# Patient Record
Sex: MALE | Race: WHITE | NOT HISPANIC OR LATINO | Employment: OTHER | URBAN - METROPOLITAN AREA
[De-identification: names, ages, dates, MRNs, and addresses within clinical notes are randomized per-mention and may not be internally consistent; named-entity substitution may affect disease eponyms.]

---

## 2017-02-06 ENCOUNTER — HISTORICAL (OUTPATIENT)
Dept: LAB | Facility: HOSPITAL | Age: 72
End: 2017-02-06

## 2017-04-24 ENCOUNTER — HISTORICAL (OUTPATIENT)
Dept: ADMINISTRATIVE | Facility: HOSPITAL | Age: 72
End: 2017-04-24

## 2017-04-28 ENCOUNTER — HISTORICAL (OUTPATIENT)
Dept: RADIOLOGY | Facility: HOSPITAL | Age: 72
End: 2017-04-28

## 2017-08-07 ENCOUNTER — HISTORICAL (OUTPATIENT)
Dept: LAB | Facility: HOSPITAL | Age: 72
End: 2017-08-07

## 2017-08-07 LAB
ALBUMIN SERPL-MCNC: 3.9 GM/DL (ref 3.4–5)
ALBUMIN/GLOB SERPL: 1.1 RATIO (ref 1.1–2)
ALP SERPL-CCNC: 56 UNIT/L (ref 46–116)
ALT SERPL-CCNC: 21 UNIT/L (ref 12–78)
AST SERPL-CCNC: 18 UNIT/L (ref 15–37)
BILIRUB SERPL-MCNC: 0.5 MG/DL (ref 0.2–1)
BILIRUBIN DIRECT+TOT PNL SERPL-MCNC: 0.12 MG/DL (ref 0–0.2)
BILIRUBIN DIRECT+TOT PNL SERPL-MCNC: 0.38 MG/DL (ref 0–0.8)
BUN SERPL-MCNC: 22 MG/DL (ref 7–18)
CALCIUM SERPL-MCNC: 10 MG/DL (ref 8.5–10.1)
CHLORIDE SERPL-SCNC: 106 MMOL/L (ref 98–107)
CO2 SERPL-SCNC: 21.7 MMOL/L (ref 21–32)
CREAT SERPL-MCNC: 1.05 MG/DL (ref 0.6–1.3)
CREAT UR-MCNC: 48.6 MG/DL (ref 30–125)
EST. AVERAGE GLUCOSE BLD GHB EST-MCNC: 128 MG/DL
GLOBULIN SER-MCNC: 3.7 GM/DL (ref 2.4–3.5)
GLUCOSE SERPL-MCNC: 112 MG/DL (ref 74–106)
HBA1C MFR BLD: 6.1 % (ref 4.5–6.2)
MICROALBUMIN UR-MCNC: 1.2 MG/DL (ref 0–3)
MICROALBUMIN/CREAT RATIO PNL UR: 24.7 MG/GM CR (ref 0–30)
POTASSIUM SERPL-SCNC: 4.5 MMOL/L (ref 3.5–5.1)
PROT SERPL-MCNC: 7.6 GM/DL (ref 6.4–8.2)
SODIUM SERPL-SCNC: 144 MMOL/L (ref 136–145)

## 2018-02-16 ENCOUNTER — HISTORICAL (OUTPATIENT)
Dept: LAB | Facility: HOSPITAL | Age: 73
End: 2018-02-16

## 2018-08-20 ENCOUNTER — HISTORICAL (OUTPATIENT)
Dept: LAB | Facility: HOSPITAL | Age: 73
End: 2018-08-20

## 2019-02-18 ENCOUNTER — HISTORICAL (OUTPATIENT)
Dept: LAB | Facility: HOSPITAL | Age: 74
End: 2019-02-18

## 2019-02-18 LAB
ABS NEUT (OLG): 3.27 X10(3)/MCL (ref 2.1–9.2)
ALBUMIN SERPL-MCNC: 3.9 GM/DL (ref 3.4–5)
ALBUMIN/GLOB SERPL: 1.1 RATIO (ref 1.1–2)
ALP SERPL-CCNC: 51 UNIT/L (ref 46–116)
ALT SERPL-CCNC: 19 UNIT/L (ref 12–78)
AST SERPL-CCNC: 17 UNIT/L (ref 15–37)
BASOPHILS # BLD AUTO: 0.1 X10(3)/MCL (ref 0–0.2)
BASOPHILS NFR BLD AUTO: 1 %
BILIRUB SERPL-MCNC: 0.5 MG/DL (ref 0.2–1)
BILIRUBIN DIRECT+TOT PNL SERPL-MCNC: 0.16 MG/DL (ref 0–0.2)
BILIRUBIN DIRECT+TOT PNL SERPL-MCNC: 0.34 MG/DL (ref 0–0.8)
BUN SERPL-MCNC: 23.4 MG/DL (ref 7–18)
CALCIUM SERPL-MCNC: 9.6 MG/DL (ref 8.5–10.1)
CHLORIDE SERPL-SCNC: 103 MMOL/L (ref 98–107)
CHOLEST SERPL-MCNC: 134 MG/DL (ref 0–200)
CHOLEST/HDLC SERPL: 1.9 {RATIO} (ref 0–5)
CO2 SERPL-SCNC: 29.3 MMOL/L (ref 21–32)
CREAT SERPL-MCNC: 1.25 MG/DL (ref 0.6–1.3)
CREAT UR-MCNC: 52.4 MG/DL (ref 30–125)
EOSINOPHIL # BLD AUTO: 1.2 X10(3)/MCL (ref 0–0.9)
EOSINOPHIL NFR BLD AUTO: 17 %
ERYTHROCYTE [DISTWIDTH] IN BLOOD BY AUTOMATED COUNT: 14 % (ref 11.5–17)
EST. AVERAGE GLUCOSE BLD GHB EST-MCNC: 117 MG/DL
GLOBULIN SER-MCNC: 3.4 GM/DL (ref 2.4–3.5)
GLUCOSE SERPL-MCNC: 110 MG/DL (ref 74–106)
HBA1C MFR BLD: 5.7 % (ref 4.5–6.2)
HCT VFR BLD AUTO: 45.6 % (ref 42–52)
HDLC SERPL-MCNC: 72 MG/DL (ref 40–60)
HGB BLD-MCNC: 14.6 GM/DL (ref 14–18)
LDLC SERPL CALC-MCNC: 53 MG/DL (ref 0–129)
LYMPHOCYTES # BLD AUTO: 1.8 X10(3)/MCL (ref 0.6–4.6)
LYMPHOCYTES NFR BLD AUTO: 26 %
MCH RBC QN AUTO: 28.2 PG (ref 27–31)
MCHC RBC AUTO-ENTMCNC: 32 GM/DL (ref 33–36)
MCV RBC AUTO: 88.2 FL (ref 80–94)
MICROALBUMIN UR-MCNC: <0.1 MG/DL (ref 0–3)
MICROALBUMIN/CREAT RATIO PNL UR: <1.9 MG/GM CR (ref 0–30)
MONOCYTES # BLD AUTO: 0.7 X10(3)/MCL (ref 0.1–1.3)
MONOCYTES NFR BLD AUTO: 10 %
NEUTROPHILS # BLD AUTO: 3.27 X10(3)/MCL (ref 1.4–7.9)
NEUTROPHILS NFR BLD AUTO: 47 %
PLATELET # BLD AUTO: 263 X10(3)/MCL (ref 130–400)
PMV BLD AUTO: 10 FL (ref 9.4–12.4)
POTASSIUM SERPL-SCNC: 4.3 MMOL/L (ref 3.5–5.1)
PROT SERPL-MCNC: 7.3 GM/DL (ref 6.4–8.2)
PSA SERPL-MCNC: 0.88 NG/ML (ref 0–4)
RBC # BLD AUTO: 5.17 X10(6)/MCL (ref 4.7–6.1)
SODIUM SERPL-SCNC: 140 MMOL/L (ref 136–145)
TRIGL SERPL-MCNC: 46 MG/DL
VLDLC SERPL CALC-MCNC: 9 MG/DL
WBC # SPEC AUTO: 7 X10(3)/MCL (ref 4.5–11.5)

## 2019-08-20 ENCOUNTER — HISTORICAL (OUTPATIENT)
Dept: LAB | Facility: HOSPITAL | Age: 74
End: 2019-08-20

## 2020-02-20 ENCOUNTER — HISTORICAL (OUTPATIENT)
Dept: LAB | Facility: HOSPITAL | Age: 75
End: 2020-02-20

## 2020-09-08 ENCOUNTER — HISTORICAL (OUTPATIENT)
Dept: LAB | Facility: HOSPITAL | Age: 75
End: 2020-09-08

## 2021-03-08 ENCOUNTER — HISTORICAL (OUTPATIENT)
Dept: LAB | Facility: HOSPITAL | Age: 76
End: 2021-03-08

## 2021-03-08 LAB
ABS NEUT (OLG): 3.43 X10(3)/MCL (ref 2.1–9.2)
ALBUMIN SERPL-MCNC: 3.7 GM/DL (ref 3.4–4.8)
ALBUMIN/GLOB SERPL: 1.3 RATIO (ref 1.1–2)
ALP SERPL-CCNC: 37 UNIT/L (ref 40–150)
ALT SERPL-CCNC: 11 UNIT/L (ref 0–55)
AST SERPL-CCNC: 13 UNIT/L (ref 5–34)
BASOPHILS # BLD AUTO: 0.1 X10(3)/MCL (ref 0–0.2)
BASOPHILS NFR BLD AUTO: 1 %
BILIRUB SERPL-MCNC: 0.7 MG/DL
BILIRUBIN DIRECT+TOT PNL SERPL-MCNC: 0.3 MG/DL (ref 0–0.5)
BILIRUBIN DIRECT+TOT PNL SERPL-MCNC: 0.4 MG/DL (ref 0–0.8)
BUN SERPL-MCNC: 20.1 MG/DL (ref 8.4–25.7)
CALCIUM SERPL-MCNC: 9.3 MG/DL (ref 8.8–10)
CHLORIDE SERPL-SCNC: 106 MMOL/L (ref 98–107)
CO2 SERPL-SCNC: 27 MMOL/L (ref 23–31)
CREAT SERPL-MCNC: 0.91 MG/DL (ref 0.73–1.18)
EOSINOPHIL # BLD AUTO: 1.2 X10(3)/MCL (ref 0–0.9)
EOSINOPHIL NFR BLD AUTO: 17 %
ERYTHROCYTE [DISTWIDTH] IN BLOOD BY AUTOMATED COUNT: 15 % (ref 11.5–17)
EST. AVERAGE GLUCOSE BLD GHB EST-MCNC: 114 MG/DL
GLOBULIN SER-MCNC: 2.8 GM/DL (ref 2.4–3.5)
GLUCOSE SERPL-MCNC: 80 MG/DL (ref 82–115)
HBA1C MFR BLD: 5.6 %
HCT VFR BLD AUTO: 47.3 % (ref 42–52)
HGB BLD-MCNC: 15 GM/DL (ref 14–18)
IMM GRANULOCYTES # BLD AUTO: 0.01 % (ref 0–0.02)
IMM GRANULOCYTES NFR BLD AUTO: 0.1 % (ref 0–0.43)
LYMPHOCYTES # BLD AUTO: 1.6 X10(3)/MCL (ref 0.6–4.6)
LYMPHOCYTES NFR BLD AUTO: 23 %
MCH RBC QN AUTO: 28.4 PG (ref 27–31)
MCHC RBC AUTO-ENTMCNC: 31.7 GM/DL (ref 33–36)
MCV RBC AUTO: 89.6 FL (ref 80–94)
MONOCYTES # BLD AUTO: 0.6 X10(3)/MCL (ref 0.1–1.3)
MONOCYTES NFR BLD AUTO: 9 %
NEUTROPHILS # BLD AUTO: 3.43 X10(3)/MCL (ref 1.4–7.9)
NEUTROPHILS NFR BLD AUTO: 50 %
PLATELET # BLD AUTO: 223 X10(3)/MCL (ref 130–400)
PMV BLD AUTO: 11.2 FL (ref 9.4–12.4)
POTASSIUM SERPL-SCNC: 4.4 MMOL/L (ref 3.5–5.1)
PROT SERPL-MCNC: 6.5 GM/DL (ref 5.8–7.6)
PSA SERPL-MCNC: 1.15 NG/ML
RBC # BLD AUTO: 5.28 X10(6)/MCL (ref 4.7–6.1)
SODIUM SERPL-SCNC: 145 MMOL/L (ref 136–145)
TSH SERPL-ACNC: 1.79 UIU/ML (ref 0.35–4.94)
WBC # SPEC AUTO: 6.9 X10(3)/MCL (ref 4.5–11.5)

## 2021-09-13 ENCOUNTER — HISTORICAL (OUTPATIENT)
Dept: LAB | Facility: HOSPITAL | Age: 76
End: 2021-09-13

## 2021-09-13 LAB
ABS NEUT (OLG): 4.38 X10(3)/MCL (ref 2.1–9.2)
ALBUMIN SERPL-MCNC: 3.5 GM/DL (ref 3.4–4.8)
ALBUMIN/GLOB SERPL: 1.2 RATIO (ref 1.1–2)
ALP SERPL-CCNC: 37 UNIT/L (ref 40–150)
ALT SERPL-CCNC: 8 UNIT/L (ref 0–55)
AST SERPL-CCNC: 13 UNIT/L (ref 5–34)
BASOPHILS # BLD AUTO: 0.1 X10(3)/MCL (ref 0–0.2)
BASOPHILS NFR BLD AUTO: 1 %
BILIRUB SERPL-MCNC: 0.8 MG/DL
BILIRUBIN DIRECT+TOT PNL SERPL-MCNC: 0.4 MG/DL (ref 0–0.5)
BILIRUBIN DIRECT+TOT PNL SERPL-MCNC: 0.4 MG/DL (ref 0–0.8)
BUN SERPL-MCNC: 22.3 MG/DL (ref 8.4–25.7)
CALCIUM SERPL-MCNC: 9.2 MG/DL (ref 8.8–10)
CHLORIDE SERPL-SCNC: 107 MMOL/L (ref 98–107)
CHOLEST SERPL-MCNC: 119 MG/DL
CHOLEST/HDLC SERPL: 2 {RATIO} (ref 0–5)
CO2 SERPL-SCNC: 24 MMOL/L (ref 23–31)
CREAT SERPL-MCNC: 0.97 MG/DL (ref 0.73–1.18)
CREAT UR-MCNC: 46.5 MG/DL (ref 58–161)
EOSINOPHIL # BLD AUTO: 1.2 X10(3)/MCL (ref 0–0.9)
EOSINOPHIL NFR BLD AUTO: 15 %
ERYTHROCYTE [DISTWIDTH] IN BLOOD BY AUTOMATED COUNT: 14.9 % (ref 11.5–17)
EST. AVERAGE GLUCOSE BLD GHB EST-MCNC: 111.2 MG/DL
GLOBULIN SER-MCNC: 3 GM/DL (ref 2.4–3.5)
GLUCOSE SERPL-MCNC: 95 MG/DL (ref 82–115)
HBA1C MFR BLD: 5.5 %
HCT VFR BLD AUTO: 45.5 % (ref 42–52)
HDLC SERPL-MCNC: 59 MG/DL (ref 35–60)
HGB BLD-MCNC: 15 GM/DL (ref 14–18)
IMM GRANULOCYTES # BLD AUTO: 0.01 % (ref 0–0.02)
IMM GRANULOCYTES NFR BLD AUTO: 0.1 % (ref 0–0.43)
LDLC SERPL CALC-MCNC: 51 MG/DL (ref 50–140)
LYMPHOCYTES # BLD AUTO: 1.6 X10(3)/MCL (ref 0.6–4.6)
LYMPHOCYTES NFR BLD AUTO: 20 %
MCH RBC QN AUTO: 28.8 PG (ref 27–31)
MCHC RBC AUTO-ENTMCNC: 33 GM/DL (ref 33–36)
MCV RBC AUTO: 87.3 FL (ref 80–94)
MICROALBUMIN UR-MCNC: 25.1 UG/ML
MICROALBUMIN/CREAT RATIO PNL UR: 54 MG/GM CR (ref 0–30)
MONOCYTES # BLD AUTO: 0.7 X10(3)/MCL (ref 0.1–1.3)
MONOCYTES NFR BLD AUTO: 9 %
NEUTROPHILS # BLD AUTO: 4.38 X10(3)/MCL (ref 1.4–7.9)
NEUTROPHILS NFR BLD AUTO: 55 %
PLATELET # BLD AUTO: 220 X10(3)/MCL (ref 130–400)
PMV BLD AUTO: 10.7 FL (ref 9.4–12.4)
POTASSIUM SERPL-SCNC: 4 MMOL/L (ref 3.5–5.1)
PROT SERPL-MCNC: 6.5 GM/DL (ref 5.8–7.6)
RBC # BLD AUTO: 5.21 X10(6)/MCL (ref 4.7–6.1)
SODIUM SERPL-SCNC: 140 MMOL/L (ref 136–145)
TRIGL SERPL-MCNC: 47 MG/DL (ref 34–140)
TSH SERPL-ACNC: 1.88 UIU/ML (ref 0.35–4.94)
VLDLC SERPL CALC-MCNC: 9 MG/DL
WBC # SPEC AUTO: 7.9 X10(3)/MCL (ref 4.5–11.5)

## 2022-02-25 ENCOUNTER — HISTORICAL (OUTPATIENT)
Dept: LAB | Facility: HOSPITAL | Age: 77
End: 2022-02-25

## 2022-02-25 LAB
ALBUMIN SERPL-MCNC: 3.5 G/DL (ref 3.4–4.8)
ALBUMIN/GLOB SERPL: 1 {RATIO} (ref 1.1–2)
ALP SERPL-CCNC: 42 U/L (ref 40–150)
ALT SERPL-CCNC: 11 U/L (ref 0–55)
AST SERPL-CCNC: 13 U/L (ref 5–34)
BILIRUB SERPL-MCNC: 0.8 MG/DL
BILIRUBIN DIRECT+TOT PNL SERPL-MCNC: 0.4 (ref 0–0.5)
BILIRUBIN DIRECT+TOT PNL SERPL-MCNC: 0.4 (ref 0–0.8)
BUN SERPL-MCNC: 16.2 MG/DL (ref 8.4–25.7)
CALCIUM SERPL-MCNC: 9.5 MG/DL (ref 8.7–10.5)
CHLORIDE SERPL-SCNC: 110 MMOL/L (ref 98–107)
CHOLEST SERPL-MCNC: 121 MG/DL
CHOLEST/HDLC SERPL: 2 {RATIO} (ref 0–5)
CO2 SERPL-SCNC: 26 MMOL/L (ref 23–31)
CREAT SERPL-MCNC: 0.94 MG/DL (ref 0.73–1.18)
EST. AVERAGE GLUCOSE BLD GHB EST-MCNC: 116.9 MG/DL
GLOBULIN SER-MCNC: 3.4 G/DL (ref 2.4–3.5)
GLUCOSE SERPL-MCNC: 99 MG/DL (ref 82–115)
HBA1C MFR BLD: 5.7 %
HDLC SERPL-MCNC: 51 MG/DL (ref 35–60)
HEMOLYSIS INTERF INDEX SERPL-ACNC: 3
ICTERIC INTERF INDEX SERPL-ACNC: 1
LDLC SERPL CALC-MCNC: 62 MG/DL (ref 50–140)
LIPEMIC INTERF INDEX SERPL-ACNC: <0
POTASSIUM SERPL-SCNC: 4.1 MMOL/L (ref 3.5–5.1)
PROT SERPL-MCNC: 6.9 G/DL (ref 5.8–7.6)
SODIUM SERPL-SCNC: 145 MMOL/L (ref 136–145)
TRIGL SERPL-MCNC: 42 MG/DL (ref 34–140)
TSH SERPL-ACNC: 2.43 M[IU]/L (ref 0.35–4.94)
VLDLC SERPL CALC-MCNC: 8 MG/DL

## 2022-04-09 ENCOUNTER — HISTORICAL (OUTPATIENT)
Dept: ADMINISTRATIVE | Facility: HOSPITAL | Age: 77
End: 2022-04-09
Payer: MEDICARE

## 2022-04-25 VITALS
DIASTOLIC BLOOD PRESSURE: 84 MMHG | OXYGEN SATURATION: 98 % | HEIGHT: 71 IN | SYSTOLIC BLOOD PRESSURE: 152 MMHG | BODY MASS INDEX: 35.28 KG/M2 | WEIGHT: 252 LBS

## 2022-09-01 DIAGNOSIS — E78.5 HYPERLIPIDEMIA, UNSPECIFIED HYPERLIPIDEMIA TYPE: Primary | ICD-10-CM

## 2022-09-01 DIAGNOSIS — I10 HYPERTENSION, UNSPECIFIED TYPE: ICD-10-CM

## 2022-09-01 DIAGNOSIS — E13.9 DIABETES MELLITUS OF OTHER TYPE WITHOUT COMPLICATION, UNSPECIFIED WHETHER LONG TERM INSULIN USE: ICD-10-CM

## 2022-09-01 DIAGNOSIS — E66.9 OBESITY, UNSPECIFIED CLASSIFICATION, UNSPECIFIED OBESITY TYPE, UNSPECIFIED WHETHER SERIOUS COMORBIDITY PRESENT: ICD-10-CM

## 2022-09-02 ENCOUNTER — TELEPHONE (OUTPATIENT)
Dept: INTERNAL MEDICINE | Facility: CLINIC | Age: 77
End: 2022-09-02
Payer: MEDICARE

## 2022-09-02 NOTE — TELEPHONE ENCOUNTER
----- Message from Aimee Paredes Patient Care Assistant sent at 9/1/2022  3:22 PM CDT -----  Regarding: RE: mae curry 9/8 @ 3  Pt. Confirmed appointment and fasting labs.  ----- Message -----  From: Alvaro Bell LPN  Sent: 9/1/2022   8:47 AM CDT  To: Aimee Paredes Patient Care Assistant  Subject: mae curry 9/8 @ 3                              Are there any outstanding tasks in patient chart? Needs fasting labs    Is there documentation of outstanding tasks in patient chart? no    Has patient been to the ER, urgent care, or another physician since last visit?    Has patient done any blood work or x-rays since last visit?

## 2022-09-06 ENCOUNTER — LAB VISIT (OUTPATIENT)
Dept: LAB | Facility: HOSPITAL | Age: 77
End: 2022-09-06
Attending: INTERNAL MEDICINE
Payer: MEDICARE

## 2022-09-06 DIAGNOSIS — E13.9 DIABETES MELLITUS OF OTHER TYPE WITHOUT COMPLICATION, UNSPECIFIED WHETHER LONG TERM INSULIN USE: ICD-10-CM

## 2022-09-06 DIAGNOSIS — E78.5 HYPERLIPIDEMIA, UNSPECIFIED HYPERLIPIDEMIA TYPE: ICD-10-CM

## 2022-09-06 DIAGNOSIS — I10 HYPERTENSION, UNSPECIFIED TYPE: ICD-10-CM

## 2022-09-06 DIAGNOSIS — E66.9 OBESITY, UNSPECIFIED CLASSIFICATION, UNSPECIFIED OBESITY TYPE, UNSPECIFIED WHETHER SERIOUS COMORBIDITY PRESENT: ICD-10-CM

## 2022-09-06 LAB
ALBUMIN SERPL-MCNC: 3.4 GM/DL (ref 3.4–4.8)
ALBUMIN/GLOB SERPL: 1 RATIO (ref 1.1–2)
ALP SERPL-CCNC: 38 UNIT/L (ref 40–150)
ALT SERPL-CCNC: 12 UNIT/L (ref 0–55)
AST SERPL-CCNC: 16 UNIT/L (ref 5–34)
BILIRUBIN DIRECT+TOT PNL SERPL-MCNC: 0.7 MG/DL
BUN SERPL-MCNC: 23.3 MG/DL (ref 8.4–25.7)
CALCIUM SERPL-MCNC: 9.4 MG/DL (ref 8.8–10)
CHLORIDE SERPL-SCNC: 106 MMOL/L (ref 98–107)
CHOLEST SERPL-MCNC: 134 MG/DL
CHOLEST/HDLC SERPL: 2 {RATIO} (ref 0–5)
CO2 SERPL-SCNC: 28 MMOL/L (ref 23–31)
CREAT SERPL-MCNC: 1.07 MG/DL (ref 0.73–1.18)
EST. AVERAGE GLUCOSE BLD GHB EST-MCNC: 114 MG/DL
GFR SERPLBLD CREATININE-BSD FMLA CKD-EPI: >60 MLS/MIN/1.73/M2
GLOBULIN SER-MCNC: 3.5 GM/DL (ref 2.4–3.5)
GLUCOSE SERPL-MCNC: 91 MG/DL (ref 82–115)
HBA1C MFR BLD: 5.6 %
HDLC SERPL-MCNC: 57 MG/DL (ref 35–60)
LDLC SERPL CALC-MCNC: 66 MG/DL (ref 50–140)
POTASSIUM SERPL-SCNC: 3.8 MMOL/L (ref 3.5–5.1)
PROT SERPL-MCNC: 6.9 GM/DL (ref 5.8–7.6)
SODIUM SERPL-SCNC: 143 MMOL/L (ref 136–145)
TRIGL SERPL-MCNC: 55 MG/DL (ref 34–140)
TSH SERPL-ACNC: 3.57 UIU/ML (ref 0.35–4.94)
VLDLC SERPL CALC-MCNC: 11 MG/DL

## 2022-09-06 PROCEDURE — 80053 COMPREHEN METABOLIC PANEL: CPT

## 2022-09-06 PROCEDURE — 80061 LIPID PANEL: CPT

## 2022-09-06 PROCEDURE — 83036 HEMOGLOBIN GLYCOSYLATED A1C: CPT

## 2022-09-06 PROCEDURE — 84443 ASSAY THYROID STIM HORMONE: CPT

## 2022-09-06 PROCEDURE — 36415 COLL VENOUS BLD VENIPUNCTURE: CPT

## 2022-09-08 ENCOUNTER — OFFICE VISIT (OUTPATIENT)
Dept: INTERNAL MEDICINE | Facility: CLINIC | Age: 77
End: 2022-09-08
Payer: MEDICARE

## 2022-09-08 VITALS
BODY MASS INDEX: 36.94 KG/M2 | WEIGHT: 258 LBS | OXYGEN SATURATION: 98 % | HEIGHT: 70 IN | HEART RATE: 46 BPM | SYSTOLIC BLOOD PRESSURE: 132 MMHG | DIASTOLIC BLOOD PRESSURE: 82 MMHG

## 2022-09-08 DIAGNOSIS — E11.9 TYPE 2 DIABETES MELLITUS WITHOUT COMPLICATION, WITHOUT LONG-TERM CURRENT USE OF INSULIN: ICD-10-CM

## 2022-09-08 DIAGNOSIS — R60.0 LEG EDEMA: ICD-10-CM

## 2022-09-08 DIAGNOSIS — I10 HYPERTENSION, UNSPECIFIED TYPE: Primary | ICD-10-CM

## 2022-09-08 DIAGNOSIS — I73.9 PAD (PERIPHERAL ARTERY DISEASE): ICD-10-CM

## 2022-09-08 DIAGNOSIS — E78.5 HYPERLIPIDEMIA, UNSPECIFIED HYPERLIPIDEMIA TYPE: ICD-10-CM

## 2022-09-08 DIAGNOSIS — I25.10 CORONARY ARTERY DISEASE, UNSPECIFIED VESSEL OR LESION TYPE, UNSPECIFIED WHETHER ANGINA PRESENT, UNSPECIFIED WHETHER NATIVE OR TRANSPLANTED HEART: ICD-10-CM

## 2022-09-08 PROCEDURE — 1126F AMNT PAIN NOTED NONE PRSNT: CPT | Mod: CPTII,,, | Performed by: INTERNAL MEDICINE

## 2022-09-08 PROCEDURE — 3288F PR FALLS RISK ASSESSMENT DOCUMENTED: ICD-10-PCS | Mod: CPTII,,, | Performed by: INTERNAL MEDICINE

## 2022-09-08 PROCEDURE — 99214 PR OFFICE/OUTPT VISIT, EST, LEVL IV, 30-39 MIN: ICD-10-PCS | Mod: ,,, | Performed by: INTERNAL MEDICINE

## 2022-09-08 PROCEDURE — 1159F PR MEDICATION LIST DOCUMENTED IN MEDICAL RECORD: ICD-10-PCS | Mod: CPTII,,, | Performed by: INTERNAL MEDICINE

## 2022-09-08 PROCEDURE — 3079F DIAST BP 80-89 MM HG: CPT | Mod: CPTII,,, | Performed by: INTERNAL MEDICINE

## 2022-09-08 PROCEDURE — 1159F MED LIST DOCD IN RCRD: CPT | Mod: CPTII,,, | Performed by: INTERNAL MEDICINE

## 2022-09-08 PROCEDURE — 99214 OFFICE O/P EST MOD 30 MIN: CPT | Mod: ,,, | Performed by: INTERNAL MEDICINE

## 2022-09-08 PROCEDURE — 1160F RVW MEDS BY RX/DR IN RCRD: CPT | Mod: CPTII,,, | Performed by: INTERNAL MEDICINE

## 2022-09-08 PROCEDURE — 3075F SYST BP GE 130 - 139MM HG: CPT | Mod: CPTII,,, | Performed by: INTERNAL MEDICINE

## 2022-09-08 PROCEDURE — 1101F PR PT FALLS ASSESS DOC 0-1 FALLS W/OUT INJ PAST YR: ICD-10-PCS | Mod: CPTII,,, | Performed by: INTERNAL MEDICINE

## 2022-09-08 PROCEDURE — 3075F PR MOST RECENT SYSTOLIC BLOOD PRESS GE 130-139MM HG: ICD-10-PCS | Mod: CPTII,,, | Performed by: INTERNAL MEDICINE

## 2022-09-08 PROCEDURE — 1160F PR REVIEW ALL MEDS BY PRESCRIBER/CLIN PHARMACIST DOCUMENTED: ICD-10-PCS | Mod: CPTII,,, | Performed by: INTERNAL MEDICINE

## 2022-09-08 PROCEDURE — 3288F FALL RISK ASSESSMENT DOCD: CPT | Mod: CPTII,,, | Performed by: INTERNAL MEDICINE

## 2022-09-08 PROCEDURE — 1126F PR PAIN SEVERITY QUANTIFIED, NO PAIN PRESENT: ICD-10-PCS | Mod: CPTII,,, | Performed by: INTERNAL MEDICINE

## 2022-09-08 PROCEDURE — 3079F PR MOST RECENT DIASTOLIC BLOOD PRESSURE 80-89 MM HG: ICD-10-PCS | Mod: CPTII,,, | Performed by: INTERNAL MEDICINE

## 2022-09-08 PROCEDURE — 1101F PT FALLS ASSESS-DOCD LE1/YR: CPT | Mod: CPTII,,, | Performed by: INTERNAL MEDICINE

## 2022-09-08 RX ORDER — FINASTERIDE 5 MG/1
TABLET, FILM COATED ORAL
COMMUNITY
Start: 2021-09-23 | End: 2022-10-05

## 2022-09-08 RX ORDER — AMLODIPINE BESYLATE 5 MG/1
TABLET ORAL
COMMUNITY
Start: 2022-03-02 | End: 2023-02-06

## 2022-09-08 RX ORDER — BENAZEPRIL HYDROCHLORIDE 40 MG/1
TABLET ORAL
COMMUNITY
Start: 2021-09-23 | End: 2022-10-05

## 2022-09-08 RX ORDER — CILOSTAZOL 100 MG/1
100 TABLET ORAL 2 TIMES DAILY
COMMUNITY
Start: 2022-09-02 | End: 2022-10-05

## 2022-09-08 RX ORDER — PRAVASTATIN SODIUM 40 MG/1
TABLET ORAL
COMMUNITY
Start: 2021-09-23 | End: 2022-10-05

## 2022-09-08 RX ORDER — FUROSEMIDE 20 MG/1
20 TABLET ORAL DAILY
Qty: 30 TABLET | Refills: 11 | Status: SHIPPED | OUTPATIENT
Start: 2022-09-08 | End: 2023-08-07

## 2022-09-08 NOTE — PROGRESS NOTES
Patient ID: Alcides Rosario is a 77 y.o. male.    Chief Complaint: 6 Month Follow Up (6 MO F/U)      HPI:   Patient presents here today for above reason.     Alcides is a 75-year-old gentleman here for follow up History of BPH diabetes mellitus type 2 non-insulin-dependent history hypertension hyperlipidemia and also a history of a stroke ×2 in the past underwent interventional radiology embolectomy. Also with a history of claudication been on Pletal. Overall doing okay had blood work done is here for review. His age-appropriate screening is up-to-date. c/o left knee pain, mostly in back of knee. better with advil.  labs wnl.   flu vax refuses. pneumonia refuses. MRI shoulder with bursitis and tendonitis, PT was ordered but pt improved  lost 26#, diet exercise, has a garden now.   labs wnl.  screening uptodate  c-scope done wnl, couple of polyps  today : no change since last visit.  covid vaccinated  Off metformin, a1c controlled  C/o LE swelling, on norvasc 5mg, new findings. On plavix and pletal        The patient's Health Maintenance was reviewed and the following appears to be due at this time:   Health Maintenance Due   Topic Date Due    Hepatitis C Screening  Never done    TETANUS VACCINE  Never done    Shingles Vaccine (1 of 2) Never done    Pneumococcal Vaccines (Age 65+) (1 - PCV) Never done    COVID-19 Vaccine (4 - Booster for Moderna series) 02/28/2022    Influenza Vaccine (1) Never done        Past Medical History:  History reviewed. No pertinent past medical history.  History reviewed. No pertinent surgical history.  Review of patient's allergies indicates:  No Known Allergies  Current Outpatient Medications on File Prior to Visit   Medication Sig Dispense Refill    amLODIPine (NORVASC) 5 MG tablet   See Instructions, TAKE 1 TABLET BY MOUTH DAILY (FOR BLOOD PRESSURE), # 30 tab(s), 11 Refill(s), Pharmacy: [facility], cm, Height/Length Dosing, 03/02/22 11:22:00 CST, 113.4, kg, Weight  Dosing, 03/02/22 11:22:00 CST      benazepriL (LOTENSIN) 40 MG tablet   See Instructions, TAKE 1 TABLET BY MOUTH DAILY, # 30 tab(s), 11 Refill(s), Pharmacy: [facility], 180, cm, Height/Length Dosing, 09/15/21 13:52:00 CDT, 114.3, kg, Weight Dosing, 09/15/21 13:52:00 CDT      finasteride (PROSCAR) 5 mg tablet   See Instructions, TAKE 1 TABLET BY MOUTH DAILY, # 30 tab(s), 11 Refill(s), Pharmacy: [facility], 180, cm, Height/Length Dosing, 09/15/21 13:52:00 CDT, 114.3, kg, Weight Dosing, 09/15/21 13:52:00 CDT      pravastatin (PRAVACHOL) 40 MG tablet   See Instructions, TAKE 1 TABLET BY MOUTH DAILY, # 30 tab(s), 11 Refill(s), Pharmacy: [facility], 180, cm, Height/Length Dosing, 09/15/21 13:52:00 CDT, 114.3, kg, Weight Dosing, 09/15/21 13:52:00 CDT      cilostazoL (PLETAL) 100 MG Tab Take 100 mg by mouth 2 (two) times daily.      clopidogreL (PLAVIX) 75 mg tablet TAKE 1 TABLET BY MOUTH DAILY FOR ANTI CLOTTING 30 tablet 11    fenofibrate (TRICOR) 145 MG tablet TAKE 1 TABLET BY MOUTH DAILY 30 tablet 5     No current facility-administered medications on file prior to visit.     Social History     Socioeconomic History    Marital status:    Tobacco Use    Smoking status: Former     Types: Cigarettes     Passive exposure: Never    Smokeless tobacco: Never   Substance and Sexual Activity    Alcohol use: Never    Drug use: Never    Sexual activity: Never     History reviewed. No pertinent family history.    ROS:   Review of Systems  Constitutional: No weight gain, No fever, No chills, No fatigue.   Eyes: No blurring, No visual disturbances.   Ear/Nose/Mouth/Throat: No decreased hearing, No ear pain, No nasal congestion, No sore throat.   Respiratory: No shortness of breath, No cough, No wheezing.   Cardiovascular: No chest pain, No palpitations, No peripheral edema.   Gastrointestinal: No nausea, No vomiting, No diarrhea, No constipation, No abdominal pain.   Genitourinary: No  "dysuria, No hematuria.   Hematology/Lymphatics: No bruising tendency, No bleeding tendency, No swollen lymph glands.   Endocrine: No excessive thirst, No polyuria, No excessive hunger.   Musculoskeletal: No joint pain, No muscle pain, No decreased range of motion.   Integumentary: No rash, No pruritus.   Neurologic: No abnormal balance, No confusion, No headache.   Psychiatric: No anxiety, No depression, Not suicidal, No hallucinations.      Vitals/PE:   /82 (BP Location: Right arm, Patient Position: Sitting, BP Method: Medium (Manual))   Pulse (!) 46   Ht 5' 10" (1.778 m)   Wt 117 kg (258 lb)   SpO2 98%   BMI 37.02 kg/m²   Physical Exam    General: Alert and oriented, No acute distress.   Eye: Normal conjunctiva without exudate.  HENMT: Normocephalic/AT, Normal hearing, Oral mucosa is moist and pink   Neck: No goiter visualized.   Respiratory: Lungs CTAB, Respirations are non-labored, Breath sounds are equal, Symmetrical chest wall expansion.  Cardiovascular: Normal rate, Regular rhythm, No murmur, No edema.   Gastrointestinal: Non-distended.   Genitourinary: Deferred.  Musculoskeletal: Normal ROM, Normal gait, No deformities or amputations.  Integumentary: Warm, Dry, Intact. No diaphoresis, or flushing.  Neurologic: No focal deficits, Cranial Nerves II-XII are grossly intact.   Psychiatric: Cooperative, Appropriate mood & affect, Normal judgment, Non-suicidal.    Assessment/Plan:       1. Hypertension, unspecified type    2. Hyperlipidemia, unspecified hyperlipidemia type    3. Coronary artery disease, unspecified vessel or lesion type, unspecified whether angina present, unspecified whether native or transplanted heart    4. PAD (peripheral artery disease)    5. Leg edema    6. Type 2 diabetes mellitus without complication, without long-term current use of insulin      Plan:lasix 20mg daily  Will send to Dr Bravo  in the near future if nto improved  Labs wnl  Cpmm  Off of metformin and A1c " controlled.  Cpmm  Rtc 6mo    Education and counseling done face to face regarding medical conditions and plan. Contact office if new symptoms develop. Should any symptoms ever significantly worsen seek emergency medical attention/go to ER. Follow up at least yearly for wellness or sooner PRN. Nurse to call patient with any results. The patient is receptive, expresses understanding and is agreeable to plan. All questions have been answered.    No follow-ups on file.      2

## 2022-11-07 ENCOUNTER — TELEPHONE (OUTPATIENT)
Dept: INTERNAL MEDICINE | Facility: CLINIC | Age: 77
End: 2022-11-07
Payer: MEDICARE

## 2022-11-07 DIAGNOSIS — R60.0 LEG EDEMA: Primary | ICD-10-CM

## 2022-11-11 ENCOUNTER — HOSPITAL ENCOUNTER (OUTPATIENT)
Dept: RADIOLOGY | Facility: HOSPITAL | Age: 77
Discharge: HOME | End: 2022-11-11
Attending: INTERNAL MEDICINE
Payer: MEDICARE

## 2022-11-11 DIAGNOSIS — R60.0 LEG EDEMA: ICD-10-CM

## 2022-11-11 PROCEDURE — 93970 EXTREMITY STUDY: CPT | Mod: TC

## 2022-11-15 ENCOUNTER — TELEPHONE (OUTPATIENT)
Dept: INTERNAL MEDICINE | Facility: CLINIC | Age: 77
End: 2022-11-15
Payer: MEDICARE

## 2022-11-15 DIAGNOSIS — I25.10 CORONARY ARTERY DISEASE, UNSPECIFIED VESSEL OR LESION TYPE, UNSPECIFIED WHETHER ANGINA PRESENT, UNSPECIFIED WHETHER NATIVE OR TRANSPLANTED HEART: Primary | ICD-10-CM

## 2022-11-15 NOTE — TELEPHONE ENCOUNTER
----- Message from Maycol Mcleod II, MD sent at 11/14/2022  5:14 PM CST -----  No evidence of DVT on ultrasound

## 2023-02-06 DIAGNOSIS — I10 HYPERTENSION, UNSPECIFIED TYPE: Primary | ICD-10-CM

## 2023-02-06 RX ORDER — AMLODIPINE BESYLATE 5 MG/1
TABLET ORAL
Qty: 30 TABLET | Refills: 11 | Status: SHIPPED | OUTPATIENT
Start: 2023-02-06 | End: 2023-02-06

## 2023-02-28 ENCOUNTER — TELEPHONE (OUTPATIENT)
Dept: INTERNAL MEDICINE | Facility: CLINIC | Age: 78
End: 2023-02-28
Payer: MEDICARE

## 2023-02-28 NOTE — TELEPHONE ENCOUNTER
----- Message from Alvaro Bell LPN sent at 2/28/2023  7:53 AM CST -----  Regarding: mae curry 3/8 @10:20  Are there any outstanding tasks in patient chart? Needs fasting labs    Is there documentation of outstanding tasks in patient chart? no    Has patient been to the ER, urgent care, or another physician since last visit?    Has patient done any blood work or x-rays since last visit?

## 2023-03-06 ENCOUNTER — LAB VISIT (OUTPATIENT)
Dept: LAB | Facility: HOSPITAL | Age: 78
End: 2023-03-06
Attending: INTERNAL MEDICINE
Payer: MEDICARE

## 2023-03-06 DIAGNOSIS — R60.0 LEG EDEMA: ICD-10-CM

## 2023-03-06 DIAGNOSIS — I25.10 CORONARY ARTERY DISEASE, UNSPECIFIED VESSEL OR LESION TYPE, UNSPECIFIED WHETHER ANGINA PRESENT, UNSPECIFIED WHETHER NATIVE OR TRANSPLANTED HEART: ICD-10-CM

## 2023-03-06 DIAGNOSIS — I73.9 PAD (PERIPHERAL ARTERY DISEASE): ICD-10-CM

## 2023-03-06 DIAGNOSIS — E11.9 TYPE 2 DIABETES MELLITUS WITHOUT COMPLICATION, WITHOUT LONG-TERM CURRENT USE OF INSULIN: ICD-10-CM

## 2023-03-06 DIAGNOSIS — E78.5 HYPERLIPIDEMIA, UNSPECIFIED HYPERLIPIDEMIA TYPE: ICD-10-CM

## 2023-03-06 DIAGNOSIS — I10 HYPERTENSION, UNSPECIFIED TYPE: ICD-10-CM

## 2023-03-06 LAB
ALBUMIN SERPL-MCNC: 3.4 G/DL (ref 3.4–4.8)
ALBUMIN/GLOB SERPL: 0.9 RATIO (ref 1.1–2)
ALP SERPL-CCNC: 44 UNIT/L (ref 40–150)
ALT SERPL-CCNC: 13 UNIT/L (ref 0–55)
AST SERPL-CCNC: 14 UNIT/L (ref 5–34)
BASOPHILS # BLD AUTO: 0.04 X10(3)/MCL (ref 0–0.2)
BASOPHILS NFR BLD AUTO: 0.5 %
BILIRUBIN DIRECT+TOT PNL SERPL-MCNC: 0.6 MG/DL
BUN SERPL-MCNC: 23.6 MG/DL (ref 8.4–25.7)
CALCIUM SERPL-MCNC: 9.2 MG/DL (ref 8.8–10)
CHLORIDE SERPL-SCNC: 104 MMOL/L (ref 98–107)
CHOLEST SERPL-MCNC: 122 MG/DL
CHOLEST/HDLC SERPL: 3 {RATIO} (ref 0–5)
CO2 SERPL-SCNC: 27 MMOL/L (ref 23–31)
CREAT SERPL-MCNC: 1.02 MG/DL (ref 0.73–1.18)
CREAT UR-MCNC: 90.3 MG/DL (ref 63–166)
EOSINOPHIL # BLD AUTO: 0.94 X10(3)/MCL (ref 0–0.9)
EOSINOPHIL NFR BLD AUTO: 12.7 %
ERYTHROCYTE [DISTWIDTH] IN BLOOD BY AUTOMATED COUNT: 14.9 % (ref 11.5–17)
EST. AVERAGE GLUCOSE BLD GHB EST-MCNC: 114 MG/DL
GFR SERPLBLD CREATININE-BSD FMLA CKD-EPI: >60 MLS/MIN/1.73/M2
GLOBULIN SER-MCNC: 3.8 GM/DL (ref 2.4–3.5)
GLUCOSE SERPL-MCNC: 91 MG/DL (ref 82–115)
HBA1C MFR BLD: 5.6 %
HCT VFR BLD AUTO: 51.5 % (ref 42–52)
HDLC SERPL-MCNC: 44 MG/DL (ref 35–60)
HGB BLD-MCNC: 16.2 G/DL (ref 14–18)
IMM GRANULOCYTES # BLD AUTO: 0.01 X10(3)/MCL (ref 0–0.04)
IMM GRANULOCYTES NFR BLD AUTO: 0.1 %
LDLC SERPL CALC-MCNC: 64 MG/DL (ref 50–140)
LYMPHOCYTES # BLD AUTO: 1.74 X10(3)/MCL (ref 0.6–4.6)
LYMPHOCYTES NFR BLD AUTO: 23.5 %
MCH RBC QN AUTO: 27.3 PG
MCHC RBC AUTO-ENTMCNC: 31.5 G/DL (ref 33–36)
MCV RBC AUTO: 86.7 FL (ref 80–94)
MICROALBUMIN UR-MCNC: 111.8 UG/ML
MICROALBUMIN/CREAT RATIO PNL UR: 123.8 MG/GM CR (ref 0–30)
MONOCYTES # BLD AUTO: 0.58 X10(3)/MCL (ref 0.1–1.3)
MONOCYTES NFR BLD AUTO: 7.8 %
NEUTROPHILS # BLD AUTO: 4.11 X10(3)/MCL (ref 2.1–9.2)
NEUTROPHILS NFR BLD AUTO: 55.4 %
PLATELET # BLD AUTO: 283 X10(3)/MCL (ref 130–400)
PMV BLD AUTO: 10.5 FL (ref 7.4–10.4)
POTASSIUM SERPL-SCNC: 4 MMOL/L (ref 3.5–5.1)
PROT SERPL-MCNC: 7.2 GM/DL (ref 5.8–7.6)
RBC # BLD AUTO: 5.94 X10(6)/MCL (ref 4.7–6.1)
SODIUM SERPL-SCNC: 140 MMOL/L (ref 136–145)
TRIGL SERPL-MCNC: 68 MG/DL (ref 34–140)
TSH SERPL-ACNC: 2.3 UIU/ML (ref 0.35–4.94)
VLDLC SERPL CALC-MCNC: 14 MG/DL
WBC # SPEC AUTO: 7.4 X10(3)/MCL (ref 4.5–11.5)

## 2023-03-06 PROCEDURE — 80061 LIPID PANEL: CPT

## 2023-03-06 PROCEDURE — 84443 ASSAY THYROID STIM HORMONE: CPT

## 2023-03-06 PROCEDURE — 83036 HEMOGLOBIN GLYCOSYLATED A1C: CPT

## 2023-03-06 PROCEDURE — 80053 COMPREHEN METABOLIC PANEL: CPT

## 2023-03-06 PROCEDURE — 82043 UR ALBUMIN QUANTITATIVE: CPT

## 2023-03-06 PROCEDURE — 85025 COMPLETE CBC W/AUTO DIFF WBC: CPT

## 2023-03-06 PROCEDURE — 36415 COLL VENOUS BLD VENIPUNCTURE: CPT

## 2023-03-08 ENCOUNTER — OFFICE VISIT (OUTPATIENT)
Dept: INTERNAL MEDICINE | Facility: CLINIC | Age: 78
End: 2023-03-08
Payer: MEDICARE

## 2023-03-08 VITALS
DIASTOLIC BLOOD PRESSURE: 72 MMHG | OXYGEN SATURATION: 96 % | HEIGHT: 70 IN | SYSTOLIC BLOOD PRESSURE: 138 MMHG | RESPIRATION RATE: 18 BRPM | BODY MASS INDEX: 36.79 KG/M2 | HEART RATE: 44 BPM | WEIGHT: 257 LBS

## 2023-03-08 DIAGNOSIS — I10 HYPERTENSION, UNSPECIFIED TYPE: Primary | ICD-10-CM

## 2023-03-08 DIAGNOSIS — I25.10 CORONARY ARTERY DISEASE, UNSPECIFIED VESSEL OR LESION TYPE, UNSPECIFIED WHETHER ANGINA PRESENT, UNSPECIFIED WHETHER NATIVE OR TRANSPLANTED HEART: ICD-10-CM

## 2023-03-08 DIAGNOSIS — E78.5 HYPERLIPIDEMIA, UNSPECIFIED HYPERLIPIDEMIA TYPE: ICD-10-CM

## 2023-03-08 DIAGNOSIS — I48.20 CHRONIC A-FIB: ICD-10-CM

## 2023-03-08 DIAGNOSIS — I73.9 PAD (PERIPHERAL ARTERY DISEASE): ICD-10-CM

## 2023-03-08 DIAGNOSIS — E66.01 SEVERE OBESITY (BMI 35.0-39.9) WITH COMORBIDITY: ICD-10-CM

## 2023-03-08 DIAGNOSIS — E11.9 TYPE 2 DIABETES MELLITUS WITHOUT COMPLICATION, WITHOUT LONG-TERM CURRENT USE OF INSULIN: ICD-10-CM

## 2023-03-08 DIAGNOSIS — R60.0 LEG EDEMA: ICD-10-CM

## 2023-03-08 PROCEDURE — 99214 OFFICE O/P EST MOD 30 MIN: CPT | Mod: ,,, | Performed by: INTERNAL MEDICINE

## 2023-03-08 PROCEDURE — 1101F PT FALLS ASSESS-DOCD LE1/YR: CPT | Mod: CPTII,,, | Performed by: INTERNAL MEDICINE

## 2023-03-08 PROCEDURE — 1159F MED LIST DOCD IN RCRD: CPT | Mod: CPTII,,, | Performed by: INTERNAL MEDICINE

## 2023-03-08 PROCEDURE — 3075F PR MOST RECENT SYSTOLIC BLOOD PRESS GE 130-139MM HG: ICD-10-PCS | Mod: CPTII,,, | Performed by: INTERNAL MEDICINE

## 2023-03-08 PROCEDURE — 3078F DIAST BP <80 MM HG: CPT | Mod: CPTII,,, | Performed by: INTERNAL MEDICINE

## 2023-03-08 PROCEDURE — 3288F PR FALLS RISK ASSESSMENT DOCUMENTED: ICD-10-PCS | Mod: CPTII,,, | Performed by: INTERNAL MEDICINE

## 2023-03-08 PROCEDURE — 99214 PR OFFICE/OUTPT VISIT, EST, LEVL IV, 30-39 MIN: ICD-10-PCS | Mod: ,,, | Performed by: INTERNAL MEDICINE

## 2023-03-08 PROCEDURE — 3288F FALL RISK ASSESSMENT DOCD: CPT | Mod: CPTII,,, | Performed by: INTERNAL MEDICINE

## 2023-03-08 PROCEDURE — 3078F PR MOST RECENT DIASTOLIC BLOOD PRESSURE < 80 MM HG: ICD-10-PCS | Mod: CPTII,,, | Performed by: INTERNAL MEDICINE

## 2023-03-08 PROCEDURE — 1101F PR PT FALLS ASSESS DOC 0-1 FALLS W/OUT INJ PAST YR: ICD-10-PCS | Mod: CPTII,,, | Performed by: INTERNAL MEDICINE

## 2023-03-08 PROCEDURE — 1159F PR MEDICATION LIST DOCUMENTED IN MEDICAL RECORD: ICD-10-PCS | Mod: CPTII,,, | Performed by: INTERNAL MEDICINE

## 2023-03-08 PROCEDURE — 3075F SYST BP GE 130 - 139MM HG: CPT | Mod: CPTII,,, | Performed by: INTERNAL MEDICINE

## 2023-03-08 RX ORDER — APIXABAN 5 MG/1
5 TABLET, FILM COATED ORAL 2 TIMES DAILY
Status: ON HOLD | COMMUNITY
Start: 2023-03-07 | End: 2023-04-01 | Stop reason: HOSPADM

## 2023-03-08 RX ORDER — SACUBITRIL AND VALSARTAN 24; 26 MG/1; MG/1
1 TABLET, FILM COATED ORAL 2 TIMES DAILY
COMMUNITY
Start: 2023-01-12 | End: 2024-03-06

## 2023-03-08 NOTE — PROGRESS NOTES
Patient ID: Alcides Rosario is a 77 y.o. male.    Chief Complaint: Follow-up (6 month f/u, labs 3/6/Dr. Bravo will proceed with pacemaker 3/31)      HPI:   Patient presents here today for above reason.     Alcides is a 69-year-old gentleman presenting today in follow-up.  Last visit was having some lower extremity edema again on amlodipine.  Was sent over to Cardiology for evaluation they performed a full workup including EKG PET scan is echocardiogram.  Was found to have new onset AFib with elevated chads Vasc score was placed on Eliquis.  Also ultrasound showed decompensated EF placed on Entresto.  Blood pressures are stable he was placed on Coreg as well but could not tolerate it because of bradycardia.  Then now he is scheduled for a pacemaker.  He was taken off his Pletal benazepril and amlodipine he is doing better in that regard.  He had blood work done here for review labs all within normal limits age-appropriate screening up-to-date here for follow-up    The patient's Health Maintenance was reviewed and the following appears to be due at this time:   Health Maintenance Due   Topic Date Due    Hepatitis C Screening  Never done    TETANUS VACCINE  Never done    Shingles Vaccine (1 of 2) Never done    Pneumococcal Vaccines (Age 65+) (1 - PCV) Never done    Influenza Vaccine (1) Never done        Past Medical History:  History reviewed. No pertinent past medical history.  History reviewed. No pertinent surgical history.  Review of patient's allergies indicates:  No Known Allergies  Current Outpatient Medications on File Prior to Visit   Medication Sig Dispense Refill    clopidogreL (PLAVIX) 75 mg tablet TAKE 1 TABLET BY MOUTH DAILY FOR ANTI CLOTTING 30 tablet 11    ELIQUIS 5 mg Tab Take 5 mg by mouth 2 (two) times daily.      ENTRESTO 24-26 mg per tablet Take 1 tablet by mouth 2 (two) times daily.      fenofibrate (TRICOR) 145 MG tablet TAKE 1 TABLET BY MOUTH DAILY 30 tablet 5    finasteride (PROSCAR) 5 mg tablet  "TAKE 1 TABLET BY MOUTH DAILY 30 tablet 11    furosemide (LASIX) 20 MG tablet Take 1 tablet (20 mg total) by mouth once daily. 30 tablet 11    pravastatin (PRAVACHOL) 40 MG tablet TAKE 1 TABLET BY MOUTH DAILY 30 tablet 11    [DISCONTINUED] benazepriL (LOTENSIN) 40 MG tablet TAKE 1 TABLET BY MOUTH DAILY (Patient not taking: Reported on 3/8/2023) 30 tablet 11    [DISCONTINUED] cilostazoL (PLETAL) 100 MG Tab TAKE 1 TABLET BY MOUTH TWICE A DAY (Patient not taking: Reported on 3/8/2023) 60 tablet 11     No current facility-administered medications on file prior to visit.     Social History     Socioeconomic History    Marital status:    Tobacco Use    Smoking status: Former     Types: Cigarettes     Passive exposure: Never    Smokeless tobacco: Never   Substance and Sexual Activity    Alcohol use: Never    Drug use: Never    Sexual activity: Never     History reviewed. No pertinent family history.    ROS:   Comprehensive review of systems was performed and is negative except as noted above    Vitals/PE:   /72 (BP Location: Left arm, Patient Position: Sitting)   Pulse (!) 44   Resp 18   Ht 5' 10" (1.778 m)   Wt 116.6 kg (257 lb)   SpO2 96%   BMI 36.88 kg/m²   Physical Exam    General: Alert and oriented, No acute distress.   Eye: Normal conjunctiva without exudate.  HENMT: Normocephalic/AT, Normal hearing, Oral mucosa is moist and pink   Neck: No goiter visualized.   Respiratory: Lungs CTAB, Respirations are non-labored, Breath sounds are equal, Symmetrical chest wall expansion.  Cardiovascular: Normal rate, Regular rhythm, No murmur, No edema.   Gastrointestinal: Non-distended.   Genitourinary: Deferred.  Musculoskeletal: Normal ROM, Normal gait, No deformities or amputations.  Integumentary: Warm, Dry, Intact. No diaphoresis, or flushing.  Neurologic: No focal deficits, Cranial Nerves II-XII are grossly intact.   Psychiatric: Cooperative, Appropriate mood & affect, Normal judgment, " Non-suicidal.    Assessment/Plan:       1. Hypertension, unspecified type    2. Hyperlipidemia, unspecified hyperlipidemia type    3. Coronary artery disease, unspecified vessel or lesion type, unspecified whether angina present, unspecified whether native or transplanted heart    4. PAD (peripheral artery disease)    5. Leg edema    6. Chronic a-fib    7. Type 2 diabetes mellitus without complication, without long-term current use of insulin    8. Severe obesity (BMI 35.0-39.9) with comorbidity         Plan:  Labs wnl  Now off amlodipine  Neg US  Seen by cardio, workup shows new afib and chf.  Now on eliquis and entresto.  Couldn't tolerate bb.  Scheduled for PPM  Screening uptodate  Edema improved    Education and counseling done face to face regarding medical conditions and plan. Contact office if new symptoms develop. Should any symptoms ever significantly worsen seek emergency medical attention/go to ER. Follow up at least yearly for wellness or sooner PRN. Nurse to call patient with any results. The patient is receptive, expresses understanding and is agreeable to plan. All questions have been answered.    No follow-ups on file.

## 2023-03-31 ENCOUNTER — HOSPITAL ENCOUNTER (OUTPATIENT)
Facility: HOSPITAL | Age: 78
End: 2023-03-31
Attending: INTERNAL MEDICINE | Admitting: INTERNAL MEDICINE
Payer: MEDICARE

## 2023-03-31 ENCOUNTER — HOSPITAL ENCOUNTER (INPATIENT)
Facility: HOSPITAL | Age: 78
LOS: 1 days | Discharge: HOME | DRG: 921 | End: 2023-04-01
Attending: INTERNAL MEDICINE | Admitting: INTERNAL MEDICINE
Payer: MEDICARE

## 2023-03-31 VITALS
RESPIRATION RATE: 21 BRPM | SYSTOLIC BLOOD PRESSURE: 162 MMHG | HEART RATE: 64 BPM | HEIGHT: 72 IN | WEIGHT: 248.88 LBS | BODY MASS INDEX: 33.71 KG/M2 | OXYGEN SATURATION: 97 % | DIASTOLIC BLOOD PRESSURE: 86 MMHG | TEMPERATURE: 98 F

## 2023-03-31 DIAGNOSIS — I48.19 ATRIAL FIBRILLATION, PERSISTENT: ICD-10-CM

## 2023-03-31 DIAGNOSIS — I49.9 ARRHYTHMIA: ICD-10-CM

## 2023-03-31 DIAGNOSIS — T82.9XXA PACEMAKER COMPLICATIONS: ICD-10-CM

## 2023-03-31 DIAGNOSIS — I48.20 CHRONIC A-FIB: Primary | ICD-10-CM

## 2023-03-31 DIAGNOSIS — I49.5 CORONARY SINUS RHYTHM DISORDER: ICD-10-CM

## 2023-03-31 PROCEDURE — 25000003 PHARM REV CODE 250: Performed by: INTERNAL MEDICINE

## 2023-03-31 PROCEDURE — 25000003 PHARM REV CODE 250

## 2023-03-31 PROCEDURE — C1898 LEAD, PMKR, OTHER THAN TRANS: HCPCS | Performed by: INTERNAL MEDICINE

## 2023-03-31 PROCEDURE — 11000001 HC ACUTE MED/SURG PRIVATE ROOM

## 2023-03-31 PROCEDURE — 93005 ELECTROCARDIOGRAM TRACING: CPT

## 2023-03-31 PROCEDURE — 99900031 HC PATIENT EDUCATION (STAT)

## 2023-03-31 PROCEDURE — C1894 INTRO/SHEATH, NON-LASER: HCPCS | Performed by: INTERNAL MEDICINE

## 2023-03-31 PROCEDURE — 93010 EKG 12-LEAD: ICD-10-PCS | Mod: ,,, | Performed by: INTERNAL MEDICINE

## 2023-03-31 PROCEDURE — 99152 MOD SED SAME PHYS/QHP 5/>YRS: CPT | Performed by: INTERNAL MEDICINE

## 2023-03-31 PROCEDURE — C1786 PMKR, SINGLE, RATE-RESP: HCPCS | Performed by: INTERNAL MEDICINE

## 2023-03-31 PROCEDURE — 99153 MOD SED SAME PHYS/QHP EA: CPT | Performed by: INTERNAL MEDICINE

## 2023-03-31 PROCEDURE — 33207 INSERT HEART PM VENTRICULAR: CPT | Mod: KX | Performed by: INTERNAL MEDICINE

## 2023-03-31 PROCEDURE — 93041 RHYTHM ECG TRACING: CPT

## 2023-03-31 PROCEDURE — 27201423 OPTIME MED/SURG SUP & DEVICES STERILE SUPPLY: Performed by: INTERNAL MEDICINE

## 2023-03-31 PROCEDURE — C1819 TISSUE LOCALIZATION-EXCISION: HCPCS | Performed by: INTERNAL MEDICINE

## 2023-03-31 PROCEDURE — 93010 ELECTROCARDIOGRAM REPORT: CPT | Mod: ,,, | Performed by: INTERNAL MEDICINE

## 2023-03-31 PROCEDURE — 63600175 PHARM REV CODE 636 W HCPCS: Performed by: INTERNAL MEDICINE

## 2023-03-31 PROCEDURE — 25500020 PHARM REV CODE 255: Performed by: INTERNAL MEDICINE

## 2023-03-31 PROCEDURE — 21400001 HC TELEMETRY ROOM

## 2023-03-31 DEVICE — PACING LEAD
Type: IMPLANTABLE DEVICE | Site: CHEST | Status: FUNCTIONAL
Brand: TENDRIL™

## 2023-03-31 DEVICE — PULSE GENERATOR SSIR
Type: IMPLANTABLE DEVICE | Site: CHEST | Status: FUNCTIONAL
Brand: ASSURITY MRI™

## 2023-03-31 RX ORDER — MIDAZOLAM HYDROCHLORIDE 1 MG/ML
INJECTION INTRAMUSCULAR; INTRAVENOUS
Status: DISCONTINUED | OUTPATIENT
Start: 2023-03-31 | End: 2023-03-31 | Stop reason: HOSPADM

## 2023-03-31 RX ORDER — FUROSEMIDE 20 MG/1
20 TABLET ORAL DAILY
Status: DISCONTINUED | OUTPATIENT
Start: 2023-04-01 | End: 2023-04-01 | Stop reason: HOSPADM

## 2023-03-31 RX ORDER — HYDROCODONE BITARTRATE AND ACETAMINOPHEN 5; 325 MG/1; MG/1
1 TABLET ORAL EVERY 4 HOURS PRN
Status: DISCONTINUED | OUTPATIENT
Start: 2023-03-31 | End: 2023-03-31 | Stop reason: HOSPADM

## 2023-03-31 RX ORDER — ALPRAZOLAM 0.25 MG/1
0.25 TABLET ORAL 3 TIMES DAILY PRN
Status: DISCONTINUED | OUTPATIENT
Start: 2023-03-31 | End: 2023-04-01 | Stop reason: HOSPADM

## 2023-03-31 RX ORDER — DOXYCYCLINE HYCLATE 100 MG
100 TABLET ORAL 2 TIMES DAILY
Status: DISCONTINUED | OUTPATIENT
Start: 2023-03-31 | End: 2023-04-01 | Stop reason: HOSPADM

## 2023-03-31 RX ORDER — ZOLPIDEM TARTRATE 5 MG/1
5 TABLET ORAL NIGHTLY PRN
Status: DISCONTINUED | OUTPATIENT
Start: 2023-03-31 | End: 2023-04-01 | Stop reason: HOSPADM

## 2023-03-31 RX ORDER — FINASTERIDE 5 MG/1
5 TABLET, FILM COATED ORAL DAILY
Status: DISCONTINUED | OUTPATIENT
Start: 2023-04-01 | End: 2023-04-01 | Stop reason: HOSPADM

## 2023-03-31 RX ORDER — LABETALOL HYDROCHLORIDE 5 MG/ML
10 INJECTION, SOLUTION INTRAVENOUS ONCE
Status: COMPLETED | OUTPATIENT
Start: 2023-03-31 | End: 2023-03-31

## 2023-03-31 RX ORDER — CEFAZOLIN SODIUM 1 G/3ML
INJECTION, POWDER, FOR SOLUTION INTRAMUSCULAR; INTRAVENOUS
Status: DISCONTINUED | OUTPATIENT
Start: 2023-03-31 | End: 2023-03-31 | Stop reason: HOSPADM

## 2023-03-31 RX ORDER — FENOFIBRATE 145 MG/1
145 TABLET, FILM COATED ORAL DAILY
Status: DISCONTINUED | OUTPATIENT
Start: 2023-04-01 | End: 2023-04-01 | Stop reason: HOSPADM

## 2023-03-31 RX ORDER — MORPHINE SULFATE 4 MG/ML
2 INJECTION, SOLUTION INTRAMUSCULAR; INTRAVENOUS
Status: DISCONTINUED | OUTPATIENT
Start: 2023-03-31 | End: 2023-04-01 | Stop reason: HOSPADM

## 2023-03-31 RX ORDER — FENTANYL CITRATE 50 UG/ML
INJECTION, SOLUTION INTRAMUSCULAR; INTRAVENOUS
Status: DISCONTINUED | OUTPATIENT
Start: 2023-03-31 | End: 2023-03-31 | Stop reason: HOSPADM

## 2023-03-31 RX ORDER — MORPHINE SULFATE 4 MG/ML
2 INJECTION, SOLUTION INTRAMUSCULAR; INTRAVENOUS EVERY 4 HOURS PRN
Status: DISCONTINUED | OUTPATIENT
Start: 2023-03-31 | End: 2023-03-31 | Stop reason: HOSPADM

## 2023-03-31 RX ORDER — DOXYCYCLINE 100 MG/1
100 CAPSULE ORAL 2 TIMES DAILY
Qty: 10 CAPSULE | Refills: 0 | Status: ON HOLD
Start: 2023-03-31 | End: 2023-04-01 | Stop reason: HOSPADM

## 2023-03-31 RX ORDER — PRAVASTATIN SODIUM 40 MG/1
40 TABLET ORAL DAILY
Status: DISCONTINUED | OUTPATIENT
Start: 2023-04-01 | End: 2023-04-01 | Stop reason: HOSPADM

## 2023-03-31 RX ORDER — ONDANSETRON 2 MG/ML
INJECTION INTRAMUSCULAR; INTRAVENOUS
Status: DISCONTINUED | OUTPATIENT
Start: 2023-03-31 | End: 2023-03-31 | Stop reason: HOSPADM

## 2023-03-31 RX ORDER — SODIUM CHLORIDE 9 MG/ML
INJECTION, SOLUTION INTRAVENOUS ONCE
Status: DISCONTINUED | OUTPATIENT
Start: 2023-03-31 | End: 2023-03-31 | Stop reason: HOSPADM

## 2023-03-31 RX ORDER — HYDROCODONE BITARTRATE AND ACETAMINOPHEN 7.5; 325 MG/1; MG/1
1 TABLET ORAL EVERY 6 HOURS PRN
Status: DISCONTINUED | OUTPATIENT
Start: 2023-03-31 | End: 2023-04-01 | Stop reason: HOSPADM

## 2023-03-31 RX ORDER — LIDOCAINE HYDROCHLORIDE 10 MG/ML
INJECTION, SOLUTION EPIDURAL; INFILTRATION; INTRACAUDAL; PERINEURAL
Status: DISCONTINUED | OUTPATIENT
Start: 2023-03-31 | End: 2023-03-31 | Stop reason: HOSPADM

## 2023-03-31 RX ORDER — ACETAMINOPHEN 325 MG/1
650 TABLET ORAL EVERY 4 HOURS PRN
Status: DISCONTINUED | OUTPATIENT
Start: 2023-03-31 | End: 2023-03-31 | Stop reason: HOSPADM

## 2023-03-31 RX ADMIN — LABETALOL HYDROCHLORIDE 10 MG: 5 INJECTION, SOLUTION INTRAVENOUS at 12:03

## 2023-03-31 RX ADMIN — DOXYCYCLINE HYCLATE 100 MG: 100 TABLET, COATED ORAL at 10:03

## 2023-03-31 NOTE — Clinical Note
Patient moved to the table hooked up to vitals and defib pads put on chest and back. Left chest prepped with chloraprep and sterile draped.

## 2023-03-31 NOTE — Clinical Note
A dressing was applied to the incision. Steri strips over staples, Pressure dressing applied covered with telfa island.

## 2023-03-31 NOTE — DISCHARGE SUMMARY
[facility] - Cath Lab  Discharge Note  Short Stay    Procedure(s) (LRB):  INSERTION, PACEMAKER (N/A)      OUTCOME: Patient tolerated treatment/procedure well without complication and is now ready for discharge.    DISPOSITION: Home or Self Care    FINAL DIAGNOSIS:  Sick sinus syndrome    FOLLOWUP: In clinic    DISCHARGE INSTRUCTIONS:  No discharge procedures on file.      Clinical Reference Documents Added to Patient Instructions         Document    ANESTHESIA (ENGLISH)            TIME SPENT ON DISCHARGE: 60 minutes

## 2023-03-31 NOTE — Clinical Note
The generator was inserted into pocket in and was inserted into antibiotic pouch in the pocket the left upper chest.

## 2023-03-31 NOTE — DISCHARGE INSTRUCTIONS
Wound Care    You may get the incision wet (3 days for dermabond glue/7 days for steri strips) after your surgery. That is no sooner than ______________. After_______ wash the incision (gently) with soap and water, using a clean washcloth. You should rinse over the area thoroughly and pat dry with a towel. DO NOT SCRUB THE WOUND. The incision should be closed with no drainage or gaping edges. If it opens up you should call the office.  Do not put anything on the incision such as (ointments, creams, powders, alcohol, peroxide, etc.)  Do NOT use a Band-aid or any other dressing.  If steri strips (paper stitches) are used, do not remove them. Leave them in place, trim the ends if they curl up. DO NOT PUT STRIPS BACK ON THE WOUND IF THEY FALL OFF.  If it appears that there are no stitches or steri-strips, then a sterile glue was used, it will flake off like a scab over time. Do not pick at it as it is serving as the wound cover.  Watch the incision/surgery site for: increased redness, increased swelling, bleeding or drainage. Also, note if you have fever or chills.  Treat pain at your incision with Tylenol. If you are not on Coumadin, you may use Ibuprofen(Advil) or Aspirin.     Activity Post Implant    Do not drive for two days  _You received new leads in your procedure today. Keep your elbow below the shoulder where the pacemaker/ICD was implanted and do not lift more than 10 to 15 pounds for at least 4 weeks to prevent dislodgment of the leads.  Activities otherwise tolerated or as specified by the physician are allowed. We want you to get back to normal activities as soon as possible. If you feel pulling or discomfort in the area, either slow down or stop the activity altogether. Your body and common sense are your best guides.  Take antibiotics as prescribed until last dose is taken.  You may resume your regular diet.    When to call the office: [phone]    Excessive drainage, swelling or pain at the incision  site, fever 100 degrees or greater.  Return of symptoms occurring before the pacemaker/ICD was implanted.    New Medications prescribed include: Doxycycline 100mg Twice a Day for 5 days                                                               prescription at [facility] in [place].                                                               Resume Eliquis in 2 days.

## 2023-04-01 VITALS
BODY MASS INDEX: 33.71 KG/M2 | SYSTOLIC BLOOD PRESSURE: 132 MMHG | RESPIRATION RATE: 18 BRPM | WEIGHT: 248.88 LBS | DIASTOLIC BLOOD PRESSURE: 66 MMHG | TEMPERATURE: 98 F | OXYGEN SATURATION: 97 % | HEIGHT: 72 IN | HEART RATE: 85 BPM

## 2023-04-01 PROBLEM — T82.9XXA PACEMAKER COMPLICATIONS: Status: ACTIVE | Noted: 2023-04-01

## 2023-04-01 LAB
ANION GAP SERPL CALC-SCNC: 6 MEQ/L
BUN SERPL-MCNC: 16 MG/DL (ref 8.4–25.7)
CALCIUM SERPL-MCNC: 8.5 MG/DL (ref 8.8–10)
CHLORIDE SERPL-SCNC: 110 MMOL/L (ref 98–107)
CO2 SERPL-SCNC: 24 MMOL/L (ref 23–31)
CREAT SERPL-MCNC: 0.93 MG/DL (ref 0.73–1.18)
CREAT/UREA NIT SERPL: 17
ERYTHROCYTE [DISTWIDTH] IN BLOOD BY AUTOMATED COUNT: 15.5 % (ref 11.5–17)
GFR SERPLBLD CREATININE-BSD FMLA CKD-EPI: >60 MLS/MIN/1.73/M2
GLUCOSE SERPL-MCNC: 114 MG/DL (ref 82–115)
HCT VFR BLD AUTO: 41.4 % (ref 42–52)
HGB BLD-MCNC: 13.3 G/DL (ref 14–18)
MAGNESIUM SERPL-MCNC: 1.7 MG/DL (ref 1.6–2.6)
MCH RBC QN AUTO: 27.6 PG (ref 27–31)
MCHC RBC AUTO-ENTMCNC: 32.1 G/DL (ref 33–36)
MCV RBC AUTO: 85.9 FL (ref 80–94)
NRBC BLD AUTO-RTO: 0 %
PLATELET # BLD AUTO: 237 X10(3)/MCL (ref 130–400)
PMV BLD AUTO: 10.9 FL (ref 7.4–10.4)
POTASSIUM SERPL-SCNC: 3.9 MMOL/L (ref 3.5–5.1)
RBC # BLD AUTO: 4.82 X10(6)/MCL (ref 4.7–6.1)
SODIUM SERPL-SCNC: 140 MMOL/L (ref 136–145)
WBC # SPEC AUTO: 6.7 X10(3)/MCL (ref 4.5–11.5)

## 2023-04-01 PROCEDURE — 80048 BASIC METABOLIC PNL TOTAL CA: CPT | Performed by: NURSE PRACTITIONER

## 2023-04-01 PROCEDURE — 83735 ASSAY OF MAGNESIUM: CPT | Performed by: NURSE PRACTITIONER

## 2023-04-01 PROCEDURE — 25000003 PHARM REV CODE 250: Performed by: INTERNAL MEDICINE

## 2023-04-01 PROCEDURE — 85027 COMPLETE CBC AUTOMATED: CPT | Performed by: NURSE PRACTITIONER

## 2023-04-01 RX ORDER — HYDROCODONE BITARTRATE AND ACETAMINOPHEN 7.5; 325 MG/1; MG/1
1 TABLET ORAL EVERY 12 HOURS PRN
Qty: 10 TABLET | Refills: 0 | Status: SHIPPED | OUTPATIENT
Start: 2023-04-01 | End: 2024-03-06

## 2023-04-01 RX ORDER — DOXYCYCLINE HYCLATE 100 MG
100 TABLET ORAL 2 TIMES DAILY
Qty: 14 TABLET | Refills: 0 | Status: SHIPPED | OUTPATIENT
Start: 2023-04-01 | End: 2023-04-08

## 2023-04-01 RX ADMIN — DOXYCYCLINE HYCLATE 100 MG: 100 TABLET, COATED ORAL at 09:04

## 2023-04-01 NOTE — H&P
[facility] - 6th Floor Medical Telemetry  Cardiology  History and Physical     Patient Name: Alcides Rosario  MRN: [id number]  Admission Date: 3/31/2023  Code Status: Prior   Attending Provider: Joaquin Bravo MD   Primary Care Physician: SERGIO JEFFERS MD  Principal Problem:<principal problem not specified>    Patient information was obtained from patient, past medical records, and ER records.     Subjective:     Chief Complaint: Reason for Admission: Pocket Hematoma s/p Device Implant     HPI: Mr. Rosario is a 76 y/o male who is known to [facility], Dr. Bravo. The patient presented to [facility] via transfer from [facility] after having a small Device Pocket Hematoma post operatively. The hematoma was compressed with a Compression Dressing and he was Transferred to [facility] for overnight observation.     PMH: SSS/PPM Implant, PAF, HTN, HLD, Chronic BLE Edema, CAD, PAD  PSH: Tonsillectomy, Embolectomy, Angiogram, PPM Implant (Single Lead [employer]rales)  Family History: Mother, D, 76, MI; Brother, L, CAD  Social History: Denies Illicit Drug, ETOH and Tobacco Use (Former Smoker - 1 ppd for 50+ Years; Quit in 2000)    Previous Cardiac Diagnostics:   HOLTER 2.17.23:  This is a good quality study.   Predominant rhythm is sinus bradycardia.   The minimum heart rate recorded was 31 beats / minute. The maximum heart rate is 95 beats / minute. The mean heart rate is 48 beats / minute.   HR NOTED TO BE IN 20'S    Carotid US 2.17.23:  The study quality is average.   1-39% stenosis in the proximal left internal carotid artery based on Bluth Criteria.  Appears closer to 40-59% in gray scale.  1-39% stenosis in the proximal right internal carotid artery based on Bluth Criteria.  Appears closer to 40-59% in gray scale.   Antegrade left vertebral artery flow.   Antegrade right vertebral artery flow.     ECHO 2.9.23:  The study quality is average.   Global left ventricular systolic function is borderline normal. The left ventricle diastolic  function is impaired (Grade II) with an elevated left atrial pressure. Severe concentric LVH present. The left ventricular ejection fraction is estimated at 50-55%.  Difficult to assess EF due to irregualr rhythm.  The left atrium is severely enlarged based on the left atrium volume index of 49.6ml/m².  Moderate (2+) mitral regurgitation. Mild (1+) tricuspid regurgitation. Mild (1+) pulmonic regurgitation.   Mild calcification of the aortic valve is noted with adequate cuspal excursion. Moderate calcification of the mitral valve is noted with reduced excursion of the posterior mitral leaflet appreciated.   The estimated pulmonary artery systolic pressure is 48 mmHg assuming a right atrial pressure of 8 mmHg. Evidence of pulmonary hypertension is noted.     PET 12.29.22:  This is an abnormal perfusion study. Study is consistent with ischemia.   This scan is suggestive of moderate risk for future cardiovascular events.   Small partially reversible perfusion abnormality of severe intensity in the inferior lateral region.   The left ventricular cavity is noted to be moderately enlarged on the stress studies. The stress left ventricular ejection fraction was calculated to be 40% and left ventricular global function is mildly reduced. The rest left ventricular cavity is noted to be moderately enlarged. The rest left ventricular ejection fraction was calculated to be 32% and rest left ventricular global function is moderately reduced.   When compared to the resting ejection fraction (32%), the stress ejection fraction (40%) has increased.   The study quality is good.   There was a rise in myocardial blood flow between rest and stress.  Global myocardial blood flow reserve was 1.97.  Myocardial blood flow reserve is globally abnormal, placing the patient at a higher coronary event risk.    Review of patient's allergies indicates:  No Known Allergies    Current Facility-Administered Medications on File Prior to Encounter    Medication    [COMPLETED] labetaloL injection 10 mg    [DISCONTINUED] 0.9%  NaCl infusion    [DISCONTINUED] acetaminophen tablet 650 mg    [DISCONTINUED] ceFAZolin 2 g in dextrose 5 % in water (D5W) 5 % 50 mL IVPB (MB+)    [DISCONTINUED] ceFAZolin injection    [DISCONTINUED] fentaNYL injection    [DISCONTINUED] HYDROcodone-acetaminophen 5-325 mg per tablet 1 tablet    [DISCONTINUED] iopamidoL (ISOVUE-370) injection    [DISCONTINUED] LIDOcaine (PF) 10 mg/ml (1%) injection    [DISCONTINUED] midazolam (VERSED) 1 mg/mL injection    [DISCONTINUED] morphine injection 2 mg    [DISCONTINUED] ondansetron injection     Current Outpatient Medications on File Prior to Encounter   Medication Sig    clopidogreL (PLAVIX) 75 mg tablet TAKE 1 TABLET BY MOUTH DAILY FOR ANTI CLOTTING    doxycycline (VIBRAMYCIN) 100 MG Cap Take 1 capsule (100 mg total) by mouth 2 (two) times a day. for 5 days    ELIQUIS 5 mg Tab Take 5 mg by mouth 2 (two) times daily.    ENTRESTO 24-26 mg per tablet Take 1 tablet by mouth 2 (two) times daily.    fenofibrate (TRICOR) 145 MG tablet TAKE 1 TABLET BY MOUTH DAILY    finasteride (PROSCAR) 5 mg tablet TAKE 1 TABLET BY MOUTH DAILY    furosemide (LASIX) 20 MG tablet Take 1 tablet (20 mg total) by mouth once daily.    pravastatin (PRAVACHOL) 40 MG tablet TAKE 1 TABLET BY MOUTH DAILY     Review of Systems   Constitutional: Negative for fever and malaise/fatigue.   Cardiovascular:  Negative for chest pain, claudication and irregular heartbeat.   Respiratory:  Negative for shortness of breath.    Skin:         Ecchymosis to Pacer Pocket/L CW   Gastrointestinal:  Negative for nausea and vomiting.   Neurological:  Negative for weakness.   All other systems reviewed and are negative.    Objective:     Vital Signs (Most Recent):  Temp: 97.8 °F (36.6 °C) (03/31/23 2252)  Pulse: 60 (04/01/23 0400)  Resp: 18 (03/31/23 2252)  BP: (!) 151/80 (03/31/23 2252)  SpO2: 97 % (03/31/23 2252)   Vital Signs (24h Range):  Temp:   [97.5 °F (36.4 °C)-97.8 °F (36.6 °C)] 97.8 °F (36.6 °C)  Pulse:  [55-71] 60  Resp:  [5-24] 18  SpO2:  [90 %-100 %] 97 %  BP: (115-194)/() 151/80     Weight: 112.9 kg (248 lb 14.4 oz)  Body mass index is 33.76 kg/m².    SpO2: 97 %         Intake/Output Summary (Last 24 hours) at 4/1/2023 0723  Last data filed at 4/1/2023 0600  Gross per 24 hour   Intake 0 ml   Output 600 ml   Net -600 ml       Lines/Drains/Airways       Peripheral Intravenous Line  Duration                  Peripheral IV - Single Lumen 03/31/23 0645 20 G Anterior;Left Upper Arm 1 day                  Physical Exam  Constitutional:       Appearance: Normal appearance.   HENT:      Head: Normocephalic.   Eyes:      Extraocular Movements: Extraocular movements intact.   Cardiovascular:      Rate and Rhythm: Normal rate and regular rhythm.      Heart sounds: Normal heart sounds.   Pulmonary:      Effort: Pulmonary effort is normal.      Breath sounds: Normal breath sounds.   Abdominal:      Palpations: Abdomen is soft.   Musculoskeletal:         General: Normal range of motion.   Skin:     General: Skin is warm and dry.      Capillary Refill: Capillary refill takes less than 2 seconds.      Comments: L CW Pacer Site TESHA/Ecchymosis, Mild Swelling    Neurological:      General: No focal deficit present.      Mental Status: He is alert and oriented to person, place, and time.   Psychiatric:         Mood and Affect: Mood normal.     EKG:       Telemetry: V-Paced    Significant Labs: CMP   Recent Labs   Lab 04/01/23  0630      K 3.9   CO2 24   BUN 16.0   CREATININE 0.93   CALCIUM 8.5*   , CBC   Recent Labs   Lab 04/01/23  0630   WBC 6.7   HGB 13.3*   HCT 41.4*        CXR 3.31.23:  EXAMINATION:  XR CHEST AP PORTABLE     CLINICAL HISTORY:  s/p device placement;     TECHNIQUE:  Single view of the chest     COMPARISON:  No prior imaging available for comparison.     FINDINGS:  Prominent interstitial markings with no focal opacification.     The  cardiomediastinal silhouette is within normal limits.     Left-sided cardiac device is noted.     No acute osseous abnormality.     Impression:     Prominent interstitial markings with no focal opacification.    Assessment and Plan:   Pacemaker Hematoma Pocket  SSS s/p Single Lead Pacemaker Placement ([employer])  PAF    - CHADsVASc - 3 Points - 3.2% Stroke Risk per Year     - Eliquis as OP  HTN  HLD  CAD  PAD  No Hx of GIB     PLAN:  Monitor PPM Site for Worsening of Hematoma  Possible D/C Home after PPM Incision Evaluated  Will F/U for Evaluation of PPM    VTE Risk Mitigation (From admission, onward)      None          Dustin Dle Real, FROYLAN  Cardiology  [facility] - 6th Floor Medical Telemetry  04/01/2023 7:23 AM

## 2023-04-01 NOTE — PROGRESS NOTES
Post pacemaker pocket hematoma  Admit for observation overnight  Resume home meds except for Eliquis and Plavix  P.r.n.    Discussed with Dr. Bravo

## 2023-04-01 NOTE — DISCHARGE SUMMARY
[facility]  Discharge Note  Short Stay    * No surgery found *    OUTCOME: Patient tolerated treatment/procedure well without complication and is now ready for discharge.    DISPOSITION: Home or Self Care    FINAL DIAGNOSIS: Pacemaker Pocket Hematoma    FOLLOWUP: In clinic   Follow-up Information       Joaquin Bravo MD Follow up on 4/6/2023.    Specialty: Cardiology  Why: Pacemaker/Wound Check with Dr. Bravo on Thursday to Evaluate Restart of Plavix and Eliquis  Contact information:  [facility] [place] [address]  [phone]                            DISCHARGE INSTRUCTIONS:   Discharge Procedure Orders   Activity as tolerated   Order Comments: DISCHARGE PLAN:  Discharge Activity:  As Tolerated, No Heavy Lifting > 5 lbs., Notify MD with Symptoms of Bleed/Infection    Discharge Implant Device Instructions:  Sling to be Used when you go to bed or try to take naps for 1 week post implant.   Do NOT raise elbow above shoulder height for 4 weeks post Implant.  Do NOT lift more than 10-15 Pounds for 4 Weeks.  Do NOT Drive for 1 Week Post Device Implant.        Medication List        START taking these medications      doxycycline 100 MG tablet  Commonly known as: VIBRA-TABS  Take 1 tablet (100 mg total) by mouth 2 (two) times a day. for 7 days  Replaces: doxycycline 100 MG Cap     HYDROcodone-acetaminophen 7.5-325 mg per tablet  Commonly known as: NORCO  Take 1 tablet by mouth every 12 (twelve) hours as needed for Pain.            CONTINUE taking these medications      ENTRESTO 24-26 mg per tablet  Generic drug: sacubitriL-valsartan     fenofibrate 145 MG tablet  Commonly known as: TRICOR  TAKE 1 TABLET BY MOUTH DAILY     finasteride 5 mg tablet  Commonly known as: PROSCAR  TAKE 1 TABLET BY MOUTH DAILY     furosemide 20 MG tablet  Commonly known as: LASIX  Take 1 tablet (20 mg total) by mouth once daily.     pravastatin 40 MG tablet  Commonly known as: PRAVACHOL  TAKE 1 TABLET BY MOUTH DAILY             STOP taking these medications      clopidogreL 75 mg tablet  Commonly known as: PLAVIX     doxycycline 100 MG Cap  Commonly known as: VIBRAMYCIN  Replaced by: doxycycline 100 MG tablet     ELIQUIS 5 mg Tab  Generic drug: apixaban               Where to Get Your Medications        These medications were sent to [facility] - [place] - [address]  [address], [facility] [address]      Phone: [phone]   doxycycline 100 MG tablet  HYDROcodone-acetaminophen 7.5-325 mg per tablet        TIME SPENT ON DISCHARGE: 38 minutes

## 2023-04-01 NOTE — NURSING
Nurses Note -- 4 Eyes      4/1/2023   12:54 AM      Skin assessed during: Admit      [x] No Pressure Injuries Present    [x]Prevention Measures Documented      [] Yes- Altered Skin Integrity Present or Discovered   [] LDA Added if Not in Epic (Describe Wound)   [] New Altered Skin Integrity was Present on Admit and Documented in LDA   [] Wound Image Taken    Wound Care Consulted? No    Attending Nurse:  Rama Munoz RN     Second RN/Staff Member:  Malika Alejandro RN

## 2023-04-06 ENCOUNTER — PATIENT OUTREACH (OUTPATIENT)
Dept: ADMINISTRATIVE | Facility: CLINIC | Age: 78
End: 2023-04-06
Payer: MEDICARE

## 2023-04-06 NOTE — PROGRESS NOTES
C3 nurse spoke with Alcides Rosario   for a TCC post hospital discharge follow up call. The patient has a scheduled HOSFU appointment with SERGIO JEFFERS MD   on 04/06/2023 @ 4PM.

## 2023-04-10 ENCOUNTER — TELEPHONE (OUTPATIENT)
Dept: INTERNAL MEDICINE | Facility: CLINIC | Age: 78
End: 2023-04-10
Payer: MEDICARE

## 2023-04-10 NOTE — TELEPHONE ENCOUNTER
----- Message from Alie Galan sent at 4/10/2023  2:32 PM CDT -----  Regarding: meds  .Type:  Needs Medical Advice    Who Called: Pt  Symptoms (please be specific):    How long has patient had these symptoms:    Pharmacy name and phone #: [facility]. - [place], [place] - [id number]   Would the patient rather a call back or a response via [facility]? Call back  Best Call Back Number: [phone]  Additional Information: Pt states that he went to get meds at pharmacy and the pharmacy is telling him it's discontinued. Needs clinic to f/u, states he's been taking these meds for a long time.

## 2023-04-10 NOTE — TELEPHONE ENCOUNTER
Pt stated he went to his pharmacy to  Plavix and he was told MD discontinued this medication. Pt states he needs to be on this medication. Please advise

## 2023-04-11 RX ORDER — CARVEDILOL 6.25 MG/1
6.25 TABLET ORAL 2 TIMES DAILY
COMMUNITY
Start: 2023-03-07 | End: 2023-09-06

## 2023-04-11 RX ORDER — APIXABAN 5 MG/1
5 TABLET, FILM COATED ORAL 2 TIMES DAILY
COMMUNITY
Start: 2023-04-07

## 2023-06-05 DIAGNOSIS — E78.5 HYPERLIPIDEMIA, UNSPECIFIED HYPERLIPIDEMIA TYPE: ICD-10-CM

## 2023-06-05 RX ORDER — FENOFIBRATE 145 MG/1
TABLET, FILM COATED ORAL
Qty: 30 TABLET | Refills: 5 | Status: SHIPPED | OUTPATIENT
Start: 2023-06-05 | End: 2023-11-08

## 2023-08-07 DIAGNOSIS — I10 HYPERTENSION, UNSPECIFIED TYPE: Primary | ICD-10-CM

## 2023-08-07 RX ORDER — FUROSEMIDE 20 MG/1
20 TABLET ORAL
Qty: 30 TABLET | Refills: 11 | Status: SHIPPED | OUTPATIENT
Start: 2023-08-07 | End: 2024-03-06

## 2023-08-30 ENCOUNTER — TELEPHONE (OUTPATIENT)
Dept: INTERNAL MEDICINE | Facility: CLINIC | Age: 78
End: 2023-08-30
Payer: MEDICARE

## 2023-08-30 DIAGNOSIS — E66.01 SEVERE OBESITY (BMI 35.0-39.9) WITH COMORBIDITY: ICD-10-CM

## 2023-08-30 DIAGNOSIS — E11.9 TYPE 2 DIABETES MELLITUS WITHOUT COMPLICATION, WITHOUT LONG-TERM CURRENT USE OF INSULIN: ICD-10-CM

## 2023-08-30 DIAGNOSIS — I10 HYPERTENSION, UNSPECIFIED TYPE: ICD-10-CM

## 2023-08-30 DIAGNOSIS — Z00.00 WELLNESS EXAMINATION: Primary | ICD-10-CM

## 2023-08-30 DIAGNOSIS — Z12.5 SCREENING PSA (PROSTATE SPECIFIC ANTIGEN): ICD-10-CM

## 2023-08-30 DIAGNOSIS — E78.5 HYPERLIPIDEMIA, UNSPECIFIED HYPERLIPIDEMIA TYPE: ICD-10-CM

## 2023-08-30 DIAGNOSIS — I48.20 CHRONIC A-FIB: ICD-10-CM

## 2023-08-30 DIAGNOSIS — I73.9 PAD (PERIPHERAL ARTERY DISEASE): ICD-10-CM

## 2023-08-30 DIAGNOSIS — I25.10 CORONARY ARTERY DISEASE, UNSPECIFIED VESSEL OR LESION TYPE, UNSPECIFIED WHETHER ANGINA PRESENT, UNSPECIFIED WHETHER NATIVE OR TRANSPLANTED HEART: ICD-10-CM

## 2023-08-30 NOTE — TELEPHONE ENCOUNTER
----- Message from Alvaro Bell LPN sent at 8/30/2023  7:26 AM CDT -----  Regarding: mae curry 9/6 @10:40  Are there any outstanding tasks in patient chart?needs fasting labs    Is there documentation of outstanding tasks in patient chart? no    Has patient been to the ER, urgent care, or another physician since last visit?    Has patient done any blood work or x-rays since last visit?

## 2023-09-02 ENCOUNTER — LAB VISIT (OUTPATIENT)
Dept: LAB | Facility: HOSPITAL | Age: 78
End: 2023-09-02
Attending: INTERNAL MEDICINE
Payer: MEDICARE

## 2023-09-02 DIAGNOSIS — Z00.00 WELLNESS EXAMINATION: ICD-10-CM

## 2023-09-02 DIAGNOSIS — I48.20 CHRONIC A-FIB: ICD-10-CM

## 2023-09-02 DIAGNOSIS — E78.5 HYPERLIPIDEMIA, UNSPECIFIED HYPERLIPIDEMIA TYPE: ICD-10-CM

## 2023-09-02 DIAGNOSIS — I73.9 PAD (PERIPHERAL ARTERY DISEASE): ICD-10-CM

## 2023-09-02 DIAGNOSIS — E66.01 SEVERE OBESITY (BMI 35.0-39.9) WITH COMORBIDITY: ICD-10-CM

## 2023-09-02 DIAGNOSIS — I10 HYPERTENSION, UNSPECIFIED TYPE: ICD-10-CM

## 2023-09-02 DIAGNOSIS — E11.9 TYPE 2 DIABETES MELLITUS WITHOUT COMPLICATION, WITHOUT LONG-TERM CURRENT USE OF INSULIN: ICD-10-CM

## 2023-09-02 DIAGNOSIS — I25.10 CORONARY ARTERY DISEASE, UNSPECIFIED VESSEL OR LESION TYPE, UNSPECIFIED WHETHER ANGINA PRESENT, UNSPECIFIED WHETHER NATIVE OR TRANSPLANTED HEART: ICD-10-CM

## 2023-09-02 DIAGNOSIS — Z12.5 SCREENING PSA (PROSTATE SPECIFIC ANTIGEN): ICD-10-CM

## 2023-09-02 LAB
ALBUMIN SERPL-MCNC: 3.4 G/DL (ref 3.4–4.8)
ALBUMIN/GLOB SERPL: 0.9 RATIO (ref 1.1–2)
ALP SERPL-CCNC: 50 UNIT/L (ref 40–150)
ALT SERPL-CCNC: 12 UNIT/L (ref 0–55)
APPEARANCE UR: CLEAR
AST SERPL-CCNC: 14 UNIT/L (ref 5–34)
BACTERIA #/AREA URNS AUTO: NORMAL /HPF
BASOPHILS # BLD AUTO: 0.05 X10(3)/MCL
BASOPHILS NFR BLD AUTO: 0.7 %
BILIRUB SERPL-MCNC: 0.6 MG/DL
BILIRUB UR QL STRIP.AUTO: NEGATIVE
BUN SERPL-MCNC: 21 MG/DL (ref 8.4–25.7)
CALCIUM SERPL-MCNC: 9.2 MG/DL (ref 8.8–10)
CHLORIDE SERPL-SCNC: 104 MMOL/L (ref 98–107)
CHOLEST SERPL-MCNC: 118 MG/DL
CHOLEST/HDLC SERPL: 3 {RATIO} (ref 0–5)
CO2 SERPL-SCNC: 28 MMOL/L (ref 23–31)
COLOR UR: YELLOW
CREAT SERPL-MCNC: 1.13 MG/DL (ref 0.73–1.18)
EOSINOPHIL # BLD AUTO: 0.94 X10(3)/MCL (ref 0–0.9)
EOSINOPHIL NFR BLD AUTO: 13.4 %
ERYTHROCYTE [DISTWIDTH] IN BLOOD BY AUTOMATED COUNT: 17.9 % (ref 11.5–17)
EST. AVERAGE GLUCOSE BLD GHB EST-MCNC: 116.9 MG/DL
GFR SERPLBLD CREATININE-BSD FMLA CKD-EPI: >60 MLS/MIN/1.73/M2
GLOBULIN SER-MCNC: 3.7 GM/DL (ref 2.4–3.5)
GLUCOSE SERPL-MCNC: 102 MG/DL (ref 82–115)
GLUCOSE UR QL STRIP.AUTO: NEGATIVE
HBA1C MFR BLD: 5.7 %
HCT VFR BLD AUTO: 51.9 % (ref 42–52)
HDLC SERPL-MCNC: 43 MG/DL (ref 35–60)
HGB BLD-MCNC: 15.6 G/DL (ref 14–18)
IMM GRANULOCYTES # BLD AUTO: 0.01 X10(3)/MCL (ref 0–0.04)
IMM GRANULOCYTES NFR BLD AUTO: 0.1 %
KETONES UR QL STRIP.AUTO: NEGATIVE
LDLC SERPL CALC-MCNC: 65 MG/DL (ref 50–140)
LEUKOCYTE ESTERASE UR QL STRIP.AUTO: NEGATIVE
LYMPHOCYTES # BLD AUTO: 1.18 X10(3)/MCL (ref 0.6–4.6)
LYMPHOCYTES NFR BLD AUTO: 16.8 %
MCH RBC QN AUTO: 25.7 PG (ref 27–31)
MCHC RBC AUTO-ENTMCNC: 30.1 G/DL (ref 33–36)
MCV RBC AUTO: 85.5 FL (ref 80–94)
MONOCYTES # BLD AUTO: 0.71 X10(3)/MCL (ref 0.1–1.3)
MONOCYTES NFR BLD AUTO: 10.1 %
NEUTROPHILS # BLD AUTO: 4.15 X10(3)/MCL (ref 2.1–9.2)
NEUTROPHILS NFR BLD AUTO: 58.9 %
NITRITE UR QL STRIP.AUTO: NEGATIVE
PH UR STRIP.AUTO: 7 [PH]
PLATELET # BLD AUTO: 202 X10(3)/MCL (ref 130–400)
PMV BLD AUTO: 10.6 FL (ref 7.4–10.4)
POTASSIUM SERPL-SCNC: 4.2 MMOL/L (ref 3.5–5.1)
PROT SERPL-MCNC: 7.1 GM/DL (ref 5.8–7.6)
PROT UR QL STRIP.AUTO: 30
PSA SERPL-MCNC: 0.77 NG/ML
RBC # BLD AUTO: 6.07 X10(6)/MCL (ref 4.7–6.1)
RBC #/AREA URNS AUTO: NORMAL /HPF
RBC UR QL AUTO: ABNORMAL
SODIUM SERPL-SCNC: 139 MMOL/L (ref 136–145)
SP GR UR STRIP.AUTO: 1.02 (ref 1–1.03)
SQUAMOUS #/AREA URNS AUTO: NORMAL /HPF
TRIGL SERPL-MCNC: 52 MG/DL (ref 34–140)
TSH SERPL-ACNC: 2.44 UIU/ML (ref 0.35–4.94)
UROBILINOGEN UR STRIP-ACNC: 2
VLDLC SERPL CALC-MCNC: 10 MG/DL
WBC # SPEC AUTO: 7.04 X10(3)/MCL (ref 4.5–11.5)
WBC #/AREA URNS AUTO: NORMAL /HPF

## 2023-09-02 PROCEDURE — 81001 URINALYSIS AUTO W/SCOPE: CPT

## 2023-09-02 PROCEDURE — 83036 HEMOGLOBIN GLYCOSYLATED A1C: CPT

## 2023-09-02 PROCEDURE — 84153 ASSAY OF PSA TOTAL: CPT

## 2023-09-02 PROCEDURE — 85025 COMPLETE CBC W/AUTO DIFF WBC: CPT

## 2023-09-02 PROCEDURE — 80053 COMPREHEN METABOLIC PANEL: CPT

## 2023-09-02 PROCEDURE — 84443 ASSAY THYROID STIM HORMONE: CPT

## 2023-09-02 PROCEDURE — 80061 LIPID PANEL: CPT

## 2023-09-02 PROCEDURE — 36415 COLL VENOUS BLD VENIPUNCTURE: CPT

## 2023-09-06 ENCOUNTER — OFFICE VISIT (OUTPATIENT)
Dept: INTERNAL MEDICINE | Facility: CLINIC | Age: 78
End: 2023-09-06
Payer: MEDICARE

## 2023-09-06 VITALS
WEIGHT: 264 LBS | DIASTOLIC BLOOD PRESSURE: 84 MMHG | SYSTOLIC BLOOD PRESSURE: 132 MMHG | HEIGHT: 72 IN | RESPIRATION RATE: 18 BRPM | BODY MASS INDEX: 35.76 KG/M2 | HEART RATE: 60 BPM

## 2023-09-06 DIAGNOSIS — I10 HYPERTENSION, UNSPECIFIED TYPE: ICD-10-CM

## 2023-09-06 DIAGNOSIS — E78.5 HYPERLIPIDEMIA, UNSPECIFIED HYPERLIPIDEMIA TYPE: ICD-10-CM

## 2023-09-06 DIAGNOSIS — I48.20 CHRONIC A-FIB: ICD-10-CM

## 2023-09-06 DIAGNOSIS — E11.9 TYPE 2 DIABETES MELLITUS WITHOUT COMPLICATION, WITHOUT LONG-TERM CURRENT USE OF INSULIN: ICD-10-CM

## 2023-09-06 DIAGNOSIS — I10 PRIMARY HYPERTENSION: Primary | ICD-10-CM

## 2023-09-06 DIAGNOSIS — E66.01 SEVERE OBESITY (BMI 35.0-39.9) WITH COMORBIDITY: ICD-10-CM

## 2023-09-06 DIAGNOSIS — E78.2 MIXED HYPERLIPIDEMIA: ICD-10-CM

## 2023-09-06 DIAGNOSIS — I25.10 CORONARY ARTERY DISEASE INVOLVING NATIVE CORONARY ARTERY OF NATIVE HEART WITHOUT ANGINA PECTORIS: ICD-10-CM

## 2023-09-06 PROCEDURE — 99214 OFFICE O/P EST MOD 30 MIN: CPT | Mod: ,,, | Performed by: INTERNAL MEDICINE

## 2023-09-06 PROCEDURE — 1159F PR MEDICATION LIST DOCUMENTED IN MEDICAL RECORD: ICD-10-PCS | Mod: CPTII,,, | Performed by: INTERNAL MEDICINE

## 2023-09-06 PROCEDURE — 1101F PR PT FALLS ASSESS DOC 0-1 FALLS W/OUT INJ PAST YR: ICD-10-PCS | Mod: CPTII,,, | Performed by: INTERNAL MEDICINE

## 2023-09-06 PROCEDURE — 1101F PT FALLS ASSESS-DOCD LE1/YR: CPT | Mod: CPTII,,, | Performed by: INTERNAL MEDICINE

## 2023-09-06 PROCEDURE — 3075F SYST BP GE 130 - 139MM HG: CPT | Mod: CPTII,,, | Performed by: INTERNAL MEDICINE

## 2023-09-06 PROCEDURE — 3079F DIAST BP 80-89 MM HG: CPT | Mod: CPTII,,, | Performed by: INTERNAL MEDICINE

## 2023-09-06 PROCEDURE — 3288F FALL RISK ASSESSMENT DOCD: CPT | Mod: CPTII,,, | Performed by: INTERNAL MEDICINE

## 2023-09-06 PROCEDURE — 3288F PR FALLS RISK ASSESSMENT DOCUMENTED: ICD-10-PCS | Mod: CPTII,,, | Performed by: INTERNAL MEDICINE

## 2023-09-06 PROCEDURE — 3075F PR MOST RECENT SYSTOLIC BLOOD PRESS GE 130-139MM HG: ICD-10-PCS | Mod: CPTII,,, | Performed by: INTERNAL MEDICINE

## 2023-09-06 PROCEDURE — 99214 PR OFFICE/OUTPT VISIT, EST, LEVL IV, 30-39 MIN: ICD-10-PCS | Mod: ,,, | Performed by: INTERNAL MEDICINE

## 2023-09-06 PROCEDURE — 3079F PR MOST RECENT DIASTOLIC BLOOD PRESSURE 80-89 MM HG: ICD-10-PCS | Mod: CPTII,,, | Performed by: INTERNAL MEDICINE

## 2023-09-06 PROCEDURE — 1159F MED LIST DOCD IN RCRD: CPT | Mod: CPTII,,, | Performed by: INTERNAL MEDICINE

## 2023-09-06 RX ORDER — FINASTERIDE 5 MG/1
TABLET, FILM COATED ORAL
Qty: 30 TABLET | Refills: 11 | Status: SHIPPED | OUTPATIENT
Start: 2023-09-06

## 2023-09-06 RX ORDER — KETOCONAZOLE 20 MG/ML
SHAMPOO, SUSPENSION TOPICAL
Qty: 120 ML | Refills: 3 | Status: SHIPPED | OUTPATIENT
Start: 2023-09-07 | End: 2023-12-11

## 2023-09-06 RX ORDER — PRAVASTATIN SODIUM 40 MG/1
TABLET ORAL
Qty: 30 TABLET | Refills: 11 | Status: SHIPPED | OUTPATIENT
Start: 2023-09-06

## 2023-09-06 NOTE — PROGRESS NOTES
Patient ID: Alcides Rosario is a 78 y.o. male.    Chief Complaint: Medicare AWV (Labs 9/2)      HPI:   Patient presents here today for above reason.     Alcides is a 78-year-old gentleman here for follow up History of BPH diabetes mellitus type 2 non-insulin-dependent history hypertension hyperlipidemia and also a history of a stroke ×2 in the past underwent interventional radiology embolectomy. Also with a history of claudication been on Pletal. Overall doing okay had blood work done is here for review. His age-appropriate screening is up-to-date. c/o left knee pain, mostly in back of knee. better with advil.  labs wnl.   flu vax refuses. pneumonia refuses. MRI shoulder with bursitis and tendonitis, PT was ordered but pt improved  lost 26#, diet exercise, has a garden now.   labs wnl.  screening uptodate  c-scope done wnl, couple of polyps  today : no change since last visit.  covid vaccinated  Off metformin, a1c controlled  C/o LE swelling, on norvasc 5mg, new findings. Off plavix and pletal.  Post pacemaker placement doing well energy is better.  He is on anticoagulated.  Also placed on Entresto which is helping tremendously blood pressure is tolerating.  Potassium levels are normal  Dealing with seborrhea        The patient's Health Maintenance was reviewed and the following appears to be due at this time:   Health Maintenance Due   Topic Date Due    Hepatitis C Screening  Never done    Pneumococcal Vaccines (Age 65+) (1 - PCV) Never done    TETANUS VACCINE  Never done    Shingles Vaccine (1 of 2) Never done    Eye Exam  08/22/2020    COVID-19 Vaccine (6 - Moderna series) 12/23/2021    Influenza Vaccine (1) Never done        Past Medical History:  Past Medical History:   Diagnosis Date    Atrial fibrillation     HTN (hypertension)     Peripheral vascular disease, unspecified      Past Surgical History:   Procedure Laterality Date    INSERTION OF PACEMAKER N/A 3/31/2023    Procedure: INSERTION, PACEMAKER;  Surgeon:  Joaquin Bravo MD;  Location: [facility] CATH LAB;  Service: Cardiology;  Laterality: N/A;  single chamber PPM     Review of patient's allergies indicates:  No Known Allergies  Current Outpatient Medications on File Prior to Visit   Medication Sig Dispense Refill    ELIQUIS 5 mg Tab Take 5 mg by mouth 2 (two) times daily.      ENTRESTO 24-26 mg per tablet Take 1 tablet by mouth 2 (two) times daily.      fenofibrate (TRICOR) 145 MG tablet TAKE 1 TABLET BY MOUTH DAILY 30 tablet 5    furosemide (LASIX) 20 MG tablet TAKE ONE TABLET BY MOUTH DAILY 30 tablet 11    HYDROcodone-acetaminophen (NORCO) 7.5-325 mg per tablet Take 1 tablet by mouth every 12 (twelve) hours as needed for Pain. (Patient not taking: Reported on 4/6/2023) 10 tablet 0    [DISCONTINUED] carvediloL (COREG) 6.25 MG tablet Take 6.25 mg by mouth 2 (two) times daily.      [DISCONTINUED] finasteride (PROSCAR) 5 mg tablet TAKE 1 TABLET BY MOUTH DAILY 30 tablet 11    [DISCONTINUED] pravastatin (PRAVACHOL) 40 MG tablet TAKE 1 TABLET BY MOUTH DAILY 30 tablet 11     No current facility-administered medications on file prior to visit.     Social History     Socioeconomic History    Marital status:    Tobacco Use    Smoking status: Former     Types: Cigarettes     Passive exposure: Never    Smokeless tobacco: Never   Substance and Sexual Activity    Alcohol use: Never    Drug use: Never    Sexual activity: Never     History reviewed. No pertinent family history.    ROS:   Comprehensive review of systems was performed and is negative except as noted above    Vitals/PE:   /84 (BP Location: Left arm, Patient Position: Sitting)   Pulse 60   Resp 18   Ht 6' (1.829 m)   Wt 119.7 kg (264 lb)   BMI 35.80 kg/m²   Physical Exam    General: Alert and oriented, No acute distress.   Eye: Normal conjunctiva without exudate.  HENMT: Normocephalic/AT, Normal hearing, Oral mucosa is moist and pink   Neck: No goiter visualized.   Respiratory: Lungs CTAB, Respirations are  non-labored, Breath sounds are equal, Symmetrical chest wall expansion.  Cardiovascular: Normal rate, Regular rhythm, No murmur, No edema.   Gastrointestinal: Non-distended.   Genitourinary: Deferred.  Musculoskeletal: Normal ROM, Normal gait, No deformities or amputations.  Integumentary: Warm, Dry, Intact. No diaphoresis, or flushing.  Neurologic: No focal deficits, Cranial Nerves II-XII are grossly intact.   Psychiatric: Cooperative, Appropriate mood & affect, Normal judgment, Non-suicidal.    Assessment/Plan:       1. Primary hypertension   At goal, cont current rx.   2. Mixed hyperlipidemia   At goal on statin  3. Coronary artery disease involving native coronary artery of native heart without angina pectoris   stable  4. Chronic a-fib   Rate controlled. On oac. S/p ppm  5. Type 2 diabetes mellitus without complication, without long-term current use of insulin   A1c 5.7, off of med   6. Severe obesity (BMI 35.0-39.9) with comorbidity   Diet exercise wt loss     7. seborrhea   Nizoral, if not improved, will send to derm.    Education and counseling done face to face regarding medical conditions and plan. Contact office if new symptoms develop. Should any symptoms ever significantly worsen seek emergency medical attention/go to ER. Follow up at least yearly for wellness or sooner PRN. Nurse to call patient with any results. The patient is receptive, expresses understanding and is agreeable to plan. All questions have been answered.    No follow-ups on file.

## 2023-11-08 DIAGNOSIS — E78.5 HYPERLIPIDEMIA, UNSPECIFIED HYPERLIPIDEMIA TYPE: ICD-10-CM

## 2023-11-08 RX ORDER — FENOFIBRATE 145 MG/1
TABLET, FILM COATED ORAL
Qty: 30 TABLET | Refills: 5 | Status: SHIPPED | OUTPATIENT
Start: 2023-11-08

## 2023-12-11 DIAGNOSIS — L40.9 PSORIASIS: Primary | ICD-10-CM

## 2023-12-11 RX ORDER — KETOCONAZOLE 20 MG/ML
SHAMPOO, SUSPENSION TOPICAL
Qty: 120 ML | Refills: 3 | Status: SHIPPED | OUTPATIENT
Start: 2023-12-11

## 2024-01-22 ENCOUNTER — LAB VISIT (OUTPATIENT)
Dept: LAB | Facility: HOSPITAL | Age: 79
End: 2024-01-22
Attending: INTERNAL MEDICINE
Payer: MEDICARE

## 2024-01-22 DIAGNOSIS — I10 ESSENTIAL HYPERTENSION, MALIGNANT: ICD-10-CM

## 2024-01-22 DIAGNOSIS — E78.5 HYPERLIPIDEMIA, UNSPECIFIED HYPERLIPIDEMIA TYPE: Primary | ICD-10-CM

## 2024-01-22 LAB
ANION GAP SERPL CALC-SCNC: 11 MEQ/L
BUN SERPL-MCNC: 30.2 MG/DL (ref 8.4–25.7)
CALCIUM SERPL-MCNC: 9.6 MG/DL (ref 8.8–10)
CHLORIDE SERPL-SCNC: 102 MMOL/L (ref 98–107)
CO2 SERPL-SCNC: 31 MMOL/L (ref 23–31)
CREAT SERPL-MCNC: 1.08 MG/DL (ref 0.73–1.18)
CREAT/UREA NIT SERPL: 28
GFR SERPLBLD CREATININE-BSD FMLA CKD-EPI: >60 MLS/MIN/1.73/M2
GLUCOSE SERPL-MCNC: 89 MG/DL (ref 82–115)
POTASSIUM SERPL-SCNC: 4.6 MMOL/L (ref 3.5–5.1)
SODIUM SERPL-SCNC: 144 MMOL/L (ref 136–145)

## 2024-01-22 PROCEDURE — 36415 COLL VENOUS BLD VENIPUNCTURE: CPT

## 2024-01-22 PROCEDURE — 80048 BASIC METABOLIC PNL TOTAL CA: CPT

## 2024-02-27 ENCOUNTER — TELEPHONE (OUTPATIENT)
Dept: INTERNAL MEDICINE | Facility: CLINIC | Age: 79
End: 2024-02-27
Payer: MEDICARE

## 2024-02-27 DIAGNOSIS — E11.9 TYPE 2 DIABETES MELLITUS WITHOUT COMPLICATION, WITHOUT LONG-TERM CURRENT USE OF INSULIN: ICD-10-CM

## 2024-02-27 DIAGNOSIS — I25.10 CORONARY ARTERY DISEASE INVOLVING NATIVE CORONARY ARTERY OF NATIVE HEART WITHOUT ANGINA PECTORIS: ICD-10-CM

## 2024-02-27 DIAGNOSIS — E78.2 MIXED HYPERLIPIDEMIA: ICD-10-CM

## 2024-02-27 DIAGNOSIS — I48.20 CHRONIC A-FIB: ICD-10-CM

## 2024-02-27 DIAGNOSIS — I10 PRIMARY HYPERTENSION: Primary | ICD-10-CM

## 2024-02-27 NOTE — TELEPHONE ENCOUNTER
----- Message from Alvaro Bell LPN sent at 2/27/2024  7:32 AM CST -----  Regarding: mae curry 3/6 @10:40  Are there any outstanding tasks in patient chart? Needs fasting labs    Is there documentation of outstanding tasks in patient chart? no    Has patient been to the ER, urgent care, or another physician since last visit?    Has patient done any blood work or x-rays since last visit?    5. PLEASE HAVE PATIENT BRING MEDICATION LIST OR BOTTLES TO EVERY OFFICE VISIT

## 2024-03-04 ENCOUNTER — LAB VISIT (OUTPATIENT)
Dept: LAB | Facility: HOSPITAL | Age: 79
End: 2024-03-04
Attending: INTERNAL MEDICINE
Payer: MEDICARE

## 2024-03-04 DIAGNOSIS — E11.9 TYPE 2 DIABETES MELLITUS WITHOUT COMPLICATION, WITHOUT LONG-TERM CURRENT USE OF INSULIN: ICD-10-CM

## 2024-03-04 DIAGNOSIS — I25.10 CORONARY ARTERY DISEASE INVOLVING NATIVE CORONARY ARTERY OF NATIVE HEART WITHOUT ANGINA PECTORIS: ICD-10-CM

## 2024-03-04 DIAGNOSIS — I10 PRIMARY HYPERTENSION: ICD-10-CM

## 2024-03-04 DIAGNOSIS — E78.2 MIXED HYPERLIPIDEMIA: ICD-10-CM

## 2024-03-04 DIAGNOSIS — I48.20 CHRONIC A-FIB: ICD-10-CM

## 2024-03-04 LAB
ALBUMIN SERPL-MCNC: 3 G/DL (ref 3.4–4.8)
ALBUMIN/GLOB SERPL: 0.8 RATIO (ref 1.1–2)
ALP SERPL-CCNC: 43 UNIT/L (ref 40–150)
ALT SERPL-CCNC: 25 UNIT/L (ref 0–55)
AST SERPL-CCNC: 30 UNIT/L (ref 5–34)
BASOPHILS # BLD AUTO: 0.06 X10(3)/MCL
BASOPHILS NFR BLD AUTO: 1 %
BILIRUB SERPL-MCNC: 0.7 MG/DL
BUN SERPL-MCNC: 31 MG/DL (ref 8.4–25.7)
CALCIUM SERPL-MCNC: 9.1 MG/DL (ref 8.8–10)
CHLORIDE SERPL-SCNC: 103 MMOL/L (ref 98–107)
CHOLEST SERPL-MCNC: 115 MG/DL
CHOLEST/HDLC SERPL: 2 {RATIO} (ref 0–5)
CO2 SERPL-SCNC: 27 MMOL/L (ref 23–31)
CREAT SERPL-MCNC: 1.28 MG/DL (ref 0.73–1.18)
CREAT UR-MCNC: 72.7 MG/DL (ref 63–166)
EOSINOPHIL # BLD AUTO: 1.2 X10(3)/MCL (ref 0–0.9)
EOSINOPHIL NFR BLD AUTO: 19.6 %
ERYTHROCYTE [DISTWIDTH] IN BLOOD BY AUTOMATED COUNT: 18.1 % (ref 11.5–17)
EST. AVERAGE GLUCOSE BLD GHB EST-MCNC: 105.4 MG/DL
GFR SERPLBLD CREATININE-BSD FMLA CKD-EPI: 57 MLS/MIN/1.73/M2
GLOBULIN SER-MCNC: 3.6 GM/DL (ref 2.4–3.5)
GLUCOSE SERPL-MCNC: 86 MG/DL (ref 82–115)
HBA1C MFR BLD: 5.3 %
HCT VFR BLD AUTO: 41.6 % (ref 42–52)
HDLC SERPL-MCNC: 49 MG/DL (ref 35–60)
HGB BLD-MCNC: 13.1 G/DL (ref 14–18)
IMM GRANULOCYTES # BLD AUTO: 0 X10(3)/MCL (ref 0–0.04)
IMM GRANULOCYTES NFR BLD AUTO: 0 %
LDLC SERPL CALC-MCNC: 54 MG/DL (ref 50–140)
LYMPHOCYTES # BLD AUTO: 1.39 X10(3)/MCL (ref 0.6–4.6)
LYMPHOCYTES NFR BLD AUTO: 22.7 %
MCH RBC QN AUTO: 27.7 PG (ref 27–31)
MCHC RBC AUTO-ENTMCNC: 31.5 G/DL (ref 33–36)
MCV RBC AUTO: 87.9 FL (ref 80–94)
MICROALBUMIN UR-MCNC: 8.8 UG/ML
MICROALBUMIN/CREAT RATIO PNL UR: 12.1 MG/GM CR (ref 0–30)
MONOCYTES # BLD AUTO: 0.58 X10(3)/MCL (ref 0.1–1.3)
MONOCYTES NFR BLD AUTO: 9.5 %
NEUTROPHILS # BLD AUTO: 2.89 X10(3)/MCL (ref 2.1–9.2)
NEUTROPHILS NFR BLD AUTO: 47.2 %
PLATELET # BLD AUTO: 255 X10(3)/MCL (ref 130–400)
PMV BLD AUTO: 10.2 FL (ref 7.4–10.4)
POTASSIUM SERPL-SCNC: 4.1 MMOL/L (ref 3.5–5.1)
PROT SERPL-MCNC: 6.6 GM/DL (ref 5.8–7.6)
RBC # BLD AUTO: 4.73 X10(6)/MCL (ref 4.7–6.1)
SODIUM SERPL-SCNC: 142 MMOL/L (ref 136–145)
TRIGL SERPL-MCNC: 59 MG/DL (ref 34–140)
VLDLC SERPL CALC-MCNC: 12 MG/DL
WBC # SPEC AUTO: 6.12 X10(3)/MCL (ref 4.5–11.5)

## 2024-03-04 PROCEDURE — 83036 HEMOGLOBIN GLYCOSYLATED A1C: CPT

## 2024-03-04 PROCEDURE — 82043 UR ALBUMIN QUANTITATIVE: CPT

## 2024-03-04 PROCEDURE — 80053 COMPREHEN METABOLIC PANEL: CPT

## 2024-03-04 PROCEDURE — 36415 COLL VENOUS BLD VENIPUNCTURE: CPT

## 2024-03-04 PROCEDURE — 80061 LIPID PANEL: CPT

## 2024-03-04 PROCEDURE — 85025 COMPLETE CBC W/AUTO DIFF WBC: CPT

## 2024-03-06 ENCOUNTER — OFFICE VISIT (OUTPATIENT)
Dept: INTERNAL MEDICINE | Facility: CLINIC | Age: 79
End: 2024-03-06
Payer: MEDICARE

## 2024-03-06 VITALS
OXYGEN SATURATION: 98 % | HEIGHT: 72 IN | BODY MASS INDEX: 32.37 KG/M2 | HEART RATE: 75 BPM | RESPIRATION RATE: 18 BRPM | SYSTOLIC BLOOD PRESSURE: 104 MMHG | WEIGHT: 239 LBS | DIASTOLIC BLOOD PRESSURE: 68 MMHG

## 2024-03-06 DIAGNOSIS — I10 PRIMARY HYPERTENSION: Primary | ICD-10-CM

## 2024-03-06 DIAGNOSIS — I48.20 CHRONIC A-FIB: ICD-10-CM

## 2024-03-06 DIAGNOSIS — E78.2 MIXED HYPERLIPIDEMIA: ICD-10-CM

## 2024-03-06 DIAGNOSIS — E11.9 TYPE 2 DIABETES MELLITUS WITHOUT COMPLICATION, WITHOUT LONG-TERM CURRENT USE OF INSULIN: ICD-10-CM

## 2024-03-06 DIAGNOSIS — I50.9 CONGESTIVE HEART FAILURE, UNSPECIFIED HF CHRONICITY, UNSPECIFIED HEART FAILURE TYPE: ICD-10-CM

## 2024-03-06 DIAGNOSIS — I25.10 CORONARY ARTERY DISEASE INVOLVING NATIVE CORONARY ARTERY OF NATIVE HEART WITHOUT ANGINA PECTORIS: ICD-10-CM

## 2024-03-06 PROCEDURE — 3074F SYST BP LT 130 MM HG: CPT | Mod: CPTII,,, | Performed by: INTERNAL MEDICINE

## 2024-03-06 PROCEDURE — 1159F MED LIST DOCD IN RCRD: CPT | Mod: CPTII,,, | Performed by: INTERNAL MEDICINE

## 2024-03-06 PROCEDURE — 99214 OFFICE O/P EST MOD 30 MIN: CPT | Mod: ,,, | Performed by: INTERNAL MEDICINE

## 2024-03-06 PROCEDURE — 3078F DIAST BP <80 MM HG: CPT | Mod: CPTII,,, | Performed by: INTERNAL MEDICINE

## 2024-03-06 PROCEDURE — 3288F FALL RISK ASSESSMENT DOCD: CPT | Mod: CPTII,,, | Performed by: INTERNAL MEDICINE

## 2024-03-06 PROCEDURE — 1101F PT FALLS ASSESS-DOCD LE1/YR: CPT | Mod: CPTII,,, | Performed by: INTERNAL MEDICINE

## 2024-03-06 RX ORDER — FUROSEMIDE 40 MG/1
40 TABLET ORAL 2 TIMES DAILY
COMMUNITY
Start: 2024-03-05

## 2024-03-06 RX ORDER — SACUBITRIL AND VALSARTAN 49; 51 MG/1; MG/1
1 TABLET, FILM COATED ORAL 2 TIMES DAILY
COMMUNITY
Start: 2024-03-05

## 2024-03-06 NOTE — PROGRESS NOTES
Patient ID: Alcides Rosario is a 78 y.o. male.    Chief Complaint: Follow-up (6 month f/u, labs 3/4)      HPI:   Patient presents here today for above reason.     Alcides is a 78-year-old gentleman here for follow up History of BPH diabetes mellitus type 2 non-insulin-dependent history hypertension hyperlipidemia and also a history of a stroke ×2 in the past underwent interventional radiology embolectomy. Also with a history of claudication been on Pletal. Overall doing okay had blood work done is here for review. His age-appropriate screening is up-to-date. c/o left knee pain, mostly in back of knee. better with advil.  labs wnl.   flu vax refuses. pneumonia refuses. MRI shoulder with bursitis and tendonitis, PT was ordered but pt improved  lost 26#, diet exercise, has a garden now.   labs wnl.  screening uptodate  c-scope done wnl, couple of polyps  today : no change since last visit.  covid vaccinated  Off metformin, a1c controlled  C/o LE swelling, on norvasc 5mg, new findings. Off plavix and pletal.  Post pacemaker placement doing well energy is better.  He is on anticoagulated.  Also placed on Entresto which is helping tremendously blood pressure is tolerating.  Potassium levels are normal. Lasix is now 40mg bid.    The patient's Health Maintenance was reviewed and the following appears to be due at this time:   Health Maintenance Due   Topic Date Due    Hepatitis C Screening  Never done    Pneumococcal Vaccines (Age 65+) (1 of 2 - PCV) Never done    TETANUS VACCINE  Never done    Shingles Vaccine (1 of 2) Never done    RSV Vaccine (Age 60+ and Pregnant patients) (1 - 1-dose 60+ series) Never done    Eye Exam  11/03/2017    Influenza Vaccine (1) Never done    COVID-19 Vaccine (7 - 2023-24 season) 09/01/2023        Past Medical History:  Past Medical History:   Diagnosis Date    Atrial fibrillation     HTN (hypertension)     Peripheral vascular disease, unspecified      Past Surgical History:   Procedure Laterality  Date    INSERTION OF PACEMAKER N/A 3/31/2023    Procedure: INSERTION, PACEMAKER;  Surgeon: Joaquin Bravo MD;  Location: [facility] CATH LAB;  Service: Cardiology;  Laterality: N/A;  single chamber PPM     Review of patient's allergies indicates:  No Known Allergies  Current Outpatient Medications on File Prior to Visit   Medication Sig Dispense Refill    ELIQUIS 5 mg Tab Take 5 mg by mouth 2 (two) times daily.      ENTRESTO 49-51 mg per tablet Take 1 tablet by mouth 2 (two) times daily.      fenofibrate (TRICOR) 145 MG tablet TAKE 1 TABLET BY MOUTH DAILY 30 tablet 5    finasteride (PROSCAR) 5 mg tablet TAKE 1 TABLET BY MOUTH DAILY 30 tablet 11    furosemide (LASIX) 40 MG tablet Take 40 mg by mouth 2 (two) times daily.      pravastatin (PRAVACHOL) 40 MG tablet TAKE 1 TABLET BY MOUTH DAILY 30 tablet 11    ketoconazole (NIZORAL) 2 % shampoo APPLY TO THE AFFECTED AREA(S) TOPICALLY TWICE A WEEK 120 mL 3    [DISCONTINUED] ENTRESTO 24-26 mg per tablet Take 1 tablet by mouth 2 (two) times daily.      [DISCONTINUED] furosemide (LASIX) 20 MG tablet TAKE ONE TABLET BY MOUTH DAILY 30 tablet 11    [DISCONTINUED] HYDROcodone-acetaminophen (NORCO) 7.5-325 mg per tablet Take 1 tablet by mouth every 12 (twelve) hours as needed for Pain. (Patient not taking: Reported on 4/6/2023) 10 tablet 0     No current facility-administered medications on file prior to visit.     Social History     Socioeconomic History    Marital status:    Tobacco Use    Smoking status: Former     Types: Cigarettes     Passive exposure: Never    Smokeless tobacco: Never   Substance and Sexual Activity    Alcohol use: Never    Drug use: Never    Sexual activity: Never     History reviewed. No pertinent family history.    ROS:   Comprehensive review of systems was performed and is negative except as noted above    Vitals/PE:   /68 (BP Location: Left arm, Patient Position: Sitting)   Pulse 75   Resp 18   Ht 6' (1.829 m)   Wt 108.4 kg (239 lb)   SpO2 98%    BMI 32.41 kg/m²   Physical Exam    General: Alert and oriented, No acute distress.   Eye: Normal conjunctiva without exudate.  HENMT: Normocephalic/AT, Normal hearing, Oral mucosa is moist and pink   Neck: No goiter visualized.   Respiratory: Lungs CTAB, Respirations are non-labored, Breath sounds are equal, Symmetrical chest wall expansion.  Cardiovascular: Normal rate, Regular rhythm, No murmur, No edema.   Gastrointestinal: Non-distended.   Genitourinary: Deferred.  Musculoskeletal: Normal ROM, Normal gait, No deformities or amputations.  Integumentary: Warm, Dry, Intact. No diaphoresis, or flushing.  Neurologic: No focal deficits, Cranial Nerves II-XII are grossly intact.   Psychiatric: Cooperative, Appropriate mood & affect, Normal judgment, Non-suicidal.    Assessment/Plan:       1. Primary hypertension   At goal, dont current rx.   2. Mixed hyperlipidemia   At goal cont current  3. Coronary artery disease involving native coronary artery of native heart without angina pectoris   Sees cardio  4. Chronic a-fib   Rate  controlled, o noac  5. Type 2 diabetes mellitus without complication, without long-term current use of insulin   Well controlled. Diet controlled.  6. Congestive heart failure, unspecified HF chronicity, unspecified heart failure type   Compensated, better with lasix 40mg bid, entresto now 49/51           Education and counseling done face to face regarding medical conditions and plan. Contact office if new symptoms develop. Should any symptoms ever significantly worsen seek emergency medical attention/go to ER. Follow up at least yearly for wellness or sooner PRN. Nurse to call patient with any results. The patient is receptive, expresses understanding and is agreeable to plan. All questions have been answered.    No follow-ups on file.

## 2024-05-08 DIAGNOSIS — E78.5 HYPERLIPIDEMIA, UNSPECIFIED HYPERLIPIDEMIA TYPE: ICD-10-CM

## 2024-05-08 RX ORDER — FENOFIBRATE 145 MG/1
TABLET, FILM COATED ORAL
Qty: 30 TABLET | Refills: 5 | Status: SHIPPED | OUTPATIENT
Start: 2024-05-08

## 2024-09-03 ENCOUNTER — TELEPHONE (OUTPATIENT)
Dept: INTERNAL MEDICINE | Facility: CLINIC | Age: 79
End: 2024-09-03
Payer: MEDICARE

## 2024-09-03 DIAGNOSIS — I73.9 PAD (PERIPHERAL ARTERY DISEASE): ICD-10-CM

## 2024-09-03 DIAGNOSIS — E11.9 TYPE 2 DIABETES MELLITUS WITHOUT COMPLICATION, WITHOUT LONG-TERM CURRENT USE OF INSULIN: ICD-10-CM

## 2024-09-03 DIAGNOSIS — I10 HYPERTENSION, UNSPECIFIED TYPE: ICD-10-CM

## 2024-09-03 DIAGNOSIS — E78.2 MIXED HYPERLIPIDEMIA: ICD-10-CM

## 2024-09-03 DIAGNOSIS — Z00.00 WELLNESS EXAMINATION: Primary | ICD-10-CM

## 2024-09-03 DIAGNOSIS — I50.9 CONGESTIVE HEART FAILURE, UNSPECIFIED HF CHRONICITY, UNSPECIFIED HEART FAILURE TYPE: ICD-10-CM

## 2024-09-03 DIAGNOSIS — Z12.5 SCREENING PSA (PROSTATE SPECIFIC ANTIGEN): ICD-10-CM

## 2024-09-03 DIAGNOSIS — I25.10 CORONARY ARTERY DISEASE INVOLVING NATIVE CORONARY ARTERY OF NATIVE HEART WITHOUT ANGINA PECTORIS: ICD-10-CM

## 2024-09-03 NOTE — TELEPHONE ENCOUNTER
----- Message from Alvaro Bell LPN sent at 9/3/2024  8:20 AM CDT -----  Regarding: mae curry 9/11 @10  Are there any outstanding tasks in patient chart? Needs fasting labs    Is there documentation of outstanding tasks in patient chart? no    Has patient been to the ER, urgent care, or another physician since last visit?    Has patient done any blood work or x-rays since last visit?    5. PLEASE HAVE PATIENT BRING MEDICATION LIST OR BOTTLES TO EVERY OFFICE VISIT   normal (ped)...

## 2024-09-18 ENCOUNTER — TELEPHONE (OUTPATIENT)
Dept: INTERNAL MEDICINE | Facility: CLINIC | Age: 79
End: 2024-09-18
Payer: MEDICARE

## 2024-09-18 NOTE — TELEPHONE ENCOUNTER
----- Message from Magdy Briceño MA sent at 9/18/2024 10:07 AM CDT -----  Regarding: POLI Addison 89/23/24 @ 9:20 AM  Fasting labs to be done.

## 2024-09-20 ENCOUNTER — LAB VISIT (OUTPATIENT)
Dept: LAB | Facility: HOSPITAL | Age: 79
End: 2024-09-20
Attending: INTERNAL MEDICINE
Payer: MEDICARE

## 2024-09-20 DIAGNOSIS — E78.2 MIXED HYPERLIPIDEMIA: ICD-10-CM

## 2024-09-20 DIAGNOSIS — Z12.5 SCREENING PSA (PROSTATE SPECIFIC ANTIGEN): ICD-10-CM

## 2024-09-20 DIAGNOSIS — I10 HYPERTENSION, UNSPECIFIED TYPE: ICD-10-CM

## 2024-09-20 DIAGNOSIS — Z00.00 WELLNESS EXAMINATION: ICD-10-CM

## 2024-09-20 DIAGNOSIS — I25.10 CORONARY ARTERY DISEASE INVOLVING NATIVE CORONARY ARTERY OF NATIVE HEART WITHOUT ANGINA PECTORIS: ICD-10-CM

## 2024-09-20 DIAGNOSIS — E11.9 TYPE 2 DIABETES MELLITUS WITHOUT COMPLICATION, WITHOUT LONG-TERM CURRENT USE OF INSULIN: ICD-10-CM

## 2024-09-20 DIAGNOSIS — I50.9 CONGESTIVE HEART FAILURE, UNSPECIFIED HF CHRONICITY, UNSPECIFIED HEART FAILURE TYPE: ICD-10-CM

## 2024-09-20 DIAGNOSIS — I73.9 PAD (PERIPHERAL ARTERY DISEASE): ICD-10-CM

## 2024-09-20 DIAGNOSIS — E78.5 HYPERLIPIDEMIA, UNSPECIFIED HYPERLIPIDEMIA TYPE: ICD-10-CM

## 2024-09-20 LAB
ALBUMIN SERPL-MCNC: 3.1 G/DL (ref 3.4–4.8)
ALBUMIN/GLOB SERPL: 1 RATIO (ref 1.1–2)
ALP SERPL-CCNC: 38 UNIT/L (ref 40–150)
ALT SERPL-CCNC: 10 UNIT/L (ref 0–55)
ANION GAP SERPL CALC-SCNC: 5 MEQ/L
AST SERPL-CCNC: 15 UNIT/L (ref 5–34)
BASOPHILS # BLD AUTO: 0.04 X10(3)/MCL
BASOPHILS NFR BLD AUTO: 0.7 %
BILIRUB SERPL-MCNC: 0.5 MG/DL
BUN SERPL-MCNC: 37.6 MG/DL (ref 8.4–25.7)
CALCIUM SERPL-MCNC: 9.4 MG/DL (ref 8.8–10)
CHLORIDE SERPL-SCNC: 106 MMOL/L (ref 98–107)
CHOLEST SERPL-MCNC: 107 MG/DL
CHOLEST/HDLC SERPL: 3 {RATIO} (ref 0–5)
CO2 SERPL-SCNC: 31 MMOL/L (ref 23–31)
CREAT SERPL-MCNC: 1.4 MG/DL (ref 0.73–1.18)
CREAT/UREA NIT SERPL: 27
EOSINOPHIL # BLD AUTO: 0.85 X10(3)/MCL (ref 0–0.9)
EOSINOPHIL NFR BLD AUTO: 14 %
ERYTHROCYTE [DISTWIDTH] IN BLOOD BY AUTOMATED COUNT: 15.5 % (ref 11.5–17)
EST. AVERAGE GLUCOSE BLD GHB EST-MCNC: 108.3 MG/DL
GFR SERPLBLD CREATININE-BSD FMLA CKD-EPI: 51 ML/MIN/1.73/M2
GLOBULIN SER-MCNC: 3.2 GM/DL (ref 2.4–3.5)
GLUCOSE SERPL-MCNC: 89 MG/DL (ref 82–115)
HBA1C MFR BLD: 5.4 %
HCT VFR BLD AUTO: 36.3 % (ref 42–52)
HDLC SERPL-MCNC: 42 MG/DL (ref 35–60)
HGB BLD-MCNC: 11.6 G/DL (ref 14–18)
IMM GRANULOCYTES # BLD AUTO: 0.01 X10(3)/MCL (ref 0–0.04)
IMM GRANULOCYTES NFR BLD AUTO: 0.2 %
LDLC SERPL CALC-MCNC: 54 MG/DL (ref 50–140)
LYMPHOCYTES # BLD AUTO: 1.52 X10(3)/MCL (ref 0.6–4.6)
LYMPHOCYTES NFR BLD AUTO: 25.1 %
MCH RBC QN AUTO: 29.2 PG (ref 27–31)
MCHC RBC AUTO-ENTMCNC: 32 G/DL (ref 33–36)
MCV RBC AUTO: 91.4 FL (ref 80–94)
MONOCYTES # BLD AUTO: 0.63 X10(3)/MCL (ref 0.1–1.3)
MONOCYTES NFR BLD AUTO: 10.4 %
NEUTROPHILS # BLD AUTO: 3 X10(3)/MCL (ref 2.1–9.2)
NEUTROPHILS NFR BLD AUTO: 49.6 %
PLATELET # BLD AUTO: 279 X10(3)/MCL (ref 130–400)
PMV BLD AUTO: 10.5 FL (ref 7.4–10.4)
POTASSIUM SERPL-SCNC: 4.6 MMOL/L (ref 3.5–5.1)
PROT SERPL-MCNC: 6.3 GM/DL (ref 5.8–7.6)
PSA SERPL-MCNC: 1.38 NG/ML
RBC # BLD AUTO: 3.97 X10(6)/MCL (ref 4.7–6.1)
SODIUM SERPL-SCNC: 142 MMOL/L (ref 136–145)
TRIGL SERPL-MCNC: 56 MG/DL (ref 34–140)
TSH SERPL-ACNC: 1.9 UIU/ML (ref 0.35–4.94)
VLDLC SERPL CALC-MCNC: 11 MG/DL
WBC # BLD AUTO: 6.05 X10(3)/MCL (ref 4.5–11.5)

## 2024-09-20 PROCEDURE — 84153 ASSAY OF PSA TOTAL: CPT

## 2024-09-20 PROCEDURE — 83036 HEMOGLOBIN GLYCOSYLATED A1C: CPT

## 2024-09-20 PROCEDURE — 36415 COLL VENOUS BLD VENIPUNCTURE: CPT

## 2024-09-20 PROCEDURE — 80061 LIPID PANEL: CPT

## 2024-09-20 PROCEDURE — 84443 ASSAY THYROID STIM HORMONE: CPT

## 2024-09-20 PROCEDURE — 85025 COMPLETE CBC W/AUTO DIFF WBC: CPT

## 2024-09-20 PROCEDURE — 80053 COMPREHEN METABOLIC PANEL: CPT

## 2024-09-20 RX ORDER — PRAVASTATIN SODIUM 40 MG/1
TABLET ORAL
Qty: 30 TABLET | Refills: 11 | Status: SHIPPED | OUTPATIENT
Start: 2024-09-20

## 2024-09-20 RX ORDER — FINASTERIDE 5 MG/1
TABLET, FILM COATED ORAL
Qty: 30 TABLET | Refills: 11 | Status: SHIPPED | OUTPATIENT
Start: 2024-09-20

## 2024-09-23 ENCOUNTER — OFFICE VISIT (OUTPATIENT)
Dept: INTERNAL MEDICINE | Facility: CLINIC | Age: 79
End: 2024-09-23
Payer: MEDICARE

## 2024-09-23 VITALS
BODY MASS INDEX: 31.97 KG/M2 | WEIGHT: 236 LBS | OXYGEN SATURATION: 95 % | DIASTOLIC BLOOD PRESSURE: 60 MMHG | HEIGHT: 72 IN | RESPIRATION RATE: 16 BRPM | SYSTOLIC BLOOD PRESSURE: 108 MMHG | HEART RATE: 60 BPM

## 2024-09-23 DIAGNOSIS — I63.9 CEREBROVASCULAR ACCIDENT (CVA), UNSPECIFIED MECHANISM: ICD-10-CM

## 2024-09-23 DIAGNOSIS — N40.0 BENIGN PROSTATIC HYPERPLASIA WITHOUT LOWER URINARY TRACT SYMPTOMS: ICD-10-CM

## 2024-09-23 DIAGNOSIS — E78.2 MIXED HYPERLIPIDEMIA: ICD-10-CM

## 2024-09-23 DIAGNOSIS — I10 PRIMARY HYPERTENSION: ICD-10-CM

## 2024-09-23 DIAGNOSIS — Z00.00 WELL ADULT EXAM: Primary | ICD-10-CM

## 2024-09-23 DIAGNOSIS — G89.29 CHRONIC BILATERAL LOW BACK PAIN WITHOUT SCIATICA: ICD-10-CM

## 2024-09-23 DIAGNOSIS — D64.9 ANEMIA, UNSPECIFIED TYPE: ICD-10-CM

## 2024-09-23 DIAGNOSIS — I48.20 CHRONIC A-FIB: ICD-10-CM

## 2024-09-23 DIAGNOSIS — N18.31 CHRONIC KIDNEY DISEASE, STAGE 3A: ICD-10-CM

## 2024-09-23 DIAGNOSIS — E66.9 OBESITY (BMI 30-39.9): ICD-10-CM

## 2024-09-23 DIAGNOSIS — E11.9 TYPE 2 DIABETES MELLITUS WITHOUT COMPLICATION, WITHOUT LONG-TERM CURRENT USE OF INSULIN: ICD-10-CM

## 2024-09-23 DIAGNOSIS — I25.10 CORONARY ARTERY DISEASE INVOLVING NATIVE CORONARY ARTERY OF NATIVE HEART WITHOUT ANGINA PECTORIS: ICD-10-CM

## 2024-09-23 DIAGNOSIS — M54.50 CHRONIC BILATERAL LOW BACK PAIN WITHOUT SCIATICA: ICD-10-CM

## 2024-09-23 DIAGNOSIS — I73.9 PAD (PERIPHERAL ARTERY DISEASE): ICD-10-CM

## 2024-09-23 DIAGNOSIS — K62.5 BRBPR (BRIGHT RED BLOOD PER RECTUM): ICD-10-CM

## 2024-09-23 PROCEDURE — 1159F MED LIST DOCD IN RCRD: CPT | Mod: CPTII,,, | Performed by: NURSE PRACTITIONER

## 2024-09-23 PROCEDURE — 3074F SYST BP LT 130 MM HG: CPT | Mod: CPTII,,, | Performed by: NURSE PRACTITIONER

## 2024-09-23 PROCEDURE — 3078F DIAST BP <80 MM HG: CPT | Mod: CPTII,,, | Performed by: NURSE PRACTITIONER

## 2024-09-23 PROCEDURE — 3288F FALL RISK ASSESSMENT DOCD: CPT | Mod: CPTII,,, | Performed by: NURSE PRACTITIONER

## 2024-09-23 PROCEDURE — 1101F PT FALLS ASSESS-DOCD LE1/YR: CPT | Mod: CPTII,,, | Performed by: NURSE PRACTITIONER

## 2024-09-23 PROCEDURE — 1158F ADVNC CARE PLAN TLK DOCD: CPT | Mod: CPTII,,, | Performed by: NURSE PRACTITIONER

## 2024-09-23 PROCEDURE — G0439 PPPS, SUBSEQ VISIT: HCPCS | Mod: ,,, | Performed by: NURSE PRACTITIONER

## 2024-09-23 PROCEDURE — 99213 OFFICE O/P EST LOW 20 MIN: CPT | Mod: 25,,, | Performed by: NURSE PRACTITIONER

## 2024-09-23 PROCEDURE — 1160F RVW MEDS BY RX/DR IN RCRD: CPT | Mod: CPTII,,, | Performed by: NURSE PRACTITIONER

## 2024-09-23 NOTE — PROGRESS NOTES
Internal Medicine      Patient ID: [id number]     Chief Complaint: Medicare Annual Wellness     HPI:     Alcides Rosario is a 79 y.o. male here today for a Medicare Annual Wellness visit and comprehensive Health Risk Assessment. History of BPH diabetes mellitus type 2 non-insulin-dependent history hypertension hyperlipidemia and also a history of a stroke ×2 in the past underwent interventional radiology embolectomy. Also h/o claudication on Pletal. S/p pacer placement. Issues with low back pain and bilateral leg pain. He has seen specialist for this and is not a sx candidate. He is requesting handicap tag for this reason. He is the sole caretaker of handicapped wife.    A separate, significant E/M service performed for complaints of occ BRBPR. He states s/s occur about every 18 months with last episode 3 weeks ago. S/s have resolved. Mild anemia noted. Also, issues with bilateral back pain with associated BLE pain at times.  He does not take OTC analgesics.      Health Maintenance   Topic Date Due    TETANUS VACCINE  Never done    Shingles Vaccine (1 of 2) Never done    Eye Exam  11/03/2017    Hemoglobin A1c  03/20/2025    Lipid Panel  09/20/2025        Past Medical History:   Diagnosis Date    Atrial fibrillation     HTN (hypertension)     Peripheral vascular disease, unspecified         Past Surgical History:   Procedure Laterality Date    INSERTION OF PACEMAKER N/A 3/31/2023    Procedure: INSERTION, PACEMAKER;  Surgeon: Joaquin Bravo MD;  Location: [facility] CATH LAB;  Service: Cardiology;  Laterality: N/A;  single chamber PPM        Social History     Socioeconomic History    Marital status:    Tobacco Use    Smoking status: Former     Types: Cigarettes     Passive exposure: Never    Smokeless tobacco: Never   Substance and Sexual Activity    Alcohol use: Never    Drug use: Never    Sexual activity: Never     Social Determinants of Health     Financial Resource Strain: Low Risk  (9/23/2024)    Overall Financial  Resource Strain (CARDIA)     Difficulty of Paying Living Expenses: Not hard at all   Food Insecurity: No Food Insecurity (9/23/2024)    Hunger Vital Sign     Worried About Running Out of Food in the Last Year: Never true     Ran Out of Food in the Last Year: Never true   Transportation Needs: No Transportation Needs (9/23/2024)    TRANSPORTATION NEEDS     Transportation : No   Physical Activity: Insufficiently Active (9/23/2024)    Exercise Vital Sign     Days of Exercise per Week: 2 days     Minutes of Exercise per Session: 10 min   Stress: No Stress Concern Present (9/23/2024)    [employer] [place] [employer] - Occupational Stress Questionnaire     Feeling of Stress : Not at all   Housing Stability: Low Risk  (9/23/2024)    Housing Stability Vital Sign     Unable to Pay for Housing in the Last Year: No     Homeless in the Last Year: No          Current Outpatient Medications   Medication Instructions    ELIQUIS 5 mg, Oral, 2 times daily    ENTRESTO 49-51 mg per tablet 1 tablet, Oral, 2 times daily    fenofibrate (TRICOR) 145 MG tablet TAKE 1 TABLET BY MOUTH DAILY    finasteride (PROSCAR) 5 mg tablet TAKE 1 TABLET BY MOUTH DAILY    furosemide (LASIX) 40 mg, Oral, 2 times daily    pravastatin (PRAVACHOL) 40 MG tablet TAKE 1 TABLET BY MOUTH DAILY       Review of patient's allergies indicates:  No Known Allergies     Immunization History   Administered Date(s) Administered    COVID-19 Vaccine 01/06/2021, 02/03/2021, 10/28/2021    COVID-19, MRNA, LN-S, PF (MODERNA FULL 0.5 ML DOSE) 01/06/2021, 02/03/2021, 10/28/2021        Patient Care Team:  Maycol Mcleod II, MD as PCP - General (Internal Medicine)  Maycol Mcleod II, MD Chastant, Bradley J II, MD as Consulting Physician (Internal Medicine)  Joaquin Bravo MD as Consulting Physician (Cardiology)    Subjective:     Review of Systems  Constitutional: No fever, No chills, No sweats, No fatigue, No weight loss.  Eyes: No  blurring.  Ear/Nose/Mouth/Throat: No nasal congestion, No vertigo.  Respiratory: No shortness of breath, No cough, No sputum production, No wheezing, No exertional dyspnea.   Cardiovascular: No chest pain, No palpitations, No claudication, No orthopnea, No peripheral edema.  Gastrointestinal: No nausea, No vomiting, No diarrhea, No rectal bleeding, No constipation, No abdominal pain.  Genitourinary: No dysuria, No hematuria, No frequency.  Endocrine: No excessive thirst, No polyuria, No cold intolerance, No heat intolerance.  Musculoskeletal: No joint pain, No muscle pain.  Integumentary: No rash, No ecchymosis.  Neurologic: No altered mental status, No headaches.  Psychiatrics: No anxiety,No depression, No SI/HI.    Objective:     Visit Vitals  /60 (BP Location: Left arm, Patient Position: Sitting, BP Method: Small (Manual))   Pulse 60   Resp 16   Ht 6' (1.829 m)   Wt 107 kg (236 lb)   SpO2 95%   BMI 32.01 kg/m²       Physical Exam  Vitals and nursing note reviewed.   Constitutional:       General: He is not in acute distress.     Appearance: Normal appearance. He is normal weight. He is not ill-appearing, toxic-appearing or diaphoretic.   HENT:      Head: Normocephalic and atraumatic.      Right Ear: Tympanic membrane, ear canal and external ear normal.      Left Ear: Tympanic membrane, ear canal and external ear normal.      Nose: Nose normal. No congestion or rhinorrhea.      Mouth/Throat:      Mouth: Mucous membranes are moist.      Pharynx: Oropharynx is clear. No oropharyngeal exudate or posterior oropharyngeal erythema.   Eyes:      General: No scleral icterus.        Right eye: No discharge.         Left eye: No discharge.      Extraocular Movements: Extraocular movements intact.      Conjunctiva/sclera: Conjunctivae normal.      Pupils: Pupils are equal, round, and reactive to light.   Neck:      Vascular: No carotid bruit.   Cardiovascular:      Rate and Rhythm: Normal rate and regular rhythm.       Pulses: Normal pulses.      Heart sounds: No murmur heard.     No friction rub. No gallop.   Pulmonary:      Effort: Pulmonary effort is normal. No respiratory distress.      Breath sounds: Normal breath sounds. No stridor. No wheezing, rhonchi or rales.   Abdominal:      General: Abdomen is flat. Bowel sounds are normal. There is no distension.      Palpations: Abdomen is soft. There is no mass.      Tenderness: There is no abdominal tenderness. There is no guarding or rebound.      Hernia: No hernia is present.   Musculoskeletal:         General: No swelling, tenderness, deformity or signs of injury. Normal range of motion.      Cervical back: Normal range of motion and neck supple. No rigidity or tenderness.   Lymphadenopathy:      Cervical: No cervical adenopathy.   Skin:     General: Skin is warm and dry.      Coloration: Skin is not jaundiced or pale.      Findings: No bruising, erythema, lesion or rash.   Neurological:      General: No focal deficit present.      Mental Status: He is alert and oriented to person, place, and time. Mental status is at baseline.      Motor: No weakness.      Coordination: Coordination normal.      Gait: Gait normal.   Psychiatric:         Mood and Affect: Mood normal.         Behavior: Behavior normal.         Thought Content: Thought content normal.         Judgment: Judgment normal.         Assessment:       ICD-10-CM ICD-9-CM   1. Well adult exam  Z00.00 V70.0   2. BRBPR (bright red blood per rectum)  K62.5 569.3   3. Anemia, unspecified type  D64.9 285.9   4. Chronic bilateral low back pain without sciatica  M54.50 724.2    G89.29 338.29   5. Chronic kidney disease, stage 3a  N18.31 585.3   6. PAD (peripheral artery disease)  I73.9 443.9   7. Type 2 diabetes mellitus without complication, without long-term current use of insulin  E11.9 250.00   8. Benign prostatic hyperplasia without lower urinary tract symptoms  N40.0 600.00   9. Primary hypertension  I10 401.9   10. Mixed  hyperlipidemia  E78.2 272.2   11. Coronary artery disease involving native coronary artery of native heart without angina pectoris  I25.10 414.01   12. Chronic a-fib  I48.20 427.31   13. Cerebrovascular accident (CVA), unspecified mechanism  I63.9 434.91   14. Obesity (BMI 30-39.9)  E66.9 278.00        Plan:     1. Well adult exam  HEALTH EDUCATION RECOMMENDATIONS:  weight control, diet and cholesterol, exercise 150 minutes per week, stress reduction, and adequate sleep 6-8 hours per night    2. BRBPR (bright red blood per rectum)  S/s reportedly resolved presently  Continue to monitor for evidence of bleeding  OAC Eliquis  Repeat CBC with iron studies in 1 month    -     CBC Auto Differential; Future; Expected date: 10/23/2024  -     Iron and TIBC; Future; Expected date: 10/23/2024  -     Ferritin; Future; Expected date: 10/23/2024  -     Reticulocytes; Future; Expected date: 10/23/2024  -     Folate; Future; Expected date: 10/23/2024  -     Vitamin B12; Future; Expected date: 10/23/2024    3. Anemia, unspecified type  See #2    -     CBC Auto Differential; Future; Expected date: 10/23/2024  -     Iron and TIBC; Future; Expected date: 10/23/2024  -     Ferritin; Future; Expected date: 10/23/2024  -     Reticulocytes; Future; Expected date: 10/23/2024  -     Folate; Future; Expected date: 10/23/2024  -     Vitamin B12; Future; Expected date: 10/23/2024    4. Chronic bilateral low back pain without sciatica  Tylenol Arthritis as needed    5. Chronic kidney disease, stage 3a  Inc water intake   Repeat BMP in 1 month    -     Basic Metabolic Panel; Future; Expected date: 10/23/2024    6. PAD (peripheral artery disease)  Per Dr. Bravo  No LE edema present    7. Type 2 diabetes mellitus without complication, without long-term current use of insulin  Diet controlled    8. Benign prostatic hyperplasia without lower urinary tract symptoms  Stable on current dose of Proscar 5mg daily    9. Primary hypertension  HYPERTENSION  RECOMMENDATIONS:  Continue current treatment regimen.  Dietary sodium restriction.  Regular aerobic exercise.  Weight loss.  Reduce stress.  Goal BP <130/80; Encouraged to monitor blood pressure at home    10. Mixed hyperlipidemia  HYPERLIPIDEMIA RECOMMENDATIONS:  Reviewed diet and exercise., Encouraged regular exercise., Discussed diet modification., and Lab as ordered.    11. Coronary artery disease involving native coronary artery of native heart without angina pectoris  Stable on current dosage of Entresto  Healthy lifestyle recommended  Keep scheduled follow ups with CV, Dr. Bravo    12. Chronic a-fib  Stable R & R  Pacer status    13. Cerebrovascular accident (CVA), unspecified mechanism  No s/s present  Doing well on current RX dosage of Eliquis    14. Obesity (BMI 30-39.9)  See #1       A comprehensive HEALTH RISK ASSESSMENT was completed today. Results are summarized below:    There are NO EMOTIONAL/SOCIAL CONCERNS identified on today's screening for Social Isolation, Depression and Anxiety.    There are NO COGNITIVE FUNCTION CONCERNS identified on today's screening.     The patient reports NO OPIOID PRESCRIPTIONS. This was confirmed through medication reconciliation and the  website.    The patient is NOT A TOBACCO USER.  The patient reports NO SIGNIFICANT ALCOHOL USE.     All Questions regarding food, transportation or housing were not answered today.     Advance Care Planning   Advance Care Planning     Date: 09/23/2024    Living Will  During this visit, I engaged the patient  in the voluntary advance care planning process.  The patient and I reviewed the role for advance directives and their purpose in directing future healthcare if the patient's unable to speak for him/herself.  At this point in time, the patient does have full decision-making capacity.  We discussed different extreme health states that he could experience, and reviewed what kind of medical care he would want in those situations.  I  spent a total of 3 minutes engaging the patient in this advance care planning discussion. Encouraged to bring copy to NOV.           I attest to discussing Advance Care Planning with patient and/or family member.  Education was provided including the importance of the Health Care Power of , Advance Directives, and/or [place] documentation.  The patient expressed understanding to the importance of this information and discussion.       Provided patient with a 5-10 year written screening schedule and personal prevention plan. Recommendations were developed using the USPSTF age appropriate recommendations. Education, counseling, and referrals were provided as needed. After Visit Summary printed and given to patient, which includes a list of additional screenings\tests needed.    Follow up in about 6 months (around 3/23/2025) for with Dr. Mcleod. In addition to their scheduled follow up, the patient has also been instructed to follow up on as needed basis.     Future Appointments   Date Time Provider Department Center   4/2/2025 10:20 AM Maycol Mcleod II, MD [facility]   9/29/2025 10:00 AM Maycol Mcleod II, MD [facility]        Cyn Ratliff NP

## 2024-10-22 ENCOUNTER — LAB VISIT (OUTPATIENT)
Dept: LAB | Facility: HOSPITAL | Age: 79
End: 2024-10-22
Attending: INTERNAL MEDICINE
Payer: MEDICARE

## 2024-10-22 DIAGNOSIS — N18.31 CHRONIC KIDNEY DISEASE, STAGE 3A: ICD-10-CM

## 2024-10-22 DIAGNOSIS — D50.8 IRON DEFICIENCY ANEMIA SECONDARY TO INADEQUATE DIETARY IRON INTAKE: Primary | ICD-10-CM

## 2024-10-22 DIAGNOSIS — E53.8 FOLATE DEFICIENCY: ICD-10-CM

## 2024-10-22 DIAGNOSIS — K62.5 BRBPR (BRIGHT RED BLOOD PER RECTUM): ICD-10-CM

## 2024-10-22 DIAGNOSIS — D64.9 ANEMIA, UNSPECIFIED TYPE: ICD-10-CM

## 2024-10-22 LAB
ANION GAP SERPL CALC-SCNC: 9 MEQ/L
BASOPHILS # BLD AUTO: 0.04 X10(3)/MCL
BASOPHILS NFR BLD AUTO: 0.6 %
BUN SERPL-MCNC: 35.3 MG/DL (ref 8.4–25.7)
CALCIUM SERPL-MCNC: 9.3 MG/DL (ref 8.8–10)
CHLORIDE SERPL-SCNC: 104 MMOL/L (ref 98–107)
CO2 SERPL-SCNC: 28 MMOL/L (ref 23–31)
CREAT SERPL-MCNC: 1.39 MG/DL (ref 0.72–1.25)
CREAT/UREA NIT SERPL: 25
EOSINOPHIL # BLD AUTO: 0.9 X10(3)/MCL (ref 0–0.9)
EOSINOPHIL NFR BLD AUTO: 13.9 %
ERYTHROCYTE [DISTWIDTH] IN BLOOD BY AUTOMATED COUNT: 14.8 % (ref 11.5–17)
FERRITIN SERPL-MCNC: 35 NG/ML (ref 21.81–274.66)
FOLATE SERPL-MCNC: 4.7 NG/ML (ref 7–31.4)
GFR SERPLBLD CREATININE-BSD FMLA CKD-EPI: 51 ML/MIN/1.73/M2
GLUCOSE SERPL-MCNC: 93 MG/DL (ref 82–115)
HCT VFR BLD AUTO: 46.4 % (ref 42–52)
HGB BLD-MCNC: 14.6 G/DL (ref 14–18)
IMM GRANULOCYTES # BLD AUTO: 0 X10(3)/MCL (ref 0–0.04)
IMM GRANULOCYTES NFR BLD AUTO: 0 %
IRON SATN MFR SERPL: 10 % (ref 20–50)
IRON SERPL-MCNC: 48 UG/DL (ref 65–175)
LYMPHOCYTES # BLD AUTO: 1.13 X10(3)/MCL (ref 0.6–4.6)
LYMPHOCYTES NFR BLD AUTO: 17.5 %
MCH RBC QN AUTO: 29 PG (ref 27–31)
MCHC RBC AUTO-ENTMCNC: 31.5 G/DL (ref 33–36)
MCV RBC AUTO: 92.1 FL (ref 80–94)
MONOCYTES # BLD AUTO: 0.62 X10(3)/MCL (ref 0.1–1.3)
MONOCYTES NFR BLD AUTO: 9.6 %
NEUTROPHILS # BLD AUTO: 3.78 X10(3)/MCL (ref 2.1–9.2)
NEUTROPHILS NFR BLD AUTO: 58.4 %
PLATELET # BLD AUTO: 242 X10(3)/MCL (ref 130–400)
PMV BLD AUTO: 10.6 FL (ref 7.4–10.4)
POTASSIUM SERPL-SCNC: 4.5 MMOL/L (ref 3.5–5.1)
RBC # BLD AUTO: 5.04 X10(6)/MCL (ref 4.7–6.1)
RET# (OHS): 0.09 X10E6/UL (ref 0.03–0.1)
RETICULOCYTE COUNT AUTOMATED (OLG): 1.75 % (ref 1.1–2.1)
SODIUM SERPL-SCNC: 141 MMOL/L (ref 136–145)
TIBC SERPL-MCNC: 418 UG/DL (ref 69–240)
TIBC SERPL-MCNC: 466 UG/DL (ref 250–450)
TRANSFERRIN SERPL-MCNC: 441 MG/DL
VIT B12 SERPL-MCNC: 475 PG/ML (ref 213–816)
WBC # BLD AUTO: 6.47 X10(3)/MCL (ref 4.5–11.5)

## 2024-10-22 PROCEDURE — 85025 COMPLETE CBC W/AUTO DIFF WBC: CPT

## 2024-10-22 PROCEDURE — 82728 ASSAY OF FERRITIN: CPT

## 2024-10-22 PROCEDURE — 83550 IRON BINDING TEST: CPT

## 2024-10-22 PROCEDURE — 82607 VITAMIN B-12: CPT

## 2024-10-22 PROCEDURE — 36415 COLL VENOUS BLD VENIPUNCTURE: CPT

## 2024-10-22 PROCEDURE — 85045 AUTOMATED RETICULOCYTE COUNT: CPT

## 2024-10-22 PROCEDURE — 82746 ASSAY OF FOLIC ACID SERUM: CPT

## 2024-10-22 PROCEDURE — 83540 ASSAY OF IRON: CPT

## 2024-10-22 PROCEDURE — 80048 BASIC METABOLIC PNL TOTAL CA: CPT

## 2024-10-22 RX ORDER — IRON,CARBONYL/ASCORBIC ACID 100-250 MG
1 TABLET ORAL DAILY
Qty: 30 TABLET | Refills: 5 | Status: SHIPPED | OUTPATIENT
Start: 2024-10-22

## 2024-10-22 RX ORDER — FOLIC ACID 1 MG/1
1 TABLET ORAL DAILY
Qty: 30 TABLET | Refills: 5 | Status: SHIPPED | OUTPATIENT
Start: 2024-10-22 | End: 2025-10-22

## 2024-10-24 ENCOUNTER — TELEPHONE (OUTPATIENT)
Dept: INTERNAL MEDICINE | Facility: CLINIC | Age: 79
End: 2024-10-24
Payer: MEDICARE

## 2024-10-24 NOTE — TELEPHONE ENCOUNTER
Spoke with pt about results. Pt verbalized understanding. Pt states he is already taking 65 MG of iron, he refused the iron that was sent in but he did  the folate.

## 2024-10-24 NOTE — TELEPHONE ENCOUNTER
----- Message from Cyn Ratliff NP sent at 10/22/2024  4:41 PM CDT -----  Please let pt know that labs reviewed with Dr. Mcleod. Would like to place on iron supplement and folate supplement. RX sent to pharmacy. Recheck CBC with iron studies in 1 month. Orders in system.

## 2024-11-26 ENCOUNTER — LAB VISIT (OUTPATIENT)
Dept: LAB | Facility: HOSPITAL | Age: 79
End: 2024-11-26
Attending: NURSE PRACTITIONER
Payer: MEDICARE

## 2024-11-26 DIAGNOSIS — E53.8 FOLATE DEFICIENCY: ICD-10-CM

## 2024-11-26 DIAGNOSIS — D50.8 IRON DEFICIENCY ANEMIA SECONDARY TO INADEQUATE DIETARY IRON INTAKE: ICD-10-CM

## 2024-11-26 LAB
BASOPHILS # BLD AUTO: 0.07 X10(3)/MCL
BASOPHILS NFR BLD AUTO: 1 %
EOSINOPHIL # BLD AUTO: 1.14 X10(3)/MCL (ref 0–0.9)
EOSINOPHIL NFR BLD AUTO: 16.2 %
ERYTHROCYTE [DISTWIDTH] IN BLOOD BY AUTOMATED COUNT: 14.1 % (ref 11.5–17)
FERRITIN SERPL-MCNC: 28.9 NG/ML (ref 21.81–274.66)
FOLATE SERPL-MCNC: 15.2 NG/ML (ref 7–31.4)
HCT VFR BLD AUTO: 51.2 % (ref 42–52)
HGB BLD-MCNC: 16 G/DL (ref 14–18)
IMM GRANULOCYTES # BLD AUTO: 0.01 X10(3)/MCL (ref 0–0.04)
IMM GRANULOCYTES NFR BLD AUTO: 0.1 %
IRON SATN MFR SERPL: 13 % (ref 20–50)
IRON SERPL-MCNC: 63 UG/DL (ref 65–175)
LYMPHOCYTES # BLD AUTO: 1.44 X10(3)/MCL (ref 0.6–4.6)
LYMPHOCYTES NFR BLD AUTO: 20.5 %
MCH RBC QN AUTO: 28.7 PG (ref 27–31)
MCHC RBC AUTO-ENTMCNC: 31.3 G/DL (ref 33–36)
MCV RBC AUTO: 91.8 FL (ref 80–94)
MONOCYTES # BLD AUTO: 0.71 X10(3)/MCL (ref 0.1–1.3)
MONOCYTES NFR BLD AUTO: 10.1 %
NEUTROPHILS # BLD AUTO: 3.67 X10(3)/MCL (ref 2.1–9.2)
NEUTROPHILS NFR BLD AUTO: 52.1 %
PLATELET # BLD AUTO: 214 X10(3)/MCL (ref 130–400)
PMV BLD AUTO: 11 FL (ref 7.4–10.4)
RBC # BLD AUTO: 5.58 X10(6)/MCL (ref 4.7–6.1)
RET# (OHS): 0.07 X10E6/UL (ref 0.03–0.1)
RETICULOCYTE COUNT AUTOMATED (OLG): 1.31 % (ref 1.1–2.1)
TIBC SERPL-MCNC: 415 UG/DL (ref 60–240)
TIBC SERPL-MCNC: 478 UG/DL (ref 250–450)
TRANSFERRIN SERPL-MCNC: 455 MG/DL
WBC # BLD AUTO: 7.04 X10(3)/MCL (ref 4.5–11.5)

## 2024-11-26 PROCEDURE — 36415 COLL VENOUS BLD VENIPUNCTURE: CPT

## 2024-11-26 PROCEDURE — 83550 IRON BINDING TEST: CPT

## 2024-11-26 PROCEDURE — 82728 ASSAY OF FERRITIN: CPT

## 2024-11-26 PROCEDURE — 85045 AUTOMATED RETICULOCYTE COUNT: CPT

## 2024-11-26 PROCEDURE — 82746 ASSAY OF FOLIC ACID SERUM: CPT

## 2024-11-26 PROCEDURE — 85025 COMPLETE CBC W/AUTO DIFF WBC: CPT

## 2024-12-03 ENCOUNTER — TELEPHONE (OUTPATIENT)
Dept: INTERNAL MEDICINE | Facility: CLINIC | Age: 79
End: 2024-12-03
Payer: MEDICARE

## 2024-12-03 NOTE — TELEPHONE ENCOUNTER
Result Care Coordination      Result Notes     Wolfgang Harrington MA  11/27/2024  7:49 AM CST Back to Top      Tried to contact patient to make him aware of results/recommendations--No Answer/Unable to leave VM, will call back at later time.    Cyn Ratliff NP  11/26/2024 11:07 AM CST       Please let pt know that repeat labs note improvement in CBC and iron levels. Continue Folate and iron supplement.     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    2 Result Notes        Component Ref Range & Units 7 d ago 1 mo ago   Ferritin Level 21.81 - 274.66 ng/mL 28.90 35.00   Resulting Agency  [facility]             View All Conversations on this Encounter     Result Care Coordination      Result Notes     Wolfgang Harrington MA  11/27/2024  7:49 AM CST Back to Top      Tried to contact patient to make him aware of results/recommendations--No Answer/Unable to leave VM, will call back at later time.    Cyn Ratliff NP  11/26/2024 11:07 AM CST       Please let pt know that repeat labs note improvement in CBC and iron levels. Continue Folate and iron supplement.     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    2 Result Notes        Component Ref Range & Units 7 d ago 1 mo ago   Iron Binding Capacity Unsaturated 60 - 240 ug/dL 415 High  418 High  R   Iron Level 65 - 175 ug/dL 63 Low  48 Low    Transferrin mg/dL 455 441    Iron Binding Capacity Total 250 - 450 ug/dL 478 High  466 High    Iron Saturation 20 - 50 % 13 Low  10 Low    Resulting Agency  [facility]             Specimen Collected: 11/26/24 07:23 CST Last Resulted: 11/26/24 08:06 CST        Lab Flowsheet        Order Details        View Encounter        Lab and Collection Details        Routing        Result History     View All Conversations on this Encounter     R=Reference range differs from displayed range     Result Care Coordination      Result Notes     Wolfgang Harrington MA  11/27/2024  7:49 AM CST Back to Top      Tried to contact patient to make him aware of results/recommendations--No Answer/Unable to leave VM, will call back at later time.    Cyn Ratliff NP  11/26/2024 11:07 AM CST       Please let pt know that repeat labs note improvement in CBC and iron levels. Continue Folate and iron supplement.     Patient Communication     Add Comments   Not seen Back to Top               Blood Administration  Expand All  Collapse All

## 2024-12-03 NOTE — TELEPHONE ENCOUNTER
----- Message from [employer] sent at 12/3/2024  8:49 AM CST -----  .Type:  Patient Returning Call    Who Called:pt  Who Left Message for Patient:pt  Does the patient know what this is regarding?:lood test results   Would the patient rather a call back or a response via [facility]?   Best Call Back Number:[phone]  Additional Information: Please call back about blood test results

## 2024-12-11 DIAGNOSIS — E78.5 HYPERLIPIDEMIA, UNSPECIFIED HYPERLIPIDEMIA TYPE: ICD-10-CM

## 2024-12-11 RX ORDER — FENOFIBRATE 145 MG/1
145 TABLET, FILM COATED ORAL
Qty: 30 TABLET | Refills: 5 | Status: SHIPPED | OUTPATIENT
Start: 2024-12-11

## 2025-03-25 ENCOUNTER — TELEPHONE (OUTPATIENT)
Dept: INTERNAL MEDICINE | Facility: CLINIC | Age: 80
End: 2025-03-25
Payer: MEDICARE

## 2025-03-25 DIAGNOSIS — Z00.00 WELLNESS EXAMINATION: Primary | ICD-10-CM

## 2025-03-25 DIAGNOSIS — I48.20 CHRONIC A-FIB: ICD-10-CM

## 2025-03-25 DIAGNOSIS — E11.9 TYPE 2 DIABETES MELLITUS WITHOUT COMPLICATION, WITHOUT LONG-TERM CURRENT USE OF INSULIN: ICD-10-CM

## 2025-03-25 DIAGNOSIS — D64.9 ANEMIA, UNSPECIFIED TYPE: ICD-10-CM

## 2025-03-25 DIAGNOSIS — E78.2 MIXED HYPERLIPIDEMIA: ICD-10-CM

## 2025-03-25 DIAGNOSIS — I63.9 CEREBROVASCULAR ACCIDENT (CVA), UNSPECIFIED MECHANISM: ICD-10-CM

## 2025-03-25 DIAGNOSIS — N18.31 CHRONIC KIDNEY DISEASE, STAGE 3A: ICD-10-CM

## 2025-03-25 DIAGNOSIS — I10 PRIMARY HYPERTENSION: ICD-10-CM

## 2025-03-25 DIAGNOSIS — I73.9 PAD (PERIPHERAL ARTERY DISEASE): ICD-10-CM

## 2025-03-25 DIAGNOSIS — E78.5 HYPERLIPIDEMIA, UNSPECIFIED HYPERLIPIDEMIA TYPE: ICD-10-CM

## 2025-03-25 NOTE — TELEPHONE ENCOUNTER
----- Message from Nurse John sent at 3/25/2025  7:42 AM CDT -----  Regarding: mae curry 4/2 @10:20  Are there any outstanding tasks in patient chart? Needs fasting labsIs there documentation of outstanding tasks in patient chart? noHas patient been to the ER, urgent care, or another physician since last visit?Has patient done any blood work or x-rays since last visit?5. PLEASE HAVE PATIENT BRING MEDICATION LIST OR BOTTLES TO EVERY OFFICE VISIT

## 2025-04-01 ENCOUNTER — LAB VISIT (OUTPATIENT)
Dept: LAB | Facility: HOSPITAL | Age: 80
End: 2025-04-01
Attending: OTOLARYNGOLOGY
Payer: MEDICARE

## 2025-04-01 DIAGNOSIS — E11.9 TYPE 2 DIABETES MELLITUS WITHOUT COMPLICATION, WITHOUT LONG-TERM CURRENT USE OF INSULIN: ICD-10-CM

## 2025-04-01 DIAGNOSIS — Z00.00 WELLNESS EXAMINATION: ICD-10-CM

## 2025-04-01 DIAGNOSIS — N18.31 CHRONIC KIDNEY DISEASE, STAGE 3A: ICD-10-CM

## 2025-04-01 DIAGNOSIS — I63.9 CEREBROVASCULAR ACCIDENT (CVA), UNSPECIFIED MECHANISM: ICD-10-CM

## 2025-04-01 DIAGNOSIS — E78.2 MIXED HYPERLIPIDEMIA: ICD-10-CM

## 2025-04-01 DIAGNOSIS — I48.20 CHRONIC A-FIB: ICD-10-CM

## 2025-04-01 DIAGNOSIS — E78.5 HYPERLIPIDEMIA, UNSPECIFIED HYPERLIPIDEMIA TYPE: ICD-10-CM

## 2025-04-01 DIAGNOSIS — I10 PRIMARY HYPERTENSION: ICD-10-CM

## 2025-04-01 DIAGNOSIS — D64.9 ANEMIA, UNSPECIFIED TYPE: ICD-10-CM

## 2025-04-01 DIAGNOSIS — I73.9 PAD (PERIPHERAL ARTERY DISEASE): ICD-10-CM

## 2025-04-01 LAB
ALBUMIN SERPL-MCNC: 3.3 G/DL (ref 3.4–4.8)
ALBUMIN/GLOB SERPL: 0.9 RATIO (ref 1.1–2)
ALP SERPL-CCNC: 42 UNIT/L (ref 40–150)
ALT SERPL-CCNC: 14 UNIT/L (ref 0–55)
ANION GAP SERPL CALC-SCNC: 10 MEQ/L
AST SERPL-CCNC: 16 UNIT/L (ref 11–45)
BASOPHILS # BLD AUTO: 0.07 X10(3)/MCL
BASOPHILS NFR BLD AUTO: 0.9 %
BILIRUB SERPL-MCNC: 1.1 MG/DL
BUN SERPL-MCNC: 42.1 MG/DL (ref 8.4–25.7)
CALCIUM SERPL-MCNC: 9.2 MG/DL (ref 8.8–10)
CHLORIDE SERPL-SCNC: 106 MMOL/L (ref 98–107)
CHOLEST SERPL-MCNC: 123 MG/DL
CHOLEST/HDLC SERPL: 3 {RATIO} (ref 0–5)
CO2 SERPL-SCNC: 25 MMOL/L (ref 23–31)
CREAT SERPL-MCNC: 1.47 MG/DL (ref 0.72–1.25)
CREAT UR-MCNC: 93.6 MG/DL (ref 63–166)
CREAT/UREA NIT SERPL: 29
EOSINOPHIL # BLD AUTO: 0.96 X10(3)/MCL (ref 0–0.9)
EOSINOPHIL NFR BLD AUTO: 11.9 %
ERYTHROCYTE [DISTWIDTH] IN BLOOD BY AUTOMATED COUNT: 15.7 % (ref 11.5–17)
EST. AVERAGE GLUCOSE BLD GHB EST-MCNC: 116.9 MG/DL
GFR SERPLBLD CREATININE-BSD FMLA CKD-EPI: 48 ML/MIN/1.73/M2
GLOBULIN SER-MCNC: 3.5 GM/DL (ref 2.4–3.5)
GLUCOSE SERPL-MCNC: 96 MG/DL (ref 82–115)
HBA1C MFR BLD: 5.7 %
HCT VFR BLD AUTO: 50.9 % (ref 42–52)
HDLC SERPL-MCNC: 48 MG/DL (ref 35–60)
HGB BLD-MCNC: 16.5 G/DL (ref 14–18)
IMM GRANULOCYTES # BLD AUTO: 0.01 X10(3)/MCL (ref 0–0.04)
IMM GRANULOCYTES NFR BLD AUTO: 0.1 %
LDLC SERPL CALC-MCNC: 63 MG/DL (ref 50–140)
LYMPHOCYTES # BLD AUTO: 1.44 X10(3)/MCL (ref 0.6–4.6)
LYMPHOCYTES NFR BLD AUTO: 17.9 %
MCH RBC QN AUTO: 29.8 PG (ref 27–31)
MCHC RBC AUTO-ENTMCNC: 32.4 G/DL (ref 33–36)
MCV RBC AUTO: 91.9 FL (ref 80–94)
MICROALBUMIN UR-MCNC: 11.2 UG/ML
MICROALBUMIN/CREAT RATIO PNL UR: 12 MG/GM CR (ref 0–30)
MONOCYTES # BLD AUTO: 0.71 X10(3)/MCL (ref 0.1–1.3)
MONOCYTES NFR BLD AUTO: 8.8 %
NEUTROPHILS # BLD AUTO: 4.86 X10(3)/MCL (ref 2.1–9.2)
NEUTROPHILS NFR BLD AUTO: 60.4 %
PLATELET # BLD AUTO: 197 X10(3)/MCL (ref 130–400)
PMV BLD AUTO: 10.5 FL (ref 7.4–10.4)
POTASSIUM SERPL-SCNC: 4.1 MMOL/L (ref 3.5–5.1)
PROT SERPL-MCNC: 6.8 GM/DL (ref 5.8–7.6)
RBC # BLD AUTO: 5.54 X10(6)/MCL (ref 4.7–6.1)
SODIUM SERPL-SCNC: 141 MMOL/L (ref 136–145)
TRIGL SERPL-MCNC: 59 MG/DL (ref 34–140)
TSH SERPL-ACNC: 2.57 UIU/ML (ref 0.35–4.94)
VLDLC SERPL CALC-MCNC: 12 MG/DL
WBC # BLD AUTO: 8.05 X10(3)/MCL (ref 4.5–11.5)

## 2025-04-01 PROCEDURE — 82570 ASSAY OF URINE CREATININE: CPT

## 2025-04-01 PROCEDURE — 80053 COMPREHEN METABOLIC PANEL: CPT

## 2025-04-01 PROCEDURE — 85025 COMPLETE CBC W/AUTO DIFF WBC: CPT

## 2025-04-01 PROCEDURE — 80061 LIPID PANEL: CPT

## 2025-04-01 PROCEDURE — 83036 HEMOGLOBIN GLYCOSYLATED A1C: CPT

## 2025-04-01 PROCEDURE — 84443 ASSAY THYROID STIM HORMONE: CPT

## 2025-04-01 PROCEDURE — 36415 COLL VENOUS BLD VENIPUNCTURE: CPT

## 2025-04-02 ENCOUNTER — OFFICE VISIT (OUTPATIENT)
Dept: INTERNAL MEDICINE | Facility: CLINIC | Age: 80
End: 2025-04-02
Payer: MEDICARE

## 2025-04-02 VITALS
SYSTOLIC BLOOD PRESSURE: 114 MMHG | RESPIRATION RATE: 16 BRPM | BODY MASS INDEX: 33.86 KG/M2 | HEART RATE: 59 BPM | OXYGEN SATURATION: 98 % | WEIGHT: 250 LBS | DIASTOLIC BLOOD PRESSURE: 70 MMHG | HEIGHT: 72 IN

## 2025-04-02 DIAGNOSIS — I48.20 CHRONIC A-FIB: ICD-10-CM

## 2025-04-02 DIAGNOSIS — E78.2 MIXED HYPERLIPIDEMIA: ICD-10-CM

## 2025-04-02 DIAGNOSIS — E11.51 TYPE 2 DIABETES MELLITUS WITH DIABETIC PERIPHERAL ANGIOPATHY WITHOUT GANGRENE, WITHOUT LONG-TERM CURRENT USE OF INSULIN: ICD-10-CM

## 2025-04-02 DIAGNOSIS — I63.9 CEREBROVASCULAR ACCIDENT (CVA), UNSPECIFIED MECHANISM: Primary | ICD-10-CM

## 2025-04-02 DIAGNOSIS — E11.9 TYPE 2 DIABETES MELLITUS WITHOUT COMPLICATION, WITHOUT LONG-TERM CURRENT USE OF INSULIN: ICD-10-CM

## 2025-04-02 DIAGNOSIS — I10 PRIMARY HYPERTENSION: ICD-10-CM

## 2025-04-02 DIAGNOSIS — N18.31 CHRONIC KIDNEY DISEASE, STAGE 3A: ICD-10-CM

## 2025-04-02 DIAGNOSIS — I50.9 CONGESTIVE HEART FAILURE, UNSPECIFIED HF CHRONICITY, UNSPECIFIED HEART FAILURE TYPE: ICD-10-CM

## 2025-04-02 NOTE — PROGRESS NOTES
Patient ID: Alcides Rosario is a 79 y.o. male.    Chief Complaint: Follow-up (6 month f/u, labs done 4/1)      Patient Care Team:  Maycol Mcleod II, MD as PCP - General (Internal Medicine)  Maycol Mcleod II, MD Chastant, Bradley J II, MD as Consulting Physician (Internal Medicine)  Joaquin Bravo MD as Consulting Physician (Cardiology)  Rose Ratliff LPN as Licensed Practical Nurse  Maycol Mcleod II, MD as Consulting Physician (Internal Medicine)     HPI:   Patient presents here today for above reason.     Alcides is 79-year-old gentleman presents today for follow-up.  Actually doing great cholesterol is at goal on statin therapy.  He is still in his iron supplement.  History of CHF currently compensated with Entresto and also Lasix.  History of AFib rate controlled and on anticoagulation.  Otherwise doing great no complaints at this time here for follow-up.  Labs look great    The patient's Health Maintenance was reviewed and the following appears to be due at this time:   Health Maintenance Due   Topic Date Due    TETANUS VACCINE  Never done    Pneumococcal Vaccines (Age 50+) (1 of 2 - PCV) Never done    Shingles Vaccine (1 of 2) Never done    Diabetic Eye Exam  11/03/2017    RSV Vaccine (Age 60+ and Pregnant patients) (1 - 1-dose 75+ series) Never done    Influenza Vaccine (1) Never done    COVID-19 Vaccine (7 - 2024-25 season) 09/01/2024        Past Medical History:  Past Medical History:   Diagnosis Date    Atrial fibrillation     HTN (hypertension)     Peripheral vascular disease, unspecified      Past Surgical History:   Procedure Laterality Date    INSERTION OF PACEMAKER N/A 3/31/2023    Procedure: INSERTION, PACEMAKER;  Surgeon: Joaquin Bravo MD;  Location: [facility] CATH LAB;  Service: Cardiology;  Laterality: N/A;  single chamber PPM     Review of patient's allergies indicates:  No Known Allergies  Medications Ordered Prior to Encounter[1]  Social History[2]  No family history on file.    ROS:    Review of Systems  Constitutional: No weight gain, No fever, No chills, No fatigue.   Eyes: No blurring, No visual disturbances.   Ear/Nose/Mouth/Throat: No decreased hearing, No ear pain, No nasal congestion, No sore throat.   Respiratory: No shortness of breath, No cough, No wheezing.   Cardiovascular: No chest pain, No palpitations, No peripheral edema.   Gastrointestinal: No nausea, No vomiting, No diarrhea, No constipation, No abdominal pain.   Genitourinary: No dysuria, No hematuria.   Hematology/Lymphatics: No bruising tendency, No bleeding tendency, No swollen lymph glands.   Endocrine: No excessive thirst, No polyuria, No excessive hunger.   Musculoskeletal: No joint pain, No muscle pain, No decreased range of motion.   Integumentary: No rash, No pruritus.   Neurologic: No abnormal balance, No confusion, No headache.   Psychiatric: No anxiety, No depression, Not suicidal, No hallucinations.        A comprehensive HEALTH RISK ASSESSMENT was completed today. Results are summarized below:                  The patient is NOT A TOBACCO USER.  The patient reports NO SIGNIFICANT ALCOHOL USE.     All Questions regarding food, transportation or housing were not answered today.     Vitals/PE:   /70 (BP Location: Left arm, Patient Position: Sitting)   Pulse (!) 59   Resp 16   Ht 6' (1.829 m)   Wt 113.4 kg (250 lb)   SpO2 98%   BMI 33.91 kg/m²   Physical Exam    General: Alert and oriented, No acute distress.   Eye: Normal conjunctiva without exudate.  HENMT: Normocephalic/AT, Normal hearing, Oral mucosa is moist and pink   Neck: No goiter visualized.   Respiratory: Lungs CTAB, Respirations are non-labored, Breath sounds are equal, Symmetrical chest wall expansion.  Cardiovascular: Normal rate, Regular rhythm, No murmur, No edema.   Gastrointestinal: Non-distended.   Genitourinary: Deferred.  Musculoskeletal: Normal ROM, Normal gait, No deformities or amputations.  Integumentary: Warm, Dry, Intact. No  diaphoresis, or flushing.  Neurologic: No focal deficits, Cranial Nerves II-XII are grossly intact.   Psychiatric: Cooperative, Appropriate mood & affect, Normal judgment, Non-suicidal.             No data to display                  4/2/2025    10:20 AM 9/23/2024     9:20 AM 3/6/2024    10:40 AM 9/6/2023    10:40 AM 3/8/2023    10:20 AM 9/8/2022     3:00 PM   Fall Risk Assessment - Outpatient   Mobility Status Ambulatory Ambulatory Ambulatory Ambulatory Ambulatory Ambulatory   Number of falls 0 0 0 0 0 0   Identified as fall risk False False False False False False                Assessment/Plan:       1. Cerebrovascular accident (CVA), unspecified mechanism  Overview:  Resolved, no residual  On oac      2. Primary hypertension  Overview:  At goal,cont current rx      3. Mixed hyperlipidemia  Overview:  At goal, cont current rx.       4. Chronic a-fib  Overview:  Rate controlled. On oac.       5. Chronic kidney disease, stage 3a  Overview:  stable      6. Type 2 diabetes mellitus without complication, without long-term current use of insulin  Overview:  A1c 5.7, cont current rx.       7. Type 2 diabetes mellitus with diabetic peripheral angiopathy without gangrene, without long-term current use of insulin    8. Congestive heart failure, unspecified HF chronicity, unspecified heart failure type  Overview:  Compensated. Sees cv.  Cont entresto               Education and counseling done face to face regarding medical conditions and plan. Contact office if new symptoms develop. Should any symptoms ever significantly worsen seek emergency medical attention/go to ER. Follow up at least yearly for wellness or sooner PRN. Nurse to call patient with any results. The patient is receptive, expresses understanding and is agreeable to plan. All questions have been answered.    No follow-ups on file.      Medicare Annual Wellness and Personalized Prevention Plan:   Fall Risk + Home Safety + Hearing Impairment + Depression  Screen + Cognitive Impairment Screen + Health Risk Assessment all reviewed.    Advance Care Planning   I attest to discussing Advance Care Planning with patient and/or family member.  Education was provided including the importance of the Health Care Power of , Advance Directives, and/or [place] documentation.  The patient expressed understanding to the importance of this information and discussion.  Length of ACP conversation in minutes:                   [1]   Current Outpatient Medications on File Prior to Visit   Medication Sig Dispense Refill    ELIQUIS 5 mg Tab Take 5 mg by mouth 2 (two) times daily.      ENTRESTO 49-51 mg per tablet Take 1 tablet by mouth 2 (two) times daily.      fenofibrate (TRICOR) 145 MG tablet TAKE 1 TABLET BY MOUTH ONCE A DAY 30 tablet 5    finasteride (PROSCAR) 5 mg tablet TAKE 1 TABLET BY MOUTH DAILY 30 tablet 11    folic acid (FOLVITE) 1 MG tablet Take 1 tablet (1 mg total) by mouth once daily. 30 tablet 5    furosemide (LASIX) 40 MG tablet Take 40 mg by mouth 2 (two) times daily.      iron-vitamin C 100-250 mg, ICAR-C, (ICAR-C) 100-250 mg Tab Take 1 tablet by mouth once daily. 30 tablet 5    pravastatin (PRAVACHOL) 40 MG tablet TAKE 1 TABLET BY MOUTH DAILY 30 tablet 11     No current facility-administered medications on file prior to visit.   [2]   Social History  Socioeconomic History    Marital status:    Tobacco Use    Smoking status: Former     Types: Cigarettes     Passive exposure: Never    Smokeless tobacco: Never   Substance and Sexual Activity    Alcohol use: Never    Drug use: Never    Sexual activity: Never     Social Drivers of Health     Financial Resource Strain: Low Risk  (4/1/2025)    Overall Financial Resource Strain (CARDIA)     Difficulty of Paying Living Expenses: Not hard at all   Food Insecurity: No Food Insecurity (4/1/2025)    Hunger Vital Sign     Worried About Running Out of Food in the Last Year: Never true     Ran Out of Food in the Last  Year: Never true   Transportation Needs: No Transportation Needs (4/1/2025)    PRAPARE - Transportation     Lack of Transportation (Medical): No     Lack of Transportation (Non-Medical): No   Physical Activity: Inactive (4/1/2025)    Exercise Vital Sign     Days of Exercise per Week: 0 days     Minutes of Exercise per Session: 10 min   Stress: No Stress Concern Present (4/1/2025)    [employer] - Occupational Stress Questionnaire     Feeling of Stress : Only a little   Housing Stability: Low Risk  (4/1/2025)    Housing Stability Vital Sign     Unable to Pay for Housing in the Last Year: No     Number of Times Moved in the Last Year: 0     Homeless in the Last Year: No

## 2025-04-20 DIAGNOSIS — E53.8 FOLATE DEFICIENCY: ICD-10-CM

## 2025-04-20 DIAGNOSIS — D50.8 IRON DEFICIENCY ANEMIA SECONDARY TO INADEQUATE DIETARY IRON INTAKE: ICD-10-CM

## 2025-04-21 RX ORDER — FOLIC ACID 1 MG/1
1000 TABLET ORAL
Qty: 30 TABLET | Refills: 5 | Status: SHIPPED | OUTPATIENT
Start: 2025-04-21

## 2025-05-08 ENCOUNTER — HOSPITAL ENCOUNTER (INPATIENT)
Facility: HOSPITAL | Age: 80
LOS: 32 days | Discharge: NURSING, REHAB OR LONG-TERM CARE | DRG: 215 | End: 2025-06-09
Attending: INTERNAL MEDICINE | Admitting: INTERNAL MEDICINE
Payer: MEDICARE

## 2025-05-08 DIAGNOSIS — I47.29 NSVT (NONSUSTAINED VENTRICULAR TACHYCARDIA): ICD-10-CM

## 2025-05-08 DIAGNOSIS — R00.0 TACHYCARDIA: ICD-10-CM

## 2025-05-08 DIAGNOSIS — I47.20 VT (VENTRICULAR TACHYCARDIA): ICD-10-CM

## 2025-05-08 DIAGNOSIS — I31.39 PERICARDIAL EFFUSION: ICD-10-CM

## 2025-05-08 DIAGNOSIS — I49.9 ARRHYTHMIA: ICD-10-CM

## 2025-05-08 DIAGNOSIS — I49.9 ABNORMAL HEART RHYTHM: ICD-10-CM

## 2025-05-08 DIAGNOSIS — I25.10 CAD (CORONARY ARTERY DISEASE): ICD-10-CM

## 2025-05-08 DIAGNOSIS — I48.91 ATRIAL FIBRILLATION: ICD-10-CM

## 2025-05-08 DIAGNOSIS — I47.20 VENTRICULAR TACHYCARDIA: ICD-10-CM

## 2025-05-08 DIAGNOSIS — I50.9 CONGESTIVE HEART FAILURE, UNSPECIFIED HF CHRONICITY, UNSPECIFIED HEART FAILURE TYPE: Primary | ICD-10-CM

## 2025-05-08 DIAGNOSIS — I47.20 V-TACH: ICD-10-CM

## 2025-05-08 DIAGNOSIS — I47.20 VENTRICULAR TACHYARRHYTHMIA: ICD-10-CM

## 2025-05-08 LAB
ALBUMIN SERPL-MCNC: 3 G/DL (ref 3.4–4.8)
ALBUMIN/GLOB SERPL: 1 RATIO (ref 1.1–2)
ALP SERPL-CCNC: 40 UNIT/L (ref 40–150)
ALT SERPL-CCNC: 37 UNIT/L (ref 0–55)
ANION GAP SERPL CALC-SCNC: 10 MEQ/L
APTT PPP: 30.4 SECONDS (ref 23.2–33.7)
APTT PPP: 30.8 SECONDS (ref 23.2–33.7)
AST SERPL-CCNC: 50 UNIT/L (ref 11–45)
BILIRUB SERPL-MCNC: 1 MG/DL
BUN SERPL-MCNC: 37.7 MG/DL (ref 8.4–25.7)
CALCIUM SERPL-MCNC: 8.1 MG/DL (ref 8.8–10)
CHLORIDE SERPL-SCNC: 108 MMOL/L (ref 98–107)
CO2 SERPL-SCNC: 22 MMOL/L (ref 23–31)
CREAT SERPL-MCNC: 1.67 MG/DL (ref 0.72–1.25)
CREAT/UREA NIT SERPL: 23
GFR SERPLBLD CREATININE-BSD FMLA CKD-EPI: 41 ML/MIN/1.73/M2
GLOBULIN SER-MCNC: 3 GM/DL (ref 2.4–3.5)
GLUCOSE SERPL-MCNC: 100 MG/DL (ref 82–115)
INR PPP: 1.5
MAGNESIUM SERPL-MCNC: 2 MG/DL (ref 1.6–2.6)
OHS QRS DURATION: 174 MS
OHS QTC CALCULATION: 618 MS
PHOSPHATE SERPL-MCNC: 3.5 MG/DL (ref 2.3–4.7)
POTASSIUM SERPL-SCNC: 4.6 MMOL/L (ref 3.5–5.1)
PROT SERPL-MCNC: 6 GM/DL (ref 5.8–7.6)
PROTHROMBIN TIME: 18.2 SECONDS (ref 12.5–14.5)
SODIUM SERPL-SCNC: 140 MMOL/L (ref 136–145)
TROPONIN I SERPL-MCNC: 5.33 NG/ML (ref 0–0.04)

## 2025-05-08 PROCEDURE — 20000000 HC ICU ROOM

## 2025-05-08 PROCEDURE — 93005 ELECTROCARDIOGRAM TRACING: CPT

## 2025-05-08 PROCEDURE — 84100 ASSAY OF PHOSPHORUS: CPT

## 2025-05-08 PROCEDURE — 85610 PROTHROMBIN TIME: CPT

## 2025-05-08 PROCEDURE — 83735 ASSAY OF MAGNESIUM: CPT

## 2025-05-08 PROCEDURE — 63600175 PHARM REV CODE 636 W HCPCS

## 2025-05-08 PROCEDURE — 80053 COMPREHEN METABOLIC PANEL: CPT

## 2025-05-08 PROCEDURE — 93010 ELECTROCARDIOGRAM REPORT: CPT | Mod: ,,, | Performed by: INTERNAL MEDICINE

## 2025-05-08 PROCEDURE — 36415 COLL VENOUS BLD VENIPUNCTURE: CPT

## 2025-05-08 PROCEDURE — 84484 ASSAY OF TROPONIN QUANT: CPT

## 2025-05-08 PROCEDURE — 85730 THROMBOPLASTIN TIME PARTIAL: CPT

## 2025-05-08 PROCEDURE — 25000003 PHARM REV CODE 250

## 2025-05-08 PROCEDURE — 25000003 PHARM REV CODE 250: Performed by: INTERNAL MEDICINE

## 2025-05-08 RX ORDER — HEPARIN SODIUM,PORCINE/D5W 25000/250
0-40 INTRAVENOUS SOLUTION INTRAVENOUS CONTINUOUS
Status: DISCONTINUED | OUTPATIENT
Start: 2025-05-08 | End: 2025-05-09

## 2025-05-08 RX ORDER — MUPIROCIN 20 MG/G
OINTMENT TOPICAL 2 TIMES DAILY
Status: DISPENSED | OUTPATIENT
Start: 2025-05-08 | End: 2025-05-13

## 2025-05-08 RX ORDER — PRAVASTATIN SODIUM 10 MG/1
40 TABLET ORAL DAILY
Status: DISCONTINUED | OUTPATIENT
Start: 2025-05-09 | End: 2025-06-02

## 2025-05-08 RX ORDER — FINASTERIDE 5 MG/1
5 TABLET, FILM COATED ORAL DAILY
Status: DISCONTINUED | OUTPATIENT
Start: 2025-05-09 | End: 2025-06-02

## 2025-05-08 RX ORDER — ONDANSETRON HYDROCHLORIDE 2 MG/ML
4 INJECTION, SOLUTION INTRAVENOUS EVERY 8 HOURS PRN
Status: DISCONTINUED | OUTPATIENT
Start: 2025-05-08 | End: 2025-05-13

## 2025-05-08 RX ORDER — FUROSEMIDE 40 MG/1
40 TABLET ORAL 2 TIMES DAILY
Status: DISCONTINUED | OUTPATIENT
Start: 2025-05-08 | End: 2025-05-17

## 2025-05-08 RX ORDER — SODIUM CHLORIDE 0.9 % (FLUSH) 0.9 %
10 SYRINGE (ML) INJECTION
Status: DISCONTINUED | OUTPATIENT
Start: 2025-05-08 | End: 2025-06-09 | Stop reason: HOSPADM

## 2025-05-08 RX ADMIN — SACUBITRIL AND VALSARTAN 1 TABLET: 49; 51 TABLET, FILM COATED ORAL at 08:05

## 2025-05-08 RX ADMIN — FUROSEMIDE 40 MG: 40 TABLET ORAL at 08:05

## 2025-05-08 RX ADMIN — AMIODARONE HYDROCHLORIDE 1 MG/MIN: 1.8 INJECTION, SOLUTION INTRAVENOUS at 05:05

## 2025-05-08 RX ADMIN — AMIODARONE HYDROCHLORIDE 1 MG/MIN: 1.8 INJECTION, SOLUTION INTRAVENOUS at 11:05

## 2025-05-08 RX ADMIN — HEPARIN SODIUM 12 UNITS/KG/HR: 10000 INJECTION, SOLUTION INTRAVENOUS at 08:05

## 2025-05-08 RX ADMIN — MUPIROCIN: 20 OINTMENT TOPICAL at 08:05

## 2025-05-08 NOTE — Clinical Note
An angiography of the  left femoral artery was performed to evaluate for the placement of a closure device. GROIN SHOT

## 2025-05-08 NOTE — Clinical Note
ABLATION PROCEDURE COMPLETE. WAITING ON CARLOS GRAY TO INSERT IMPELLA IN PATIENT PRIOR TO TRANSPORT TO ICU.

## 2025-05-08 NOTE — Clinical Note
The catheter was inserted into the and was inserted over the wire into the RIGHT AXILLARY. FOR EVALUATION OF POSSIBLE IMPELLA PLACEMENT

## 2025-05-08 NOTE — Clinical Note
The catheter was inserted into the and was inserted over the wire into the aorta ostium   left main. Hemodynamics were performed.  An angiography was performed of the left coronary arteries. The angiography was performed via power injection.
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
## 2025-05-08 NOTE — Clinical Note
The catheter was inserted into the and was inserted over the wire into the ostium   right coronary artery. Hemodynamics were performed.  An angiography was performed of the right coronary arteries. Multiple views were taken.

## 2025-05-08 NOTE — Clinical Note
The PA catheter was inserted into the inferior vena cava. Hemodynamics were performed. RA, RV, PA and wedge pressures measured.  RA, FA, and PA saturations collected.  Thermals performed.

## 2025-05-08 NOTE — Clinical Note
Please see anesthesia documentation FOR ALL VITAL SIGNS, MEDICATION ADMINISTRATION, AND ONGOING ASSESSMENTS.

## 2025-05-08 NOTE — Clinical Note
An angiography of the  right femoral artery was performed to evaluate for the placement of a closure device. GROIN SHOT TO EVALUATE IMPELLA PLACEMENT

## 2025-05-08 NOTE — Clinical Note
Patient remains in room 6, stable. No complaints at this time. On monitors, and will continue monitor.

## 2025-05-08 NOTE — Clinical Note
The catheter was inserted into the and was inserted over the wire into the ostium   left main. Hemodynamics were performed.  An angiography was performed of the left coronary arteries.

## 2025-05-08 NOTE — Clinical Note
The catheter was inserted into the and was inserted over the wire into the ostium   left main. Hemodynamics were performed.  An angiography was performed of the left coronary arteries. Multiple views were taken.

## 2025-05-08 NOTE — Clinical Note
The pericardiocentesis catheter was inserted and pericardial tap was performed under US guidance in the and sewed in with MAINE drain in the pericardial space.

## 2025-05-08 NOTE — Clinical Note
main pulmonary artery and secured in place for continuous hemodynamic monitoring. Hemodynamics were performed.

## 2025-05-09 LAB
ALBUMIN SERPL-MCNC: 2.6 G/DL (ref 3.4–4.8)
ALBUMIN/GLOB SERPL: 0.9 RATIO (ref 1.1–2)
ALLENS TEST BLOOD GAS (OHS): ABNORMAL
ALLENS TEST BLOOD GAS (OHS): ABNORMAL
ALP SERPL-CCNC: 37 UNIT/L (ref 40–150)
ALT SERPL-CCNC: 32 UNIT/L (ref 0–55)
ANION GAP SERPL CALC-SCNC: 8 MEQ/L
APICAL FOUR CHAMBER EJECTION FRACTION: 23 %
APICAL TWO CHAMBER EJECTION FRACTION: 23 %
APTT PPP: 75.5 SECONDS (ref 23.2–33.7)
APTT PPP: 91.5 SECONDS (ref 23.2–33.7)
AST SERPL-CCNC: 36 UNIT/L (ref 11–45)
BASE EXCESS BLD CALC-SCNC: 1.3 MMOL/L (ref -2–2)
BASE EXCESS BLD CALC-SCNC: 3.2 MMOL/L
BASOPHILS # BLD AUTO: 0.05 X10(3)/MCL
BASOPHILS NFR BLD AUTO: 0.5 %
BILIRUB SERPL-MCNC: 0.6 MG/DL
BLOOD GAS SAMPLE TYPE (OHS): ABNORMAL
BLOOD GAS SAMPLE TYPE (OHS): ABNORMAL
BSA FOR ECHO PROCEDURE: 2.42 M2
BSA FOR ECHO PROCEDURE: 2.42 M2
BUN SERPL-MCNC: 39.1 MG/DL (ref 8.4–25.7)
CA-I BLD-SCNC: 1 MMOL/L (ref 1.12–1.32)
CA-I BLD-SCNC: 1.01 MMOL/L (ref 1.12–1.23)
CALCIUM SERPL-MCNC: 7.6 MG/DL (ref 8.8–10)
CATH EF QUANTITATIVE: 15 %
CHLORIDE SERPL-SCNC: 101 MMOL/L (ref 98–107)
CO2 BLDA-SCNC: 26.1 MMOL/L
CO2 BLDA-SCNC: 30.6 MMOL/L
CO2 SERPL-SCNC: 21 MMOL/L (ref 23–31)
COHGB MFR BLDA: 1.9 % (ref 0.5–1.5)
COHGB MFR BLDA: 2.2 %
CREAT SERPL-MCNC: 1.41 MG/DL (ref 0.72–1.25)
CREAT/UREA NIT SERPL: 28
CV ECHO LV RWT: 0.49 CM
DRAWN BY BLOOD GAS (OHS): ABNORMAL
DRAWN BY BLOOD GAS (OHS): ABNORMAL
ECHO LV POSTERIOR WALL: 1.2 CM (ref 0.6–1.1)
EOSINOPHIL # BLD AUTO: 0.21 X10(3)/MCL (ref 0–0.9)
EOSINOPHIL NFR BLD AUTO: 2 %
ERYTHROCYTE [DISTWIDTH] IN BLOOD BY AUTOMATED COUNT: 15.5 % (ref 11.5–17)
FRACTIONAL SHORTENING: 18.4 % (ref 28–44)
GFR SERPLBLD CREATININE-BSD FMLA CKD-EPI: 50 ML/MIN/1.73/M2
GLOBULIN SER-MCNC: 2.8 GM/DL (ref 2.4–3.5)
GLUCOSE SERPL-MCNC: 119 MG/DL (ref 82–115)
HCO3 BLDA-SCNC: 25 MMOL/L (ref 22–26)
HCO3 BLDA-SCNC: 29.1 MMOL/L
HCT VFR BLD AUTO: 44.6 % (ref 42–52)
HGB BLD-MCNC: 14.4 G/DL (ref 14–18)
IMM GRANULOCYTES # BLD AUTO: 0.04 X10(3)/MCL (ref 0–0.04)
IMM GRANULOCYTES NFR BLD AUTO: 0.4 %
INTERVENTRICULAR SEPTUM: 1.3 CM (ref 0.6–1.1)
LACTATE SERPL-SCNC: 1 MMOL/L (ref 0.5–2.2)
LACTATE SERPL-SCNC: 1.1 MMOL/L (ref 0.5–2.2)
LEFT INTERNAL DIMENSION IN SYSTOLE: 4 CM (ref 2.1–4)
LEFT VENTRICLE DIASTOLIC VOLUME INDEX: 47.46 ML/M2
LEFT VENTRICLE DIASTOLIC VOLUME: 112 ML
LEFT VENTRICLE END DIASTOLIC VOLUME APICAL 2 CHAMBER: 221 ML
LEFT VENTRICLE END DIASTOLIC VOLUME APICAL 4 CHAMBER: 227 ML
LEFT VENTRICLE MASS INDEX: 101.6 G/M2
LEFT VENTRICLE SYSTOLIC VOLUME INDEX: 28.8 ML/M2
LEFT VENTRICLE SYSTOLIC VOLUME: 68 ML
LEFT VENTRICULAR INTERNAL DIMENSION IN DIASTOLE: 4.9 CM (ref 3.5–6)
LEFT VENTRICULAR MASS: 239.9 G
LPM (OHS): 3
LPM (OHS): 3
LVED V (TEICH): 112 ML
LVES V (TEICH): 68.3 ML
LYMPHOCYTES # BLD AUTO: 1.17 X10(3)/MCL (ref 0.6–4.6)
LYMPHOCYTES NFR BLD AUTO: 11.1 %
MAGNESIUM SERPL-MCNC: 1.9 MG/DL (ref 1.6–2.6)
MCH RBC QN AUTO: 30.1 PG (ref 27–31)
MCHC RBC AUTO-ENTMCNC: 32.3 G/DL (ref 33–36)
MCV RBC AUTO: 93.1 FL (ref 80–94)
METHGB MFR BLDA: 1.2 %
METHGB MFR BLDA: 1.5 % (ref 0.4–1.5)
MONOCYTES # BLD AUTO: 1.04 X10(3)/MCL (ref 0.1–1.3)
MONOCYTES NFR BLD AUTO: 9.9 %
NEUTROPHILS # BLD AUTO: 8 X10(3)/MCL (ref 2.1–9.2)
NEUTROPHILS NFR BLD AUTO: 76.1 %
NRBC BLD AUTO-RTO: 0 %
O2 HB BLOOD GAS (OHS): 57.1 %
O2 HB BLOOD GAS (OHS): 94.6 % (ref 94–97)
OHS LV EJECTION FRACTION SIMPSONS BIPLANE MOD: 23 %
OXYGEN DEVICE BLOOD GAS (OHS): ABNORMAL
OXYGEN DEVICE BLOOD GAS (OHS): ABNORMAL
OXYHGB MFR BLDA: 14.7 G/DL
OXYHGB MFR BLDA: 15 G/DL (ref 12–16)
PCO2 BLDA: 36 MMHG (ref 35–45)
PCO2 BLDA: 48 MMHG
PH BLDA: 7.39 [PH]
PH BLDA: 7.45 [PH] (ref 7.35–7.45)
PHOSPHATE SERPL-MCNC: 3.1 MG/DL (ref 2.3–4.7)
PLATELET # BLD AUTO: 166 X10(3)/MCL (ref 130–400)
PMV BLD AUTO: 11.2 FL (ref 7.4–10.4)
PO2 BLDA: 83 MMHG (ref 80–100)
PO2 BLDA: <38 MMHG
POTASSIUM BLOOD GAS (OHS): 3.6 MMOL/L
POTASSIUM BLOOD GAS (OHS): 3.7 MMOL/L (ref 3.5–5)
POTASSIUM SERPL-SCNC: 3.8 MMOL/L (ref 3.5–5.1)
PROT SERPL-MCNC: 5.4 GM/DL (ref 5.8–7.6)
RBC # BLD AUTO: 4.79 X10(6)/MCL (ref 4.7–6.1)
SAMPLE SITE BLOOD GAS (OHS): ABNORMAL
SAMPLE SITE BLOOD GAS (OHS): ABNORMAL
SAO2 % BLDA: 58.8 %
SAO2 % BLDA: 96.6 %
SODIUM BLOOD GAS (OHS): 133 MMOL/L (ref 137–145)
SODIUM BLOOD GAS (OHS): 134 MMOL/L
SODIUM SERPL-SCNC: 130 MMOL/L (ref 136–145)
TRICUSPID ANNULAR PLANE SYSTOLIC EXCURSION: 1.1 CM
TROPONIN I SERPL-MCNC: 3.28 NG/ML (ref 0–0.04)
TROPONIN I SERPL-MCNC: 3.85 NG/ML (ref 0–0.04)
TROPONIN I SERPL-MCNC: 4.43 NG/ML (ref 0–0.04)
WBC # BLD AUTO: 10.51 X10(3)/MCL (ref 4.5–11.5)
Z-SCORE OF LEFT VENTRICULAR DIMENSION IN END DIASTOLE: -7.17
Z-SCORE OF LEFT VENTRICULAR DIMENSION IN END SYSTOLE: -3.24

## 2025-05-09 PROCEDURE — 5A0221D ASSISTANCE WITH CARDIAC OUTPUT USING IMPELLER PUMP, CONTINUOUS: ICD-10-PCS | Performed by: INTERNAL MEDICINE

## 2025-05-09 PROCEDURE — B2111ZZ FLUOROSCOPY OF MULTIPLE CORONARY ARTERIES USING LOW OSMOLAR CONTRAST: ICD-10-PCS | Performed by: INTERNAL MEDICINE

## 2025-05-09 PROCEDURE — C1760 CLOSURE DEV, VASC: HCPCS | Performed by: INTERNAL MEDICINE

## 2025-05-09 PROCEDURE — 27000221 HC OXYGEN, UP TO 24 HOURS

## 2025-05-09 PROCEDURE — 33990 INSJ PERQ VAD L HRT ARTERIAL: CPT | Performed by: INTERNAL MEDICINE

## 2025-05-09 PROCEDURE — C1751 CATH, INF, PER/CENT/MIDLINE: HCPCS | Performed by: INTERNAL MEDICINE

## 2025-05-09 PROCEDURE — B41D1ZZ FLUOROSCOPY OF AORTA AND BILATERAL LOWER EXTREMITY ARTERIES USING LOW OSMOLAR CONTRAST: ICD-10-PCS | Performed by: INTERNAL MEDICINE

## 2025-05-09 PROCEDURE — 36415 COLL VENOUS BLD VENIPUNCTURE: CPT | Performed by: INTERNAL MEDICINE

## 2025-05-09 PROCEDURE — B2151ZZ FLUOROSCOPY OF LEFT HEART USING LOW OSMOLAR CONTRAST: ICD-10-PCS | Performed by: INTERNAL MEDICINE

## 2025-05-09 PROCEDURE — 85730 THROMBOPLASTIN TIME PARTIAL: CPT

## 2025-05-09 PROCEDURE — 02HA3RZ INSERTION OF SHORT-TERM EXTERNAL HEART ASSIST SYSTEM INTO HEART, PERCUTANEOUS APPROACH: ICD-10-PCS | Performed by: INTERNAL MEDICINE

## 2025-05-09 PROCEDURE — 82803 BLOOD GASES ANY COMBINATION: CPT

## 2025-05-09 PROCEDURE — 37799 UNLISTED PX VASCULAR SURGERY: CPT

## 2025-05-09 PROCEDURE — 36415 COLL VENOUS BLD VENIPUNCTURE: CPT

## 2025-05-09 PROCEDURE — 84100 ASSAY OF PHOSPHORUS: CPT

## 2025-05-09 PROCEDURE — 93799 UNLISTED CV SVC/PROCEDURE: CPT | Performed by: INTERNAL MEDICINE

## 2025-05-09 PROCEDURE — 25000003 PHARM REV CODE 250: Performed by: INTERNAL MEDICINE

## 2025-05-09 PROCEDURE — 83605 ASSAY OF LACTIC ACID: CPT | Performed by: INTERNAL MEDICINE

## 2025-05-09 PROCEDURE — 63600175 PHARM REV CODE 636 W HCPCS: Performed by: INTERNAL MEDICINE

## 2025-05-09 PROCEDURE — 99152 MOD SED SAME PHYS/QHP 5/>YRS: CPT | Performed by: INTERNAL MEDICINE

## 2025-05-09 PROCEDURE — 99153 MOD SED SAME PHYS/QHP EA: CPT | Performed by: INTERNAL MEDICINE

## 2025-05-09 PROCEDURE — 93010 ELECTROCARDIOGRAM REPORT: CPT | Mod: 76,,, | Performed by: INTERNAL MEDICINE

## 2025-05-09 PROCEDURE — C1769 GUIDE WIRE: HCPCS | Performed by: INTERNAL MEDICINE

## 2025-05-09 PROCEDURE — 93460 R&L HRT ART/VENTRICLE ANGIO: CPT | Performed by: INTERNAL MEDICINE

## 2025-05-09 PROCEDURE — 63600175 PHARM REV CODE 636 W HCPCS

## 2025-05-09 PROCEDURE — 80053 COMPREHEN METABOLIC PANEL: CPT

## 2025-05-09 PROCEDURE — 25000003 PHARM REV CODE 250

## 2025-05-09 PROCEDURE — 93005 ELECTROCARDIOGRAM TRACING: CPT

## 2025-05-09 PROCEDURE — 27801859 OPTIME CATHETER, IMPELLA: Performed by: INTERNAL MEDICINE

## 2025-05-09 PROCEDURE — 83735 ASSAY OF MAGNESIUM: CPT

## 2025-05-09 PROCEDURE — 25500020 PHARM REV CODE 255: Performed by: INTERNAL MEDICINE

## 2025-05-09 PROCEDURE — 51702 INSERT TEMP BLADDER CATH: CPT

## 2025-05-09 PROCEDURE — 84484 ASSAY OF TROPONIN QUANT: CPT

## 2025-05-09 PROCEDURE — 4A023N8 MEASUREMENT OF CARDIAC SAMPLING AND PRESSURE, BILATERAL, PERCUTANEOUS APPROACH: ICD-10-PCS | Performed by: INTERNAL MEDICINE

## 2025-05-09 PROCEDURE — C1894 INTRO/SHEATH, NON-LASER: HCPCS | Performed by: INTERNAL MEDICINE

## 2025-05-09 PROCEDURE — 20000000 HC ICU ROOM

## 2025-05-09 PROCEDURE — 85730 THROMBOPLASTIN TIME PARTIAL: CPT | Performed by: INTERNAL MEDICINE

## 2025-05-09 PROCEDURE — C1887 CATHETER, GUIDING: HCPCS | Performed by: INTERNAL MEDICINE

## 2025-05-09 PROCEDURE — 85025 COMPLETE CBC W/AUTO DIFF WBC: CPT

## 2025-05-09 PROCEDURE — 99900035 HC TECH TIME PER 15 MIN (STAT)

## 2025-05-09 PROCEDURE — 27201423 OPTIME MED/SURG SUP & DEVICES STERILE SUPPLY: Performed by: INTERNAL MEDICINE

## 2025-05-09 PROCEDURE — 93010 ELECTROCARDIOGRAM REPORT: CPT | Mod: ,,, | Performed by: INTERNAL MEDICINE

## 2025-05-09 PROCEDURE — 4A033BC MEASUREMENT OF ARTERIAL PRESSURE, CORONARY, PERCUTANEOUS APPROACH: ICD-10-PCS | Performed by: INTERNAL MEDICINE

## 2025-05-09 RX ORDER — POTASSIUM CHLORIDE 20 MEQ/1
40 TABLET, EXTENDED RELEASE ORAL ONCE
Status: COMPLETED | OUTPATIENT
Start: 2025-05-09 | End: 2025-05-09

## 2025-05-09 RX ORDER — LIDOCAINE HYDROCHLORIDE 20 MG/ML
INJECTION, SOLUTION INFILTRATION; PERINEURAL
Status: DISCONTINUED | OUTPATIENT
Start: 2025-05-09 | End: 2025-05-09 | Stop reason: HOSPADM

## 2025-05-09 RX ORDER — LIDOCAINE HYDROCHLORIDE 10 MG/ML
INJECTION, SOLUTION INFILTRATION; PERINEURAL
Status: DISCONTINUED | OUTPATIENT
Start: 2025-05-09 | End: 2025-05-09 | Stop reason: HOSPADM

## 2025-05-09 RX ORDER — FUROSEMIDE 10 MG/ML
INJECTION INTRAMUSCULAR; INTRAVENOUS
Status: DISCONTINUED | OUTPATIENT
Start: 2025-05-09 | End: 2025-05-09 | Stop reason: HOSPADM

## 2025-05-09 RX ORDER — MAGNESIUM SULFATE HEPTAHYDRATE 40 MG/ML
2 INJECTION, SOLUTION INTRAVENOUS ONCE
Status: COMPLETED | OUTPATIENT
Start: 2025-05-09 | End: 2025-05-09

## 2025-05-09 RX ORDER — HEPARIN SODIUM 1000 [USP'U]/ML
INJECTION, SOLUTION INTRAVENOUS; SUBCUTANEOUS
Status: DISCONTINUED | OUTPATIENT
Start: 2025-05-09 | End: 2025-05-09 | Stop reason: HOSPADM

## 2025-05-09 RX ORDER — FENTANYL CITRATE 50 UG/ML
INJECTION, SOLUTION INTRAMUSCULAR; INTRAVENOUS
Status: DISCONTINUED | OUTPATIENT
Start: 2025-05-09 | End: 2025-05-09 | Stop reason: HOSPADM

## 2025-05-09 RX ORDER — ACETAMINOPHEN 500 MG
1000 TABLET ORAL EVERY 6 HOURS PRN
Status: DISCONTINUED | OUTPATIENT
Start: 2025-05-09 | End: 2025-05-13

## 2025-05-09 RX ORDER — LIDOCAINE HYDROCHLORIDE ANHYDROUS AND DEXTROSE MONOHYDRATE .8; 5 G/100ML; G/100ML
INJECTION, SOLUTION INTRAVENOUS
Status: DISCONTINUED | OUTPATIENT
Start: 2025-05-09 | End: 2025-06-09 | Stop reason: HOSPADM

## 2025-05-09 RX ORDER — IOPAMIDOL 755 MG/ML
INJECTION, SOLUTION INTRAVASCULAR
Status: DISCONTINUED | OUTPATIENT
Start: 2025-05-09 | End: 2025-05-09 | Stop reason: HOSPADM

## 2025-05-09 RX ORDER — HEPARIN SODIUM 10000 [USP'U]/100ML
7 INJECTION, SOLUTION INTRAVENOUS CONTINUOUS
Status: DISCONTINUED | OUTPATIENT
Start: 2025-05-09 | End: 2025-05-15

## 2025-05-09 RX ORDER — CEFAZOLIN SODIUM 1 G/3ML
INJECTION, POWDER, FOR SOLUTION INTRAMUSCULAR; INTRAVENOUS
Status: DISCONTINUED | OUTPATIENT
Start: 2025-05-09 | End: 2025-05-09 | Stop reason: HOSPADM

## 2025-05-09 RX ORDER — SODIUM CHLORIDE 0.9 % (FLUSH) 0.9 %
10 SYRINGE (ML) INJECTION
Status: DISCONTINUED | OUTPATIENT
Start: 2025-05-09 | End: 2025-06-09 | Stop reason: HOSPADM

## 2025-05-09 RX ORDER — METHOCARBAMOL 100 MG/ML
500 INJECTION, SOLUTION INTRAMUSCULAR; INTRAVENOUS ONCE AS NEEDED
Status: COMPLETED | OUTPATIENT
Start: 2025-05-09 | End: 2025-05-09

## 2025-05-09 RX ORDER — MIDAZOLAM HYDROCHLORIDE 1 MG/ML
INJECTION INTRAMUSCULAR; INTRAVENOUS
Status: DISCONTINUED | OUTPATIENT
Start: 2025-05-09 | End: 2025-05-09 | Stop reason: HOSPADM

## 2025-05-09 RX ADMIN — ACETAMINOPHEN 1000 MG: 500 TABLET ORAL at 06:05

## 2025-05-09 RX ADMIN — SACUBITRIL AND VALSARTAN 1 TABLET: 49; 51 TABLET, FILM COATED ORAL at 09:05

## 2025-05-09 RX ADMIN — METHOCARBAMOL 500 MG: 100 INJECTION INTRAMUSCULAR; INTRAVENOUS at 09:05

## 2025-05-09 RX ADMIN — MUPIROCIN: 20 OINTMENT TOPICAL at 09:05

## 2025-05-09 RX ADMIN — AMIODARONE HYDROCHLORIDE 1 MG/MIN: 1.8 INJECTION, SOLUTION INTRAVENOUS at 05:05

## 2025-05-09 RX ADMIN — PERFLUTREN 1.3 ML: 6.52 INJECTION, SUSPENSION INTRAVENOUS at 11:05

## 2025-05-09 RX ADMIN — HEPARIN SODIUM 10.8 UNITS/KG/HR: 10000 INJECTION, SOLUTION INTRAVENOUS at 08:05

## 2025-05-09 RX ADMIN — AMIODARONE HYDROCHLORIDE 1 MG/MIN: 1.8 INJECTION, SOLUTION INTRAVENOUS at 11:05

## 2025-05-09 RX ADMIN — MAGNESIUM SULFATE HEPTAHYDRATE 2 G: 40 INJECTION, SOLUTION INTRAVENOUS at 10:05

## 2025-05-09 RX ADMIN — POTASSIUM CHLORIDE 40 MEQ: 1500 TABLET, EXTENDED RELEASE ORAL at 10:05

## 2025-05-09 RX ADMIN — SODIUM BICARBONATE: 84 INJECTION, SOLUTION INTRAVENOUS at 04:05

## 2025-05-09 RX ADMIN — HEPARIN SODIUM 7 UNITS/KG/HR: 10000 INJECTION, SOLUTION INTRAVENOUS at 04:05

## 2025-05-09 RX ADMIN — FUROSEMIDE 40 MG: 40 TABLET ORAL at 09:05

## 2025-05-09 RX ADMIN — PRAVASTATIN SODIUM 40 MG: 10 TABLET ORAL at 09:05

## 2025-05-09 RX ADMIN — FINASTERIDE 5 MG: 5 TABLET, FILM COATED ORAL at 09:05

## 2025-05-09 RX ADMIN — AMIODARONE HYDROCHLORIDE 1 MG/MIN: 1.8 INJECTION, SOLUTION INTRAVENOUS at 04:05

## 2025-05-09 NOTE — H&P
Patient name: Alcides Rosario  MRN: [id number]  : 1945  Cath Lab Procedure H&P Update    Pre-Procedure Assessment:    I saw and examined the patient face to face. The patient has been re-evaluated and his condition is unchanged. The reason for admission, procedure and care is still present.  Based on the patients H&P, pre-procedure physical exam, relevant diagnostic studies, NPO status and information obtained from the patient, I determine the patient is an appropriate candidate for the proposed procedure and anesthesia planned. I further certify the anesthesia risks, benefits and options have been explained to the patient to which he agrees as documented on the procedural consent.    See scanned updated H&P and additional records from media tab.      Lalo Dominguez
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
## 2025-05-10 LAB
ALBUMIN SERPL-MCNC: 2.5 G/DL (ref 3.4–4.8)
ALBUMIN/GLOB SERPL: 0.8 RATIO (ref 1.1–2)
ALLENS TEST BLOOD GAS (OHS): ABNORMAL
ALP SERPL-CCNC: 36 UNIT/L (ref 40–150)
ALT SERPL-CCNC: 25 UNIT/L (ref 0–55)
ANION GAP SERPL CALC-SCNC: 11 MEQ/L
AST SERPL-CCNC: 33 UNIT/L (ref 11–45)
BASE EXCESS BLD CALC-SCNC: -0.7 MMOL/L (ref -2–2)
BASE EXCESS BLD CALC-SCNC: 1 MMOL/L (ref -2–2)
BASE EXCESS BLD CALC-SCNC: 2.4 MMOL/L
BASE EXCESS BLD CALC-SCNC: 2.7 MMOL/L
BASOPHILS # BLD AUTO: 0.03 X10(3)/MCL
BASOPHILS NFR BLD AUTO: 0.3 %
BILIRUB SERPL-MCNC: 0.7 MG/DL
BLOOD GAS SAMPLE TYPE (OHS): ABNORMAL
BUN SERPL-MCNC: 36.2 MG/DL (ref 8.4–25.7)
CA-I BLD-SCNC: 0.96 MMOL/L (ref 1.12–1.23)
CA-I BLD-SCNC: 1.04 MMOL/L (ref 1.12–1.23)
CA-I BLD-SCNC: 1.04 MMOL/L (ref 1.12–1.23)
CA-I BLD-SCNC: 1.07 MMOL/L (ref 1.12–1.32)
CALCIUM SERPL-MCNC: 7.7 MG/DL (ref 8.8–10)
CHLORIDE SERPL-SCNC: 108 MMOL/L (ref 98–107)
CO2 BLDA-SCNC: 24.8 MMOL/L
CO2 BLDA-SCNC: 25.4 MMOL/L
CO2 BLDA-SCNC: 28.5 MMOL/L
CO2 BLDA-SCNC: 29.9 MMOL/L
CO2 SERPL-SCNC: 20 MMOL/L (ref 23–31)
COHGB MFR BLDA: 2.1 % (ref 0.5–1.5)
COHGB MFR BLDA: 2.2 %
COHGB MFR BLDA: 2.4 % (ref 0.5–1.5)
CREAT SERPL-MCNC: 1.34 MG/DL (ref 0.72–1.25)
CREAT/UREA NIT SERPL: 27
DRAWN BY BLOOD GAS (OHS): ABNORMAL
EOSINOPHIL # BLD AUTO: 0.28 X10(3)/MCL (ref 0–0.9)
EOSINOPHIL NFR BLD AUTO: 3.2 %
ERYTHROCYTE [DISTWIDTH] IN BLOOD BY AUTOMATED COUNT: 15.6 % (ref 11.5–17)
GFR SERPLBLD CREATININE-BSD FMLA CKD-EPI: 54 ML/MIN/1.73/M2
GLOBULIN SER-MCNC: 3.2 GM/DL (ref 2.4–3.5)
GLUCOSE SERPL-MCNC: 117 MG/DL (ref 82–115)
HCO3 BLDA-SCNC: 23.8 MMOL/L (ref 22–26)
HCO3 BLDA-SCNC: 24.2 MMOL/L (ref 22–26)
HCO3 BLDA-SCNC: 27.2 MMOL/L
HCO3 BLDA-SCNC: 28.5 MMOL/L
HCT VFR BLD AUTO: 41.9 % (ref 42–52)
HGB BLD-MCNC: 14 G/DL (ref 14–18)
IMM GRANULOCYTES # BLD AUTO: 0.03 X10(3)/MCL (ref 0–0.04)
IMM GRANULOCYTES NFR BLD AUTO: 0.3 %
LACTATE SERPL-SCNC: 1 MMOL/L (ref 0.5–2.2)
LACTATE SERPL-SCNC: 1 MMOL/L (ref 0.5–2.2)
LACTATE SERPL-SCNC: 1.5 MMOL/L (ref 0.5–2.2)
LPM (OHS): 3
LYMPHOCYTES # BLD AUTO: 0.73 X10(3)/MCL (ref 0.6–4.6)
LYMPHOCYTES NFR BLD AUTO: 8.4 %
MAGNESIUM SERPL-MCNC: 2 MG/DL (ref 1.6–2.6)
MCH RBC QN AUTO: 30 PG (ref 27–31)
MCHC RBC AUTO-ENTMCNC: 33.4 G/DL (ref 33–36)
MCV RBC AUTO: 89.9 FL (ref 80–94)
METHGB MFR BLDA: 1.2 % (ref 0.4–1.5)
METHGB MFR BLDA: 1.3 %
METHGB MFR BLDA: 1.3 % (ref 0.4–1.5)
MONOCYTES # BLD AUTO: 0.91 X10(3)/MCL (ref 0.1–1.3)
MONOCYTES NFR BLD AUTO: 10.5 %
NEUTROPHILS # BLD AUTO: 6.72 X10(3)/MCL (ref 2.1–9.2)
NEUTROPHILS NFR BLD AUTO: 77.3 %
NRBC BLD AUTO-RTO: 0 %
O2 HB BLOOD GAS (OHS): 54.5 %
O2 HB BLOOD GAS (OHS): 89.6 % (ref 94–97)
O2 HB BLOOD GAS (OHS): 94.4 % (ref 94–97)
OHS QRS DURATION: 174 MS
OHS QRS DURATION: 178 MS
OHS QTC CALCULATION: 612 MS
OHS QTC CALCULATION: 627 MS
OXYGEN DEVICE BLOOD GAS (OHS): ABNORMAL
OXYHGB MFR BLDA: 14.3 G/DL (ref 12–16)
OXYHGB MFR BLDA: 14.9 G/DL
OXYHGB MFR BLDA: 15.2 G/DL (ref 12–16)
PCO2 BLDA: 32 MMHG (ref 35–45)
PCO2 BLDA: 40 MMHG (ref 35–45)
PCO2 BLDA: 42 MMHG (ref 20–50)
PCO2 BLDA: 47 MMHG
PH BLDA: 7.39 [PH]
PH BLDA: 7.39 [PH] (ref 7.35–7.45)
PH BLDA: 7.42 [PH] (ref 7.3–7.6)
PH BLDA: 7.48 [PH] (ref 7.35–7.45)
PHOSPHATE SERPL-MCNC: 2.9 MG/DL (ref 2.3–4.7)
PLATELET # BLD AUTO: 140 X10(3)/MCL (ref 130–400)
PLATELETS.RETICULATED NFR BLD AUTO: 3.4 % (ref 0.9–11.2)
PMV BLD AUTO: 11.4 FL (ref 7.4–10.4)
PO2 BLDA: 62 MMHG (ref 80–100)
PO2 BLDA: 78 MMHG (ref 80–100)
PO2 BLDA: <38 MMHG
PO2 BLDA: <38 MMHG
POTASSIUM BLOOD GAS (OHS): 3.3 MMOL/L (ref 3.5–5)
POTASSIUM BLOOD GAS (OHS): 3.4 MMOL/L (ref 3.5–5)
POTASSIUM BLOOD GAS (OHS): 3.5 MMOL/L (ref 3.5–5)
POTASSIUM BLOOD GAS (OHS): 3.6 MMOL/L
POTASSIUM SERPL-SCNC: 3.9 MMOL/L (ref 3.5–5.1)
PROT SERPL-MCNC: 5.7 GM/DL (ref 5.8–7.6)
RBC # BLD AUTO: 4.66 X10(6)/MCL (ref 4.7–6.1)
SAMPLE SITE BLOOD GAS (OHS): ABNORMAL
SAO2 % BLDA: 56.6 %
SAO2 % BLDA: 63.1 %
SAO2 % BLDA: 91.1 %
SAO2 % BLDA: 96.3 %
SODIUM BLOOD GAS (OHS): 132 MMOL/L (ref 137–145)
SODIUM BLOOD GAS (OHS): 134 MMOL/L (ref 137–145)
SODIUM BLOOD GAS (OHS): 135 MMOL/L (ref 137–145)
SODIUM BLOOD GAS (OHS): 137 MMOL/L
SODIUM SERPL-SCNC: 139 MMOL/L (ref 136–145)
TROPONIN I SERPL-MCNC: 2.37 NG/ML (ref 0–0.04)
TROPONIN I SERPL-MCNC: 2.53 NG/ML (ref 0–0.04)
TROPONIN I SERPL-MCNC: 2.97 NG/ML (ref 0–0.04)
TROPONIN I SERPL-MCNC: 3.69 NG/ML (ref 0–0.04)
WBC # BLD AUTO: 8.7 X10(3)/MCL (ref 4.5–11.5)

## 2025-05-10 PROCEDURE — 63600175 PHARM REV CODE 636 W HCPCS

## 2025-05-10 PROCEDURE — 84484 ASSAY OF TROPONIN QUANT: CPT

## 2025-05-10 PROCEDURE — 63600175 PHARM REV CODE 636 W HCPCS: Mod: JZ,TB | Performed by: INTERNAL MEDICINE

## 2025-05-10 PROCEDURE — 85025 COMPLETE CBC W/AUTO DIFF WBC: CPT

## 2025-05-10 PROCEDURE — 20000000 HC ICU ROOM

## 2025-05-10 PROCEDURE — 36415 COLL VENOUS BLD VENIPUNCTURE: CPT

## 2025-05-10 PROCEDURE — 25000003 PHARM REV CODE 250

## 2025-05-10 PROCEDURE — 83735 ASSAY OF MAGNESIUM: CPT

## 2025-05-10 PROCEDURE — 27000221 HC OXYGEN, UP TO 24 HOURS

## 2025-05-10 PROCEDURE — 37799 UNLISTED PX VASCULAR SURGERY: CPT

## 2025-05-10 PROCEDURE — 84100 ASSAY OF PHOSPHORUS: CPT

## 2025-05-10 PROCEDURE — 80053 COMPREHEN METABOLIC PANEL: CPT

## 2025-05-10 PROCEDURE — 82803 BLOOD GASES ANY COMBINATION: CPT

## 2025-05-10 PROCEDURE — 63600175 PHARM REV CODE 636 W HCPCS: Performed by: INTERNAL MEDICINE

## 2025-05-10 PROCEDURE — 83605 ASSAY OF LACTIC ACID: CPT | Performed by: INTERNAL MEDICINE

## 2025-05-10 PROCEDURE — 36415 COLL VENOUS BLD VENIPUNCTURE: CPT | Performed by: INTERNAL MEDICINE

## 2025-05-10 PROCEDURE — 99900035 HC TECH TIME PER 15 MIN (STAT)

## 2025-05-10 RX ORDER — LIDOCAINE HYDROCHLORIDE ANHYDROUS AND DEXTROSE MONOHYDRATE .8; 5 G/100ML; G/100ML
1 INJECTION, SOLUTION INTRAVENOUS CONTINUOUS
Status: DISCONTINUED | OUTPATIENT
Start: 2025-05-10 | End: 2025-05-14

## 2025-05-10 RX ADMIN — ACETAMINOPHEN 1000 MG: 500 TABLET ORAL at 01:05

## 2025-05-10 RX ADMIN — AMIODARONE HYDROCHLORIDE 1 MG/MIN: 1.8 INJECTION, SOLUTION INTRAVENOUS at 05:05

## 2025-05-10 RX ADMIN — LIDOCAINE HYDROCHLORIDE 1 MG/MIN: 8 INJECTION, SOLUTION INTRAVENOUS at 08:05

## 2025-05-10 RX ADMIN — MUPIROCIN: 20 OINTMENT TOPICAL at 08:05

## 2025-05-10 RX ADMIN — SACUBITRIL AND VALSARTAN 1 TABLET: 49; 51 TABLET, FILM COATED ORAL at 08:05

## 2025-05-10 RX ADMIN — MUPIROCIN: 20 OINTMENT TOPICAL at 09:05

## 2025-05-10 RX ADMIN — FUROSEMIDE 40 MG: 40 TABLET ORAL at 09:05

## 2025-05-10 RX ADMIN — FINASTERIDE 5 MG: 5 TABLET, FILM COATED ORAL at 09:05

## 2025-05-10 RX ADMIN — AMIODARONE HYDROCHLORIDE 1 MG/MIN: 1.8 INJECTION, SOLUTION INTRAVENOUS at 11:05

## 2025-05-10 RX ADMIN — SACUBITRIL AND VALSARTAN 1 TABLET: 49; 51 TABLET, FILM COATED ORAL at 09:05

## 2025-05-10 RX ADMIN — PRAVASTATIN SODIUM 40 MG: 10 TABLET ORAL at 09:05

## 2025-05-10 RX ADMIN — ESMOLOL HYDROCHLORIDE 50 MCG/KG/MIN: 20 INJECTION INTRAVENOUS at 02:05

## 2025-05-10 RX ADMIN — HEPARIN SODIUM 20.52 UNITS/KG/HR: 10000 INJECTION, SOLUTION INTRAVENOUS at 12:05
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
## 2025-05-11 LAB
ALBUMIN SERPL-MCNC: 2.4 G/DL (ref 3.4–4.8)
ALBUMIN/GLOB SERPL: 0.7 RATIO (ref 1.1–2)
ALLENS TEST BLOOD GAS (OHS): ABNORMAL
ALP SERPL-CCNC: 39 UNIT/L (ref 40–150)
ALT SERPL-CCNC: 24 UNIT/L (ref 0–55)
ANION GAP SERPL CALC-SCNC: 9 MEQ/L
AST SERPL-CCNC: 32 UNIT/L (ref 11–45)
BASE EXCESS BLD CALC-SCNC: 2.2 MMOL/L (ref -2–2)
BASE EXCESS BLD CALC-SCNC: 2.9 MMOL/L (ref -2–2)
BASE EXCESS BLD CALC-SCNC: 3.7 MMOL/L (ref -2–2)
BASE EXCESS BLD CALC-SCNC: 4.2 MMOL/L
BASE EXCESS BLD CALC-SCNC: 5 MMOL/L
BASE EXCESS BLD CALC-SCNC: 6 MMOL/L
BASOPHILS # BLD AUTO: 0.03 X10(3)/MCL
BASOPHILS NFR BLD AUTO: 0.4 %
BILIRUB SERPL-MCNC: 0.7 MG/DL
BLOOD GAS SAMPLE TYPE (OHS): ABNORMAL
BUN SERPL-MCNC: 27 MG/DL (ref 8.4–25.7)
CA-I BLD-SCNC: 1.04 MMOL/L (ref 1.12–1.23)
CA-I BLD-SCNC: 1.06 MMOL/L (ref 1.12–1.23)
CA-I BLD-SCNC: 1.07 MMOL/L (ref 1.12–1.23)
CA-I BLD-SCNC: 1.08 MMOL/L (ref 1.12–1.23)
CA-I BLD-SCNC: 1.11 MMOL/L (ref 1.12–1.23)
CA-I BLD-SCNC: 1.14 MMOL/L (ref 1.12–1.23)
CALCIUM SERPL-MCNC: 7.9 MG/DL (ref 8.8–10)
CHLORIDE SERPL-SCNC: 108 MMOL/L (ref 98–107)
CO2 BLDA-SCNC: 27.2 MMOL/L
CO2 BLDA-SCNC: 27.3 MMOL/L
CO2 BLDA-SCNC: 28.6 MMOL/L
CO2 BLDA-SCNC: 30.6 MMOL/L
CO2 BLDA-SCNC: 31.9 MMOL/L
CO2 BLDA-SCNC: 31.9 MMOL/L
CO2 SERPL-SCNC: 22 MMOL/L (ref 23–31)
COHGB MFR BLDA: 2 % (ref 0.5–1.5)
COHGB MFR BLDA: 2.2 %
COHGB MFR BLDA: 2.3 %
COHGB MFR BLDA: 2.4 %
COHGB MFR BLDA: 3.3 % (ref 0.5–1.5)
COHGB MFR BLDA: 3.4 % (ref 0.5–1.5)
CREAT SERPL-MCNC: 1.12 MG/DL (ref 0.72–1.25)
CREAT/UREA NIT SERPL: 24
DRAWN BY BLOOD GAS (OHS): ABNORMAL
EOSINOPHIL # BLD AUTO: 0.56 X10(3)/MCL (ref 0–0.9)
EOSINOPHIL NFR BLD AUTO: 6.9 %
ERYTHROCYTE [DISTWIDTH] IN BLOOD BY AUTOMATED COUNT: 15.5 % (ref 11.5–17)
GFR SERPLBLD CREATININE-BSD FMLA CKD-EPI: >60 ML/MIN/1.73/M2
GLOBULIN SER-MCNC: 3.3 GM/DL (ref 2.4–3.5)
GLUCOSE SERPL-MCNC: 109 MG/DL (ref 82–115)
HCO3 BLDA-SCNC: 26.1 MMOL/L (ref 22–26)
HCO3 BLDA-SCNC: 26.3 MMOL/L (ref 22–26)
HCO3 BLDA-SCNC: 27.3 MMOL/L (ref 22–26)
HCO3 BLDA-SCNC: 29.2 MMOL/L
HCO3 BLDA-SCNC: 30.5 MMOL/L
HCO3 BLDA-SCNC: 30.6 MMOL/L
HCT VFR BLD AUTO: 39.6 % (ref 42–52)
HGB BLD-MCNC: 13.3 G/DL (ref 14–18)
IMM GRANULOCYTES # BLD AUTO: 0.03 X10(3)/MCL (ref 0–0.04)
IMM GRANULOCYTES NFR BLD AUTO: 0.4 %
LPM (OHS): 3
LYMPHOCYTES # BLD AUTO: 0.77 X10(3)/MCL (ref 0.6–4.6)
LYMPHOCYTES NFR BLD AUTO: 9.5 %
MAGNESIUM SERPL-MCNC: 2.1 MG/DL (ref 1.6–2.6)
MCH RBC QN AUTO: 30.2 PG (ref 27–31)
MCHC RBC AUTO-ENTMCNC: 33.6 G/DL (ref 33–36)
MCV RBC AUTO: 90 FL (ref 80–94)
METHGB MFR BLDA: 0.9 % (ref 0.4–1.5)
METHGB MFR BLDA: 1.2 %
METHGB MFR BLDA: 1.2 %
METHGB MFR BLDA: 1.3 % (ref 0.4–1.5)
METHGB MFR BLDA: 1.5 %
METHGB MFR BLDA: 1.5 % (ref 0.4–1.5)
MONOCYTES # BLD AUTO: 0.82 X10(3)/MCL (ref 0.1–1.3)
MONOCYTES NFR BLD AUTO: 10.1 %
NEUTROPHILS # BLD AUTO: 5.9 X10(3)/MCL (ref 2.1–9.2)
NEUTROPHILS NFR BLD AUTO: 72.7 %
NRBC BLD AUTO-RTO: 0 %
O2 HB BLOOD GAS (OHS): 60.8 %
O2 HB BLOOD GAS (OHS): 63.6 %
O2 HB BLOOD GAS (OHS): 64.2 %
O2 HB BLOOD GAS (OHS): 94.1 % (ref 94–97)
O2 HB BLOOD GAS (OHS): 94.5 % (ref 94–97)
O2 HB BLOOD GAS (OHS): 96.2 % (ref 94–97)
OXYGEN DEVICE BLOOD GAS (OHS): ABNORMAL
OXYHGB MFR BLDA: 13.9 G/DL (ref 12–16)
OXYHGB MFR BLDA: 14.1 G/DL
OXYHGB MFR BLDA: 14.4 G/DL
OXYHGB MFR BLDA: 14.5 G/DL
OXYHGB MFR BLDA: 14.9 G/DL (ref 12–16)
OXYHGB MFR BLDA: 16.4 G/DL (ref 12–16)
PCO2 BLDA: 33 MMHG (ref 35–45)
PCO2 BLDA: 35 MMHG (ref 35–45)
PCO2 BLDA: 43 MMHG (ref 20–50)
PCO2 BLDA: 43 MMHG (ref 35–45)
PCO2 BLDA: 44 MMHG (ref 20–50)
PCO2 BLDA: 47 MMHG (ref 20–50)
PH BLDA: 7.41 [PH] (ref 7.35–7.45)
PH BLDA: 7.42 [PH] (ref 7.3–7.6)
PH BLDA: 7.43 [PH] (ref 7.3–7.6)
PH BLDA: 7.46 [PH] (ref 7.3–7.6)
PH BLDA: 7.48 [PH] (ref 7.35–7.45)
PH BLDA: 7.51 [PH] (ref 7.35–7.45)
PHOSPHATE SERPL-MCNC: 2.4 MG/DL (ref 2.3–4.7)
PLATELET # BLD AUTO: 119 X10(3)/MCL (ref 130–400)
PLATELETS.RETICULATED NFR BLD AUTO: 4.2 % (ref 0.9–11.2)
PMV BLD AUTO: 11.6 FL (ref 7.4–10.4)
PO2 BLDA: 102 MMHG (ref 80–100)
PO2 BLDA: 88 MMHG (ref 80–100)
PO2 BLDA: 90 MMHG (ref 80–100)
PO2 BLDA: <38 MMHG
POTASSIUM BLOOD GAS (OHS): 3.3 MMOL/L (ref 3.5–5)
POTASSIUM BLOOD GAS (OHS): 3.4 MMOL/L (ref 3.5–5)
POTASSIUM BLOOD GAS (OHS): 3.5 MMOL/L (ref 3.5–5)
POTASSIUM SERPL-SCNC: 3.7 MMOL/L (ref 3.5–5.1)
PROT SERPL-MCNC: 5.7 GM/DL (ref 5.8–7.6)
RBC # BLD AUTO: 4.4 X10(6)/MCL (ref 4.7–6.1)
SAMPLE SITE BLOOD GAS (OHS): ABNORMAL
SAO2 % BLDA: 63.1 %
SAO2 % BLDA: 63.8 %
SAO2 % BLDA: 66 %
SAO2 % BLDA: 96.8 %
SAO2 % BLDA: 97.7 %
SAO2 % BLDA: 98.3 %
SODIUM BLOOD GAS (OHS): 130 MMOL/L (ref 137–145)
SODIUM BLOOD GAS (OHS): 131 MMOL/L (ref 137–145)
SODIUM BLOOD GAS (OHS): 133 MMOL/L (ref 137–145)
SODIUM BLOOD GAS (OHS): 134 MMOL/L (ref 137–145)
SODIUM BLOOD GAS (OHS): 135 MMOL/L (ref 137–145)
SODIUM BLOOD GAS (OHS): 135 MMOL/L (ref 137–145)
SODIUM SERPL-SCNC: 139 MMOL/L (ref 136–145)
TROPONIN I SERPL-MCNC: 1.06 NG/ML (ref 0–0.04)
TROPONIN I SERPL-MCNC: 1.45 NG/ML (ref 0–0.04)
TROPONIN I SERPL-MCNC: 1.69 NG/ML (ref 0–0.04)
TROPONIN I SERPL-MCNC: 2.3 NG/ML (ref 0–0.04)
WBC # BLD AUTO: 8.11 X10(3)/MCL (ref 4.5–11.5)

## 2025-05-11 PROCEDURE — 82803 BLOOD GASES ANY COMBINATION: CPT

## 2025-05-11 PROCEDURE — 36415 COLL VENOUS BLD VENIPUNCTURE: CPT

## 2025-05-11 PROCEDURE — 84484 ASSAY OF TROPONIN QUANT: CPT

## 2025-05-11 PROCEDURE — 25000003 PHARM REV CODE 250: Performed by: INTERNAL MEDICINE

## 2025-05-11 PROCEDURE — 37799 UNLISTED PX VASCULAR SURGERY: CPT

## 2025-05-11 PROCEDURE — 99900035 HC TECH TIME PER 15 MIN (STAT)

## 2025-05-11 PROCEDURE — 85025 COMPLETE CBC W/AUTO DIFF WBC: CPT

## 2025-05-11 PROCEDURE — 63600175 PHARM REV CODE 636 W HCPCS: Performed by: INTERNAL MEDICINE

## 2025-05-11 PROCEDURE — 80053 COMPREHEN METABOLIC PANEL: CPT

## 2025-05-11 PROCEDURE — 84100 ASSAY OF PHOSPHORUS: CPT

## 2025-05-11 PROCEDURE — 25000003 PHARM REV CODE 250

## 2025-05-11 PROCEDURE — 83735 ASSAY OF MAGNESIUM: CPT

## 2025-05-11 PROCEDURE — 20000000 HC ICU ROOM

## 2025-05-11 PROCEDURE — 63600175 PHARM REV CODE 636 W HCPCS

## 2025-05-11 RX ADMIN — AMIODARONE HYDROCHLORIDE 1 MG/MIN: 1.8 INJECTION, SOLUTION INTRAVENOUS at 12:05

## 2025-05-11 RX ADMIN — MUPIROCIN: 20 OINTMENT TOPICAL at 08:05

## 2025-05-11 RX ADMIN — HEPARIN SODIUM 20.52 UNITS/KG/HR: 10000 INJECTION, SOLUTION INTRAVENOUS at 02:05

## 2025-05-11 RX ADMIN — SACUBITRIL AND VALSARTAN 1 TABLET: 49; 51 TABLET, FILM COATED ORAL at 08:05

## 2025-05-11 RX ADMIN — HEPARIN SODIUM 20.52 UNITS/KG/HR: 10000 INJECTION, SOLUTION INTRAVENOUS at 04:05

## 2025-05-11 RX ADMIN — FUROSEMIDE 40 MG: 40 TABLET ORAL at 08:05

## 2025-05-11 RX ADMIN — PRAVASTATIN SODIUM 40 MG: 10 TABLET ORAL at 08:05

## 2025-05-11 RX ADMIN — FINASTERIDE 5 MG: 5 TABLET, FILM COATED ORAL at 08:05

## 2025-05-11 RX ADMIN — AMIODARONE HYDROCHLORIDE 1 MG/MIN: 1.8 INJECTION, SOLUTION INTRAVENOUS at 05:05

## 2025-05-11 RX ADMIN — SODIUM BICARBONATE: 84 INJECTION, SOLUTION INTRAVENOUS at 05:05

## 2025-05-11 RX ADMIN — FUROSEMIDE 40 MG: 40 TABLET ORAL at 10:05

## 2025-05-11 RX ADMIN — MUPIROCIN: 20 OINTMENT TOPICAL at 12:05
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
## 2025-05-11 NOTE — PLAN OF CARE
Problem: Adult Inpatient Plan of Care  Goal: Plan of Care Review  Outcome: Progressing  Goal: Patient-Specific Goal (Individualized)  Outcome: Progressing  Goal: Absence of Hospital-Acquired Illness or Injury  Outcome: Progressing  Goal: Optimal Comfort and Wellbeing  Outcome: Progressing  Goal: Readiness for Transition of Care  Outcome: Progressing     Problem: Diabetes Comorbidity  Goal: Blood Glucose Level Within Targeted Range  Outcome: Progressing     Problem: Fall Injury Risk  Goal: Absence of Fall and Fall-Related Injury  Outcome: Progressing     Problem: Skin Injury Risk Increased  Goal: Skin Health and Integrity  Outcome: Progressing     Problem: Infection  Goal: Absence of Infection Signs and Symptoms  Outcome: Progressing

## 2025-05-12 LAB
ALBUMIN SERPL-MCNC: 2.4 G/DL (ref 3.4–4.8)
ALBUMIN/GLOB SERPL: 0.7 RATIO (ref 1.1–2)
ALLENS TEST BLOOD GAS (OHS): ABNORMAL
ALP SERPL-CCNC: 43 UNIT/L (ref 40–150)
ALT SERPL-CCNC: 20 UNIT/L (ref 0–55)
ANION GAP SERPL CALC-SCNC: 7 MEQ/L
AST SERPL-CCNC: 34 UNIT/L (ref 11–45)
BASE EXCESS BLD CALC-SCNC: 3.3 MMOL/L (ref -2–2)
BASE EXCESS BLD CALC-SCNC: 3.5 MMOL/L (ref -2–2)
BASE EXCESS BLD CALC-SCNC: 3.7 MMOL/L (ref -2–2)
BASE EXCESS BLD CALC-SCNC: 4.4 MMOL/L
BASE EXCESS BLD CALC-SCNC: 5.6 MMOL/L
BASE EXCESS BLD CALC-SCNC: 5.6 MMOL/L
BASOPHILS # BLD AUTO: 0.03 X10(3)/MCL
BASOPHILS NFR BLD AUTO: 0.3 %
BILIRUB SERPL-MCNC: 0.7 MG/DL
BLOOD GAS SAMPLE TYPE (OHS): ABNORMAL
BUN SERPL-MCNC: 19 MG/DL (ref 8.4–25.7)
CA-I BLD-SCNC: 0.94 MMOL/L (ref 1.12–1.23)
CA-I BLD-SCNC: 1.03 MMOL/L (ref 1.12–1.23)
CA-I BLD-SCNC: 1.05 MMOL/L (ref 1.12–1.23)
CA-I BLD-SCNC: 1.07 MMOL/L (ref 1.12–1.23)
CA-I BLD-SCNC: 1.09 MMOL/L (ref 1.12–1.23)
CA-I BLD-SCNC: 1.09 MMOL/L (ref 1.12–1.23)
CALCIUM SERPL-MCNC: 8.2 MG/DL (ref 8.8–10)
CHLORIDE SERPL-SCNC: 106 MMOL/L (ref 98–107)
CO2 BLDA-SCNC: 28 MMOL/L
CO2 BLDA-SCNC: 28.9 MMOL/L
CO2 BLDA-SCNC: 30.5 MMOL/L
CO2 BLDA-SCNC: 31.2 MMOL/L
CO2 BLDA-SCNC: 32 MMOL/L
CO2 BLDA-SCNC: 32 MMOL/L
CO2 SERPL-SCNC: 24 MMOL/L (ref 23–31)
COHGB MFR BLDA: 1.9 % (ref 0.5–1.5)
COHGB MFR BLDA: 2.1 % (ref 0.5–1.5)
COHGB MFR BLDA: 2.2 %
COHGB MFR BLDA: 2.2 % (ref 0.5–1.5)
COHGB MFR BLDA: 2.4 %
CREAT SERPL-MCNC: 0.92 MG/DL (ref 0.72–1.25)
CREAT/UREA NIT SERPL: 21
DRAWN BY BLOOD GAS (OHS): ABNORMAL
EOSINOPHIL # BLD AUTO: 0.39 X10(3)/MCL (ref 0–0.9)
EOSINOPHIL NFR BLD AUTO: 4.2 %
ERYTHROCYTE [DISTWIDTH] IN BLOOD BY AUTOMATED COUNT: 15.2 % (ref 11.5–17)
GFR SERPLBLD CREATININE-BSD FMLA CKD-EPI: >60 ML/MIN/1.73/M2
GLOBULIN SER-MCNC: 3.3 GM/DL (ref 2.4–3.5)
GLUCOSE SERPL-MCNC: 120 MG/DL (ref 82–115)
HCO3 BLDA-SCNC: 26.9 MMOL/L (ref 22–26)
HCO3 BLDA-SCNC: 27.7 MMOL/L (ref 22–26)
HCO3 BLDA-SCNC: 29.2 MMOL/L
HCO3 BLDA-SCNC: 29.7 MMOL/L (ref 22–26)
HCO3 BLDA-SCNC: 30.6 MMOL/L
HCO3 BLDA-SCNC: 30.6 MMOL/L
HCT VFR BLD AUTO: 39.3 % (ref 42–52)
HGB BLD-MCNC: 13.1 G/DL (ref 14–18)
IMM GRANULOCYTES # BLD AUTO: 0.03 X10(3)/MCL (ref 0–0.04)
IMM GRANULOCYTES NFR BLD AUTO: 0.3 %
LPM (OHS): 3
LYMPHOCYTES # BLD AUTO: 0.64 X10(3)/MCL (ref 0.6–4.6)
LYMPHOCYTES NFR BLD AUTO: 6.8 %
MAGNESIUM SERPL-MCNC: 1.8 MG/DL (ref 1.6–2.6)
MCH RBC QN AUTO: 30.1 PG (ref 27–31)
MCHC RBC AUTO-ENTMCNC: 33.3 G/DL (ref 33–36)
MCV RBC AUTO: 90.3 FL (ref 80–94)
METHGB MFR BLDA: 1.4 %
METHGB MFR BLDA: 1.6 % (ref 0.4–1.5)
METHGB MFR BLDA: 1.7 %
METHGB MFR BLDA: 1.7 % (ref 0.4–1.5)
METHGB MFR BLDA: 1.7 % (ref 0.4–1.5)
MONOCYTES # BLD AUTO: 0.9 X10(3)/MCL (ref 0.1–1.3)
MONOCYTES NFR BLD AUTO: 9.6 %
NEUTROPHILS # BLD AUTO: 7.36 X10(3)/MCL (ref 2.1–9.2)
NEUTROPHILS NFR BLD AUTO: 78.8 %
NRBC BLD AUTO-RTO: 0 %
O2 HB BLOOD GAS (OHS): 62.9 %
O2 HB BLOOD GAS (OHS): 77.2 %
O2 HB BLOOD GAS (OHS): 83.9 % (ref 94–97)
O2 HB BLOOD GAS (OHS): 93.8 % (ref 94–97)
O2 HB BLOOD GAS (OHS): 94 % (ref 94–97)
OHS QRS DURATION: 184 MS
OHS QTC CALCULATION: 627 MS
OXYGEN DEVICE BLOOD GAS (OHS): ABNORMAL
OXYHGB MFR BLDA: 13.5 G/DL
OXYHGB MFR BLDA: 14 G/DL
OXYHGB MFR BLDA: 16.2 G/DL (ref 12–16)
OXYHGB MFR BLDA: 16.5 G/DL (ref 12–16)
OXYHGB MFR BLDA: 17.3 G/DL (ref 12–16)
PCO2 BLDA: 37 MMHG (ref 35–45)
PCO2 BLDA: 39 MMHG (ref 35–45)
PCO2 BLDA: 43 MMHG (ref 20–50)
PCO2 BLDA: 45 MMHG (ref 20–50)
PCO2 BLDA: 45 MMHG (ref 20–50)
PCO2 BLDA: 49 MMHG (ref 35–45)
PH BLDA: 7.39 [PH] (ref 7.35–7.45)
PH BLDA: 7.44 [PH] (ref 7.3–7.6)
PH BLDA: 7.46 [PH] (ref 7.35–7.45)
PH BLDA: 7.47 [PH] (ref 7.35–7.45)
PHOSPHATE SERPL-MCNC: 2 MG/DL (ref 2.3–4.7)
PLATELET # BLD AUTO: 105 X10(3)/MCL (ref 130–400)
PLATELETS.RETICULATED NFR BLD AUTO: 4.5 % (ref 0.9–11.2)
PMV BLD AUTO: 11.8 FL (ref 7.4–10.4)
PO2 BLDA: 42 MMHG
PO2 BLDA: 51 MMHG (ref 80–100)
PO2 BLDA: 83 MMHG (ref 80–100)
PO2 BLDA: 84 MMHG (ref 80–100)
PO2 BLDA: <38 MMHG
PO2 BLDA: <38 MMHG
POTASSIUM BLOOD GAS (OHS): 2.9 MMOL/L (ref 3.5–5)
POTASSIUM BLOOD GAS (OHS): 3 MMOL/L (ref 3.5–5)
POTASSIUM BLOOD GAS (OHS): 3.2 MMOL/L (ref 3.5–5)
POTASSIUM BLOOD GAS (OHS): 3.3 MMOL/L (ref 3.5–5)
POTASSIUM SERPL-SCNC: 3.7 MMOL/L (ref 3.5–5.1)
PROT SERPL-MCNC: 5.7 GM/DL (ref 5.8–7.6)
RBC # BLD AUTO: 4.35 X10(6)/MCL (ref 4.7–6.1)
SAMPLE SITE BLOOD GAS (OHS): ABNORMAL
SAO2 % BLDA: 62.6 %
SAO2 % BLDA: 62.6 %
SAO2 % BLDA: 79.6 %
SAO2 % BLDA: 85.3 %
SAO2 % BLDA: 96.7 %
SAO2 % BLDA: 96.9 %
SODIUM BLOOD GAS (OHS): 132 MMOL/L (ref 137–145)
SODIUM BLOOD GAS (OHS): 132 MMOL/L (ref 137–145)
SODIUM BLOOD GAS (OHS): 133 MMOL/L (ref 137–145)
SODIUM BLOOD GAS (OHS): 134 MMOL/L (ref 137–145)
SODIUM BLOOD GAS (OHS): 135 MMOL/L (ref 137–145)
SODIUM BLOOD GAS (OHS): 136 MMOL/L (ref 137–145)
SODIUM SERPL-SCNC: 137 MMOL/L (ref 136–145)
TROPONIN I SERPL-MCNC: 0.81 NG/ML (ref 0–0.04)
TROPONIN I SERPL-MCNC: 0.89 NG/ML (ref 0–0.04)
TROPONIN I SERPL-MCNC: 0.89 NG/ML (ref 0–0.04)
TROPONIN I SERPL-MCNC: 1.25 NG/ML (ref 0–0.04)
WBC # BLD AUTO: 9.35 X10(3)/MCL (ref 4.5–11.5)

## 2025-05-12 PROCEDURE — 36415 COLL VENOUS BLD VENIPUNCTURE: CPT

## 2025-05-12 PROCEDURE — 20000000 HC ICU ROOM

## 2025-05-12 PROCEDURE — 82803 BLOOD GASES ANY COMBINATION: CPT

## 2025-05-12 PROCEDURE — 63600175 PHARM REV CODE 636 W HCPCS: Mod: JZ,TB | Performed by: INTERNAL MEDICINE

## 2025-05-12 PROCEDURE — 99900035 HC TECH TIME PER 15 MIN (STAT)

## 2025-05-12 PROCEDURE — 37799 UNLISTED PX VASCULAR SURGERY: CPT

## 2025-05-12 PROCEDURE — 99900031 HC PATIENT EDUCATION (STAT)

## 2025-05-12 PROCEDURE — 25000003 PHARM REV CODE 250

## 2025-05-12 PROCEDURE — 84484 ASSAY OF TROPONIN QUANT: CPT

## 2025-05-12 PROCEDURE — 83735 ASSAY OF MAGNESIUM: CPT

## 2025-05-12 PROCEDURE — 93010 ELECTROCARDIOGRAM REPORT: CPT | Mod: ,,, | Performed by: INTERNAL MEDICINE

## 2025-05-12 PROCEDURE — 80053 COMPREHEN METABOLIC PANEL: CPT

## 2025-05-12 PROCEDURE — 63600175 PHARM REV CODE 636 W HCPCS

## 2025-05-12 PROCEDURE — 63600175 PHARM REV CODE 636 W HCPCS: Performed by: INTERNAL MEDICINE

## 2025-05-12 PROCEDURE — 94760 N-INVAS EAR/PLS OXIMETRY 1: CPT | Mod: XB

## 2025-05-12 PROCEDURE — 93005 ELECTROCARDIOGRAM TRACING: CPT

## 2025-05-12 PROCEDURE — 84100 ASSAY OF PHOSPHORUS: CPT

## 2025-05-12 PROCEDURE — 27000221 HC OXYGEN, UP TO 24 HOURS

## 2025-05-12 PROCEDURE — 85025 COMPLETE CBC W/AUTO DIFF WBC: CPT

## 2025-05-12 RX ADMIN — SACUBITRIL AND VALSARTAN 1 TABLET: 49; 51 TABLET, FILM COATED ORAL at 08:05

## 2025-05-12 RX ADMIN — HEPARIN SODIUM 20.52 UNITS/KG/HR: 10000 INJECTION, SOLUTION INTRAVENOUS at 06:05

## 2025-05-12 RX ADMIN — HEPARIN SODIUM 20.52 UNITS/KG/HR: 10000 INJECTION, SOLUTION INTRAVENOUS at 08:05

## 2025-05-12 RX ADMIN — PRAVASTATIN SODIUM 40 MG: 10 TABLET ORAL at 08:05

## 2025-05-12 RX ADMIN — AMIODARONE HYDROCHLORIDE 1 MG/MIN: 1.8 INJECTION, SOLUTION INTRAVENOUS at 12:05

## 2025-05-12 RX ADMIN — MUPIROCIN: 20 OINTMENT TOPICAL at 08:05

## 2025-05-12 RX ADMIN — AMIODARONE HYDROCHLORIDE 1 MG/MIN: 1.8 INJECTION, SOLUTION INTRAVENOUS at 06:05

## 2025-05-12 RX ADMIN — FUROSEMIDE 40 MG: 40 TABLET ORAL at 08:05

## 2025-05-12 RX ADMIN — HEPARIN SODIUM 20.52 UNITS/KG/HR: 10000 INJECTION, SOLUTION INTRAVENOUS at 09:05

## 2025-05-12 RX ADMIN — LIDOCAINE HYDROCHLORIDE 1 MG/MIN: 8 INJECTION, SOLUTION INTRAVENOUS at 11:05

## 2025-05-12 RX ADMIN — FINASTERIDE 5 MG: 5 TABLET, FILM COATED ORAL at 08:05
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
## 2025-05-12 NOTE — PLAN OF CARE
05/12/25 1527   Discharge Assessment   Assessment Type Discharge Planning Assessment   Confirmed/corrected address, phone number and insurance Yes   Confirmed Demographics Correct on Facesheet   Source of Information patient   Does patient/caregiver understand observation status   (inpatient)   Communicated KEYSHA with patient/caregiver Date not available/Unable to determine   Reason For Admission ventricular tacycardia   People in Home spouse  (sitters)   Do you expect to return to your current living situation? Yes   Do you have help at home or someone to help you manage your care at home? No   Prior to hospitilization cognitive status: Unable to Assess   Current cognitive status: Alert/Oriented   Walking or Climbing Stairs Difficulty no   Dressing/Bathing Difficulty no   Equipment Currently Used at Home none   Patient currently being followed by outpatient case management? No   Do you currently have service(s) that help you manage your care at home? No   Do you take prescription medications? Yes   Do you have prescription coverage? Yes   Coverage [facility] medicare advantage   Do you have any problems affording any of your prescribed medications? No   Is the patient taking medications as prescribed? yes   Who is going to help you get home at discharge? son, King Rosario   How do you get to doctors appointments? car, drives self;family or friend will provide   Are you on dialysis? No   Discharge Plan A Home with family   Discharge Plan B Home with family   DME Needed Upon Discharge  other (see comments)  (To Be Determined)   Discharge Plan discussed with: Patient   Transition of Care Barriers   (TO Be determined)   Financial Resource Strain   How hard is it for you to pay for the very basics like food, housing, medical care, and heating? Not hard   Housing Stability   In the last 12 months, was there a time when you were not able to pay the mortgage or rent on time? N   At any time in the past 12 months, were you homeless  or living in a shelter (including now)? N   Transportation Needs   In the past 12 months, has lack of transportation kept you from medical appointments or from getting medications? no   In the past 12 months, has lack of transportation kept you from meetings, work, or from getting things needed for daily living? No   Food Insecurity   Within the past 12 months, you worried that your food would run out before you got the money to buy more. Never true   Within the past 12 months, the food you bought just didn't last and you didn't have money to get more. Never true   Alcohol Use   Q1: How often do you have a drink containing alcohol? Never   Q2: How many drinks containing alcohol do you have on a typical day when you are drinking? None   Q3: How often do you have six or more drinks on one occasion? Never   [employer]   In the past 12 months has the electric, gas, oil, or water company threatened to shut off services in your home? No   Health Literacy   How often do you need to have someone help you when you read instructions, pamphlets, or other written material from your doctor or pharmacy? Never   OTHER   Name(s) of People in Home pt, and his wife Xi (he is her caregiver)     Pt.provided the following info: He is the caregiver for his wife Xi,  she has sitters and Hospice service at home.  His son King Rosario [phone] resides next door.  Pt is quite active, was a  for 48 years.  He has many hobbies most important one he says is playing  cards.  He  takes care of all household chores for himself and Xi ( 52 years)
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
## 2025-05-13 PROBLEM — I47.20 VT (VENTRICULAR TACHYCARDIA): Status: ACTIVE | Noted: 2025-05-13

## 2025-05-13 LAB
ALBUMIN SERPL-MCNC: 2.3 G/DL (ref 3.4–4.8)
ALBUMIN/GLOB SERPL: 0.7 RATIO (ref 1.1–2)
ALLENS TEST BLOOD GAS (OHS): ABNORMAL
ALP SERPL-CCNC: 51 UNIT/L (ref 40–150)
ALT SERPL-CCNC: 24 UNIT/L (ref 0–55)
ANION GAP SERPL CALC-SCNC: 7 MEQ/L
AST SERPL-CCNC: 29 UNIT/L (ref 11–45)
BASE EXCESS BLD CALC-SCNC: 3.7 MMOL/L
BASE EXCESS BLD CALC-SCNC: 4.3 MMOL/L (ref -2–2)
BASE EXCESS BLD CALC-SCNC: 4.6 MMOL/L
BASE EXCESS BLD CALC-SCNC: 5.2 MMOL/L
BASE EXCESS BLD CALC-SCNC: 5.5 MMOL/L (ref -2–2)
BASE EXCESS BLD CALC-SCNC: 6.4 MMOL/L
BASOPHILS # BLD AUTO: 0.03 X10(3)/MCL
BASOPHILS NFR BLD AUTO: 0.3 %
BILIRUB SERPL-MCNC: 0.6 MG/DL
BLOOD GAS SAMPLE TYPE (OHS): ABNORMAL
BUN SERPL-MCNC: 15.4 MG/DL (ref 8.4–25.7)
CA-I BLD-SCNC: 1.01 MMOL/L (ref 1.12–1.32)
CA-I BLD-SCNC: 1.04 MMOL/L (ref 1.12–1.23)
CA-I BLD-SCNC: 1.04 MMOL/L (ref 1.12–1.23)
CA-I BLD-SCNC: 1.06 MMOL/L (ref 1.12–1.32)
CA-I BLD-SCNC: 1.12 MMOL/L (ref 1.12–1.23)
CA-I BLD-SCNC: 1.14 MMOL/L (ref 1.12–1.23)
CALCIUM SERPL-MCNC: 7.8 MG/DL (ref 8.8–10)
CHLORIDE SERPL-SCNC: 106 MMOL/L (ref 98–107)
CHOLEST SERPL-MCNC: 98 MG/DL
CHOLEST/HDLC SERPL: 2 {RATIO} (ref 0–5)
CO2 BLDA-SCNC: 28.1 MMOL/L
CO2 BLDA-SCNC: 28.9 MMOL/L
CO2 BLDA-SCNC: 30 MMOL/L
CO2 BLDA-SCNC: 31.2 MMOL/L
CO2 BLDA-SCNC: 31.2 MMOL/L
CO2 BLDA-SCNC: 33.4 MMOL/L
CO2 SERPL-SCNC: 25 MMOL/L (ref 23–31)
COHGB MFR BLDA: 1.9 %
COHGB MFR BLDA: 1.9 % (ref 0.5–1.5)
COHGB MFR BLDA: 1.9 % (ref 0.5–1.5)
COHGB MFR BLDA: 2.4 %
CREAT SERPL-MCNC: 0.92 MG/DL (ref 0.72–1.25)
CREAT/UREA NIT SERPL: 17
DRAWN BY BLOOD GAS (OHS): ABNORMAL
EOSINOPHIL # BLD AUTO: 0.94 X10(3)/MCL (ref 0–0.9)
EOSINOPHIL NFR BLD AUTO: 10.9 %
ERYTHROCYTE [DISTWIDTH] IN BLOOD BY AUTOMATED COUNT: 15.2 % (ref 11.5–17)
GFR SERPLBLD CREATININE-BSD FMLA CKD-EPI: >60 ML/MIN/1.73/M2
GLOBULIN SER-MCNC: 3.2 GM/DL (ref 2.4–3.5)
GLUCOSE SERPL-MCNC: 114 MG/DL (ref 82–115)
HCO3 BLDA-SCNC: 27.1 MMOL/L (ref 22–26)
HCO3 BLDA-SCNC: 27.7 MMOL/L (ref 22–26)
HCO3 BLDA-SCNC: 28.9 MMOL/L (ref 22–26)
HCO3 BLDA-SCNC: 29.8 MMOL/L
HCO3 BLDA-SCNC: 29.9 MMOL/L
HCO3 BLDA-SCNC: 31.9 MMOL/L
HCT VFR BLD AUTO: 38.1 % (ref 42–52)
HDLC SERPL-MCNC: 46 MG/DL (ref 35–60)
HGB BLD-MCNC: 12.9 G/DL (ref 14–18)
IMM GRANULOCYTES # BLD AUTO: 0.02 X10(3)/MCL (ref 0–0.04)
IMM GRANULOCYTES NFR BLD AUTO: 0.2 %
LACTATE SERPL-SCNC: 0.7 MMOL/L (ref 0.5–2.2)
LDLC SERPL CALC-MCNC: 36 MG/DL (ref 50–140)
LPM (OHS): 3
LPM (OHS): 4
LPM (OHS): 4
LYMPHOCYTES # BLD AUTO: 0.73 X10(3)/MCL (ref 0.6–4.6)
LYMPHOCYTES NFR BLD AUTO: 8.4 %
MAGNESIUM SERPL-MCNC: 1.8 MG/DL (ref 1.6–2.6)
MCH RBC QN AUTO: 30.1 PG (ref 27–31)
MCHC RBC AUTO-ENTMCNC: 33.9 G/DL (ref 33–36)
MCV RBC AUTO: 89 FL (ref 80–94)
METHGB MFR BLDA: 1.2 %
METHGB MFR BLDA: 1.3 %
METHGB MFR BLDA: 1.5 % (ref 0.4–1.5)
METHGB MFR BLDA: 1.9 % (ref 0.4–1.5)
MONOCYTES # BLD AUTO: 0.87 X10(3)/MCL (ref 0.1–1.3)
MONOCYTES NFR BLD AUTO: 10.1 %
NEUTROPHILS # BLD AUTO: 6.05 X10(3)/MCL (ref 2.1–9.2)
NEUTROPHILS NFR BLD AUTO: 70.1 %
NRBC BLD AUTO-RTO: 0 %
O2 HB BLOOD GAS (OHS): 65.3 %
O2 HB BLOOD GAS (OHS): 69 %
O2 HB BLOOD GAS (OHS): 93.2 % (ref 94–97)
O2 HB BLOOD GAS (OHS): 94.5 % (ref 94–97)
OXYGEN DEVICE BLOOD GAS (OHS): ABNORMAL
OXYHGB MFR BLDA: 13.3 G/DL
OXYHGB MFR BLDA: 13.5 G/DL (ref 12–16)
OXYHGB MFR BLDA: 14.5 G/DL (ref 12–16)
OXYHGB MFR BLDA: 14.6 G/DL
PCO2 BLDA: 34 MMHG (ref 35–45)
PCO2 BLDA: 37 MMHG (ref 35–45)
PCO2 BLDA: 39 MMHG (ref 35–45)
PCO2 BLDA: 43 MMHG (ref 20–50)
PCO2 BLDA: 46 MMHG
PCO2 BLDA: 48 MMHG
PH BLDA: 7.42 [PH]
PH BLDA: 7.43 [PH]
PH BLDA: 7.45 [PH] (ref 7.3–7.6)
PH BLDA: 7.46 [PH] (ref 7.35–7.45)
PH BLDA: 7.5 [PH] (ref 7.35–7.45)
PH BLDA: 7.51 [PH] (ref 7.35–7.45)
PHOSPHATE SERPL-MCNC: 2.1 MG/DL (ref 2.3–4.7)
PLATELET # BLD AUTO: 95 X10(3)/MCL (ref 130–400)
PLATELETS.RETICULATED NFR BLD AUTO: 4.4 % (ref 0.9–11.2)
PMV BLD AUTO: 11.3 FL (ref 7.4–10.4)
PO2 BLDA: 105 MMHG (ref 80–100)
PO2 BLDA: 80 MMHG (ref 80–100)
PO2 BLDA: 82 MMHG (ref 80–100)
PO2 BLDA: <38 MMHG
POCT GLUCOSE: 98 MG/DL (ref 70–110)
POTASSIUM BLOOD GAS (OHS): 3.1 MMOL/L
POTASSIUM BLOOD GAS (OHS): 3.1 MMOL/L (ref 3.5–5)
POTASSIUM BLOOD GAS (OHS): 3.4 MMOL/L
POTASSIUM BLOOD GAS (OHS): 3.5 MMOL/L (ref 3.5–5)
POTASSIUM BLOOD GAS (OHS): 4 MMOL/L (ref 3.5–5)
POTASSIUM BLOOD GAS (OHS): 4 MMOL/L (ref 3.5–5)
POTASSIUM SERPL-SCNC: 3.5 MMOL/L (ref 3.5–5.1)
PROT SERPL-MCNC: 5.5 GM/DL (ref 5.8–7.6)
RBC # BLD AUTO: 4.28 X10(6)/MCL (ref 4.7–6.1)
SAMPLE SITE BLOOD GAS (OHS): ABNORMAL
SAO2 % BLDA: 59 %
SAO2 % BLDA: 65 %
SAO2 % BLDA: 67.6 %
SAO2 % BLDA: 96.8 %
SAO2 % BLDA: 97 %
SAO2 % BLDA: 98 %
SODIUM BLOOD GAS (OHS): 132 MMOL/L (ref 137–145)
SODIUM BLOOD GAS (OHS): 133 MMOL/L
SODIUM BLOOD GAS (OHS): 135 MMOL/L (ref 137–145)
SODIUM BLOOD GAS (OHS): 136 MMOL/L
SODIUM SERPL-SCNC: 138 MMOL/L (ref 136–145)
TRIGL SERPL-MCNC: 78 MG/DL (ref 34–140)
TROPONIN I SERPL-MCNC: 0.47 NG/ML (ref 0–0.04)
TROPONIN I SERPL-MCNC: 0.48 NG/ML (ref 0–0.04)
TROPONIN I SERPL-MCNC: 0.59 NG/ML (ref 0–0.04)
TROPONIN I SERPL-MCNC: 0.66 NG/ML (ref 0–0.04)
VLDLC SERPL CALC-MCNC: 16 MG/DL
WBC # BLD AUTO: 8.64 X10(3)/MCL (ref 4.5–11.5)

## 2025-05-13 PROCEDURE — 25000003 PHARM REV CODE 250: Performed by: INTERNAL MEDICINE

## 2025-05-13 PROCEDURE — 20000000 HC ICU ROOM

## 2025-05-13 PROCEDURE — 99153 MOD SED SAME PHYS/QHP EA: CPT | Performed by: INTERNAL MEDICINE

## 2025-05-13 PROCEDURE — 82803 BLOOD GASES ANY COMBINATION: CPT

## 2025-05-13 PROCEDURE — 63600175 PHARM REV CODE 636 W HCPCS: Performed by: INTERNAL MEDICINE

## 2025-05-13 PROCEDURE — 99900031 HC PATIENT EDUCATION (STAT)

## 2025-05-13 PROCEDURE — 84100 ASSAY OF PHOSPHORUS: CPT

## 2025-05-13 PROCEDURE — 37799 UNLISTED PX VASCULAR SURGERY: CPT

## 2025-05-13 PROCEDURE — 63600175 PHARM REV CODE 636 W HCPCS

## 2025-05-13 PROCEDURE — 80053 COMPREHEN METABOLIC PANEL: CPT

## 2025-05-13 PROCEDURE — 99152 MOD SED SAME PHYS/QHP 5/>YRS: CPT | Performed by: INTERNAL MEDICINE

## 2025-05-13 PROCEDURE — 02PA3RZ REMOVAL OF SHORT-TERM EXTERNAL HEART ASSIST SYSTEM FROM HEART, PERCUTANEOUS APPROACH: ICD-10-PCS | Performed by: INTERNAL MEDICINE

## 2025-05-13 PROCEDURE — 80061 LIPID PANEL: CPT | Performed by: INTERNAL MEDICINE

## 2025-05-13 PROCEDURE — 36415 COLL VENOUS BLD VENIPUNCTURE: CPT

## 2025-05-13 PROCEDURE — 85025 COMPLETE CBC W/AUTO DIFF WBC: CPT

## 2025-05-13 PROCEDURE — 33992 RMVL PERQ LEFT HEART VAD: CPT | Performed by: INTERNAL MEDICINE

## 2025-05-13 PROCEDURE — C1760 CLOSURE DEV, VASC: HCPCS | Performed by: INTERNAL MEDICINE

## 2025-05-13 PROCEDURE — C1894 INTRO/SHEATH, NON-LASER: HCPCS | Performed by: INTERNAL MEDICINE

## 2025-05-13 PROCEDURE — 99900035 HC TECH TIME PER 15 MIN (STAT)

## 2025-05-13 PROCEDURE — C1769 GUIDE WIRE: HCPCS | Performed by: INTERNAL MEDICINE

## 2025-05-13 PROCEDURE — 83605 ASSAY OF LACTIC ACID: CPT | Performed by: INTERNAL MEDICINE

## 2025-05-13 PROCEDURE — 84484 ASSAY OF TROPONIN QUANT: CPT

## 2025-05-13 PROCEDURE — 27000221 HC OXYGEN, UP TO 24 HOURS

## 2025-05-13 PROCEDURE — 83735 ASSAY OF MAGNESIUM: CPT

## 2025-05-13 PROCEDURE — 25000003 PHARM REV CODE 250

## 2025-05-13 DEVICE — DEVICE MANTA CLOSURE 18FR: Type: IMPLANTABLE DEVICE | Site: GROIN | Status: FUNCTIONAL

## 2025-05-13 RX ORDER — ACETAMINOPHEN 325 MG/1
650 TABLET ORAL EVERY 4 HOURS PRN
Status: DISCONTINUED | OUTPATIENT
Start: 2025-05-13 | End: 2025-06-02

## 2025-05-13 RX ORDER — DIPHENHYDRAMINE HYDROCHLORIDE 50 MG/ML
INJECTION, SOLUTION INTRAMUSCULAR; INTRAVENOUS
Status: DISCONTINUED | OUTPATIENT
Start: 2025-05-13 | End: 2025-05-13 | Stop reason: HOSPADM

## 2025-05-13 RX ORDER — POTASSIUM CHLORIDE 20 MEQ/1
60 TABLET, EXTENDED RELEASE ORAL ONCE
Status: COMPLETED | OUTPATIENT
Start: 2025-05-13 | End: 2025-05-13

## 2025-05-13 RX ORDER — MIDAZOLAM HYDROCHLORIDE 1 MG/ML
INJECTION INTRAMUSCULAR; INTRAVENOUS
Status: DISCONTINUED | OUTPATIENT
Start: 2025-05-13 | End: 2025-05-13 | Stop reason: HOSPADM

## 2025-05-13 RX ORDER — FENTANYL CITRATE 50 UG/ML
INJECTION, SOLUTION INTRAMUSCULAR; INTRAVENOUS
Status: DISCONTINUED | OUTPATIENT
Start: 2025-05-13 | End: 2025-05-13 | Stop reason: HOSPADM

## 2025-05-13 RX ORDER — AMIODARONE HYDROCHLORIDE 200 MG/1
200 TABLET ORAL DAILY
Status: DISCONTINUED | OUTPATIENT
Start: 2025-05-19 | End: 2025-05-19

## 2025-05-13 RX ORDER — MAGNESIUM SULFATE HEPTAHYDRATE 40 MG/ML
2 INJECTION, SOLUTION INTRAVENOUS ONCE
Status: COMPLETED | OUTPATIENT
Start: 2025-05-13 | End: 2025-05-13

## 2025-05-13 RX ORDER — SODIUM CHLORIDE 9 MG/ML
INJECTION, SOLUTION INTRAVENOUS CONTINUOUS
Status: ACTIVE | OUTPATIENT
Start: 2025-05-13 | End: 2025-05-14

## 2025-05-13 RX ORDER — LIDOCAINE HYDROCHLORIDE 10 MG/ML
INJECTION, SOLUTION INFILTRATION; PERINEURAL
Status: DISCONTINUED | OUTPATIENT
Start: 2025-05-13 | End: 2025-05-13 | Stop reason: HOSPADM

## 2025-05-13 RX ORDER — ONDANSETRON 4 MG/1
8 TABLET, ORALLY DISINTEGRATING ORAL EVERY 8 HOURS PRN
Status: DISCONTINUED | OUTPATIENT
Start: 2025-05-13 | End: 2025-06-02

## 2025-05-13 RX ORDER — ATROPINE SULFATE 0.1 MG/ML
INJECTION INTRAVENOUS
Status: DISPENSED
Start: 2025-05-13 | End: 2025-05-14

## 2025-05-13 RX ADMIN — SACUBITRIL AND VALSARTAN 1 TABLET: 49; 51 TABLET, FILM COATED ORAL at 08:05

## 2025-05-13 RX ADMIN — FINASTERIDE 5 MG: 5 TABLET, FILM COATED ORAL at 08:05

## 2025-05-13 RX ADMIN — FUROSEMIDE 40 MG: 40 TABLET ORAL at 08:05

## 2025-05-13 RX ADMIN — AMIODARONE HYDROCHLORIDE 1 MG/MIN: 1.8 INJECTION, SOLUTION INTRAVENOUS at 12:05

## 2025-05-13 RX ADMIN — SODIUM CHLORIDE: 9 INJECTION, SOLUTION INTRAVENOUS at 08:05

## 2025-05-13 RX ADMIN — HEPARIN SODIUM 19.44 UNITS/KG/HR: 10000 INJECTION, SOLUTION INTRAVENOUS at 11:05

## 2025-05-13 RX ADMIN — MAGNESIUM SULFATE HEPTAHYDRATE 2 G: 40 INJECTION, SOLUTION INTRAVENOUS at 09:05

## 2025-05-13 RX ADMIN — AMIODARONE HYDROCHLORIDE 1 MG/MIN: 1.8 INJECTION, SOLUTION INTRAVENOUS at 07:05

## 2025-05-13 RX ADMIN — POTASSIUM CHLORIDE 60 MEQ: 1500 TABLET, EXTENDED RELEASE ORAL at 09:05

## 2025-05-13 RX ADMIN — PRAVASTATIN SODIUM 40 MG: 10 TABLET ORAL at 08:05
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
## 2025-05-14 LAB
ALBUMIN SERPL-MCNC: 2.3 G/DL (ref 3.4–4.8)
ALBUMIN/GLOB SERPL: 0.7 RATIO (ref 1.1–2)
ALP SERPL-CCNC: 52 UNIT/L (ref 40–150)
ALT SERPL-CCNC: 22 UNIT/L (ref 0–55)
ANION GAP SERPL CALC-SCNC: 5 MEQ/L
AST SERPL-CCNC: 24 UNIT/L (ref 11–45)
BASOPHILS # BLD AUTO: 0.03 X10(3)/MCL
BASOPHILS NFR BLD AUTO: 0.3 %
BILIRUB SERPL-MCNC: 0.7 MG/DL
BUN SERPL-MCNC: 14.3 MG/DL (ref 8.4–25.7)
CALCIUM SERPL-MCNC: 8.2 MG/DL (ref 8.8–10)
CHLORIDE SERPL-SCNC: 108 MMOL/L (ref 98–107)
CO2 SERPL-SCNC: 25 MMOL/L (ref 23–31)
CREAT SERPL-MCNC: 0.92 MG/DL (ref 0.72–1.25)
CREAT/UREA NIT SERPL: 16
EOSINOPHIL # BLD AUTO: 0.81 X10(3)/MCL (ref 0–0.9)
EOSINOPHIL NFR BLD AUTO: 8.7 %
ERYTHROCYTE [DISTWIDTH] IN BLOOD BY AUTOMATED COUNT: 15.2 % (ref 11.5–17)
GFR SERPLBLD CREATININE-BSD FMLA CKD-EPI: >60 ML/MIN/1.73/M2
GLOBULIN SER-MCNC: 3.3 GM/DL (ref 2.4–3.5)
GLUCOSE SERPL-MCNC: 96 MG/DL (ref 82–115)
HCT VFR BLD AUTO: 39.7 % (ref 42–52)
HGB BLD-MCNC: 13.3 G/DL (ref 14–18)
IMM GRANULOCYTES # BLD AUTO: 0.04 X10(3)/MCL (ref 0–0.04)
IMM GRANULOCYTES NFR BLD AUTO: 0.4 %
LYMPHOCYTES # BLD AUTO: 0.58 X10(3)/MCL (ref 0.6–4.6)
LYMPHOCYTES NFR BLD AUTO: 6.2 %
MAGNESIUM SERPL-MCNC: 2.1 MG/DL (ref 1.6–2.6)
MCH RBC QN AUTO: 30 PG (ref 27–31)
MCHC RBC AUTO-ENTMCNC: 33.5 G/DL (ref 33–36)
MCV RBC AUTO: 89.6 FL (ref 80–94)
MONOCYTES # BLD AUTO: 0.99 X10(3)/MCL (ref 0.1–1.3)
MONOCYTES NFR BLD AUTO: 10.6 %
NEUTROPHILS # BLD AUTO: 6.86 X10(3)/MCL (ref 2.1–9.2)
NEUTROPHILS NFR BLD AUTO: 73.8 %
NRBC BLD AUTO-RTO: 0 %
PHOSPHATE SERPL-MCNC: 2.6 MG/DL (ref 2.3–4.7)
PLATELET # BLD AUTO: 98 X10(3)/MCL (ref 130–400)
PLATELETS.RETICULATED NFR BLD AUTO: 5.6 % (ref 0.9–11.2)
PMV BLD AUTO: 11.3 FL (ref 7.4–10.4)
POCT GLUCOSE: 89 MG/DL (ref 70–110)
POTASSIUM SERPL-SCNC: 4 MMOL/L (ref 3.5–5.1)
PROT SERPL-MCNC: 5.6 GM/DL (ref 5.8–7.6)
RBC # BLD AUTO: 4.43 X10(6)/MCL (ref 4.7–6.1)
SODIUM SERPL-SCNC: 138 MMOL/L (ref 136–145)
TROPONIN I SERPL-MCNC: 0.19 NG/ML (ref 0–0.04)
TROPONIN I SERPL-MCNC: 0.24 NG/ML (ref 0–0.04)
TROPONIN I SERPL-MCNC: 0.25 NG/ML (ref 0–0.04)
TROPONIN I SERPL-MCNC: 0.35 NG/ML (ref 0–0.04)
WBC # BLD AUTO: 9.31 X10(3)/MCL (ref 4.5–11.5)

## 2025-05-14 PROCEDURE — 84100 ASSAY OF PHOSPHORUS: CPT

## 2025-05-14 PROCEDURE — 84484 ASSAY OF TROPONIN QUANT: CPT

## 2025-05-14 PROCEDURE — 36415 COLL VENOUS BLD VENIPUNCTURE: CPT

## 2025-05-14 PROCEDURE — 94799 UNLISTED PULMONARY SVC/PX: CPT

## 2025-05-14 PROCEDURE — 85025 COMPLETE CBC W/AUTO DIFF WBC: CPT

## 2025-05-14 PROCEDURE — 83735 ASSAY OF MAGNESIUM: CPT

## 2025-05-14 PROCEDURE — 25000003 PHARM REV CODE 250

## 2025-05-14 PROCEDURE — 25000003 PHARM REV CODE 250: Performed by: INTERNAL MEDICINE

## 2025-05-14 PROCEDURE — 11000001 HC ACUTE MED/SURG PRIVATE ROOM

## 2025-05-14 PROCEDURE — 99900031 HC PATIENT EDUCATION (STAT)

## 2025-05-14 PROCEDURE — 80053 COMPREHEN METABOLIC PANEL: CPT

## 2025-05-14 RX ORDER — METOPROLOL TARTRATE 25 MG/1
25 TABLET, FILM COATED ORAL 2 TIMES DAILY
Status: DISPENSED | OUTPATIENT
Start: 2025-05-14 | End: 2025-05-16

## 2025-05-14 RX ADMIN — ACETAMINOPHEN 650 MG: 325 TABLET ORAL at 11:05

## 2025-05-14 RX ADMIN — FINASTERIDE 5 MG: 5 TABLET, FILM COATED ORAL at 08:05

## 2025-05-14 RX ADMIN — METOPROLOL TARTRATE 25 MG: 25 TABLET, FILM COATED ORAL at 08:05

## 2025-05-14 RX ADMIN — FUROSEMIDE 40 MG: 40 TABLET ORAL at 08:05

## 2025-05-14 RX ADMIN — PRAVASTATIN SODIUM 40 MG: 10 TABLET ORAL at 08:05

## 2025-05-14 RX ADMIN — SACUBITRIL AND VALSARTAN 1 TABLET: 49; 51 TABLET, FILM COATED ORAL at 08:05
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
## 2025-05-15 LAB
ALBUMIN SERPL-MCNC: 2.4 G/DL (ref 3.4–4.8)
ALBUMIN/GLOB SERPL: 0.7 RATIO (ref 1.1–2)
ALP SERPL-CCNC: 57 UNIT/L (ref 40–150)
ALT SERPL-CCNC: 22 UNIT/L (ref 0–55)
ANION GAP SERPL CALC-SCNC: 7 MEQ/L
AST SERPL-CCNC: 19 UNIT/L (ref 11–45)
BASOPHILS # BLD AUTO: 0.04 X10(3)/MCL
BASOPHILS NFR BLD AUTO: 0.4 %
BILIRUB SERPL-MCNC: 0.7 MG/DL
BUN SERPL-MCNC: 12.2 MG/DL (ref 8.4–25.7)
CALCIUM SERPL-MCNC: 8.3 MG/DL (ref 8.8–10)
CHLORIDE SERPL-SCNC: 104 MMOL/L (ref 98–107)
CO2 SERPL-SCNC: 24 MMOL/L (ref 23–31)
CREAT SERPL-MCNC: 0.89 MG/DL (ref 0.72–1.25)
CREAT/UREA NIT SERPL: 14
EOSINOPHIL # BLD AUTO: 0.93 X10(3)/MCL (ref 0–0.9)
EOSINOPHIL NFR BLD AUTO: 10 %
ERYTHROCYTE [DISTWIDTH] IN BLOOD BY AUTOMATED COUNT: 15 % (ref 11.5–17)
GFR SERPLBLD CREATININE-BSD FMLA CKD-EPI: >60 ML/MIN/1.73/M2
GLOBULIN SER-MCNC: 3.5 GM/DL (ref 2.4–3.5)
GLUCOSE SERPL-MCNC: 101 MG/DL (ref 82–115)
HCT VFR BLD AUTO: 40.4 % (ref 42–52)
HGB BLD-MCNC: 13 G/DL (ref 14–18)
IMM GRANULOCYTES # BLD AUTO: 0.05 X10(3)/MCL (ref 0–0.04)
IMM GRANULOCYTES NFR BLD AUTO: 0.5 %
LYMPHOCYTES # BLD AUTO: 0.92 X10(3)/MCL (ref 0.6–4.6)
LYMPHOCYTES NFR BLD AUTO: 9.9 %
MAGNESIUM SERPL-MCNC: 1.8 MG/DL (ref 1.6–2.6)
MCH RBC QN AUTO: 29.3 PG (ref 27–31)
MCHC RBC AUTO-ENTMCNC: 32.2 G/DL (ref 33–36)
MCV RBC AUTO: 91.2 FL (ref 80–94)
MONOCYTES # BLD AUTO: 1.04 X10(3)/MCL (ref 0.1–1.3)
MONOCYTES NFR BLD AUTO: 11.2 %
NEUTROPHILS # BLD AUTO: 6.32 X10(3)/MCL (ref 2.1–9.2)
NEUTROPHILS NFR BLD AUTO: 68 %
NRBC BLD AUTO-RTO: 0 %
OHS QRS DURATION: 158 MS
OHS QTC CALCULATION: 556 MS
PHOSPHATE SERPL-MCNC: 2.5 MG/DL (ref 2.3–4.7)
PLATELET # BLD AUTO: 131 X10(3)/MCL (ref 130–400)
PLATELETS.RETICULATED NFR BLD AUTO: 6.6 % (ref 0.9–11.2)
PMV BLD AUTO: 11.5 FL (ref 7.4–10.4)
POCT GLUCOSE: 103 MG/DL (ref 70–110)
POTASSIUM SERPL-SCNC: 3.7 MMOL/L (ref 3.5–5.1)
PROT SERPL-MCNC: 5.9 GM/DL (ref 5.8–7.6)
RBC # BLD AUTO: 4.43 X10(6)/MCL (ref 4.7–6.1)
SODIUM SERPL-SCNC: 135 MMOL/L (ref 136–145)
TROPONIN I SERPL-MCNC: 0.11 NG/ML (ref 0–0.04)
TROPONIN I SERPL-MCNC: 0.12 NG/ML (ref 0–0.04)
TROPONIN I SERPL-MCNC: 0.14 NG/ML (ref 0–0.04)
TROPONIN I SERPL-MCNC: 0.2 NG/ML (ref 0–0.04)
WBC # BLD AUTO: 9.3 X10(3)/MCL (ref 4.5–11.5)

## 2025-05-15 PROCEDURE — 25000003 PHARM REV CODE 250

## 2025-05-15 PROCEDURE — 85025 COMPLETE CBC W/AUTO DIFF WBC: CPT

## 2025-05-15 PROCEDURE — 80053 COMPREHEN METABOLIC PANEL: CPT

## 2025-05-15 PROCEDURE — 93005 ELECTROCARDIOGRAM TRACING: CPT

## 2025-05-15 PROCEDURE — 21400001 HC TELEMETRY ROOM

## 2025-05-15 PROCEDURE — 93010 ELECTROCARDIOGRAM REPORT: CPT | Mod: ,,, | Performed by: INTERNAL MEDICINE

## 2025-05-15 PROCEDURE — 83735 ASSAY OF MAGNESIUM: CPT

## 2025-05-15 PROCEDURE — 36415 COLL VENOUS BLD VENIPUNCTURE: CPT

## 2025-05-15 PROCEDURE — 25000003 PHARM REV CODE 250: Performed by: INTERNAL MEDICINE

## 2025-05-15 PROCEDURE — 84100 ASSAY OF PHOSPHORUS: CPT

## 2025-05-15 PROCEDURE — 63600175 PHARM REV CODE 636 W HCPCS: Mod: JZ,TB

## 2025-05-15 PROCEDURE — 84484 ASSAY OF TROPONIN QUANT: CPT

## 2025-05-15 PROCEDURE — 25000003 PHARM REV CODE 250: Performed by: NURSE PRACTITIONER

## 2025-05-15 RX ORDER — METOPROLOL SUCCINATE 50 MG/1
50 TABLET, EXTENDED RELEASE ORAL DAILY
Status: DISCONTINUED | OUTPATIENT
Start: 2025-05-16 | End: 2025-05-17

## 2025-05-15 RX ORDER — METHOCARBAMOL 500 MG/1
500 TABLET, FILM COATED ORAL ONCE
Status: DISCONTINUED | OUTPATIENT
Start: 2025-05-15 | End: 2025-06-09 | Stop reason: HOSPADM

## 2025-05-15 RX ORDER — KETOROLAC TROMETHAMINE 30 MG/ML
15 INJECTION, SOLUTION INTRAMUSCULAR; INTRAVENOUS ONCE
Status: COMPLETED | OUTPATIENT
Start: 2025-05-15 | End: 2025-05-15

## 2025-05-15 RX ADMIN — SACUBITRIL AND VALSARTAN 1 TABLET: 49; 51 TABLET, FILM COATED ORAL at 09:05

## 2025-05-15 RX ADMIN — FINASTERIDE 5 MG: 5 TABLET, FILM COATED ORAL at 08:05

## 2025-05-15 RX ADMIN — METOPROLOL TARTRATE 25 MG: 25 TABLET, FILM COATED ORAL at 09:05

## 2025-05-15 RX ADMIN — PRAVASTATIN SODIUM 40 MG: 10 TABLET ORAL at 08:05

## 2025-05-15 RX ADMIN — FUROSEMIDE 40 MG: 40 TABLET ORAL at 08:05

## 2025-05-15 RX ADMIN — METOPROLOL TARTRATE 25 MG: 25 TABLET, FILM COATED ORAL at 08:05

## 2025-05-15 RX ADMIN — SACUBITRIL AND VALSARTAN 1 TABLET: 49; 51 TABLET, FILM COATED ORAL at 08:05

## 2025-05-15 RX ADMIN — FUROSEMIDE 40 MG: 40 TABLET ORAL at 09:05

## 2025-05-15 RX ADMIN — KETOROLAC TROMETHAMINE 15 MG: 30 INJECTION, SOLUTION INTRAMUSCULAR; INTRAVENOUS at 03:05

## 2025-05-15 NOTE — PROGRESS NOTES
Inpatient Nutrition Assessment    Admit Date: 5/8/2025   Total duration of encounter: 7 days   Patient Age: 79 y.o.    Nutrition Recommendation/Prescription     Advance diet when appropriate. Goal diet: cardiac.     Communication of Recommendations: reviewed with patient    Nutrition Assessment     Malnutrition Assessment/Nutrition-Focused Physical Exam                                                                A minimum of two characteristics is recommended for diagnosis of either severe or non-severe malnutrition.    Chart Review    Reason Seen: length of stay    Malnutrition Screening Tool Results   Have you recently lost weight without trying?: No  Have you been eating poorly because of a decreased appetite?: No   MST Score: 0   Diagnosis:  Acute decompensated heart failure with reduced ejection fraction  Persistent ventricular tachycardia  Chronic atrial fibrillation     Relevant Medical History: Afib. CVA. HTN, PVD, CHF    Scheduled Medications:  [START ON 5/19/2025] amiodarone, 200 mg, Daily  finasteride, 5 mg, Daily  furosemide, 40 mg, BID  methocarbamoL, 500 mg, Once  [START ON 5/16/2025] metoprolol succinate, 50 mg, Daily  metoprolol tartrate, 25 mg, BID  pravastatin, 40 mg, Daily  sacubitriL-valsartan, 1 tablet, BID    Continuous Infusions:  heparin (porcine) in 5 % dex, Last Rate: Stopped (05/13/25 1823)  LIDOcaine, Last Rate: 7.5 mL/hr at 05/10/25 1935  sodium bicarbonate 25 mEq in D5W 1,000 mL infusion, Last Rate: 10 mL/hr at 05/14/25 1820    PRN Medications:  acetaminophen, 650 mg, Q4H PRN  LIDOcaine, , Continuous PRN  ondansetron, 8 mg, Q8H PRN  sodium chloride 0.9%, 10 mL, PRN  sodium chloride 0.9%, 10 mL, PRN    Calorie Containing IV Medications: no significant kcals from medications at this time    Recent Labs   Lab 05/09/25  0204 05/10/25  0240 05/11/25  0230 05/12/25  0243 05/13/25  0249 05/13/25  2040 05/14/25  0215 05/15/25  0336   * 139 139 137 138  --  138 135*   K 3.8 3.9 3.7 3.7  "3.5  --  4.0 3.7   CALCIUM 7.6* 7.7* 7.9* 8.2* 7.8*  --  8.2* 8.3*   PHOS 3.1 2.9 2.4 2.0* 2.1*  --  2.6 2.5   MG 1.90 2.00 2.10 1.80 1.80  --  2.10 1.80    108* 108* 106 106  --  108* 104   CO2 21* 20* 22* 24 25  --  25 24   BUN 39.1* 36.2* 27.0* 19.0 15.4  --  14.3 12.2   CREATININE 1.41* 1.34* 1.12 0.92 0.92  --  0.92 0.89   EGFRNORACEVR 50 54 >60 >60 >60  --  >60 >60   * 117* 109 120* 114  --  96 101   BILITOT 0.6 0.7 0.7 0.7 0.6  --  0.7 0.7   ALKPHOS 37* 36* 39* 43 51  --  52 57   ALT 32 25 24 20 24  --  22 22   AST 36 33 32 34 29  --  24 19   ALBUMIN 2.6* 2.5* 2.4* 2.4* 2.3*  --  2.3* 2.4*   TRIG  --   --   --   --   --  78  --   --    WBC 10.51 8.70 8.11 9.35 8.64  --  9.31 9.30   HGB 14.4 14.0 13.3* 13.1* 12.9*  --  13.3* 13.0*   HCT 44.6 41.9* 39.6* 39.3* 38.1*  --  39.7* 40.4*     Nutrition Orders:  Diet Full Liquid Standard Tray      Appetite/Oral Intake: good/% of meals  Factors Affecting Nutritional Intake: none identified  Social Needs Impacting Access to Food: unable to assess at this time; will attempt on follow-up  Food/Jew/Cultural Preferences: unable to obtain  Food Allergies: no known food allergies  Last Bowel Movement: 05/08/25  Wound(s):  None documented     Comments    5/15/25: Pt with 100% po intake of liquid diet. Stated appetite good. Unable to obtain further info or complete physical assessment. Interrupted by MD at time of visit. Will attempt upon F/U.     Anthropometrics    Height: 6' 0.01" (182.9 cm), Height Method: Measured  Last Weight: 115 kg (253 lb 8.5 oz) (05/12/25 1033), Weight Method: Bed Scale  BMI (Calculated): 34.4  BMI Classification: obese grade I (BMI 30-34.9)        Ideal Body Weight (IBW), Male: 178.06 lb     % Ideal Body Weight, Male (lb): 142.43 %                          Usual Weight Provided By: unable to obtain usual weight    Wt Readings from Last 5 Encounters:   05/12/25 115 kg (253 lb 8.5 oz)   04/02/25 113.4 kg (250 lb)   09/23/24 107 " kg (236 lb)   03/06/24 108.4 kg (239 lb)   09/06/23 119.7 kg (264 lb)     Weight Change(s) Since Admission:   Wt Readings from Last 1 Encounters:   05/12/25 1033 115 kg (253 lb 8.5 oz)   05/08/25 1750 115 kg (253 lb 8.5 oz)   Admit Weight: 115 kg (253 lb 8.5 oz) (05/08/25 1750), Weight Method: Bed Scale    Estimated Needs    Weight Used For Calorie Calculations: 115 kg (253 lb 8.5 oz)  Energy Calorie Requirements (kcal): 2278kcal (1.2 stress factor)  Energy Need Method: McGraw-St Jeor  Weight Used For Protein Calculations: 115 kg (253 lb 8.5 oz)  Protein Requirements: 115-138gm (1-1.2g/kg)  Fluid Requirements (mL): 2278ml (1ml/kcal)  CHO Requirement: 255gm (45% est kcal needs)     Enteral Nutrition     Patient not receiving enteral nutrition at this time.    Parenteral Nutrition     Patient not receiving parenteral nutrition support at this time.    Evaluation of Received Nutrient Intake    Calories: not meeting estimated needs  Protein: not meeting estimated needs    Patient Education     Not applicable.    Nutrition Diagnosis     PES: Inadequate energy intake related to acute illness as evidenced by liquid diet. (new)     PES:            Nutrition Interventions     Intervention(s): general/healthful diet and collaboration with other providers  Intervention(s):      Goal: Meet greater than 80% of nutritional needs by follow-up. (new)  Goal: Consume % of meals/snacks by follow-up. (new)    Nutrition Goals & Monitoring     Dietitian will monitor: food and beverage intake and energy intake  Discharge planning: resume home regimen  Nutrition Risk/Follow-Up: dietitian will follow-up one time per week   Please consult if re-assessment needed sooner.
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
## 2025-05-15 NOTE — PT/OT/SLP PROGRESS
Physical Therapy      Patient Name:  Alcides Rosario   MRN:  [id number]    Patient not seen today for PT eval. Pt unwilling to participate at this time; requesting to remain resting in bed. Will follow-up.

## 2025-05-16 ENCOUNTER — ANESTHESIA (OUTPATIENT)
Dept: CARDIOLOGY | Facility: HOSPITAL | Age: 80
End: 2025-05-16
Payer: MEDICARE

## 2025-05-16 ENCOUNTER — ANESTHESIA EVENT (OUTPATIENT)
Dept: CARDIOLOGY | Facility: HOSPITAL | Age: 80
End: 2025-05-16
Payer: MEDICARE

## 2025-05-16 LAB
ALBUMIN SERPL-MCNC: 2.2 G/DL (ref 3.4–4.8)
ALBUMIN SERPL-MCNC: 2.3 G/DL (ref 3.4–4.8)
ALBUMIN/GLOB SERPL: 0.6 RATIO (ref 1.1–2)
ALBUMIN/GLOB SERPL: 0.7 RATIO (ref 1.1–2)
ALP SERPL-CCNC: 70 UNIT/L (ref 40–150)
ALP SERPL-CCNC: 72 UNIT/L (ref 40–150)
ALT SERPL-CCNC: 20 UNIT/L (ref 0–55)
ALT SERPL-CCNC: 21 UNIT/L (ref 0–55)
ANION GAP SERPL CALC-SCNC: 11 MEQ/L
ANION GAP SERPL CALC-SCNC: 7 MEQ/L
APTT PPP: 54.8 SECONDS (ref 23.2–33.7)
AST SERPL-CCNC: 19 UNIT/L (ref 11–45)
AST SERPL-CCNC: 60 UNIT/L (ref 11–45)
BASOPHILS # BLD AUTO: 0.03 X10(3)/MCL
BASOPHILS # BLD AUTO: 0.06 X10(3)/MCL
BASOPHILS NFR BLD AUTO: 0.3 %
BASOPHILS NFR BLD AUTO: 0.4 %
BILIRUB SERPL-MCNC: 0.7 MG/DL
BILIRUB SERPL-MCNC: 0.9 MG/DL
BUN SERPL-MCNC: 15.9 MG/DL (ref 8.4–25.7)
BUN SERPL-MCNC: 16.5 MG/DL (ref 8.4–25.7)
CALCIUM SERPL-MCNC: 8.1 MG/DL (ref 8.8–10)
CALCIUM SERPL-MCNC: 8.4 MG/DL (ref 8.8–10)
CHLORIDE SERPL-SCNC: 104 MMOL/L (ref 98–107)
CHLORIDE SERPL-SCNC: 104 MMOL/L (ref 98–107)
CO2 SERPL-SCNC: 20 MMOL/L (ref 23–31)
CO2 SERPL-SCNC: 24 MMOL/L (ref 23–31)
CREAT SERPL-MCNC: 1.02 MG/DL (ref 0.72–1.25)
CREAT SERPL-MCNC: 1.09 MG/DL (ref 0.72–1.25)
CREAT/UREA NIT SERPL: 15
CREAT/UREA NIT SERPL: 16
EOSINOPHIL # BLD AUTO: 0.41 X10(3)/MCL (ref 0–0.9)
EOSINOPHIL # BLD AUTO: 0.58 X10(3)/MCL (ref 0–0.9)
EOSINOPHIL NFR BLD AUTO: 2.8 %
EOSINOPHIL NFR BLD AUTO: 5.5 %
ERYTHROCYTE [DISTWIDTH] IN BLOOD BY AUTOMATED COUNT: 15 % (ref 11.5–17)
ERYTHROCYTE [DISTWIDTH] IN BLOOD BY AUTOMATED COUNT: 15.1 % (ref 11.5–17)
GFR SERPLBLD CREATININE-BSD FMLA CKD-EPI: >60 ML/MIN/1.73/M2
GFR SERPLBLD CREATININE-BSD FMLA CKD-EPI: >60 ML/MIN/1.73/M2
GLOBULIN SER-MCNC: 3.5 GM/DL (ref 2.4–3.5)
GLOBULIN SER-MCNC: 3.5 GM/DL (ref 2.4–3.5)
GLUCOSE SERPL-MCNC: 109 MG/DL (ref 82–115)
GLUCOSE SERPL-MCNC: 141 MG/DL (ref 82–115)
HCT VFR BLD AUTO: 36.1 % (ref 42–52)
HCT VFR BLD AUTO: 38.2 % (ref 42–52)
HGB BLD-MCNC: 11.6 G/DL (ref 14–18)
HGB BLD-MCNC: 12.5 G/DL (ref 14–18)
IMM GRANULOCYTES # BLD AUTO: 0.06 X10(3)/MCL (ref 0–0.04)
IMM GRANULOCYTES # BLD AUTO: 0.13 X10(3)/MCL (ref 0–0.04)
IMM GRANULOCYTES NFR BLD AUTO: 0.6 %
IMM GRANULOCYTES NFR BLD AUTO: 0.9 %
LACTATE SERPL-SCNC: 1.8 MMOL/L (ref 0.5–2.2)
LDH SERPL L TO P-CCNC: 0.32 MMOL/L (ref 0.36–1.25)
LDH SERPL L TO P-CCNC: 0.59 MMOL/L (ref 0.36–1.25)
LYMPHOCYTES # BLD AUTO: 0.6 X10(3)/MCL (ref 0.6–4.6)
LYMPHOCYTES # BLD AUTO: 0.85 X10(3)/MCL (ref 0.6–4.6)
LYMPHOCYTES NFR BLD AUTO: 4.1 %
LYMPHOCYTES NFR BLD AUTO: 8.1 %
MAGNESIUM SERPL-MCNC: 1.7 MG/DL (ref 1.6–2.6)
MCH RBC QN AUTO: 29.8 PG (ref 27–31)
MCH RBC QN AUTO: 29.9 PG (ref 27–31)
MCHC RBC AUTO-ENTMCNC: 32.1 G/DL (ref 33–36)
MCHC RBC AUTO-ENTMCNC: 32.7 G/DL (ref 33–36)
MCV RBC AUTO: 91.4 FL (ref 80–94)
MCV RBC AUTO: 92.8 FL (ref 80–94)
MONOCYTES # BLD AUTO: 1.11 X10(3)/MCL (ref 0.1–1.3)
MONOCYTES # BLD AUTO: 1.21 X10(3)/MCL (ref 0.1–1.3)
MONOCYTES NFR BLD AUTO: 10.6 %
MONOCYTES NFR BLD AUTO: 8.3 %
NEUTROPHILS # BLD AUTO: 12.13 X10(3)/MCL (ref 2.1–9.2)
NEUTROPHILS # BLD AUTO: 7.88 X10(3)/MCL (ref 2.1–9.2)
NEUTROPHILS NFR BLD AUTO: 74.9 %
NEUTROPHILS NFR BLD AUTO: 83.5 %
NRBC BLD AUTO-RTO: 0 %
NRBC BLD AUTO-RTO: 0 %
OHS QRS DURATION: 170 MS
OHS QTC CALCULATION: 588 MS
PHOSPHATE SERPL-MCNC: 3.3 MG/DL (ref 2.3–4.7)
PLATELET # BLD AUTO: 152 X10(3)/MCL (ref 130–400)
PLATELET # BLD AUTO: 162 X10(3)/MCL (ref 130–400)
PMV BLD AUTO: 11.2 FL (ref 7.4–10.4)
PMV BLD AUTO: 11.3 FL (ref 7.4–10.4)
POTASSIUM SERPL-SCNC: 3.9 MMOL/L (ref 3.5–5.1)
POTASSIUM SERPL-SCNC: 4.2 MMOL/L (ref 3.5–5.1)
PROT SERPL-MCNC: 5.7 GM/DL (ref 5.8–7.6)
PROT SERPL-MCNC: 5.8 GM/DL (ref 5.8–7.6)
RBC # BLD AUTO: 3.89 X10(6)/MCL (ref 4.7–6.1)
RBC # BLD AUTO: 4.18 X10(6)/MCL (ref 4.7–6.1)
SAMPLE: ABNORMAL
SAMPLE: NORMAL
SODIUM SERPL-SCNC: 135 MMOL/L (ref 136–145)
SODIUM SERPL-SCNC: 135 MMOL/L (ref 136–145)
TROPONIN I SERPL-MCNC: 0.12 NG/ML (ref 0–0.04)
TROPONIN I SERPL-MCNC: 0.12 NG/ML (ref 0–0.04)
TROPONIN I SERPL-MCNC: 2.55 NG/ML (ref 0–0.04)
TROPONIN I SERPL-MCNC: 3.35 NG/ML (ref 0–0.04)
WBC # BLD AUTO: 10.51 X10(3)/MCL (ref 4.5–11.5)
WBC # BLD AUTO: 14.54 X10(3)/MCL (ref 4.5–11.5)

## 2025-05-16 PROCEDURE — 63600175 PHARM REV CODE 636 W HCPCS: Performed by: NURSE ANESTHETIST, CERTIFIED REGISTERED

## 2025-05-16 PROCEDURE — C1732 CATH, EP, DIAG/ABL, 3D/VECT: HCPCS | Performed by: STUDENT IN AN ORGANIZED HEALTH CARE EDUCATION/TRAINING PROGRAM

## 2025-05-16 PROCEDURE — 80053 COMPREHEN METABOLIC PANEL: CPT

## 2025-05-16 PROCEDURE — 02HL3KZ INSERTION OF DEFIBRILLATOR LEAD INTO LEFT VENTRICLE, PERCUTANEOUS APPROACH: ICD-10-PCS | Performed by: STUDENT IN AN ORGANIZED HEALTH CARE EDUCATION/TRAINING PROGRAM

## 2025-05-16 PROCEDURE — C1894 INTRO/SHEATH, NON-LASER: HCPCS | Performed by: STUDENT IN AN ORGANIZED HEALTH CARE EDUCATION/TRAINING PROGRAM

## 2025-05-16 PROCEDURE — 93005 ELECTROCARDIOGRAM TRACING: CPT

## 2025-05-16 PROCEDURE — 0JH609Z INSERTION OF CARDIAC RESYNCHRONIZATION DEFIBRILLATOR PULSE GENERATOR INTO CHEST SUBCUTANEOUS TISSUE AND FASCIA, OPEN APPROACH: ICD-10-PCS | Performed by: STUDENT IN AN ORGANIZED HEALTH CARE EDUCATION/TRAINING PROGRAM

## 2025-05-16 PROCEDURE — C1753 CATH, INTRAVAS ULTRASOUND: HCPCS | Performed by: STUDENT IN AN ORGANIZED HEALTH CARE EDUCATION/TRAINING PROGRAM

## 2025-05-16 PROCEDURE — 63600175 PHARM REV CODE 636 W HCPCS: Mod: JZ,TB

## 2025-05-16 PROCEDURE — 25000003 PHARM REV CODE 250: Performed by: INTERNAL MEDICINE

## 2025-05-16 PROCEDURE — 63600175 PHARM REV CODE 636 W HCPCS: Performed by: INTERNAL MEDICINE

## 2025-05-16 PROCEDURE — C1893 INTRO/SHEATH, FIXED,NON-PEEL: HCPCS | Performed by: STUDENT IN AN ORGANIZED HEALTH CARE EDUCATION/TRAINING PROGRAM

## 2025-05-16 PROCEDURE — 63600175 PHARM REV CODE 636 W HCPCS

## 2025-05-16 PROCEDURE — 99153 MOD SED SAME PHYS/QHP EA: CPT | Performed by: STUDENT IN AN ORGANIZED HEALTH CARE EDUCATION/TRAINING PROGRAM

## 2025-05-16 PROCEDURE — C1777 LEAD, AICD, ENDO SINGLE COIL: HCPCS | Performed by: STUDENT IN AN ORGANIZED HEALTH CARE EDUCATION/TRAINING PROGRAM

## 2025-05-16 PROCEDURE — 99900031 HC PATIENT EDUCATION (STAT)

## 2025-05-16 PROCEDURE — 94760 N-INVAS EAR/PLS OXIMETRY 1: CPT

## 2025-05-16 PROCEDURE — 94002 VENT MGMT INPAT INIT DAY: CPT

## 2025-05-16 PROCEDURE — C1898 LEAD, PMKR, OTHER THAN TRANS: HCPCS | Performed by: STUDENT IN AN ORGANIZED HEALTH CARE EDUCATION/TRAINING PROGRAM

## 2025-05-16 PROCEDURE — 02HK3KZ INSERTION OF DEFIBRILLATOR LEAD INTO RIGHT VENTRICLE, PERCUTANEOUS APPROACH: ICD-10-PCS | Performed by: STUDENT IN AN ORGANIZED HEALTH CARE EDUCATION/TRAINING PROGRAM

## 2025-05-16 PROCEDURE — 27201423 OPTIME MED/SURG SUP & DEVICES STERILE SUPPLY: Performed by: STUDENT IN AN ORGANIZED HEALTH CARE EDUCATION/TRAINING PROGRAM

## 2025-05-16 PROCEDURE — 84484 ASSAY OF TROPONIN QUANT: CPT

## 2025-05-16 PROCEDURE — 25000003 PHARM REV CODE 250: Performed by: STUDENT IN AN ORGANIZED HEALTH CARE EDUCATION/TRAINING PROGRAM

## 2025-05-16 PROCEDURE — 80053 COMPREHEN METABOLIC PANEL: CPT | Performed by: STUDENT IN AN ORGANIZED HEALTH CARE EDUCATION/TRAINING PROGRAM

## 2025-05-16 PROCEDURE — 25000003 PHARM REV CODE 250

## 2025-05-16 PROCEDURE — 83605 ASSAY OF LACTIC ACID: CPT | Performed by: STUDENT IN AN ORGANIZED HEALTH CARE EDUCATION/TRAINING PROGRAM

## 2025-05-16 PROCEDURE — 63600175 PHARM REV CODE 636 W HCPCS: Performed by: STUDENT IN AN ORGANIZED HEALTH CARE EDUCATION/TRAINING PROGRAM

## 2025-05-16 PROCEDURE — 36415 COLL VENOUS BLD VENIPUNCTURE: CPT

## 2025-05-16 PROCEDURE — 20000000 HC ICU ROOM

## 2025-05-16 PROCEDURE — 37000009 HC ANESTHESIA EA ADD 15 MINS: Performed by: STUDENT IN AN ORGANIZED HEALTH CARE EDUCATION/TRAINING PROGRAM

## 2025-05-16 PROCEDURE — 37000008 HC ANESTHESIA 1ST 15 MINUTES: Performed by: STUDENT IN AN ORGANIZED HEALTH CARE EDUCATION/TRAINING PROGRAM

## 2025-05-16 PROCEDURE — D9220A PRA ANESTHESIA: Mod: CRNA,,, | Performed by: NURSE ANESTHETIST, CERTIFIED REGISTERED

## 2025-05-16 PROCEDURE — C1769 GUIDE WIRE: HCPCS | Performed by: STUDENT IN AN ORGANIZED HEALTH CARE EDUCATION/TRAINING PROGRAM

## 2025-05-16 PROCEDURE — C1725 CATH, TRANSLUMIN NON-LASER: HCPCS | Performed by: STUDENT IN AN ORGANIZED HEALTH CARE EDUCATION/TRAINING PROGRAM

## 2025-05-16 PROCEDURE — 99152 MOD SED SAME PHYS/QHP 5/>YRS: CPT | Performed by: STUDENT IN AN ORGANIZED HEALTH CARE EDUCATION/TRAINING PROGRAM

## 2025-05-16 PROCEDURE — C1887 CATHETER, GUIDING: HCPCS | Performed by: STUDENT IN AN ORGANIZED HEALTH CARE EDUCATION/TRAINING PROGRAM

## 2025-05-16 PROCEDURE — 99900035 HC TECH TIME PER 15 MIN (STAT)

## 2025-05-16 PROCEDURE — 93654 COMPRE EP EVAL TX VT: CPT | Performed by: STUDENT IN AN ORGANIZED HEALTH CARE EDUCATION/TRAINING PROGRAM

## 2025-05-16 PROCEDURE — 25500020 PHARM REV CODE 255: Performed by: STUDENT IN AN ORGANIZED HEALTH CARE EDUCATION/TRAINING PROGRAM

## 2025-05-16 PROCEDURE — 25000003 PHARM REV CODE 250: Performed by: NURSE PRACTITIONER

## 2025-05-16 PROCEDURE — C1882 AICD, OTHER THAN SING/DUAL: HCPCS | Performed by: STUDENT IN AN ORGANIZED HEALTH CARE EDUCATION/TRAINING PROGRAM

## 2025-05-16 PROCEDURE — 85025 COMPLETE CBC W/AUTO DIFF WBC: CPT | Performed by: STUDENT IN AN ORGANIZED HEALTH CARE EDUCATION/TRAINING PROGRAM

## 2025-05-16 PROCEDURE — 83735 ASSAY OF MAGNESIUM: CPT

## 2025-05-16 PROCEDURE — 25000003 PHARM REV CODE 250: Performed by: NURSE ANESTHETIST, CERTIFIED REGISTERED

## 2025-05-16 PROCEDURE — C2630 CATH, EP, COOL-TIP: HCPCS | Performed by: STUDENT IN AN ORGANIZED HEALTH CARE EDUCATION/TRAINING PROGRAM

## 2025-05-16 PROCEDURE — 84100 ASSAY OF PHOSPHORUS: CPT

## 2025-05-16 PROCEDURE — 93010 ELECTROCARDIOGRAM REPORT: CPT | Mod: ,,, | Performed by: INTERNAL MEDICINE

## 2025-05-16 PROCEDURE — 27100171 HC OXYGEN HIGH FLOW UP TO 24 HOURS

## 2025-05-16 PROCEDURE — 02H63KZ INSERTION OF DEFIBRILLATOR LEAD INTO RIGHT ATRIUM, PERCUTANEOUS APPROACH: ICD-10-PCS | Performed by: STUDENT IN AN ORGANIZED HEALTH CARE EDUCATION/TRAINING PROGRAM

## 2025-05-16 PROCEDURE — D9220A PRA ANESTHESIA: Mod: ANES,,, | Performed by: ANESTHESIOLOGY

## 2025-05-16 PROCEDURE — 85025 COMPLETE CBC W/AUTO DIFF WBC: CPT

## 2025-05-16 PROCEDURE — 94761 N-INVAS EAR/PLS OXIMETRY MLT: CPT

## 2025-05-16 PROCEDURE — 33249 INSJ/RPLCMT DEFIB W/LEAD(S): CPT | Performed by: STUDENT IN AN ORGANIZED HEALTH CARE EDUCATION/TRAINING PROGRAM

## 2025-05-16 PROCEDURE — 94799 UNLISTED PULMONARY SVC/PX: CPT

## 2025-05-16 PROCEDURE — 85730 THROMBOPLASTIN TIME PARTIAL: CPT | Performed by: INTERNAL MEDICINE

## 2025-05-16 DEVICE — DEFIBRILLATION LEAD
Type: IMPLANTABLE DEVICE | Site: CHEST | Status: FUNCTIONAL
Brand: DURATA™

## 2025-05-16 DEVICE — PACING LEAD
Type: IMPLANTABLE DEVICE | Site: CHEST | Status: FUNCTIONAL
Brand: ULTIPACE™

## 2025-05-16 RX ORDER — HYDROCODONE BITARTRATE AND ACETAMINOPHEN 5; 325 MG/1; MG/1
1 TABLET ORAL EVERY 4 HOURS PRN
Refills: 0 | Status: CANCELLED | OUTPATIENT
Start: 2025-05-16

## 2025-05-16 RX ORDER — DIPHENHYDRAMINE HYDROCHLORIDE 50 MG/ML
INJECTION, SOLUTION INTRAMUSCULAR; INTRAVENOUS
Status: DISCONTINUED | OUTPATIENT
Start: 2025-05-16 | End: 2025-05-16 | Stop reason: HOSPADM

## 2025-05-16 RX ORDER — LIDOCAINE HYDROCHLORIDE ANHYDROUS AND DEXTROSE MONOHYDRATE .8; 5 G/100ML; G/100ML
1 INJECTION, SOLUTION INTRAVENOUS CONTINUOUS
Status: DISCONTINUED | OUTPATIENT
Start: 2025-05-16 | End: 2025-05-22

## 2025-05-16 RX ORDER — IOPAMIDOL 755 MG/ML
INJECTION, SOLUTION INTRAVASCULAR
Status: DISCONTINUED | OUTPATIENT
Start: 2025-05-16 | End: 2025-05-16 | Stop reason: HOSPADM

## 2025-05-16 RX ORDER — PROPOFOL 10 MG/ML
INJECTION, EMULSION INTRAVENOUS
Status: COMPLETED
Start: 2025-05-16 | End: 2025-05-16

## 2025-05-16 RX ORDER — FENTANYL CITRATE 50 UG/ML
INJECTION, SOLUTION INTRAMUSCULAR; INTRAVENOUS
Status: DISCONTINUED | OUTPATIENT
Start: 2025-05-16 | End: 2025-05-16 | Stop reason: HOSPADM

## 2025-05-16 RX ORDER — GLUCAGON 1 MG
1 KIT INJECTION
Status: DISCONTINUED | OUTPATIENT
Start: 2025-05-16 | End: 2025-05-16 | Stop reason: HOSPADM

## 2025-05-16 RX ORDER — VANCOMYCIN HCL IN 5 % DEXTROSE 1G/250ML
1000 PLASTIC BAG, INJECTION (ML) INTRAVENOUS
Status: DISCONTINUED | OUTPATIENT
Start: 2025-05-16 | End: 2025-05-16

## 2025-05-16 RX ORDER — ROCURONIUM BROMIDE 10 MG/ML
INJECTION, SOLUTION INTRAVENOUS
Status: DISCONTINUED | OUTPATIENT
Start: 2025-05-16 | End: 2025-05-16

## 2025-05-16 RX ORDER — NOREPINEPHRINE BITARTRATE/D5W 8 MG/250ML
PLASTIC BAG, INJECTION (ML) INTRAVENOUS
Status: COMPLETED
Start: 2025-05-16 | End: 2025-05-16

## 2025-05-16 RX ORDER — ACETAMINOPHEN 325 MG/1
650 TABLET ORAL EVERY 4 HOURS PRN
Status: CANCELLED | OUTPATIENT
Start: 2025-05-16

## 2025-05-16 RX ORDER — FLUMAZENIL 0.1 MG/ML
INJECTION INTRAVENOUS
Status: DISCONTINUED | OUTPATIENT
Start: 2025-05-16 | End: 2025-05-16 | Stop reason: HOSPADM

## 2025-05-16 RX ORDER — HYDROMORPHONE HYDROCHLORIDE 2 MG/ML
0.2 INJECTION, SOLUTION INTRAMUSCULAR; INTRAVENOUS; SUBCUTANEOUS EVERY 5 MIN PRN
Status: DISCONTINUED | OUTPATIENT
Start: 2025-05-16 | End: 2025-05-16 | Stop reason: HOSPADM

## 2025-05-16 RX ORDER — PHENYLEPHRINE HYDROCHLORIDE 10 MG/ML
INJECTION INTRAVENOUS
Status: DISCONTINUED | OUTPATIENT
Start: 2025-05-16 | End: 2025-05-16

## 2025-05-16 RX ORDER — HYDROCODONE BITARTRATE AND ACETAMINOPHEN 5; 325 MG/1; MG/1
1 TABLET ORAL EVERY 4 HOURS PRN
Status: DISCONTINUED | OUTPATIENT
Start: 2025-05-16 | End: 2025-05-30

## 2025-05-16 RX ORDER — DAPAGLIFLOZIN 10 MG/1
10 TABLET, FILM COATED ORAL DAILY
Status: DISCONTINUED | OUTPATIENT
Start: 2025-05-17 | End: 2025-05-17

## 2025-05-16 RX ORDER — FENTANYL CITRATE 50 UG/ML
INJECTION, SOLUTION INTRAMUSCULAR; INTRAVENOUS
Status: DISCONTINUED | OUTPATIENT
Start: 2025-05-16 | End: 2025-05-16

## 2025-05-16 RX ORDER — ONDANSETRON HYDROCHLORIDE 2 MG/ML
4 INJECTION, SOLUTION INTRAVENOUS DAILY PRN
Status: DISCONTINUED | OUTPATIENT
Start: 2025-05-16 | End: 2025-05-16 | Stop reason: HOSPADM

## 2025-05-16 RX ORDER — SUCCINYLCHOLINE CHLORIDE 20 MG/ML
INJECTION INTRAMUSCULAR; INTRAVENOUS CODE/TRAUMA/SEDATION MEDICATION
Status: COMPLETED | OUTPATIENT
Start: 2025-05-16 | End: 2025-05-16

## 2025-05-16 RX ORDER — DEXAMETHASONE SODIUM PHOSPHATE 4 MG/ML
INJECTION, SOLUTION INTRA-ARTICULAR; INTRALESIONAL; INTRAMUSCULAR; INTRAVENOUS; SOFT TISSUE
Status: DISCONTINUED | OUTPATIENT
Start: 2025-05-16 | End: 2025-05-16

## 2025-05-16 RX ORDER — MAGNESIUM SULFATE HEPTAHYDRATE 40 MG/ML
2 INJECTION, SOLUTION INTRAVENOUS ONCE
Status: COMPLETED | OUTPATIENT
Start: 2025-05-16 | End: 2025-05-16

## 2025-05-16 RX ORDER — ETOMIDATE 2 MG/ML
INJECTION INTRAVENOUS CODE/TRAUMA/SEDATION MEDICATION
Status: COMPLETED | OUTPATIENT
Start: 2025-05-16 | End: 2025-05-16

## 2025-05-16 RX ORDER — VANCOMYCIN HYDROCHLORIDE 1 G/20ML
INJECTION, POWDER, LYOPHILIZED, FOR SOLUTION INTRAVENOUS
Status: DISCONTINUED | OUTPATIENT
Start: 2025-05-16 | End: 2025-05-16 | Stop reason: HOSPADM

## 2025-05-16 RX ORDER — ONDANSETRON HYDROCHLORIDE 2 MG/ML
INJECTION, SOLUTION INTRAVENOUS
Status: DISCONTINUED | OUTPATIENT
Start: 2025-05-16 | End: 2025-05-16

## 2025-05-16 RX ORDER — MORPHINE SULFATE 4 MG/ML
4 INJECTION, SOLUTION INTRAMUSCULAR; INTRAVENOUS EVERY 4 HOURS PRN
Refills: 0 | Status: CANCELLED | OUTPATIENT
Start: 2025-05-16

## 2025-05-16 RX ORDER — DIPHENHYDRAMINE HYDROCHLORIDE 50 MG/ML
25 INJECTION, SOLUTION INTRAMUSCULAR; INTRAVENOUS EVERY 6 HOURS PRN
Status: DISCONTINUED | OUTPATIENT
Start: 2025-05-16 | End: 2025-05-16 | Stop reason: HOSPADM

## 2025-05-16 RX ORDER — DOXYCYCLINE HYCLATE 100 MG
100 TABLET ORAL 2 TIMES DAILY
Status: DISPENSED | OUTPATIENT
Start: 2025-05-17 | End: 2025-05-22

## 2025-05-16 RX ORDER — PHENTOLAMINE MESYLATE 5 MG/1
0.5 INJECTION INTRAMUSCULAR; INTRAVENOUS ONCE
Status: COMPLETED | OUTPATIENT
Start: 2025-05-16 | End: 2025-05-16

## 2025-05-16 RX ORDER — NALOXONE HCL 0.4 MG/ML
VIAL (ML) INJECTION
Status: DISCONTINUED | OUTPATIENT
Start: 2025-05-16 | End: 2025-05-16 | Stop reason: HOSPADM

## 2025-05-16 RX ORDER — SODIUM CHLORIDE 0.9 % (FLUSH) 0.9 %
10 SYRINGE (ML) INJECTION EVERY 12 HOURS PRN
Status: DISCONTINUED | OUTPATIENT
Start: 2025-05-16 | End: 2025-06-09 | Stop reason: HOSPADM

## 2025-05-16 RX ORDER — FUROSEMIDE 10 MG/ML
120 INJECTION INTRAMUSCULAR; INTRAVENOUS ONCE
Status: COMPLETED | OUTPATIENT
Start: 2025-05-16 | End: 2025-05-16

## 2025-05-16 RX ORDER — MORPHINE SULFATE 4 MG/ML
2 INJECTION, SOLUTION INTRAMUSCULAR; INTRAVENOUS EVERY 4 HOURS PRN
Status: DISCONTINUED | OUTPATIENT
Start: 2025-05-16 | End: 2025-05-30

## 2025-05-16 RX ORDER — HEPARIN SODIUM 1000 [USP'U]/ML
INJECTION, SOLUTION INTRAVENOUS; SUBCUTANEOUS
Status: DISCONTINUED | OUTPATIENT
Start: 2025-05-16 | End: 2025-05-16

## 2025-05-16 RX ORDER — MICONAZOLE NITRATE 2 G/100G
POWDER TOPICAL 2 TIMES DAILY
Status: DISCONTINUED | OUTPATIENT
Start: 2025-05-16 | End: 2025-06-09 | Stop reason: HOSPADM

## 2025-05-16 RX ORDER — POTASSIUM CHLORIDE 14.9 MG/ML
20 INJECTION INTRAVENOUS
Status: DISPENSED | OUTPATIENT
Start: 2025-05-16 | End: 2025-05-16

## 2025-05-16 RX ORDER — MAGNESIUM SULFATE HEPTAHYDRATE 40 MG/ML
2 INJECTION, SOLUTION INTRAVENOUS ONCE
Status: COMPLETED | OUTPATIENT
Start: 2025-05-16 | End: 2025-05-17

## 2025-05-16 RX ORDER — MIDAZOLAM HYDROCHLORIDE 1 MG/ML
INJECTION INTRAMUSCULAR; INTRAVENOUS
Status: DISCONTINUED | OUTPATIENT
Start: 2025-05-16 | End: 2025-05-16 | Stop reason: HOSPADM

## 2025-05-16 RX ORDER — OXYCODONE HYDROCHLORIDE 5 MG/1
5 TABLET ORAL
Status: DISCONTINUED | OUTPATIENT
Start: 2025-05-16 | End: 2025-05-16 | Stop reason: HOSPADM

## 2025-05-16 RX ORDER — NOREPINEPHRINE BITARTRATE/D5W 8 MG/250ML
0-3 PLASTIC BAG, INJECTION (ML) INTRAVENOUS CONTINUOUS
Status: DISCONTINUED | OUTPATIENT
Start: 2025-05-16 | End: 2025-05-25

## 2025-05-16 RX ORDER — HALOPERIDOL LACTATE 5 MG/ML
0.5 INJECTION, SOLUTION INTRAMUSCULAR EVERY 10 MIN PRN
Status: DISCONTINUED | OUTPATIENT
Start: 2025-05-16 | End: 2025-05-16 | Stop reason: HOSPADM

## 2025-05-16 RX ORDER — PROPOFOL 10 MG/ML
INJECTION, EMULSION INTRAVENOUS
Status: COMPLETED
Start: 2025-05-16 | End: 2025-05-17

## 2025-05-16 RX ORDER — LIDOCAINE HYDROCHLORIDE 20 MG/ML
100 INJECTION INTRAVENOUS ONCE
Status: COMPLETED | OUTPATIENT
Start: 2025-05-16 | End: 2025-05-16

## 2025-05-16 RX ORDER — ETOMIDATE 2 MG/ML
INJECTION INTRAVENOUS
Status: DISCONTINUED | OUTPATIENT
Start: 2025-05-16 | End: 2025-05-16

## 2025-05-16 RX ADMIN — MICONAZOLE NITRATE: 20 POWDER TOPICAL at 11:05

## 2025-05-16 RX ADMIN — PHENYLEPHRINE HYDROCHLORIDE 100 MCG: 10 INJECTION INTRAVENOUS at 05:05

## 2025-05-16 RX ADMIN — ROCURONIUM BROMIDE 20 MG: 10 SOLUTION INTRAVENOUS at 06:05

## 2025-05-16 RX ADMIN — FENTANYL CITRATE 25 MCG: 50 INJECTION, SOLUTION INTRAMUSCULAR; INTRAVENOUS at 03:05

## 2025-05-16 RX ADMIN — NOREPINEPHRINE BITARTRATE 0.02 MCG/KG/MIN: 8 INJECTION, SOLUTION INTRAVENOUS at 07:05

## 2025-05-16 RX ADMIN — PROPOFOL 1000 MG: 10 INJECTION, EMULSION INTRAVENOUS at 11:05

## 2025-05-16 RX ADMIN — DEXAMETHASONE SODIUM PHOSPHATE 4 MG: 4 INJECTION, SOLUTION INTRA-ARTICULAR; INTRALESIONAL; INTRAMUSCULAR; INTRAVENOUS; SOFT TISSUE at 06:05

## 2025-05-16 RX ADMIN — AMIODARONE HYDROCHLORIDE 300 MG: 50 INJECTION, SOLUTION INTRAVENOUS at 05:05

## 2025-05-16 RX ADMIN — SUCCINYLCHOLINE CHLORIDE 10 MG: 20 INJECTION, SOLUTION INTRAMUSCULAR; INTRAVENOUS at 07:05

## 2025-05-16 RX ADMIN — ROCURONIUM BROMIDE 50 MG: 10 SOLUTION INTRAVENOUS at 03:05

## 2025-05-16 RX ADMIN — FENTANYL CITRATE 25 MCG: 50 INJECTION, SOLUTION INTRAMUSCULAR; INTRAVENOUS at 04:05

## 2025-05-16 RX ADMIN — MICONAZOLE NITRATE: 20 POWDER TOPICAL at 10:05

## 2025-05-16 RX ADMIN — PHENTOLAMINE MESYLATE 0.5 ML: 5 INJECTION INTRAMUSCULAR; INTRAVENOUS at 10:05

## 2025-05-16 RX ADMIN — PHENYLEPHRINE HYDROCHLORIDE 20 MCG/MIN: 10 INJECTION INTRAVENOUS at 03:05

## 2025-05-16 RX ADMIN — SACUBITRIL AND VALSARTAN 1 TABLET: 49; 51 TABLET, FILM COATED ORAL at 08:05

## 2025-05-16 RX ADMIN — PHENYLEPHRINE HYDROCHLORIDE 100 MCG: 10 INJECTION INTRAVENOUS at 03:05

## 2025-05-16 RX ADMIN — FUROSEMIDE 40 MG: 40 TABLET ORAL at 08:05

## 2025-05-16 RX ADMIN — FUROSEMIDE 120 MG: 10 INJECTION, SOLUTION INTRAVENOUS at 08:05

## 2025-05-16 RX ADMIN — PROPOFOL 1000 MG: 10 INJECTION, EMULSION INTRAVENOUS at 10:05

## 2025-05-16 RX ADMIN — LIDOCAINE HYDROCHLORIDE 100 MG: 20 INJECTION, SOLUTION INTRAVENOUS at 07:05

## 2025-05-16 RX ADMIN — ROCURONIUM BROMIDE 30 MG: 10 SOLUTION INTRAVENOUS at 04:05

## 2025-05-16 RX ADMIN — SODIUM CHLORIDE, SODIUM GLUCONATE, SODIUM ACETATE, POTASSIUM CHLORIDE AND MAGNESIUM CHLORIDE: 526; 502; 368; 37; 30 INJECTION, SOLUTION INTRAVENOUS at 03:05

## 2025-05-16 RX ADMIN — ONDANSETRON 4 MG: 2 INJECTION INTRAMUSCULAR; INTRAVENOUS at 06:05

## 2025-05-16 RX ADMIN — METOPROLOL SUCCINATE 50 MG: 50 TABLET, EXTENDED RELEASE ORAL at 08:05

## 2025-05-16 RX ADMIN — LIDOCAINE HYDROCHLORIDE 1 MG/MIN: 8 INJECTION, SOLUTION INTRAVENOUS at 07:05

## 2025-05-16 RX ADMIN — ETOMIDATE 10 MG: 2 INJECTION INTRAVENOUS at 07:05

## 2025-05-16 RX ADMIN — FINASTERIDE 5 MG: 5 TABLET, FILM COATED ORAL at 08:05

## 2025-05-16 RX ADMIN — POTASSIUM CHLORIDE 20 MEQ: 14.9 INJECTION, SOLUTION INTRAVENOUS at 10:05

## 2025-05-16 RX ADMIN — HEPARIN SODIUM 8000 UNITS: 1000 INJECTION, SOLUTION INTRAVENOUS; SUBCUTANEOUS at 03:05

## 2025-05-16 RX ADMIN — ROCURONIUM BROMIDE 20 MG: 10 SOLUTION INTRAVENOUS at 04:05

## 2025-05-16 RX ADMIN — PHENYLEPHRINE HYDROCHLORIDE 100 MCG: 10 INJECTION INTRAVENOUS at 04:05

## 2025-05-16 RX ADMIN — SODIUM CHLORIDE, SODIUM GLUCONATE, SODIUM ACETATE, POTASSIUM CHLORIDE AND MAGNESIUM CHLORIDE: 526; 502; 368; 37; 30 INJECTION, SOLUTION INTRAVENOUS at 06:05

## 2025-05-16 RX ADMIN — ETOMIDATE 12 MG: 2 INJECTION INTRAVENOUS at 03:05

## 2025-05-16 RX ADMIN — SUGAMMADEX 200 MG: 100 INJECTION, SOLUTION INTRAVENOUS at 06:05

## 2025-05-16 RX ADMIN — MAGNESIUM SULFATE HEPTAHYDRATE 2 G: 40 INJECTION, SOLUTION INTRAVENOUS at 08:05

## 2025-05-16 RX ADMIN — PRAVASTATIN SODIUM 40 MG: 10 TABLET ORAL at 08:05
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
## 2025-05-16 NOTE — NURSING
S/P  successful BIV ICD implant; currently with persistent slow VT. Will stay in cath lab for VT ablation with Dr. Samayoa. Family updated by Dr. Samayoa.
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
## 2025-05-16 NOTE — PT/OT/SLP PROGRESS
Physical Therapy      Patient Name:  Alcides Rosario   MRN:  [id number]    Patient not seen today for PT eval. Pt tachycardic at rest. Plan is for pt to go for ICD implant today. Will follow up post op as appropriate.

## 2025-05-16 NOTE — PLAN OF CARE
Problem: Adult Inpatient Plan of Care  Goal: Plan of Care Review  Outcome: Progressing  Flowsheets (Taken 5/16/2025 0000)  Plan of Care Reviewed With: patient  Goal: Patient-Specific Goal (Individualized)  Outcome: Progressing  Flowsheets (Taken 5/16/2025 0000)  Individualized Care Needs: NPO p MN for ICD placement, tele monitoring, [place] care  Anxieties, Fears or Concerns: none verbalized  Patient/Family-Specific Goals (Include Timeframe): d/c home at baseline ADLs  Goal: Absence of Hospital-Acquired Illness or Injury  Outcome: Progressing  Intervention: Identify and Manage Fall Risk  Flowsheets (Taken 5/16/2025 0000)  Safety Promotion/Fall Prevention:   assistive device/personal item within reach   bedside commode chair   Fall Risk reviewed with patient/family   Fall Risk signage in place   instructed to call staff for mobility   nonskid shoes/socks when out of bed   patient expresses understanding of fall risk and prevention   pulse ox   side rails raised x 3  Intervention: Prevent Skin Injury  Flowsheets (Taken 5/16/2025 0000)  Skin Protection: incontinence pads utilized  Device Skin Pressure Protection: absorbent pad utilized/changed  Intervention: Prevent and Manage VTE (Venous Thromboembolism) Risk  Flowsheets (Taken 5/16/2025 0000)  VTE Prevention/Management:   bleeding risk assessed   bleeding precautions maintained  Intervention: Prevent Infection  Flowsheets (Taken 5/16/2025 0000)  Infection Prevention:   equipment surfaces disinfected   hand hygiene promoted  Goal: Optimal Comfort and Wellbeing  Outcome: Progressing  Intervention: Monitor Pain and Promote Comfort  Flowsheets (Taken 5/16/2025 0000)  Pain Management Interventions:   pain management plan reviewed with patient/caregiver   care clustered  Intervention: Provide Person-Centered Care  Flowsheets (Taken 5/16/2025 0000)  Trust Relationship/Rapport:   care explained   questions encouraged   questions answered  Goal: Readiness for Transition of  Care  Outcome: Progressing     Problem: Fall Injury Risk  Goal: Absence of Fall and Fall-Related Injury  5/16/2025 0000 by Rama Zamarripa RN  Outcome: Progressing  5/16/2025 0000 by Rama Zamarripa RN  Outcome: Progressing  Intervention: Identify and Manage Contributors  Flowsheets (Taken 5/16/2025 0000)  Self-Care Promotion:   independence encouraged   BADL personal objects within reach   BADL personal routines maintained  Medication Review/Management:   medications reviewed   high-risk medications identified  Intervention: Promote Injury-Free Environment  Flowsheets (Taken 5/16/2025 0000)  Safety Promotion/Fall Prevention:   assistive device/personal item within reach   bedside commode chair   Fall Risk reviewed with patient/family   Fall Risk signage in place   instructed to call staff for mobility   nonskid shoes/socks when out of bed   patient expresses understanding of fall risk and prevention   pulse ox   side rails raised x 3     Problem: Skin Injury Risk Increased  Goal: Skin Health and Integrity  5/16/2025 0000 by Rama Zamarripa RN  Outcome: Progressing  5/16/2025 0000 by Rama Zamarripa RN  Outcome: Progressing  Intervention: Optimize Skin Protection  Flowsheets (Taken 5/16/2025 0000)  Pressure Reduction Techniques:   frequent weight shift encouraged   heels elevated off bed   positioned off wounds  Skin Protection: incontinence pads utilized     Problem: Comorbidity Management  Goal: Maintenance of Heart Failure Symptom Control  Outcome: Progressing  Intervention: Maintain Heart Failure Management  Flowsheets (Taken 5/16/2025 0000)  Medication Review/Management:   medications reviewed   high-risk medications identified     Problem: Fatigue  Goal: Improved Activity Tolerance  Outcome: Progressing  Intervention: Promote Improved Energy  Flowsheets (Taken 5/16/2025 0000)  Sleep/Rest Enhancement:   awakenings minimized   noise level reduced   room darkened     Problem: Diabetes Comorbidity  Goal:  Blood Glucose Level Within Targeted Range  Outcome: Met     Problem: Infection  Goal: Absence of Infection Signs and Symptoms  Outcome: Met

## 2025-05-16 NOTE — CONSULTS
Subjective:      Patient ID: Alcides Rosario is a 79 y.o. male.    Chief Complaint: No chief complaint on file.    HPI  Review of Systems   Objective:     Physical Exam   Assessment:     1. Congestive heart failure, unspecified HF chronicity, unspecified heart failure type    2. Ventricular tachycardia    3. Atrial fibrillation    4. Arrhythmia    5. CAD (coronary artery disease)    6. Tachycardia    7. Abnormal heart rhythm           Wound 05/14/25 2115 Rash Right Abdomen (Active)   05/14/25 2115 Abdomen   Present on Original Admission: N   Primary Wound Type: Rash   Side: Right   Orientation:    Wound Approximate Age at First Assessment (Weeks):    Wound Number:    Is this injury device related?:    Incision Type:    Closure Method:    Wound Description (Comments):    Type:    Additional Comments:    Ankle-Brachial Index:    Pulses:    Removal Indication and Assessment:    Wound Outcome:    Wound Image   05/16/25 0830   Distribution localized 05/16/25 0830   Configuration/Shape symmetric 05/16/25 0830   Borders indistinct 05/16/25 0830   Characteristics redness/erythema 05/16/25 0830   Color red 05/16/25 0830   Lesion Size less than 0.5 cm 05/16/25 0830   Rash Care cleansed with;skin cleanser (specify);antifungal applied 05/16/25 0830            Wound 05/15/25 2115 Puncture Left Groin (Active)   05/15/25 2115 Groin   Present on Original Admission: N   Primary Wound Type: Puncture   Side: Left   Orientation:    Wound Approximate Age at First Assessment (Weeks):    Wound Number:    Is this injury device related?:    Incision Type:    Closure Method:    Wound Description (Comments):    Type:    Additional Comments:    Ankle-Brachial Index:    Pulses:    Removal Indication and Assessment:    Wound Outcome:    Dressing Appearance Saturated 05/16/25 0300   Drainage Characteristics/Odor Sanguineous 05/16/25 0300   Dressing Changed;Gauze;Transparent film 05/16/25 0300       Plan:          Wocn consult-rash abd folds  80 y/o  male in with v-tach with multiple comorbities-htn cad chf pad a-fib.    He is awake alert oriented and mobil.   Introduced self and reason for visit.    He has a large abd  with heated areas in the folds and groin.    Care initiated with instructions to pt on care.     Wound care to follow weekly while in hospital.

## 2025-05-16 NOTE — ANESTHESIA PROCEDURE NOTES
Intubation    Date/Time: 5/16/2025 3:28 PM    Performed by: Jacobo Granados CRNA  Authorized by: Jacobo Granados CRNA    Intubation:     Induction:  Intravenous    Intubated:  Postinduction    Mask Ventilation:  Easy mask    Attempted By:  CRNA    Method of Intubation:  Direct    Blade:  Purcell 2    Laryngeal View Grade: Grade I - full view of cords      Difficult Airway Encountered?: No      Complications:  None    Airway Device:  Oral endotracheal tube    Airway Device Size:  7.5    Tube secured:  23    Secured at:  The lips    Placement Verified By:  Capnometry    Complicating Factors:  None    Findings Post-Intubation:  BS equal bilateral

## 2025-05-17 LAB
ALBUMIN SERPL-MCNC: 2.3 G/DL (ref 3.4–4.8)
ALBUMIN/GLOB SERPL: 0.6 RATIO (ref 1.1–2)
ALLENS TEST BLOOD GAS (OHS): ABNORMAL
ALP SERPL-CCNC: 75 UNIT/L (ref 40–150)
ALT SERPL-CCNC: 24 UNIT/L (ref 0–55)
ANION GAP SERPL CALC-SCNC: 10 MEQ/L
AST SERPL-CCNC: 81 UNIT/L (ref 11–45)
BASE EXCESS BLD CALC-SCNC: 2.5 MMOL/L
BASOPHILS # BLD AUTO: 0.04 X10(3)/MCL
BASOPHILS NFR BLD AUTO: 0.2 %
BILIRUB SERPL-MCNC: 0.6 MG/DL
BLOOD GAS SAMPLE TYPE (OHS): ABNORMAL
BUN SERPL-MCNC: 17 MG/DL (ref 8.4–25.7)
CA-I BLD-SCNC: 1.03 MMOL/L (ref 1.12–1.23)
CALCIUM SERPL-MCNC: 8.1 MG/DL (ref 8.8–10)
CHLORIDE SERPL-SCNC: 105 MMOL/L (ref 98–107)
CO2 BLDA-SCNC: 27.4 MMOL/L
CO2 SERPL-SCNC: 21 MMOL/L (ref 23–31)
CREAT SERPL-MCNC: 1.14 MG/DL (ref 0.72–1.25)
CREAT/UREA NIT SERPL: 15
DRAWN BY BLOOD GAS (OHS): ABNORMAL
EOSINOPHIL # BLD AUTO: 0 X10(3)/MCL (ref 0–0.9)
EOSINOPHIL NFR BLD AUTO: 0 %
ERYTHROCYTE [DISTWIDTH] IN BLOOD BY AUTOMATED COUNT: 15 % (ref 11.5–17)
GFR SERPLBLD CREATININE-BSD FMLA CKD-EPI: >60 ML/MIN/1.73/M2
GLOBULIN SER-MCNC: 3.8 GM/DL (ref 2.4–3.5)
GLUCOSE SERPL-MCNC: 192 MG/DL (ref 82–115)
HCO3 BLDA-SCNC: 26.3 MMOL/L (ref 22–26)
HCT VFR BLD AUTO: 38.7 % (ref 42–52)
HGB BLD-MCNC: 12.6 G/DL (ref 14–18)
IMM GRANULOCYTES # BLD AUTO: 0.14 X10(3)/MCL (ref 0–0.04)
IMM GRANULOCYTES NFR BLD AUTO: 0.8 %
INHALED O2 CONCENTRATION: 40 %
LYMPHOCYTES # BLD AUTO: 0.44 X10(3)/MCL (ref 0.6–4.6)
LYMPHOCYTES NFR BLD AUTO: 2.4 %
MAGNESIUM SERPL-MCNC: 2.6 MG/DL (ref 1.6–2.6)
MCH RBC QN AUTO: 29.4 PG (ref 27–31)
MCHC RBC AUTO-ENTMCNC: 32.6 G/DL (ref 33–36)
MCV RBC AUTO: 90.4 FL (ref 80–94)
MECH RR (OHS): 20 B/MIN
MODE (OHS): AC
MONOCYTES # BLD AUTO: 0.77 X10(3)/MCL (ref 0.1–1.3)
MONOCYTES NFR BLD AUTO: 4.2 %
NEUTROPHILS # BLD AUTO: 16.93 X10(3)/MCL (ref 2.1–9.2)
NEUTROPHILS NFR BLD AUTO: 92.4 %
NRBC BLD AUTO-RTO: 0 %
OXYGEN DEVICE BLOOD GAS (OHS): ABNORMAL
PCO2 BLDA: 37 MMHG (ref 35–45)
PEEP RESPIRATORY: 5 CMH2O
PH BLDA: 7.46 [PH] (ref 7.35–7.45)
PHOSPHATE SERPL-MCNC: 4.1 MG/DL (ref 2.3–4.7)
PLATELET # BLD AUTO: 223 X10(3)/MCL (ref 130–400)
PMV BLD AUTO: 11.5 FL (ref 7.4–10.4)
PO2 BLDA: 78 MMHG (ref 80–100)
POTASSIUM BLOOD GAS (OHS): 4.1 MMOL/L (ref 3.5–5)
POTASSIUM SERPL-SCNC: 4.1 MMOL/L (ref 3.5–5.1)
PROT SERPL-MCNC: 6.1 GM/DL (ref 5.8–7.6)
RBC # BLD AUTO: 4.28 X10(6)/MCL (ref 4.7–6.1)
SAMPLE SITE BLOOD GAS (OHS): ABNORMAL
SAO2 % BLDA: 96 %
SODIUM BLOOD GAS (OHS): 135 MMOL/L (ref 137–145)
SODIUM SERPL-SCNC: 136 MMOL/L (ref 136–145)
SPONT+MECH VT ON VENT: 500 ML
TROPONIN I SERPL-MCNC: 4.95 NG/ML (ref 0–0.04)
TROPONIN I SERPL-MCNC: 5.21 NG/ML (ref 0–0.04)
TROPONIN I SERPL-MCNC: 5.39 NG/ML (ref 0–0.04)
TROPONIN I SERPL-MCNC: 5.67 NG/ML (ref 0–0.04)
WBC # BLD AUTO: 18.32 X10(3)/MCL (ref 4.5–11.5)

## 2025-05-17 PROCEDURE — 25000003 PHARM REV CODE 250: Performed by: INTERNAL MEDICINE

## 2025-05-17 PROCEDURE — 99900031 HC PATIENT EDUCATION (STAT)

## 2025-05-17 PROCEDURE — 83735 ASSAY OF MAGNESIUM: CPT

## 2025-05-17 PROCEDURE — 63600175 PHARM REV CODE 636 W HCPCS

## 2025-05-17 PROCEDURE — 20000000 HC ICU ROOM

## 2025-05-17 PROCEDURE — 99900035 HC TECH TIME PER 15 MIN (STAT)

## 2025-05-17 PROCEDURE — 94003 VENT MGMT INPAT SUBQ DAY: CPT

## 2025-05-17 PROCEDURE — 84484 ASSAY OF TROPONIN QUANT: CPT

## 2025-05-17 PROCEDURE — 63600175 PHARM REV CODE 636 W HCPCS: Performed by: INTERNAL MEDICINE

## 2025-05-17 PROCEDURE — 63600175 PHARM REV CODE 636 W HCPCS: Performed by: STUDENT IN AN ORGANIZED HEALTH CARE EDUCATION/TRAINING PROGRAM

## 2025-05-17 PROCEDURE — 36415 COLL VENOUS BLD VENIPUNCTURE: CPT

## 2025-05-17 PROCEDURE — 94760 N-INVAS EAR/PLS OXIMETRY 1: CPT

## 2025-05-17 PROCEDURE — 25000003 PHARM REV CODE 250: Performed by: STUDENT IN AN ORGANIZED HEALTH CARE EDUCATION/TRAINING PROGRAM

## 2025-05-17 PROCEDURE — 99900026 HC AIRWAY MAINTENANCE (STAT)

## 2025-05-17 PROCEDURE — 25000003 PHARM REV CODE 250

## 2025-05-17 PROCEDURE — 80053 COMPREHEN METABOLIC PANEL: CPT

## 2025-05-17 PROCEDURE — 0BH17EZ INSERTION OF ENDOTRACHEAL AIRWAY INTO TRACHEA, VIA NATURAL OR ARTIFICIAL OPENING: ICD-10-PCS | Performed by: INTERNAL MEDICINE

## 2025-05-17 PROCEDURE — 85025 COMPLETE CBC W/AUTO DIFF WBC: CPT

## 2025-05-17 PROCEDURE — 5A1945Z RESPIRATORY VENTILATION, 24-96 CONSECUTIVE HOURS: ICD-10-PCS | Performed by: INTERNAL MEDICINE

## 2025-05-17 PROCEDURE — 37799 UNLISTED PX VASCULAR SURGERY: CPT

## 2025-05-17 PROCEDURE — 82803 BLOOD GASES ANY COMBINATION: CPT

## 2025-05-17 PROCEDURE — 27200966 HC CLOSED SUCTION SYSTEM

## 2025-05-17 PROCEDURE — 84100 ASSAY OF PHOSPHORUS: CPT

## 2025-05-17 PROCEDURE — 94761 N-INVAS EAR/PLS OXIMETRY MLT: CPT

## 2025-05-17 PROCEDURE — 27100171 HC OXYGEN HIGH FLOW UP TO 24 HOURS

## 2025-05-17 RX ORDER — CEFEPIME HYDROCHLORIDE 1 G/1
1 INJECTION, POWDER, FOR SOLUTION INTRAMUSCULAR; INTRAVENOUS
Status: DISCONTINUED | OUTPATIENT
Start: 2025-05-17 | End: 2025-05-20

## 2025-05-17 RX ORDER — PROPOFOL 10 MG/ML
0-50 INJECTION, EMULSION INTRAVENOUS CONTINUOUS
Status: DISCONTINUED | OUTPATIENT
Start: 2025-05-17 | End: 2025-05-25

## 2025-05-17 RX ORDER — METOPROLOL TARTRATE 50 MG/1
50 TABLET ORAL 2 TIMES DAILY
Status: DISCONTINUED | OUTPATIENT
Start: 2025-05-17 | End: 2025-05-17

## 2025-05-17 RX ORDER — FUROSEMIDE 10 MG/ML
80 INJECTION INTRAMUSCULAR; INTRAVENOUS ONCE
Status: COMPLETED | OUTPATIENT
Start: 2025-05-17 | End: 2025-05-17

## 2025-05-17 RX ADMIN — PROPOFOL 50 MCG/KG/MIN: 10 INJECTION, EMULSION INTRAVENOUS at 08:05

## 2025-05-17 RX ADMIN — PRAVASTATIN SODIUM 40 MG: 10 TABLET ORAL at 08:05

## 2025-05-17 RX ADMIN — AMIODARONE HYDROCHLORIDE 1 MG/MIN: 1.8 INJECTION, SOLUTION INTRAVENOUS at 05:05

## 2025-05-17 RX ADMIN — FUROSEMIDE 80 MG: 10 INJECTION, SOLUTION INTRAVENOUS at 08:05

## 2025-05-17 RX ADMIN — PROPOFOL 50 MCG/KG/MIN: 10 INJECTION, EMULSION INTRAVENOUS at 02:05

## 2025-05-17 RX ADMIN — METOPROLOL TARTRATE 50 MG: 50 TABLET, FILM COATED ORAL at 08:05

## 2025-05-17 RX ADMIN — CEFEPIME 1 G: 1 INJECTION, POWDER, FOR SOLUTION INTRAMUSCULAR; INTRAVENOUS at 05:05

## 2025-05-17 RX ADMIN — MAGNESIUM SULFATE HEPTAHYDRATE 2 G: 40 INJECTION, SOLUTION INTRAVENOUS at 02:05

## 2025-05-17 RX ADMIN — FINASTERIDE 5 MG: 5 TABLET, FILM COATED ORAL at 08:05

## 2025-05-17 RX ADMIN — MICONAZOLE NITRATE: 20 POWDER TOPICAL at 08:05

## 2025-05-17 RX ADMIN — CEFEPIME 1 G: 1 INJECTION, POWDER, FOR SOLUTION INTRAMUSCULAR; INTRAVENOUS at 08:05

## 2025-05-17 RX ADMIN — AMIODARONE HYDROCHLORIDE 1 MG/MIN: 1.8 INJECTION, SOLUTION INTRAVENOUS at 09:05

## 2025-05-17 RX ADMIN — DOXYCYCLINE HYCLATE 100 MG: 100 TABLET, COATED ORAL at 08:05

## 2025-05-17 RX ADMIN — PROPOFOL 50 MCG/KG/MIN: 10 INJECTION, EMULSION INTRAVENOUS at 05:05

## 2025-05-17 RX ADMIN — AMIODARONE HYDROCHLORIDE 1 MG/MIN: 1.8 INJECTION, SOLUTION INTRAVENOUS at 02:05

## 2025-05-17 RX ADMIN — AMIODARONE HYDROCHLORIDE 1 MG/MIN: 1.8 INJECTION, SOLUTION INTRAVENOUS at 06:05

## 2025-05-17 RX ADMIN — NOREPINEPHRINE BITARTRATE 0.05 MCG/KG/MIN: 8 INJECTION, SOLUTION INTRAVENOUS at 09:05

## 2025-05-17 RX ADMIN — DAPAGLIFLOZIN 10 MG: 10 TABLET, FILM COATED ORAL at 08:05

## 2025-05-17 RX ADMIN — PROPOFOL 50 MCG/KG/MIN: 10 INJECTION, EMULSION INTRAVENOUS at 09:05

## 2025-05-17 NOTE — TRANSFER OF CARE
"Anesthesia Transfer of Care Note    Patient: Alcides Rosario    Procedure(s) Performed: Procedure(s) (LRB):  Ablation (N/A)    Patient location: PACU    Anesthesia Type: general    Transport from OR: Continuous ECG monitoring in transport. Continuous SpO2 monitoring in transport. Transported from OR on 100% O2 by closed face mask with adequate spontaneous ventilation    Post pain: adequate analgesia    Post assessment: tolerated procedure well and no apparent anesthetic complications    Post vital signs: stable    Level of consciousness: awake    Nausea/Vomiting: no nausea/vomiting    Complications: none    Transfer of care protocol was followed    Last vitals: Visit Vitals  BP 93/73   Pulse (!) 118   Temp 36.6 °C (97.8 °F) (Oral)   Resp (!) 32   Ht 6' 0.01" (1.829 m)   Wt 115 kg (253 lb 8.5 oz)   SpO2 100%   BMI 34.38 kg/m²     "
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
## 2025-05-17 NOTE — PROCEDURES
Arterial Catheter Insertion Procedure Note     Procedure: Insertion of Arterial Catheter  Performed by: Willam Brito MD   Supervised & assisted by: Peace Mott MD    Indications: Hemodynamic Monitoring     Procedure Details     Maximum sterile technique was used including antiseptics, cap, sterile gloves, sterile gown, hand hygiene, mask and sterile maximum barrier drape.     Under sterile conditions the skin above the radial  artery was prepped with Chloroprep and covered with a sterile drape. Local anesthesia was applied to the skin and subcutaneous tissues. Ultrasound guidance was used to identify the artery. A 20-gauge catheter over a needle was then inserted into the artery. A guide wire was then passed easily through the needle. The needle was then withdrawn. A arterial catheter was then inserted into the vessel over the guide wire. The needle was then withdrawn and was connected to the monitor to ensure a proper waveform. The catheter was sutured into place and a sterile dressing was applied.     Ultrasound/Sonosite was used during the procedure.      Estimated blood loss: 3 ml    Patient tolerated procedure well.    Willam Brito MD  5/16/2025

## 2025-05-17 NOTE — PT/OT/SLP DISCHARGE
Pt is now intubated and non-participative. He is not appropriate for skilled PT at this time. We will sign off and Pt can be re-ordered when and if he is more appropriate for our services

## 2025-05-17 NOTE — ANESTHESIA POSTPROCEDURE EVALUATION
Anesthesia Post Evaluation    Patient: Alcides Rosario    Procedure(s) Performed: Procedure(s) (LRB):  Ablation (N/A)    Final Anesthesia Type: general      Patient location during evaluation: ICU  Patient participation: No - Unable to Participate, Intubation  Level of consciousness: sedated  Post-procedure vital signs: reviewed and not stable  Pain management: adequate  Airway patency: patent    PONV status at discharge: No PONV  Anesthetic complications: yes  Perioperative Events: arrhythmias requiring treatment        Cardiovascular status: hemodynamically unstable and tachycardic  Respiratory status: ventilator and ETT  Hydration status: euvolemic  Follow-up needed               Vitals Value Taken Time   BP 95/75 05/17/25 15:02   Temp 37 °C (98.6 °F) 05/17/25 11:45   Pulse 122 05/17/25 15:58   Resp 20 05/17/25 15:58   SpO2 95 % 05/17/25 15:58   Vitals shown include unfiled device data.      No case tracking events are documented in the log.      Pain/Kathrine Score: No data recorded

## 2025-05-17 NOTE — ANESTHESIA PROCEDURE NOTES
Intubation    Date/Time: 5/16/2025 7:07 PM    Performed by: Jacobo Granados CRNA  Authorized by: Kal Leung DO    Intubation:     Induction:  Intravenous    Intubated:  Postinduction    Mask Ventilation:  Easy mask    Attempts:  1    Attempted By:  CRNA    Method of Intubation:  Video laryngoscopy    Blade:  Glidescope 3    Laryngeal View Grade: Grade I - full view of cords      Difficult Airway Encountered?: No      Complications:  None    Airway Device:  Oral endotracheal tube    Airway Device Size:  8.0    Style/Cuff Inflation:  Cuffed (inflated to minimal occlusive pressure)    Tube secured:  23    Secured at:  The lips    Placement Verified By:  Capnometry    Complicating Factors:  None    Findings Post-Intubation:  BS equal bilateral

## 2025-05-17 NOTE — OR NURSING
Pt arrived to PACU at 1900 in [place] post Vtach ablation with AICD placement. Cath lab nurses called Dr Zheng (EP cardiology) with change in pt status at this time.   Pt had ICU room ready upstairs. Dr Gillette requested the patient be transported upstairs immediately for ICU level monitoring and intubation in the ICU.   Verified with MD and anesthesia MD that  the plan is to intubate upstairs rather than in pacu. Cardiology wanted patient transported to ICU first.   Cath lab nurses and CRNA present the entire time in pacu and gave report to ICU. Pacu did not assume primary care of this patient.

## 2025-05-18 LAB
ALBUMIN SERPL-MCNC: 2.1 G/DL (ref 3.4–4.8)
ALBUMIN/GLOB SERPL: 0.5 RATIO (ref 1.1–2)
ALP SERPL-CCNC: 73 UNIT/L (ref 40–150)
ALT SERPL-CCNC: 20 UNIT/L (ref 0–55)
ANION GAP SERPL CALC-SCNC: 9 MEQ/L
AST SERPL-CCNC: 39 UNIT/L (ref 11–45)
BASOPHILS # BLD AUTO: 0.03 X10(3)/MCL
BASOPHILS NFR BLD AUTO: 0.2 %
BILIRUB SERPL-MCNC: 0.5 MG/DL
BUN SERPL-MCNC: 21.4 MG/DL (ref 8.4–25.7)
CALCIUM SERPL-MCNC: 8.2 MG/DL (ref 8.8–10)
CHLORIDE SERPL-SCNC: 108 MMOL/L (ref 98–107)
CO2 SERPL-SCNC: 22 MMOL/L (ref 23–31)
CREAT SERPL-MCNC: 1.06 MG/DL (ref 0.72–1.25)
CREAT/UREA NIT SERPL: 20
EOSINOPHIL # BLD AUTO: 0.09 X10(3)/MCL (ref 0–0.9)
EOSINOPHIL NFR BLD AUTO: 0.6 %
ERYTHROCYTE [DISTWIDTH] IN BLOOD BY AUTOMATED COUNT: 15.1 % (ref 11.5–17)
GFR SERPLBLD CREATININE-BSD FMLA CKD-EPI: >60 ML/MIN/1.73/M2
GLOBULIN SER-MCNC: 3.9 GM/DL (ref 2.4–3.5)
GLUCOSE SERPL-MCNC: 130 MG/DL (ref 82–115)
HCT VFR BLD AUTO: 38.2 % (ref 42–52)
HGB BLD-MCNC: 12.4 G/DL (ref 14–18)
IMM GRANULOCYTES # BLD AUTO: 0.12 X10(3)/MCL (ref 0–0.04)
IMM GRANULOCYTES NFR BLD AUTO: 0.9 %
LYMPHOCYTES # BLD AUTO: 0.98 X10(3)/MCL (ref 0.6–4.6)
LYMPHOCYTES NFR BLD AUTO: 7 %
MAGNESIUM SERPL-MCNC: 2.5 MG/DL (ref 1.6–2.6)
MCH RBC QN AUTO: 29.4 PG (ref 27–31)
MCHC RBC AUTO-ENTMCNC: 32.5 G/DL (ref 33–36)
MCV RBC AUTO: 90.5 FL (ref 80–94)
MONOCYTES # BLD AUTO: 1.23 X10(3)/MCL (ref 0.1–1.3)
MONOCYTES NFR BLD AUTO: 8.7 %
NEUTROPHILS # BLD AUTO: 11.62 X10(3)/MCL (ref 2.1–9.2)
NEUTROPHILS NFR BLD AUTO: 82.6 %
NRBC BLD AUTO-RTO: 0 %
OHS QRS DURATION: 170 MS
OHS QTC CALCULATION: 629 MS
PHOSPHATE SERPL-MCNC: 3.6 MG/DL (ref 2.3–4.7)
PLATELET # BLD AUTO: 268 X10(3)/MCL (ref 130–400)
PMV BLD AUTO: 11.3 FL (ref 7.4–10.4)
POTASSIUM SERPL-SCNC: 3.8 MMOL/L (ref 3.5–5.1)
PROT SERPL-MCNC: 6 GM/DL (ref 5.8–7.6)
RBC # BLD AUTO: 4.22 X10(6)/MCL (ref 4.7–6.1)
SODIUM SERPL-SCNC: 139 MMOL/L (ref 136–145)
TROPONIN I SERPL-MCNC: 3.63 NG/ML (ref 0–0.04)
TROPONIN I SERPL-MCNC: 3.75 NG/ML (ref 0–0.04)
TROPONIN I SERPL-MCNC: 3.96 NG/ML (ref 0–0.04)
TROPONIN I SERPL-MCNC: 4.7 NG/ML (ref 0–0.04)
WBC # BLD AUTO: 14.07 X10(3)/MCL (ref 4.5–11.5)

## 2025-05-18 PROCEDURE — 63600175 PHARM REV CODE 636 W HCPCS: Performed by: INTERNAL MEDICINE

## 2025-05-18 PROCEDURE — 27200966 HC CLOSED SUCTION SYSTEM

## 2025-05-18 PROCEDURE — 85025 COMPLETE CBC W/AUTO DIFF WBC: CPT

## 2025-05-18 PROCEDURE — 99900031 HC PATIENT EDUCATION (STAT)

## 2025-05-18 PROCEDURE — 99900035 HC TECH TIME PER 15 MIN (STAT)

## 2025-05-18 PROCEDURE — 25000003 PHARM REV CODE 250: Performed by: INTERNAL MEDICINE

## 2025-05-18 PROCEDURE — 84484 ASSAY OF TROPONIN QUANT: CPT

## 2025-05-18 PROCEDURE — 36415 COLL VENOUS BLD VENIPUNCTURE: CPT

## 2025-05-18 PROCEDURE — 63600175 PHARM REV CODE 636 W HCPCS: Performed by: STUDENT IN AN ORGANIZED HEALTH CARE EDUCATION/TRAINING PROGRAM

## 2025-05-18 PROCEDURE — 94760 N-INVAS EAR/PLS OXIMETRY 1: CPT

## 2025-05-18 PROCEDURE — 25000003 PHARM REV CODE 250: Performed by: STUDENT IN AN ORGANIZED HEALTH CARE EDUCATION/TRAINING PROGRAM

## 2025-05-18 PROCEDURE — 94003 VENT MGMT INPAT SUBQ DAY: CPT

## 2025-05-18 PROCEDURE — 99900026 HC AIRWAY MAINTENANCE (STAT)

## 2025-05-18 PROCEDURE — 83735 ASSAY OF MAGNESIUM: CPT

## 2025-05-18 PROCEDURE — 94761 N-INVAS EAR/PLS OXIMETRY MLT: CPT

## 2025-05-18 PROCEDURE — 20000000 HC ICU ROOM

## 2025-05-18 PROCEDURE — 25000003 PHARM REV CODE 250

## 2025-05-18 PROCEDURE — 87077 CULTURE AEROBIC IDENTIFY: CPT | Performed by: INTERNAL MEDICINE

## 2025-05-18 PROCEDURE — 27100171 HC OXYGEN HIGH FLOW UP TO 24 HOURS

## 2025-05-18 PROCEDURE — 93010 ELECTROCARDIOGRAM REPORT: CPT | Mod: ,,, | Performed by: INTERNAL MEDICINE

## 2025-05-18 PROCEDURE — 93005 ELECTROCARDIOGRAM TRACING: CPT

## 2025-05-18 PROCEDURE — 80053 COMPREHEN METABOLIC PANEL: CPT

## 2025-05-18 PROCEDURE — 84100 ASSAY OF PHOSPHORUS: CPT

## 2025-05-18 RX ORDER — FUROSEMIDE 10 MG/ML
40 INJECTION INTRAMUSCULAR; INTRAVENOUS ONCE
Status: COMPLETED | OUTPATIENT
Start: 2025-05-18 | End: 2025-05-18

## 2025-05-18 RX ORDER — POTASSIUM CHLORIDE 14.9 MG/ML
20 INJECTION INTRAVENOUS
Status: COMPLETED | OUTPATIENT
Start: 2025-05-18 | End: 2025-05-18

## 2025-05-18 RX ADMIN — POTASSIUM CHLORIDE 20 MEQ: 14.9 INJECTION, SOLUTION INTRAVENOUS at 09:05

## 2025-05-18 RX ADMIN — FUROSEMIDE 40 MG: 10 INJECTION, SOLUTION INTRAVENOUS at 08:05

## 2025-05-18 RX ADMIN — DOXYCYCLINE HYCLATE 100 MG: 100 TABLET, COATED ORAL at 08:05

## 2025-05-18 RX ADMIN — AMIODARONE HYDROCHLORIDE 1 MG/MIN: 1.8 INJECTION, SOLUTION INTRAVENOUS at 10:05

## 2025-05-18 RX ADMIN — PRAVASTATIN SODIUM 40 MG: 10 TABLET ORAL at 08:05

## 2025-05-18 RX ADMIN — PROPOFOL 40 MCG/KG/MIN: 10 INJECTION, EMULSION INTRAVENOUS at 10:05

## 2025-05-18 RX ADMIN — PROPOFOL 50 MCG/KG/MIN: 10 INJECTION, EMULSION INTRAVENOUS at 08:05

## 2025-05-18 RX ADMIN — LIDOCAINE HYDROCHLORIDE 1 MG/MIN: 8 INJECTION, SOLUTION INTRAVENOUS at 02:05

## 2025-05-18 RX ADMIN — POTASSIUM CHLORIDE 20 MEQ: 14.9 INJECTION, SOLUTION INTRAVENOUS at 01:05

## 2025-05-18 RX ADMIN — AMIODARONE HYDROCHLORIDE 1 MG/MIN: 1.8 INJECTION, SOLUTION INTRAVENOUS at 05:05

## 2025-05-18 RX ADMIN — PROPOFOL 50 MCG/KG/MIN: 10 INJECTION, EMULSION INTRAVENOUS at 05:05

## 2025-05-18 RX ADMIN — CEFEPIME 1 G: 1 INJECTION, POWDER, FOR SOLUTION INTRAMUSCULAR; INTRAVENOUS at 12:05

## 2025-05-18 RX ADMIN — MICONAZOLE NITRATE: 20 POWDER TOPICAL at 08:05

## 2025-05-18 RX ADMIN — PROPOFOL 50 MCG/KG/MIN: 10 INJECTION, EMULSION INTRAVENOUS at 02:05

## 2025-05-18 RX ADMIN — NOREPINEPHRINE BITARTRATE 0.06 MCG/KG/MIN: 8 INJECTION, SOLUTION INTRAVENOUS at 10:05

## 2025-05-18 RX ADMIN — CEFEPIME 1 G: 1 INJECTION, POWDER, FOR SOLUTION INTRAMUSCULAR; INTRAVENOUS at 08:05

## 2025-05-18 RX ADMIN — FINASTERIDE 5 MG: 5 TABLET, FILM COATED ORAL at 08:05

## 2025-05-18 RX ADMIN — CEFEPIME 1 G: 1 INJECTION, POWDER, FOR SOLUTION INTRAMUSCULAR; INTRAVENOUS at 04:05

## 2025-05-18 RX ADMIN — PROPOFOL 35 MCG/KG/MIN: 10 INJECTION, EMULSION INTRAVENOUS at 05:05
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
## 2025-05-18 NOTE — PROGRESS NOTES
[facility]   Cardiology  Progress Note    Patient Name: Alcides Rosario  MRN: [id number]  Admission Date: 5/8/2025  Hospital Length of Stay: 10 days  Code Status: Full Code   Attending Physician: Mynor Oropeza MD   Primary Care Physician: Maycol Mcleod II, MD  Expected Discharge Date:   Principal Problem:<principal problem not specified>    Subjective:     Brief HPI: This is an 79-year-old male, who is known to Dr. Bravo, with a history of sick sinus syndrome/ppm, chronic systolic heart failure, PAF, HTN, HLD, CAD, PVD.  [place] [facility] [place] after syncopal episode.  Patient reported the has been having epigastric discomfort/pressure before leaving a restaurant.  His heart rate on seen was 190 beats per minute.  He was given 300 mg of amiodarone by EMS followed by synchronized cardioversion in the emergency room which only briefly improved his rate.  He was found to be in VT.  Echo at outside facility revealed an ejection fraction of 10%.  He was transferred to [facility] for higher level of care.  Plan was noted to have patient undergo left heart catheterization with Impella placement and EP study with VT ablation.  [facility] has been consulted for this reason.     Hospital Course:   5.10.25: NAD noted. Slow VT still on monitor. Impella in place. Bilateral groin benign. Denies CP/SOB/Palps.  5.11.25: NAD noted. Wide complex tachycardia on tele. Attempted Esmolol yesterday but had to be DCd  secondary to hypotension. Remains with Impella  5.12.25: NAD noted. WCT on tele. Remains on Amio and Lidocaine. NPO for VT ablation today. Denies CP/SOB/Palps.  5.13.25: NAD noted. WCT on tele. ON Amio and Lidocaine. Denies CP/SOB/palps. Impella in place.  5.14.25: NAD noted. WCT on tele. Remains on Lidocaine. Denies CP/sOB/palps. Impella removed.  5.15.25: NAD noted. ST with trigeminal. Denies CP/SOB/PALPS.    5.16.25: NAD noted. ST on tele. Denies  CP/SOB/palps. NPO for ICD today  5.17.25: NAD. Vented/Sedated. Intermittent VT.  ICD Upgrade/VT Ablation on 5.16.25 5.18.25: NAD. Vented/Sedated. Continues to have Intermittent VT/ST. Amiodarone 1mg/min, Lidocaine 1mg/min, Levophed 0.05mcg/kg/min     PMH: SSS/PPM, Chronic Systolic HF, PAF, HTN, HLD, CAD, PVD  PSH: PPM (SJM), Tonsillectomy, Embolectomy, LHC  Social History:  Former tobacco use, denies EtOH and illicit drug use  Family History:  Mother-MI; brother-CAD     Previous Cardiac Diagnostics:   EP Study/VT 5.16.25:  3D mapping performed with Ensite.  Intracardiac echo.  Transeptal puncture.  VT ablation  Successful Implantation of BiV ICD ([employer])    ECHO Limited 5.9.25  Limited echo to check impella placement.  Impella is seated 3.9 cm from aortic valve annulus.    L/RHC 5.9.25  The Prox Cx to Dist Cx lesion was 50% stenosed.  However, the IFR was 0.96, indicating the absence of any obstructive coronary artery disease at this level.  The Prox LAD to Dist LAD lesion was 60% stenosed.  However, the IFR was 0.96, indicating the absence of any obstructive coronary artery disease at this level.  The ejection fraction was calculated to be 15%.  There was severe left ventricular systolic dysfunction.  The left ventricular end diastolic pressure was severely elevated.  The pre-procedure left ventricular end diastolic pressure was 23.  The estimated blood loss was none.  There was single vessel coronary artery disease.  There was trivial (1+) mitral regurgitation.  There was no aortic valve stenosis.  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  Strong distal collaterals from the left coronary system.    ECHO 7.25.24  The study quality is average.   Global left ventricular systolic function is mildly decreased. The left ventricular ejection fraction is 50%. Left ventricular diastolic function is indeterminate. Noted left  ventricular hypertrophy. It is severe.  Moderate (2+) mitral regurgitation. ERO-A0.21cm^2  Mild (1+) pulmonic regurgitation. Mild (1+) tricuspid regurgitation.   The left atrial diameter is moderately increased 5.2 cms.  The estimated right atrial pressure is 15 mmHg.      PET 12.29.22  This is an abnormal perfusion study. Study is consistent with ischemia.   This scan is suggestive of moderate risk for future cardiovascular events.   Small partially reversible perfusion abnormality of severe intensity in the inferior lateral region.   The left ventricular cavity is noted to be moderately enlarged on the stress studies. The stress left ventricular ejection fraction was calculated to be 40% and left ventricular global function is mildly reduced. The rest left ventricular cavity is noted to be moderately enlarged. The rest left ventricular ejection fraction was calculated to be 32% and rest left ventricular global function is moderately reduced.   When compared to the resting ejection fraction (32%), the stress ejection fraction (40%) has increased.   The study quality is good.   There was a rise in myocardial blood flow between rest and stress.  Global myocardial blood flow reserve was 1.97.  Myocardial blood flow reserve is globally abnormal, placing the patient at a higher coronary event risk.    Review of Systems   Unable to perform ROS: Intubated     Objective:     Vital Signs (Most Recent):  Temp: 98.5 °F (36.9 °C) (05/18/25 1200)  Pulse: (!) 124 (05/18/25 1330)  Resp: 20 (05/18/25 1330)  BP: 95/73 (05/18/25 1300)  SpO2: 95 % (05/18/25 1330) Vital Signs (24h Range):  Temp:  [98.4 °F (36.9 °C)-98.7 °F (37.1 °C)] 98.5 °F (36.9 °C)  Pulse:  [122-127] 124  Resp:  [20] 20  SpO2:  [94 %-96 %] 95 %  BP: ()/(61-82) 95/73  Arterial Line BP: ()/(62-94) 116/77     Weight: 115 kg (253 lb 8.5 oz)  Body mass index is 34.38 kg/m².    SpO2: 95 %         Intake/Output Summary (Last 24 hours) at 5/18/2025 1415  Last  data filed at 5/18/2025 1300  Gross per 24 hour   Intake 2079.66 ml   Output 3015 ml   Net -935.34 ml     Lines/Drains/Airways       Central Venous Catheter Line  Duration             Tunneled Central Line - Triple Lumen 05/16/25 2000 Femoral Vein Left 1 day              Drain  Duration                  Urethral Catheter 05/09/25 1500 8 days              Airway  Duration                  Airway - Non-Surgical 05/16/25 1912 Endotracheal Tube 1 day              Peripheral Intravenous Line  Duration                  Peripheral IV - Single Lumen 05/08/25 2100 20 G 2 1/4 in Anterior;Right;Lateral Upper Arm 9 days         Peripheral IV - Single Lumen 05/16/25 1211 22 G Left Antecubital 2 days                  Significant Labs: CMP   Recent Labs   Lab 05/16/25 2004 05/17/25 0315 05/18/25  0231   * 136 139   K 4.2 4.1 3.8    105 108*   CO2 20* 21* 22*   * 192* 130*   BUN 15.9 17.0 21.4   CREATININE 1.09 1.14 1.06   CALCIUM 8.1* 8.1* 8.2*   PROT 5.7* 6.1 6.0   ALBUMIN 2.2* 2.3* 2.1*   BILITOT 0.9 0.6 0.5   ALKPHOS 70 75 73   AST 60* 81* 39   ALT 21 24 20    and CBC   Recent Labs   Lab 05/16/25 2004 05/17/25 0315 05/18/25  0231   WBC 14.54* 18.32* 14.07*   HGB 11.6* 12.6* 12.4*   HCT 36.1* 38.7* 38.2*    223 268     Telemetry:  ST with NSVT    Physical Exam  Constitutional:       General: He is not in acute distress.     Appearance: Normal appearance. He is obese.      Comments: Vented/Sedated   HENT:      Head: Normocephalic.      Mouth/Throat:      Mouth: Mucous membranes are dry.   Cardiovascular:      Rate and Rhythm: Tachycardia present. Rhythm irregular.      Pulses: Normal pulses.      Heart sounds: Normal heart sounds. No murmur heard.  Pulmonary:      Effort: Pulmonary effort is normal. No respiratory distress.      Comments: Ventilator Associated Breath Sounds  Vent Mode: A/C  Oxygen Concentration (%):  [40] 40  Resp Rate Total:  [20 br/min] 20 br/min  Vt Set:  [500 mL] 500  mL  PEEP/CPAP:  [5 cmH20] 5 cmH20  Mean Airway Pressure:  [9 ggD24-86 cmH20] 11 cmH20  Abdominal:      Palpations: Abdomen is soft.   Skin:     General: Skin is warm and dry.      Comments: L CW Pacer Site Dressing C/D/I. Bilateral Groins Soft/Flat, Non-Tender, No Sign of Bleed/Infection. +2 BLE Palpable Pedal Pulses    Neurological:      Comments: Vented/Sedated       Current Inpatient Medications:  Current Medications[1]    Assessment:   VT requiring Multiple Antiarrhythmics     - s/p (5.16.25) - EP Study and Successful VT Ablation and Device Upgrade to BiV ICD (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%  Syncope  PAF - Now ST with NSVT     - CHADsVASc - 5 Points - 7.2% Stroke Risk per Year   SSS/PPM (SJM)  HTN  HLD  Leukocytosis   Chronic Systolic HF/EF 20-25%    Plan:   Wean Pressors for MAP > 65mmHg   Continue Amiodarone and Lidocaine  Add GDMT for when BP/HR Allows   Vent per Primary Team  Keep K > 4.0 and Mg > 2.0   Consider EP Re-Evaluation in AM for Antiarrhythmic Recommendations  Will Continue to Follow  Labs and EKG in AM: CBC, CMP and Mg    FROYLAN Ann  Cardiology  [facility]  05/18/2025  Physician addendum:  I have seen and examined this patient as a split-shared visit with the ANGELA d/t complicated medical management of above problems written in assessment and high acuity requiring physician expertise in medical decision-making. I performed the substantive portion of the history and exam. Above medical decision-making is also formulated by me.    Cardiovascular exam:  S1, S2  Lungs:  fine crackles at bases.  Extremities:  + edema bilaterally    Plan:  EP  reevaluation in morning Medications as above.  Continue supportive therapy.     Asad Salinas MD  Cardiologist         [1]    Current Facility-Administered Medications:     acetaminophen tablet 650 mg, 650 mg, Oral, Q4H PRN, Asad Randle MD, 650 mg at 05/14/25 2348    amiodarone 360 mg/200 mL (1.8 mg/mL) infusion, 1 mg/min, Intravenous, Continuous, Isac Samayoa MD, Last Rate: 33.3 mL/hr at 05/18/25 1300, 1 mg/min at 05/18/25 1300    [START ON 5/19/2025] amiodarone tablet 200 mg, 200 mg, Oral, Daily, Jhon Ramsey, AGACNP-BC    ceFEPIme injection 1 g, 1 g, Intravenous, Q8H, Mynor Oropeza MD, 1 g at 05/18/25 0838    doxycycline tablet 100 mg, 100 mg, Oral, BID, Isac Samayoa MD, 100 mg at 05/18/25 0838    finasteride tablet 5 mg, 5 mg, Oral, Daily, Misha Johansen DO, 5 mg at 05/18/25 0839    HYDROcodone-acetaminophen 5-325 mg per tablet 1 tablet, 1 tablet, Oral, Q4H PRN, Isac Samayoa MD    LIDOcaine 2000 mg in D5W 250 mL infusion, , , Continuous PRN, Lalo Dominguez MD, Last Rate: 7.5 mL/hr at 05/10/25 1935, Rate Verify at 05/10/25 1935    LIDOcaine 2000 mg in D5W 250 mL infusion, 1 mg/min, Intravenous, Continuous, Isac Samayoa MD, Last Rate: 7.5 mL/hr at 05/18/25 1300, 1 mg/min at 05/18/25 1300    methocarbamoL tablet 500 mg, 500 mg, Oral, Once, Shawn Olivas MD    miconazole NITRATE 2 % top powder, , Topical (Top), BID, Asad Randle MD, Given at 05/18/25 0838    morphine injection 2 mg, 2 mg, Intravenous, Q4H PRN, Isac Samayoa MD    NORepinephrine 8 mg in dextrose 5% 250 mL infusion, 0-3 mcg/kg/min, Intravenous, Continuous, Mynor Oropeza MD, Last Rate: 10.8 mL/hr at 05/18/25 1300, 0.05 mcg/kg/min at 05/18/25 1300    ondansetron disintegrating tablet 8 mg, 8 mg, Oral, Q8H PRN, Asad Randle MD    pravastatin tablet 40 mg, 40 mg, Oral, Daily, Misha Johansen DO, 40 mg at 05/18/25 0838    propofol (DIPRIVAN) 10 mg/mL infusion, 0-50 mcg/kg/min, Intravenous, Continuous, Isac Samayoa MD, Last Rate: 27.6 mL/hr at 05/18/25 1300, 40 mcg/kg/min at 05/18/25 1300    sodium chloride 0.9% flush 10 mL, 10  mL, Intravenous, PRN, Misha Johansen DO    sodium chloride 0.9% flush 10 mL, 10 mL, Intravenous, PRN, Jhon Ramsey, [facility]    Flushing PICC/Midline Protocol, , , Until Discontinued **AND** sodium chloride 0.9% flush 10 mL, 10 mL, Intravenous, Q12H PRN, Isac Samayoa MD    vancomycin (VANCOCIN) 1,000 mg in D5W 250 mL IVPB (admixture device), 1,000 mg, Intravenous, On Call Procedure, Asad Randle MD

## 2025-05-19 PROBLEM — E44.0 MODERATE MALNUTRITION: Status: ACTIVE | Noted: 2025-05-19

## 2025-05-19 LAB
ALBUMIN SERPL-MCNC: 2 G/DL (ref 3.4–4.8)
ALBUMIN/GLOB SERPL: 0.5 RATIO (ref 1.1–2)
ALP SERPL-CCNC: 88 UNIT/L (ref 40–150)
ALT SERPL-CCNC: 16 UNIT/L (ref 0–55)
ANION GAP SERPL CALC-SCNC: 11 MEQ/L
AST SERPL-CCNC: 23 UNIT/L (ref 11–45)
BASOPHILS # BLD AUTO: 0.06 X10(3)/MCL
BASOPHILS NFR BLD AUTO: 0.4 %
BILIRUB SERPL-MCNC: 0.6 MG/DL
BUN SERPL-MCNC: 19.3 MG/DL (ref 8.4–25.7)
CALCIUM SERPL-MCNC: 8.2 MG/DL (ref 8.8–10)
CHLORIDE SERPL-SCNC: 107 MMOL/L (ref 98–107)
CO2 SERPL-SCNC: 22 MMOL/L (ref 23–31)
CREAT SERPL-MCNC: 1.06 MG/DL (ref 0.72–1.25)
CREAT/UREA NIT SERPL: 18
EOSINOPHIL # BLD AUTO: 0.18 X10(3)/MCL (ref 0–0.9)
EOSINOPHIL NFR BLD AUTO: 1.1 %
ERYTHROCYTE [DISTWIDTH] IN BLOOD BY AUTOMATED COUNT: 15.4 % (ref 11.5–17)
GFR SERPLBLD CREATININE-BSD FMLA CKD-EPI: >60 ML/MIN/1.73/M2
GLOBULIN SER-MCNC: 4.1 GM/DL (ref 2.4–3.5)
GLUCOSE SERPL-MCNC: 113 MG/DL (ref 82–115)
HCT VFR BLD AUTO: 39.3 % (ref 42–52)
HGB BLD-MCNC: 12.9 G/DL (ref 14–18)
IMM GRANULOCYTES # BLD AUTO: 0.13 X10(3)/MCL (ref 0–0.04)
IMM GRANULOCYTES NFR BLD AUTO: 0.8 %
LYMPHOCYTES # BLD AUTO: 0.92 X10(3)/MCL (ref 0.6–4.6)
LYMPHOCYTES NFR BLD AUTO: 5.8 %
MAGNESIUM SERPL-MCNC: 2.1 MG/DL (ref 1.6–2.6)
MCH RBC QN AUTO: 29.5 PG (ref 27–31)
MCHC RBC AUTO-ENTMCNC: 32.8 G/DL (ref 33–36)
MCV RBC AUTO: 89.9 FL (ref 80–94)
MONOCYTES # BLD AUTO: 1.68 X10(3)/MCL (ref 0.1–1.3)
MONOCYTES NFR BLD AUTO: 10.5 %
NEUTROPHILS # BLD AUTO: 12.99 X10(3)/MCL (ref 2.1–9.2)
NEUTROPHILS NFR BLD AUTO: 81.4 %
NRBC BLD AUTO-RTO: 0.1 %
OHS QRS DURATION: 162 MS
OHS QTC CALCULATION: 603 MS
PHOSPHATE SERPL-MCNC: 3.4 MG/DL (ref 2.3–4.7)
PLATELET # BLD AUTO: 269 X10(3)/MCL (ref 130–400)
PMV BLD AUTO: 11.1 FL (ref 7.4–10.4)
POTASSIUM SERPL-SCNC: 4.1 MMOL/L (ref 3.5–5.1)
PROT SERPL-MCNC: 6.1 GM/DL (ref 5.8–7.6)
RBC # BLD AUTO: 4.37 X10(6)/MCL (ref 4.7–6.1)
SODIUM SERPL-SCNC: 140 MMOL/L (ref 136–145)
TROPONIN I SERPL-MCNC: 2.7 NG/ML (ref 0–0.04)
TROPONIN I SERPL-MCNC: 3.23 NG/ML (ref 0–0.04)
TROPONIN I SERPL-MCNC: 3.4 NG/ML (ref 0–0.04)
WBC # BLD AUTO: 15.96 X10(3)/MCL (ref 4.5–11.5)

## 2025-05-19 PROCEDURE — 63600175 PHARM REV CODE 636 W HCPCS: Performed by: INTERNAL MEDICINE

## 2025-05-19 PROCEDURE — 36415 COLL VENOUS BLD VENIPUNCTURE: CPT

## 2025-05-19 PROCEDURE — 20000000 HC ICU ROOM

## 2025-05-19 PROCEDURE — 25000003 PHARM REV CODE 250: Performed by: INTERNAL MEDICINE

## 2025-05-19 PROCEDURE — 93005 ELECTROCARDIOGRAM TRACING: CPT

## 2025-05-19 PROCEDURE — 84484 ASSAY OF TROPONIN QUANT: CPT

## 2025-05-19 PROCEDURE — 84100 ASSAY OF PHOSPHORUS: CPT

## 2025-05-19 PROCEDURE — 25000003 PHARM REV CODE 250: Performed by: STUDENT IN AN ORGANIZED HEALTH CARE EDUCATION/TRAINING PROGRAM

## 2025-05-19 PROCEDURE — 63600175 PHARM REV CODE 636 W HCPCS: Performed by: STUDENT IN AN ORGANIZED HEALTH CARE EDUCATION/TRAINING PROGRAM

## 2025-05-19 PROCEDURE — 99900035 HC TECH TIME PER 15 MIN (STAT)

## 2025-05-19 PROCEDURE — 94761 N-INVAS EAR/PLS OXIMETRY MLT: CPT

## 2025-05-19 PROCEDURE — 94003 VENT MGMT INPAT SUBQ DAY: CPT

## 2025-05-19 PROCEDURE — 80053 COMPREHEN METABOLIC PANEL: CPT

## 2025-05-19 PROCEDURE — 25000003 PHARM REV CODE 250

## 2025-05-19 PROCEDURE — 27200966 HC CLOSED SUCTION SYSTEM

## 2025-05-19 PROCEDURE — 93010 ELECTROCARDIOGRAM REPORT: CPT | Mod: ,,, | Performed by: INTERNAL MEDICINE

## 2025-05-19 PROCEDURE — 83735 ASSAY OF MAGNESIUM: CPT

## 2025-05-19 PROCEDURE — 85025 COMPLETE CBC W/AUTO DIFF WBC: CPT

## 2025-05-19 PROCEDURE — 99900031 HC PATIENT EDUCATION (STAT)

## 2025-05-19 PROCEDURE — 25000003 PHARM REV CODE 250: Performed by: NURSE PRACTITIONER

## 2025-05-19 PROCEDURE — 94760 N-INVAS EAR/PLS OXIMETRY 1: CPT

## 2025-05-19 PROCEDURE — 27100171 HC OXYGEN HIGH FLOW UP TO 24 HOURS

## 2025-05-19 RX ORDER — HEPARIN SODIUM 5000 [USP'U]/ML
5000 INJECTION, SOLUTION INTRAVENOUS; SUBCUTANEOUS EVERY 8 HOURS
Status: DISCONTINUED | OUTPATIENT
Start: 2025-05-19 | End: 2025-05-30

## 2025-05-19 RX ORDER — FUROSEMIDE 10 MG/ML
80 INJECTION INTRAMUSCULAR; INTRAVENOUS ONCE
Status: COMPLETED | OUTPATIENT
Start: 2025-05-19 | End: 2025-05-19

## 2025-05-19 RX ORDER — FAMOTIDINE 20 MG/1
20 TABLET, FILM COATED ORAL 2 TIMES DAILY
Status: DISCONTINUED | OUTPATIENT
Start: 2025-05-19 | End: 2025-06-02

## 2025-05-19 RX ADMIN — AMIODARONE HYDROCHLORIDE 200 MG: 200 TABLET ORAL at 08:05

## 2025-05-19 RX ADMIN — AMIODARONE HYDROCHLORIDE 1 MG/MIN: 1.8 INJECTION, SOLUTION INTRAVENOUS at 06:05

## 2025-05-19 RX ADMIN — HEPARIN SODIUM 5000 UNITS: 5000 INJECTION, SOLUTION INTRAVENOUS; SUBCUTANEOUS at 02:05

## 2025-05-19 RX ADMIN — PRAVASTATIN SODIUM 40 MG: 10 TABLET ORAL at 08:05

## 2025-05-19 RX ADMIN — MICONAZOLE NITRATE: 20 POWDER TOPICAL at 08:05

## 2025-05-19 RX ADMIN — CEFEPIME 1 G: 1 INJECTION, POWDER, FOR SOLUTION INTRAMUSCULAR; INTRAVENOUS at 12:05

## 2025-05-19 RX ADMIN — MICONAZOLE NITRATE: 20 POWDER TOPICAL at 09:05

## 2025-05-19 RX ADMIN — FAMOTIDINE 20 MG: 20 TABLET, FILM COATED ORAL at 09:05

## 2025-05-19 RX ADMIN — CEFEPIME 1 G: 1 INJECTION, POWDER, FOR SOLUTION INTRAMUSCULAR; INTRAVENOUS at 08:05

## 2025-05-19 RX ADMIN — DOXYCYCLINE HYCLATE 100 MG: 100 TABLET, COATED ORAL at 08:05

## 2025-05-19 RX ADMIN — FAMOTIDINE 20 MG: 20 TABLET, FILM COATED ORAL at 10:05

## 2025-05-19 RX ADMIN — FUROSEMIDE 80 MG: 10 INJECTION, SOLUTION INTRAVENOUS at 10:05

## 2025-05-19 RX ADMIN — AMIODARONE HYDROCHLORIDE 1 MG/MIN: 1.8 INJECTION, SOLUTION INTRAVENOUS at 12:05

## 2025-05-19 RX ADMIN — PROPOFOL 25 MCG/KG/MIN: 10 INJECTION, EMULSION INTRAVENOUS at 08:05

## 2025-05-19 RX ADMIN — HEPARIN SODIUM 5000 UNITS: 5000 INJECTION, SOLUTION INTRAVENOUS; SUBCUTANEOUS at 09:05

## 2025-05-19 RX ADMIN — PROPOFOL 30 MCG/KG/MIN: 10 INJECTION, EMULSION INTRAVENOUS at 01:05

## 2025-05-19 RX ADMIN — DOXYCYCLINE HYCLATE 100 MG: 100 TABLET, COATED ORAL at 09:05

## 2025-05-19 RX ADMIN — FINASTERIDE 5 MG: 5 TABLET, FILM COATED ORAL at 08:05

## 2025-05-19 RX ADMIN — NOREPINEPHRINE BITARTRATE 0.08 MCG/KG/MIN: 8 INJECTION, SOLUTION INTRAVENOUS at 05:05

## 2025-05-19 RX ADMIN — PROPOFOL 30 MCG/KG/MIN: 10 INJECTION, EMULSION INTRAVENOUS at 06:05

## 2025-05-19 RX ADMIN — CEFEPIME 1 G: 1 INJECTION, POWDER, FOR SOLUTION INTRAMUSCULAR; INTRAVENOUS at 04:05
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
## 2025-05-19 NOTE — NURSING
Dr. Samayoa rounded at bedside to interrogate rhythm which remains in slow VT. First attempt ICD defibrillation only converted rhythm to Vpaced @ 80 bpm for approx 1 min then returned to slow Vtach 112 bpm. Second attempt defib converted to Vpaced @ 80 bpm for approx 15 min and also returned to slow Vtach  106 bpm. Remains on Lido gtt @ 1 mg/min and Amio @ 1 mg/min, with low dose Levo for BP support. Plan will be to return to cathlab on Wednesday for another VT Ablation with Dr. Samayoa. Son at  bedside throughout bedside attempt with no further questions at this time. Son would like to proceed with another ablation on wedn. TM
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
## 2025-05-19 NOTE — PLAN OF CARE
CM following patient. Patient is still on Vent.Dr Avendaño rounded on patient . Unable to Convert Rhythm. Plan is to bring patient to Cath lab on Wednesday  for VT Ablation.  CM will continue to follow.

## 2025-05-19 NOTE — PROGRESS NOTES
Inpatient Nutrition Assessment    Admit Date: 5/8/2025   Total duration of encounter: 11 days   Patient Age: 79 y.o.    Nutrition Recommendation/Prescription     If able to extubate, advance diet as appropriate, goal diet: cardiac.   Would also benefit from ONS once diet advanced.    If not able to extubate, consider starting TF.    Peptamen Intense VHP goal rate 80 ml/hr to provide  1600 kcal/d  (100% est needs)  148 g protein/d (91% est needs)  1344 ml free water/d (83% est needs)  (calculations based on estimated 20 hr/d run time)     If no IV fluids running, can give 50ml q 4hr water flushes. Total water provided: 1644ml (102% est needs.)     Due to dx of malnutrition, pt at risk for refeeding syndrome. Start TF @ 20ml/hr and increase as tolerated 10ml/hr q8hr until goal rate reached.   May also need to consider additional MVI and thiamine per MD.      Communication of Recommendations: reviewed with nurse    Nutrition Assessment     Malnutrition Assessment/Nutrition-Focused Physical Exam    Malnutrition Context: acute illness or injury (05/19/25 1246)  Malnutrition Level: moderate (05/19/25 1246)  Energy Intake (Malnutrition): other (see comments) (Does not meet criteria) (05/19/25 1246)  Weight Loss (Malnutrition): other (see comments) (Unable to assess) (05/19/25 1246)              Muscle Mass (Malnutrition): mild depletion (05/19/25 1246)  [place] (Muscle Loss): mild depletion                       Fluid Accumulation (Malnutrition): moderate (05/19/25 1246)        A minimum of two characteristics is recommended for diagnosis of either severe or non-severe malnutrition.    Chart Review    Reason Seen: length of stay    Malnutrition Screening Tool Results   Have you recently lost weight without trying?: No  Have you been eating poorly because of a decreased appetite?: No   MST Score: 0   Diagnosis:  Acute decompensated heart failure with reduced ejection fraction  Persistent ventricular  tachycardia  Chronic atrial fibrillation     Relevant Medical History: Afib. CVA. HTN, PVD, CHF    Scheduled Medications:  ceFEPime IV (PEDS and ADULTS), 1 g, Q8H  doxycycline, 100 mg, BID  famotidine, 20 mg, BID  finasteride, 5 mg, Daily  heparin (porcine), 5,000 Units, Q8H  methocarbamoL, 500 mg, Once  miconazole NITRATE 2 %, , BID  pravastatin, 40 mg, Daily    Continuous Infusions:  amiodarone in dextrose 5%, Last Rate: 1 mg/min (05/19/25 1200)  LIDOcaine, Last Rate: 7.5 mL/hr at 05/10/25 1935  LIDOcaine, Last Rate: 1 mg/min (05/19/25 1200)  NORepinephrine bitartrate-D5W, Last Rate: 0.02 mcg/kg/min (05/19/25 1200)  propofoL, Last Rate: Stopped (05/19/25 0908)    PRN Medications:  acetaminophen, 650 mg, Q4H PRN  HYDROcodone-acetaminophen, 1 tablet, Q4H PRN  LIDOcaine, , Continuous PRN  morphine, 2 mg, Q4H PRN  ondansetron, 8 mg, Q8H PRN  sodium chloride 0.9%, 10 mL, PRN  sodium chloride 0.9%, 10 mL, PRN  sodium chloride 0.9%, 10 mL, Q12H PRN  vancomycin (VANCOCIN) 1,000 mg in D5W 250 mL IVPB (admixture device), 1,000 mg, On Call Procedure    Calorie Containing IV Medications: no significant kcals from medications at this time    Recent Labs   Lab 05/13/25  0249 05/13/25  2040 05/14/25  0215 05/15/25  0336 05/16/25  0259 05/16/25  2004 05/17/25  0315 05/18/25  0231 05/19/25  0628     --  138 135* 135* 135* 136 139 140   K 3.5  --  4.0 3.7 3.9 4.2 4.1 3.8 4.1   CALCIUM 7.8*  --  8.2* 8.3* 8.4* 8.1* 8.1* 8.2* 8.2*   PHOS 2.1*  --  2.6 2.5 3.3  --  4.1 3.6 3.4   MG 1.80  --  2.10 1.80 1.70  --  2.60 2.50 2.10     --  108* 104 104 104 105 108* 107   CO2 25  --  25 24 24 20* 21* 22* 22*   BUN 15.4  --  14.3 12.2 16.5 15.9 17.0 21.4 19.3   CREATININE 0.92  --  0.92 0.89 1.02 1.09 1.14 1.06 1.06   EGFRNORACEVR >60  --  >60 >60 >60 >60 >60 >60 >60     --  96 101 109 141* 192* 130* 113   BILITOT 0.6  --  0.7 0.7 0.7 0.9 0.6 0.5 0.6   ALKPHOS 51  --  52 57 72 70 75 73 88   ALT 24  --  22 22 20 21 24 20 16  "  AST 29  --  24 19 19 60* 81* 39 23   ALBUMIN 2.3*  --  2.3* 2.4* 2.3* 2.2* 2.3* 2.1* 2.0*   TRIG  --  78  --   --   --   --   --   --   --    WBC 8.64  --  9.31 9.30 10.51 14.54* 18.32* 14.07* 15.96*   HGB 12.9*  --  13.3* 13.0* 12.5* 11.6* 12.6* 12.4* 12.9*   HCT 38.1*  --  39.7* 40.4* 38.2* 36.1* 38.7* 38.2* 39.3*     Nutrition Orders:  Diet NPO      Appetite/Oral Intake: NPO/NPO  Factors Affecting Nutritional Intake: none identified  Social Needs Impacting Access to Food: unable to assess at this time; will attempt on follow-up  Food/Yarsani/Cultural Preferences: unable to obtain  Food Allergies: no known food allergies  Last Bowel Movement: 05/16/25  Wound(s):  None documented     Comments    5/15/25: Pt with 100% po intake of liquid diet. Stated appetite good. Unable to obtain further info or complete physical assessment. Interrupted by MD at time of visit. Will attempt upon F/U.     5/19/25: Pt now intubate.d Possibly going for procedure vs extubation today. Will provide TF recommendation in case needed. Pt also meets criteria for intake in malnutrition assessment, Junum not updating though. No kcal from meds.     Anthropometrics    Height: 6' 0.01" (182.9 cm), Height Method: Measured  Last Weight: 115 kg (253 lb 8.5 oz) (05/12/25 1033), Weight Method: Bed Scale  BMI (Calculated): 34.4  BMI Classification: obese grade I (BMI 30-34.9)        Ideal Body Weight (IBW), Male: 178.06 lb     % Ideal Body Weight, Male (lb): 142.43 %                          Usual Weight Provided By: unable to obtain usual weight    Wt Readings from Last 5 Encounters:   05/12/25 115 kg (253 lb 8.5 oz)   04/02/25 113.4 kg (250 lb)   09/23/24 107 kg (236 lb)   03/06/24 108.4 kg (239 lb)   09/06/23 119.7 kg (264 lb)     Weight Change(s) Since Admission:   Wt Readings from Last 1 Encounters:   05/12/25 1033 115 kg (253 lb 8.5 oz)   05/08/25 1750 115 kg (253 lb 8.5 oz)   Admit Weight: 115 kg (253 lb 8.5 oz) (05/08/25 1750), Weight " Method: Bed Scale    Estimated Needs    Weight Used For Calorie Calculations: 115 kg (253 lb 8.5 oz)  Energy Calorie Requirements (kcal): 1265-1610kcal (11-14kcal/kg)  Energy Need Method: Kcal/kg  Weight Used For Protein Calculations: 80.9 kg (178 lb 5.6 oz) (IBW)  Protein Requirements: 162gm (2g/kg IBW)  Fluid Requirements (mL): 1610ml (1ml/kcal)  CHO Requirement: 180gm (45% est kcal needs)     Enteral Nutrition     Patient not receiving enteral nutrition at this time.    Parenteral Nutrition     Patient not receiving parenteral nutrition support at this time.    Evaluation of Received Nutrient Intake    Calories: not meeting estimated needs  Protein: not meeting estimated needs    Patient Education     Not applicable.    Nutrition Diagnosis     PES: Inadequate energy intake related to acute illness as evidenced by NPO or liquid diet since 5/9/25. (active)     PES: Moderate acute disease or injury related malnutrition Related to current condition As Evidenced by:  - weight loss: Unable to assess - muscle mass depletion: 1 area of mild muscle loss (Temporalis) - fluid accumulation: 2 areas of moderate or severe fluid accumulation (Lower extremities edema, Upper extremities edema) active    Nutrition Interventions     Intervention(s): general/healthful diet, modified composition of enteral nutrition, modified rate of enteral nutrition, and collaboration with other providers  Intervention(s):      Goal: Meet greater than 80% of nutritional needs by follow-up. (goal progressing)  Goal: Consume % of meals/snacks by follow-up. (goal discontinued)    Nutrition Goals & Monitoring     Dietitian will monitor: energy intake and enteral nutrition intake  Discharge planning: resume home regimen  Nutrition Risk/Follow-Up: patient at increased nutrition risk; dietitian will follow-up twice weekly   Please consult if re-assessment needed sooner.

## 2025-05-19 NOTE — PLAN OF CARE
Problem: Adult Inpatient Plan of Care  Goal: Plan of Care Review  Outcome: Progressing  Goal: Patient-Specific Goal (Individualized)  Outcome: Progressing  Goal: Absence of Hospital-Acquired Illness or Injury  Outcome: Progressing  Goal: Optimal Comfort and Wellbeing  Outcome: Progressing  Goal: Readiness for Transition of Care  Outcome: Progressing     Problem: Fall Injury Risk  Goal: Absence of Fall and Fall-Related Injury  Outcome: Progressing     Problem: Skin Injury Risk Increased  Goal: Skin Health and Integrity  Outcome: Progressing     Problem: Comorbidity Management  Goal: Maintenance of Heart Failure Symptom Control  Outcome: Progressing     Problem: Fatigue  Goal: Improved Activity Tolerance  Outcome: Progressing     Problem: Wound  Goal: Optimal Coping  Outcome: Progressing  Goal: Optimal Functional Ability  Outcome: Progressing  Goal: Absence of Infection Signs and Symptoms  Outcome: Progressing  Goal: Improved Oral Intake  Outcome: Progressing  Goal: Optimal Pain Control and Function  Outcome: Progressing  Goal: Skin Health and Integrity  Outcome: Progressing  Goal: Optimal Wound Healing  Outcome: Progressing

## 2025-05-20 LAB
ALBUMIN SERPL-MCNC: 1.9 G/DL (ref 3.4–4.8)
ALBUMIN/GLOB SERPL: 0.5 RATIO (ref 1.1–2)
ALP SERPL-CCNC: 85 UNIT/L (ref 40–150)
ALT SERPL-CCNC: 13 UNIT/L (ref 0–55)
ANION GAP SERPL CALC-SCNC: 8 MEQ/L
AST SERPL-CCNC: 17 UNIT/L (ref 11–45)
BACTERIA SPT CULT: ABNORMAL
BASOPHILS # BLD AUTO: 0.03 X10(3)/MCL
BASOPHILS NFR BLD AUTO: 0.3 %
BILIRUB SERPL-MCNC: 0.6 MG/DL
BUN SERPL-MCNC: 19.5 MG/DL (ref 8.4–25.7)
CALCIUM SERPL-MCNC: 8.1 MG/DL (ref 8.8–10)
CHLORIDE SERPL-SCNC: 106 MMOL/L (ref 98–107)
CO2 SERPL-SCNC: 25 MMOL/L (ref 23–31)
CREAT SERPL-MCNC: 0.88 MG/DL (ref 0.72–1.25)
CREAT/UREA NIT SERPL: 22
EOSINOPHIL # BLD AUTO: 0.22 X10(3)/MCL (ref 0–0.9)
EOSINOPHIL NFR BLD AUTO: 1.8 %
ERYTHROCYTE [DISTWIDTH] IN BLOOD BY AUTOMATED COUNT: 15.1 % (ref 11.5–17)
GFR SERPLBLD CREATININE-BSD FMLA CKD-EPI: >60 ML/MIN/1.73/M2
GLOBULIN SER-MCNC: 3.9 GM/DL (ref 2.4–3.5)
GLUCOSE SERPL-MCNC: 102 MG/DL (ref 82–115)
GRAM STN SPEC: ABNORMAL
HCT VFR BLD AUTO: 37 % (ref 42–52)
HGB BLD-MCNC: 11.7 G/DL (ref 14–18)
IMM GRANULOCYTES # BLD AUTO: 0.09 X10(3)/MCL (ref 0–0.04)
IMM GRANULOCYTES NFR BLD AUTO: 0.8 %
LYMPHOCYTES # BLD AUTO: 0.65 X10(3)/MCL (ref 0.6–4.6)
LYMPHOCYTES NFR BLD AUTO: 5.4 %
MAGNESIUM SERPL-MCNC: 2 MG/DL (ref 1.6–2.6)
MCH RBC QN AUTO: 29.3 PG (ref 27–31)
MCHC RBC AUTO-ENTMCNC: 31.6 G/DL (ref 33–36)
MCV RBC AUTO: 92.5 FL (ref 80–94)
MONOCYTES # BLD AUTO: 1.01 X10(3)/MCL (ref 0.1–1.3)
MONOCYTES NFR BLD AUTO: 8.4 %
NEUTROPHILS # BLD AUTO: 9.97 X10(3)/MCL (ref 2.1–9.2)
NEUTROPHILS NFR BLD AUTO: 83.3 %
NRBC BLD AUTO-RTO: 0 %
OHS QRS DURATION: 184 MS
OHS QTC CALCULATION: 640 MS
PHOSPHATE SERPL-MCNC: 3.1 MG/DL (ref 2.3–4.7)
PLATELET # BLD AUTO: 272 X10(3)/MCL (ref 130–400)
PMV BLD AUTO: 10.5 FL (ref 7.4–10.4)
POTASSIUM SERPL-SCNC: 3.6 MMOL/L (ref 3.5–5.1)
PROT SERPL-MCNC: 5.8 GM/DL (ref 5.8–7.6)
RBC # BLD AUTO: 4 X10(6)/MCL (ref 4.7–6.1)
SODIUM SERPL-SCNC: 139 MMOL/L (ref 136–145)
WBC # BLD AUTO: 11.97 X10(3)/MCL (ref 4.5–11.5)

## 2025-05-20 PROCEDURE — 94761 N-INVAS EAR/PLS OXIMETRY MLT: CPT

## 2025-05-20 PROCEDURE — 85025 COMPLETE CBC W/AUTO DIFF WBC: CPT

## 2025-05-20 PROCEDURE — 94760 N-INVAS EAR/PLS OXIMETRY 1: CPT

## 2025-05-20 PROCEDURE — 94003 VENT MGMT INPAT SUBQ DAY: CPT

## 2025-05-20 PROCEDURE — 25000003 PHARM REV CODE 250

## 2025-05-20 PROCEDURE — 93010 ELECTROCARDIOGRAM REPORT: CPT | Mod: ,,, | Performed by: INTERNAL MEDICINE

## 2025-05-20 PROCEDURE — 20000000 HC ICU ROOM

## 2025-05-20 PROCEDURE — 99900035 HC TECH TIME PER 15 MIN (STAT)

## 2025-05-20 PROCEDURE — 80053 COMPREHEN METABOLIC PANEL: CPT

## 2025-05-20 PROCEDURE — 27200966 HC CLOSED SUCTION SYSTEM

## 2025-05-20 PROCEDURE — 25000003 PHARM REV CODE 250: Performed by: STUDENT IN AN ORGANIZED HEALTH CARE EDUCATION/TRAINING PROGRAM

## 2025-05-20 PROCEDURE — 99900026 HC AIRWAY MAINTENANCE (STAT)

## 2025-05-20 PROCEDURE — 93005 ELECTROCARDIOGRAM TRACING: CPT

## 2025-05-20 PROCEDURE — 99900031 HC PATIENT EDUCATION (STAT)

## 2025-05-20 PROCEDURE — 63600175 PHARM REV CODE 636 W HCPCS: Performed by: INTERNAL MEDICINE

## 2025-05-20 PROCEDURE — 63600175 PHARM REV CODE 636 W HCPCS: Performed by: STUDENT IN AN ORGANIZED HEALTH CARE EDUCATION/TRAINING PROGRAM

## 2025-05-20 PROCEDURE — 25000003 PHARM REV CODE 250: Performed by: INTERNAL MEDICINE

## 2025-05-20 PROCEDURE — 27100171 HC OXYGEN HIGH FLOW UP TO 24 HOURS

## 2025-05-20 PROCEDURE — 63600175 PHARM REV CODE 636 W HCPCS

## 2025-05-20 PROCEDURE — 36415 COLL VENOUS BLD VENIPUNCTURE: CPT

## 2025-05-20 PROCEDURE — 84100 ASSAY OF PHOSPHORUS: CPT

## 2025-05-20 PROCEDURE — 83735 ASSAY OF MAGNESIUM: CPT

## 2025-05-20 RX ORDER — POTASSIUM CHLORIDE 14.9 MG/ML
20 INJECTION INTRAVENOUS ONCE
Status: COMPLETED | OUTPATIENT
Start: 2025-05-20 | End: 2025-05-20

## 2025-05-20 RX ORDER — CEFEPIME HYDROCHLORIDE 2 G/1
2 INJECTION, POWDER, FOR SOLUTION INTRAVENOUS
Status: DISCONTINUED | OUTPATIENT
Start: 2025-05-20 | End: 2025-05-20

## 2025-05-20 RX ORDER — FUROSEMIDE 10 MG/ML
80 INJECTION INTRAMUSCULAR; INTRAVENOUS ONCE
Status: COMPLETED | OUTPATIENT
Start: 2025-05-20 | End: 2025-05-20

## 2025-05-20 RX ORDER — MEROPENEM 1 G/1
1 INJECTION, POWDER, FOR SOLUTION INTRAVENOUS
Status: DISCONTINUED | OUTPATIENT
Start: 2025-05-20 | End: 2025-05-30

## 2025-05-20 RX ADMIN — AMIODARONE HYDROCHLORIDE 1 MG/MIN: 1.8 INJECTION, SOLUTION INTRAVENOUS at 12:05

## 2025-05-20 RX ADMIN — FAMOTIDINE 20 MG: 20 TABLET, FILM COATED ORAL at 10:05

## 2025-05-20 RX ADMIN — HEPARIN SODIUM 5000 UNITS: 5000 INJECTION, SOLUTION INTRAVENOUS; SUBCUTANEOUS at 10:05

## 2025-05-20 RX ADMIN — AMIODARONE HYDROCHLORIDE 1 MG/MIN: 1.8 INJECTION, SOLUTION INTRAVENOUS at 02:05

## 2025-05-20 RX ADMIN — MEROPENEM 1 G: 1 INJECTION, POWDER, FOR SOLUTION INTRAVENOUS at 12:05

## 2025-05-20 RX ADMIN — FAMOTIDINE 20 MG: 20 TABLET, FILM COATED ORAL at 08:05

## 2025-05-20 RX ADMIN — PROPOFOL 10 MCG/KG/MIN: 10 INJECTION, EMULSION INTRAVENOUS at 06:05

## 2025-05-20 RX ADMIN — MICONAZOLE NITRATE: 20 POWDER TOPICAL at 08:05

## 2025-05-20 RX ADMIN — HEPARIN SODIUM 5000 UNITS: 5000 INJECTION, SOLUTION INTRAVENOUS; SUBCUTANEOUS at 12:05

## 2025-05-20 RX ADMIN — AMIODARONE HYDROCHLORIDE 1 MG/MIN: 1.8 INJECTION, SOLUTION INTRAVENOUS at 07:05

## 2025-05-20 RX ADMIN — AMIODARONE HYDROCHLORIDE 1 MG/MIN: 1.8 INJECTION, SOLUTION INTRAVENOUS at 10:05

## 2025-05-20 RX ADMIN — POTASSIUM CHLORIDE 20 MEQ: 14.9 INJECTION, SOLUTION INTRAVENOUS at 02:05

## 2025-05-20 RX ADMIN — PRAVASTATIN SODIUM 40 MG: 10 TABLET ORAL at 08:05

## 2025-05-20 RX ADMIN — FUROSEMIDE 80 MG: 10 INJECTION, SOLUTION INTRAVENOUS at 12:05

## 2025-05-20 RX ADMIN — MICONAZOLE NITRATE: 20 POWDER TOPICAL at 09:05

## 2025-05-20 RX ADMIN — HEPARIN SODIUM 5000 UNITS: 5000 INJECTION, SOLUTION INTRAVENOUS; SUBCUTANEOUS at 05:05

## 2025-05-20 RX ADMIN — MEROPENEM 1 G: 1 INJECTION, POWDER, FOR SOLUTION INTRAVENOUS at 06:05

## 2025-05-20 RX ADMIN — DOXYCYCLINE HYCLATE 100 MG: 100 TABLET, COATED ORAL at 10:05

## 2025-05-20 RX ADMIN — DOXYCYCLINE HYCLATE 100 MG: 100 TABLET, COATED ORAL at 08:05

## 2025-05-20 RX ADMIN — CEFEPIME 1 G: 1 INJECTION, POWDER, FOR SOLUTION INTRAMUSCULAR; INTRAVENOUS at 08:05

## 2025-05-20 RX ADMIN — FINASTERIDE 5 MG: 5 TABLET, FILM COATED ORAL at 08:05

## 2025-05-20 RX ADMIN — CEFEPIME 1 G: 1 INJECTION, POWDER, FOR SOLUTION INTRAMUSCULAR; INTRAVENOUS at 12:05
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
## 2025-05-21 ENCOUNTER — ANESTHESIA EVENT (OUTPATIENT)
Dept: CARDIOLOGY | Facility: HOSPITAL | Age: 80
DRG: 215 | End: 2025-05-21
Payer: MEDICARE

## 2025-05-21 ENCOUNTER — ANESTHESIA (OUTPATIENT)
Dept: CARDIOLOGY | Facility: HOSPITAL | Age: 80
DRG: 215 | End: 2025-05-21
Payer: MEDICARE

## 2025-05-21 LAB
ALBUMIN SERPL-MCNC: 1.9 G/DL (ref 3.4–4.8)
ALBUMIN/GLOB SERPL: 0.6 RATIO (ref 1.1–2)
ALP SERPL-CCNC: 86 UNIT/L (ref 40–150)
ALT SERPL-CCNC: 13 UNIT/L (ref 0–55)
ANION GAP SERPL CALC-SCNC: 11 MEQ/L
AST SERPL-CCNC: 18 UNIT/L (ref 11–45)
BASOPHILS # BLD AUTO: 0.05 X10(3)/MCL
BASOPHILS NFR BLD AUTO: 0.5 %
BILIRUB SERPL-MCNC: 0.5 MG/DL
BUN SERPL-MCNC: 21.4 MG/DL (ref 8.4–25.7)
CALCIUM SERPL-MCNC: 7.7 MG/DL (ref 8.8–10)
CHLORIDE SERPL-SCNC: 106 MMOL/L (ref 98–107)
CO2 SERPL-SCNC: 21 MMOL/L (ref 23–31)
CREAT SERPL-MCNC: 0.88 MG/DL (ref 0.72–1.25)
CREAT/UREA NIT SERPL: 24
EOSINOPHIL # BLD AUTO: 0.28 X10(3)/MCL (ref 0–0.9)
EOSINOPHIL NFR BLD AUTO: 2.6 %
ERYTHROCYTE [DISTWIDTH] IN BLOOD BY AUTOMATED COUNT: 15.1 % (ref 11.5–17)
GFR SERPLBLD CREATININE-BSD FMLA CKD-EPI: >60 ML/MIN/1.73/M2
GLOBULIN SER-MCNC: 3.1 GM/DL (ref 2.4–3.5)
GLUCOSE SERPL-MCNC: 89 MG/DL (ref 82–115)
HCT VFR BLD AUTO: 38.1 % (ref 42–52)
HGB BLD-MCNC: 11.5 G/DL (ref 14–18)
IMM GRANULOCYTES # BLD AUTO: 0.08 X10(3)/MCL (ref 0–0.04)
IMM GRANULOCYTES NFR BLD AUTO: 0.7 %
LYMPHOCYTES # BLD AUTO: 0.76 X10(3)/MCL (ref 0.6–4.6)
LYMPHOCYTES NFR BLD AUTO: 7 %
MAGNESIUM SERPL-MCNC: 1.9 MG/DL (ref 1.6–2.6)
MCH RBC QN AUTO: 28.7 PG (ref 27–31)
MCHC RBC AUTO-ENTMCNC: 30.2 G/DL (ref 33–36)
MCV RBC AUTO: 95 FL (ref 80–94)
MONOCYTES # BLD AUTO: 1.21 X10(3)/MCL (ref 0.1–1.3)
MONOCYTES NFR BLD AUTO: 11.2 %
NEUTROPHILS # BLD AUTO: 8.46 X10(3)/MCL (ref 2.1–9.2)
NEUTROPHILS NFR BLD AUTO: 78 %
NRBC BLD AUTO-RTO: 0 %
PHOSPHATE SERPL-MCNC: 2.7 MG/DL (ref 2.3–4.7)
PLATELET # BLD AUTO: 237 X10(3)/MCL (ref 130–400)
PMV BLD AUTO: 10.9 FL (ref 7.4–10.4)
POTASSIUM SERPL-SCNC: 3.9 MMOL/L (ref 3.5–5.1)
PROT SERPL-MCNC: 5 GM/DL (ref 5.8–7.6)
RBC # BLD AUTO: 4.01 X10(6)/MCL (ref 4.7–6.1)
SODIUM SERPL-SCNC: 138 MMOL/L (ref 136–145)
WBC # BLD AUTO: 10.84 X10(3)/MCL (ref 4.5–11.5)

## 2025-05-21 PROCEDURE — 63600175 PHARM REV CODE 636 W HCPCS: Performed by: INTERNAL MEDICINE

## 2025-05-21 PROCEDURE — 36415 COLL VENOUS BLD VENIPUNCTURE: CPT

## 2025-05-21 PROCEDURE — 99900026 HC AIRWAY MAINTENANCE (STAT)

## 2025-05-21 PROCEDURE — 02583ZZ DESTRUCTION OF CONDUCTION MECHANISM, PERCUTANEOUS APPROACH: ICD-10-PCS | Performed by: STUDENT IN AN ORGANIZED HEALTH CARE EDUCATION/TRAINING PROGRAM

## 2025-05-21 PROCEDURE — 37000009 HC ANESTHESIA EA ADD 15 MINS: Performed by: STUDENT IN AN ORGANIZED HEALTH CARE EDUCATION/TRAINING PROGRAM

## 2025-05-21 PROCEDURE — 99900035 HC TECH TIME PER 15 MIN (STAT)

## 2025-05-21 PROCEDURE — 84100 ASSAY OF PHOSPHORUS: CPT

## 2025-05-21 PROCEDURE — 27201423 OPTIME MED/SURG SUP & DEVICES STERILE SUPPLY: Performed by: STUDENT IN AN ORGANIZED HEALTH CARE EDUCATION/TRAINING PROGRAM

## 2025-05-21 PROCEDURE — D9220A PRA ANESTHESIA: Mod: ANES,,, | Performed by: ANESTHESIOLOGY

## 2025-05-21 PROCEDURE — C1732 CATH, EP, DIAG/ABL, 3D/VECT: HCPCS | Performed by: STUDENT IN AN ORGANIZED HEALTH CARE EDUCATION/TRAINING PROGRAM

## 2025-05-21 PROCEDURE — 83735 ASSAY OF MAGNESIUM: CPT

## 2025-05-21 PROCEDURE — C1753 CATH, INTRAVAS ULTRASOUND: HCPCS | Performed by: STUDENT IN AN ORGANIZED HEALTH CARE EDUCATION/TRAINING PROGRAM

## 2025-05-21 PROCEDURE — C1769 GUIDE WIRE: HCPCS | Performed by: STUDENT IN AN ORGANIZED HEALTH CARE EDUCATION/TRAINING PROGRAM

## 2025-05-21 PROCEDURE — D9220A PRA ANESTHESIA: Mod: CRNA,,, | Performed by: NURSE ANESTHETIST, CERTIFIED REGISTERED

## 2025-05-21 PROCEDURE — C2630 CATH, EP, COOL-TIP: HCPCS | Performed by: STUDENT IN AN ORGANIZED HEALTH CARE EDUCATION/TRAINING PROGRAM

## 2025-05-21 PROCEDURE — 93462 L HRT CATH TRNSPTL PUNCTURE: CPT | Performed by: STUDENT IN AN ORGANIZED HEALTH CARE EDUCATION/TRAINING PROGRAM

## 2025-05-21 PROCEDURE — 25000003 PHARM REV CODE 250: Performed by: STUDENT IN AN ORGANIZED HEALTH CARE EDUCATION/TRAINING PROGRAM

## 2025-05-21 PROCEDURE — 85025 COMPLETE CBC W/AUTO DIFF WBC: CPT

## 2025-05-21 PROCEDURE — 93010 ELECTROCARDIOGRAM REPORT: CPT | Mod: ,,, | Performed by: INTERNAL MEDICINE

## 2025-05-21 PROCEDURE — 63600175 PHARM REV CODE 636 W HCPCS

## 2025-05-21 PROCEDURE — 94761 N-INVAS EAR/PLS OXIMETRY MLT: CPT

## 2025-05-21 PROCEDURE — C1894 INTRO/SHEATH, NON-LASER: HCPCS | Performed by: STUDENT IN AN ORGANIZED HEALTH CARE EDUCATION/TRAINING PROGRAM

## 2025-05-21 PROCEDURE — 94003 VENT MGMT INPAT SUBQ DAY: CPT

## 2025-05-21 PROCEDURE — 27200966 HC CLOSED SUCTION SYSTEM

## 2025-05-21 PROCEDURE — 63600175 PHARM REV CODE 636 W HCPCS: Performed by: NURSE ANESTHETIST, CERTIFIED REGISTERED

## 2025-05-21 PROCEDURE — 94760 N-INVAS EAR/PLS OXIMETRY 1: CPT

## 2025-05-21 PROCEDURE — 93005 ELECTROCARDIOGRAM TRACING: CPT

## 2025-05-21 PROCEDURE — 80053 COMPREHEN METABOLIC PANEL: CPT

## 2025-05-21 PROCEDURE — 37000008 HC ANESTHESIA 1ST 15 MINUTES: Performed by: STUDENT IN AN ORGANIZED HEALTH CARE EDUCATION/TRAINING PROGRAM

## 2025-05-21 PROCEDURE — 27100171 HC OXYGEN HIGH FLOW UP TO 24 HOURS

## 2025-05-21 PROCEDURE — 99900031 HC PATIENT EDUCATION (STAT)

## 2025-05-21 PROCEDURE — 63600175 PHARM REV CODE 636 W HCPCS: Performed by: STUDENT IN AN ORGANIZED HEALTH CARE EDUCATION/TRAINING PROGRAM

## 2025-05-21 PROCEDURE — 20000000 HC ICU ROOM

## 2025-05-21 PROCEDURE — 25000003 PHARM REV CODE 250: Performed by: NURSE ANESTHETIST, CERTIFIED REGISTERED

## 2025-05-21 PROCEDURE — 93654 COMPRE EP EVAL TX VT: CPT | Performed by: STUDENT IN AN ORGANIZED HEALTH CARE EDUCATION/TRAINING PROGRAM

## 2025-05-21 PROCEDURE — 25000003 PHARM REV CODE 250: Performed by: INTERNAL MEDICINE

## 2025-05-21 RX ORDER — HEPARIN SODIUM 1000 [USP'U]/ML
INJECTION, SOLUTION INTRAVENOUS; SUBCUTANEOUS
Status: DISCONTINUED | OUTPATIENT
Start: 2025-05-21 | End: 2025-05-21

## 2025-05-21 RX ORDER — ROCURONIUM BROMIDE 10 MG/ML
INJECTION, SOLUTION INTRAVENOUS
Status: DISCONTINUED | OUTPATIENT
Start: 2025-05-21 | End: 2025-05-21

## 2025-05-21 RX ORDER — LIDOCAINE HYDROCHLORIDE 20 MG/ML
INJECTION, SOLUTION EPIDURAL; INFILTRATION; INTRACAUDAL; PERINEURAL
Status: DISCONTINUED | OUTPATIENT
Start: 2025-05-21 | End: 2025-05-21

## 2025-05-21 RX ORDER — PHENYLEPHRINE HYDROCHLORIDE 10 MG/ML
INJECTION INTRAVENOUS
Status: DISCONTINUED | OUTPATIENT
Start: 2025-05-21 | End: 2025-05-21

## 2025-05-21 RX ORDER — MIDAZOLAM HYDROCHLORIDE 1 MG/ML
INJECTION INTRAMUSCULAR; INTRAVENOUS
Status: DISCONTINUED | OUTPATIENT
Start: 2025-05-21 | End: 2025-05-21

## 2025-05-21 RX ORDER — FENTANYL CITRATE 50 UG/ML
INJECTION, SOLUTION INTRAMUSCULAR; INTRAVENOUS
Status: DISCONTINUED | OUTPATIENT
Start: 2025-05-21 | End: 2025-05-21

## 2025-05-21 RX ADMIN — SODIUM CHLORIDE, SODIUM GLUCONATE, SODIUM ACETATE, POTASSIUM CHLORIDE AND MAGNESIUM CHLORIDE: 526; 502; 368; 37; 30 INJECTION, SOLUTION INTRAVENOUS at 01:05

## 2025-05-21 RX ADMIN — AMIODARONE HYDROCHLORIDE 150 MG: 50 INJECTION, SOLUTION INTRAVENOUS at 04:05

## 2025-05-21 RX ADMIN — HEPARIN SODIUM 5000 UNITS: 5000 INJECTION, SOLUTION INTRAVENOUS; SUBCUTANEOUS at 10:05

## 2025-05-21 RX ADMIN — ROCURONIUM BROMIDE 50 MG: 10 SOLUTION INTRAVENOUS at 04:05

## 2025-05-21 RX ADMIN — PHENYLEPHRINE HYDROCHLORIDE 100 MCG: 10 INJECTION INTRAVENOUS at 02:05

## 2025-05-21 RX ADMIN — FAMOTIDINE 20 MG: 20 TABLET, FILM COATED ORAL at 08:05

## 2025-05-21 RX ADMIN — PHENYLEPHRINE HYDROCHLORIDE 100 MCG: 10 INJECTION INTRAVENOUS at 03:05

## 2025-05-21 RX ADMIN — FENTANYL CITRATE 50 MCG: 50 INJECTION, SOLUTION INTRAMUSCULAR; INTRAVENOUS at 01:05

## 2025-05-21 RX ADMIN — PROPOFOL 10 MCG/KG/MIN: 10 INJECTION, EMULSION INTRAVENOUS at 05:05

## 2025-05-21 RX ADMIN — HEPARIN SODIUM 8000 UNITS: 1000 INJECTION, SOLUTION INTRAVENOUS; SUBCUTANEOUS at 01:05

## 2025-05-21 RX ADMIN — MEROPENEM 1 G: 1 INJECTION, POWDER, FOR SOLUTION INTRAVENOUS at 09:05

## 2025-05-21 RX ADMIN — HEPARIN SODIUM 5000 UNITS: 5000 INJECTION, SOLUTION INTRAVENOUS; SUBCUTANEOUS at 05:05

## 2025-05-21 RX ADMIN — MEROPENEM 1 G: 1 INJECTION, POWDER, FOR SOLUTION INTRAVENOUS at 06:05

## 2025-05-21 RX ADMIN — AMIODARONE HYDROCHLORIDE 1 MG/MIN: 1.8 INJECTION, SOLUTION INTRAVENOUS at 02:05

## 2025-05-21 RX ADMIN — HEPARIN SODIUM 4000 UNITS: 1000 INJECTION, SOLUTION INTRAVENOUS; SUBCUTANEOUS at 02:05

## 2025-05-21 RX ADMIN — HEPARIN SODIUM 5000 UNITS: 1000 INJECTION, SOLUTION INTRAVENOUS; SUBCUTANEOUS at 03:05

## 2025-05-21 RX ADMIN — AMIODARONE HYDROCHLORIDE 1 MG/MIN: 1.8 INJECTION, SOLUTION INTRAVENOUS at 07:05

## 2025-05-21 RX ADMIN — LIDOCAINE HYDROCHLORIDE 100 MG: 20 INJECTION, SOLUTION EPIDURAL; INFILTRATION; INTRACAUDAL; PERINEURAL at 04:05

## 2025-05-21 RX ADMIN — MICONAZOLE NITRATE: 20 POWDER TOPICAL at 09:05

## 2025-05-21 RX ADMIN — ROCURONIUM BROMIDE 20 MG: 10 SOLUTION INTRAVENOUS at 02:05

## 2025-05-21 RX ADMIN — MIDAZOLAM HYDROCHLORIDE 2 MG: 1 INJECTION, SOLUTION INTRAMUSCULAR; INTRAVENOUS at 04:05

## 2025-05-21 RX ADMIN — MEROPENEM 1 G: 1 INJECTION, POWDER, FOR SOLUTION INTRAVENOUS at 02:05

## 2025-05-21 RX ADMIN — MICONAZOLE NITRATE: 20 POWDER TOPICAL at 08:05

## 2025-05-21 RX ADMIN — ROCURONIUM BROMIDE 50 MG: 10 SOLUTION INTRAVENOUS at 01:05

## 2025-05-21 RX ADMIN — AMIODARONE HYDROCHLORIDE 1 MG/MIN: 1.8 INJECTION, SOLUTION INTRAVENOUS at 09:05

## 2025-05-21 RX ADMIN — PHENYLEPHRINE HYDROCHLORIDE 100 MCG: 10 INJECTION INTRAVENOUS at 01:05

## 2025-05-21 RX ADMIN — ROCURONIUM BROMIDE 30 MG: 10 SOLUTION INTRAVENOUS at 01:05

## 2025-05-21 RX ADMIN — DOXYCYCLINE HYCLATE 100 MG: 100 TABLET, COATED ORAL at 08:05
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
## 2025-05-21 NOTE — ANESTHESIA PREPROCEDURE EVALUATION
05/21/2025  Alcides Rosario is a 79 y.o., male.  Patient in ICU for days with resistant Vtach, at least secondEPs with ablation  On Amiodarone and lidocaine gtt    Pre-op Assessment    I have reviewed the Patient Summary Reports.     I have reviewed the Nursing Notes. I have reviewed the NPO Status.   I have reviewed the Medications.     Review of Systems  Anesthesia Hx:  No problems with previous Anesthesia             Denies Family Hx of Anesthesia complications.    Denies Personal Hx of Anesthesia complications.                    Cardiovascular:  Cardiovascular Normal                   No Cardiac Complaints                           Pulmonary:  Pulmonary Normal        No Pulmonary Complaints               Hepatic/GI:        No Current GERD Sx                 Physical Exam    Airway:  Mallampati: unable to assess   Pre-Existing Airway: Oral Endotracheal tube    Chest/Lungs:  Normal Respiratory Rate    Heart:  Rate: Tachycardia    Anesthesia Plan  Type of Anesthesia, risks & benefits discussed:    Anesthesia Type: Gen ETT  Intra-op Monitoring Plan: Standard ASA Monitors and Art Line  Post Op Pain Control Plan: multimodal analgesia and IV/PO Opioids PRN  Airway Plan: Direct, Post-Induction  Informed Consent: Informed consent signed with the Patient and all parties understand the risks and agree with anesthesia plan.  All questions answered. Patient consented to blood products? Yes  ASA Score: 4 Emergent  Day of Surgery Review of History & Physical: H&P Update referred to the surgeon/provider.  Anesthesia Plan Notes: Consent received from son who poa    Ready For Surgery From Anesthesia Perspective.   .

## 2025-05-21 NOTE — ANESTHESIA PROCEDURE NOTES
Intubation    Date/Time: 5/21/2025 1:07 PM    Performed by: Jacobo Granados CRNA  Authorized by: Frankie Victor MD    Intubation:     Induction:  Inhalational - ETT/trach    Intubated:  Arrived to OR intubated    Mask Ventilation:  N/a    Difficult Airway Encountered?: No      Airway Device:  Oral endotracheal tube    Airway Device Size:  8.0    Style/Cuff Inflation:  Cuffed (inflated to minimal occlusive pressure)    Tube secured:  26    Secured at:  The lips    Placement Verified By:  Capnometry    Complicating Factors:  None    Findings Post-Intubation:  BS equal bilateral

## 2025-05-21 NOTE — NURSING
[facility] [address]  Wound Care    Patient Name:  Alcides Rosario   MRN:  [id number]  Date: 5/21/2025  Diagnosis: <principal problem not specified>    History:     Past Medical History:   Diagnosis Date    Atrial fibrillation     HTN (hypertension)     Peripheral vascular disease, unspecified        Social History[1]    Precautions:     Allergies as of 05/08/2025    (No Known Allergies)       WOC Assessment Details/Treatment      05/21/25 0930   ECG   Pulse 103        Wound 05/14/25 2115 Rash Right Abdomen   Date First Assessed/Time First Assessed: 05/14/25 2115   Present on Original Admission: No  Primary Wound Type: Rash  Side: Right  Location: Abdomen   Wound Image   (mostly clear)   Distribution localized   Characteristics dryness   Color pink   Rash Care   (no change to care)     Re-eval of abd fold and groin rash.  Mostly resolved-see new photo.   Pt remains in critial condition-remains on vent and sedated.     Staffing continues with q2hrs turns with wedge and offloading boots.    Wound care to follow rash weekly while in hospital till fully resolved.      05/21/2025         [1]   Social History  Socioeconomic History    Marital status:    Tobacco Use    Smoking status: Former     Types: Cigarettes     Passive exposure: Never    Smokeless tobacco: Never   Substance and Sexual Activity    Alcohol use: Never    Drug use: Never    Sexual activity: Never     Social Drivers of Health     Financial Resource Strain: Low Risk  (5/12/2025)    Overall Financial Resource Strain (CARDIA)     Difficulty of Paying Living Expenses: Not hard at all   Food Insecurity: No Food Insecurity (5/12/2025)    Hunger Vital Sign     Worried About Running Out of Food in the Last Year: Never true     Ran Out of Food in the Last Year: Never true   Transportation Needs: No Transportation Needs (5/12/2025)    PRAPARE - Transportation     Lack of Transportation (Medical): No     Lack of Transportation (Non-Medical): No    Recent Concern: Transportation Needs - High Risk (3/16/2025)    Received from [facility] SDOH Screening     Has lack of transportation kept you from medical appointments, meetings, work or from getting things needed for daily living? choose all that apply.: Yes, it has kept me from non-medical meetings, appointments, work or from getting things that i need     Has lack of transportation kept you from medical appointments, meetings, work or from getting things needed for daily living? choose all that apply.: Yes, it has kept me from medical appointments or from getting my medications   Physical Activity: Inactive (4/1/2025)    Exercise Vital Sign     Days of Exercise per Week: 0 days     Minutes of Exercise per Session: 10 min   Stress: No Stress Concern Present (5/8/2025)    [employer] [place] [employer] - Occupational Stress Questionnaire     Feeling of Stress : Not at all   Housing Stability: Low Risk  (5/12/2025)    Housing Stability Vital Sign     Unable to Pay for Housing in the Last Year: No     Number of Times Moved in the Last Year: 0     Homeless in the Last Year: No

## 2025-05-21 NOTE — PROGRESS NOTES
Inpatient Nutrition Assessment    Admit Date: 5/8/2025   Total duration of encounter: 13 days   Patient Age: 79 y.o.    Nutrition Recommendation/Prescription     If able to extubate post procedure, advance diet as appropriate, goal diet: cardiac.   Would also benefit from ONS once diet advanced.    If not able to extubate, consider starting TF.    Peptamen Intense VHP goal rate 80 ml/hr to provide  1600 kcal/d  (100% est needs)  148 g protein/d (91% est needs)  1344 ml free water/d (83% est needs)  (calculations based on estimated 20 hr/d run time)     If no IV fluids running, can give 50ml q 4hr water flushes. Total water provided: 1644ml (102% est needs.)     Due to dx of malnutrition, pt at risk for refeeding syndrome. Start TF @ 20ml/hr and increase as tolerated 10ml/hr q8hr until goal rate reached.   May also need to consider additional MVI and thiamine per MD.      Communication of Recommendations: reviewed with nurse    Nutrition Assessment     Malnutrition Assessment/Nutrition-Focused Physical Exam    Malnutrition Context: acute illness or injury (05/19/25 1246)  Malnutrition Level: moderate (05/19/25 1246)  Energy Intake (Malnutrition): other (see comments) (Does not meet criteria) (05/19/25 1246)  Weight Loss (Malnutrition): other (see comments) (Unable to assess) (05/19/25 1246)              Muscle Mass (Malnutrition): mild depletion (05/19/25 1246)  [place] (Muscle Loss): mild depletion                       Fluid Accumulation (Malnutrition): moderate (05/19/25 1246)        A minimum of two characteristics is recommended for diagnosis of either severe or non-severe malnutrition.    Chart Review    Reason Seen: length of stay    Malnutrition Screening Tool Results   Have you recently lost weight without trying?: No  Have you been eating poorly because of a decreased appetite?: No   MST Score: 0   Diagnosis:  Acute decompensated heart failure with reduced ejection fraction  Persistent ventricular  tachycardia  Chronic atrial fibrillation     Relevant Medical History: Afib. CVA. HTN, PVD, CHF    Scheduled Medications:  doxycycline, 100 mg, BID  famotidine, 20 mg, BID  finasteride, 5 mg, Daily  heparin (porcine), 5,000 Units, Q8H  meropenem, 1 g, Q8H  methocarbamoL, 500 mg, Once  miconazole NITRATE 2 %, , BID  pravastatin, 40 mg, Daily    Continuous Infusions:  amiodarone in dextrose 5%, Last Rate: 1 mg/min (05/21/25 0954)  LIDOcaine, Last Rate: 7.5 mL/hr at 05/10/25 1935  LIDOcaine, Last Rate: 1 mg/min (05/21/25 0546)  NORepinephrine bitartrate-D5W, Last Rate: Stopped (05/20/25 1454)  propofoL, Last Rate: 10 mcg/kg/min (05/21/25 0546)    PRN Medications:  acetaminophen, 650 mg, Q4H PRN  HYDROcodone-acetaminophen, 1 tablet, Q4H PRN  LIDOcaine, , Continuous PRN  morphine, 2 mg, Q4H PRN  ondansetron, 8 mg, Q8H PRN  sodium chloride 0.9%, 10 mL, PRN  sodium chloride 0.9%, 10 mL, PRN  sodium chloride 0.9%, 10 mL, Q12H PRN  vancomycin (VANCOCIN) 1,000 mg in D5W 250 mL IVPB (admixture device), 1,000 mg, On Call Procedure    Calorie Containing IV Medications: Diprivan @ 7 ml/hr (provides 185 kcal/d)    Recent Labs   Lab 05/15/25  0336 05/16/25  0259 05/16/25  2004 05/17/25  0315 05/18/25  0231 05/19/25  0628 05/20/25  0610 05/21/25  0312   * 135* 135* 136 139 140 139 138   K 3.7 3.9 4.2 4.1 3.8 4.1 3.6 3.9   CALCIUM 8.3* 8.4* 8.1* 8.1* 8.2* 8.2* 8.1* 7.7*   PHOS 2.5 3.3  --  4.1 3.6 3.4 3.1 2.7   MG 1.80 1.70  --  2.60 2.50 2.10 2.00 1.90    104 104 105 108* 107 106 106   CO2 24 24 20* 21* 22* 22* 25 21*   BUN 12.2 16.5 15.9 17.0 21.4 19.3 19.5 21.4   CREATININE 0.89 1.02 1.09 1.14 1.06 1.06 0.88 0.88   EGFRNORACEVR >60 >60 >60 >60 >60 >60 >60 >60    109 141* 192* 130* 113 102 89   BILITOT 0.7 0.7 0.9 0.6 0.5 0.6 0.6 0.5   ALKPHOS 57 72 70 75 73 88 85 86   ALT 22 20 21 24 20 16 13 13   AST 19 19 60* 81* 39 23 17 18   ALBUMIN 2.4* 2.3* 2.2* 2.3* 2.1* 2.0* 1.9* 1.9*   WBC 9.30 10.51 14.54* 18.32*  "14.07* 15.96* 11.97* 10.84   HGB 13.0* 12.5* 11.6* 12.6* 12.4* 12.9* 11.7* 11.5*   HCT 40.4* 38.2* 36.1* 38.7* 38.2* 39.3* 37.0* 38.1*     Nutrition Orders:  Diet NPO      Appetite/Oral Intake: NPO/NPO  Factors Affecting Nutritional Intake: none identified  Social Needs Impacting Access to Food: unable to assess at this time; will attempt on follow-up  Food/Rastafarian/Cultural Preferences: unable to obtain  Food Allergies: no known food allergies  Last Bowel Movement: 05/16/25  Wound(s):  None documented     Comments    5/15/25: Pt with 100% po intake of liquid diet. Stated appetite good. Unable to obtain further info or complete physical assessment. Interrupted by MD at time of visit. Will attempt upon F/U.     5/19/25: Pt now intubate.d Possibly going for procedure vs extubation today. Will provide TF recommendation in case needed. Pt also meets criteria for intake in malnutrition assessment, Junum not updating though. No kcal from meds.     5/21/25: Still no TF running. Plans for procedure today with possible extubation after, per RN. Receiving small amount of kcal from meds.     Anthropometrics    Height: 6' 0.01" (182.9 cm), Height Method: Measured  Last Weight: 115 kg (253 lb 8.5 oz) (05/12/25 1033), Weight Method: Bed Scale  BMI (Calculated): 34.4  BMI Classification: obese grade I (BMI 30-34.9)        Ideal Body Weight (IBW), Male: 178.06 lb     % Ideal Body Weight, Male (lb): 142.43 %                          Usual Weight Provided By: unable to obtain usual weight    Wt Readings from Last 5 Encounters:   05/12/25 115 kg (253 lb 8.5 oz)   04/02/25 113.4 kg (250 lb)   09/23/24 107 kg (236 lb)   03/06/24 108.4 kg (239 lb)   09/06/23 119.7 kg (264 lb)     Weight Change(s) Since Admission:   Wt Readings from Last 1 Encounters:   05/12/25 1033 115 kg (253 lb 8.5 oz)   05/08/25 1750 115 kg (253 lb 8.5 oz)   Admit Weight: 115 kg (253 lb 8.5 oz) (05/08/25 1750), Weight Method: Bed Scale    Estimated Needs    Weight " Used For Calorie Calculations: 115 kg (253 lb 8.5 oz)  Energy Calorie Requirements (kcal): 1265-1610kcal (11-14kcal/kg)  Energy Need Method: Kcal/kg  Weight Used For Protein Calculations: 80.9 kg (178 lb 5.6 oz) (IBW)  Protein Requirements: 162gm (2g/kg IBW)  Fluid Requirements (mL): 1610ml (1ml/kcal)  CHO Requirement: 180gm (45% est kcal needs)     Enteral Nutrition     Patient not receiving enteral nutrition at this time.    Parenteral Nutrition     Patient not receiving parenteral nutrition support at this time.    Evaluation of Received Nutrient Intake    Calories: not meeting estimated needs  Protein: not meeting estimated needs    Patient Education     Not applicable.    Nutrition Diagnosis     PES: Inadequate energy intake related to acute illness as evidenced by NPO or liquid diet since 5/9/25. (active)     PES: Moderate acute disease or injury related malnutrition Related to current condition As Evidenced by:  - weight loss: Unable to assess - muscle mass depletion: 1 area of mild muscle loss (Temporalis) - fluid accumulation: 2 areas of moderate or severe fluid accumulation (Lower extremities edema, Upper extremities edema) active    Nutrition Interventions     Intervention(s): modified composition of enteral nutrition, modified rate of enteral nutrition, and collaboration with other providers  Intervention(s):      Goal: Meet greater than 80% of nutritional needs by follow-up. (goal progressing)  Goal: Consume % of meals/snacks by follow-up. (goal discontinued)    Nutrition Goals & Monitoring     Dietitian will monitor: energy intake and enteral nutrition intake  Discharge planning: resume home regimen  Nutrition Risk/Follow-Up: patient at increased nutrition risk; dietitian will follow-up twice weekly   Please consult if re-assessment needed sooner.

## 2025-05-21 NOTE — TRANSFER OF CARE
"Anesthesia Transfer of Care Note    Patient: Alcides Rosario    Procedure(s) Performed: Procedure(s) (LRB):  Study possible ablation (N/A)    Patient location: ICU    Anesthesia Type: general    Transport from OR: Transported from OR intubated on 100% O2 by AMBU with adequate controlled ventilation    Post pain: adequate analgesia    Post assessment: no apparent anesthetic complications and tolerated procedure well    Post vital signs: stable    Level of consciousness: sedated    Nausea/Vomiting: no nausea/vomiting    Complications: none    Transfer of care protocol was followed    Last vitals: Visit Vitals  BP 90/69 (BP Location: Left arm, Patient Position: Lying)   Pulse 103   Temp 36.8 °C (98.3 °F) (Oral)   Resp (!) 24   Ht 6' 0.01" (1.829 m)   Wt 115 kg (253 lb 8.5 oz)   SpO2 100%   BMI 34.38 kg/m²     "

## 2025-05-22 LAB
ALBUMIN SERPL-MCNC: 1.8 G/DL (ref 3.4–4.8)
ALBUMIN/GLOB SERPL: 0.5 RATIO (ref 1.1–2)
ALLENS TEST BLOOD GAS (OHS): ABNORMAL
ALP SERPL-CCNC: 100 UNIT/L (ref 40–150)
ALT SERPL-CCNC: 16 UNIT/L (ref 0–55)
ANION GAP SERPL CALC-SCNC: 8 MEQ/L
AST SERPL-CCNC: 66 UNIT/L (ref 11–45)
BASE EXCESS BLD CALC-SCNC: 3.8 MMOL/L (ref -2–2)
BASOPHILS # BLD AUTO: 0.04 X10(3)/MCL
BASOPHILS NFR BLD AUTO: 0.4 %
BILIRUB SERPL-MCNC: 0.5 MG/DL
BLOOD GAS SAMPLE TYPE (OHS): ABNORMAL
BUN SERPL-MCNC: 21.1 MG/DL (ref 8.4–25.7)
CA-I BLD-SCNC: 1.09 MMOL/L (ref 1.12–1.23)
CALCIUM SERPL-MCNC: 8 MG/DL (ref 8.8–10)
CHLORIDE SERPL-SCNC: 106 MMOL/L (ref 98–107)
CO2 BLDA-SCNC: 28.9 MMOL/L
CO2 SERPL-SCNC: 23 MMOL/L (ref 23–31)
COHGB MFR BLDA: 1.3 % (ref 0.5–1.5)
CREAT SERPL-MCNC: 0.77 MG/DL (ref 0.72–1.25)
CREAT/UREA NIT SERPL: 27
DRAWN BY BLOOD GAS (OHS): ABNORMAL
EOSINOPHIL # BLD AUTO: 0.08 X10(3)/MCL (ref 0–0.9)
EOSINOPHIL NFR BLD AUTO: 0.8 %
ERYTHROCYTE [DISTWIDTH] IN BLOOD BY AUTOMATED COUNT: 15 % (ref 11.5–17)
GFR SERPLBLD CREATININE-BSD FMLA CKD-EPI: >60 ML/MIN/1.73/M2
GLOBULIN SER-MCNC: 3.8 GM/DL (ref 2.4–3.5)
GLUCOSE SERPL-MCNC: 102 MG/DL (ref 82–115)
HCO3 BLDA-SCNC: 27.7 MMOL/L (ref 22–26)
HCT VFR BLD AUTO: 36.7 % (ref 42–52)
HGB BLD-MCNC: 11.2 G/DL (ref 14–18)
IMM GRANULOCYTES # BLD AUTO: 0.11 X10(3)/MCL (ref 0–0.04)
IMM GRANULOCYTES NFR BLD AUTO: 1.1 %
INHALED O2 CONCENTRATION: 50 %
LYMPHOCYTES # BLD AUTO: 0.65 X10(3)/MCL (ref 0.6–4.6)
LYMPHOCYTES NFR BLD AUTO: 6.5 %
MAGNESIUM SERPL-MCNC: 2 MG/DL (ref 1.6–2.6)
MCH RBC QN AUTO: 28.9 PG (ref 27–31)
MCHC RBC AUTO-ENTMCNC: 30.5 G/DL (ref 33–36)
MCV RBC AUTO: 94.8 FL (ref 80–94)
MECH RR (OHS): 12 B/MIN
METHGB MFR BLDA: 1 % (ref 0.4–1.5)
MODE (OHS): ABNORMAL
MONOCYTES # BLD AUTO: 1.11 X10(3)/MCL (ref 0.1–1.3)
MONOCYTES NFR BLD AUTO: 11.2 %
NEUTROPHILS # BLD AUTO: 7.94 X10(3)/MCL (ref 2.1–9.2)
NEUTROPHILS NFR BLD AUTO: 80 %
NRBC BLD AUTO-RTO: 0 %
O2 HB BLOOD GAS (OHS): 96.8 % (ref 94–97)
OHS QRS DURATION: 188 MS
OHS QTC CALCULATION: 634 MS
OXYGEN DEVICE BLOOD GAS (OHS): ABNORMAL
OXYHGB MFR BLDA: 10.7 G/DL (ref 12–16)
PCO2 BLDA: 38 MMHG (ref 35–45)
PEEP RESPIRATORY: 5 CMH2O
PH BLDA: 7.47 [PH] (ref 7.35–7.45)
PHOSPHATE SERPL-MCNC: 3.4 MG/DL (ref 2.3–4.7)
PLATELET # BLD AUTO: 288 X10(3)/MCL (ref 130–400)
PMV BLD AUTO: 10.7 FL (ref 7.4–10.4)
PO2 BLDA: 167 MMHG (ref 80–100)
POTASSIUM BLOOD GAS (OHS): 3.5 MMOL/L (ref 3.5–5)
POTASSIUM SERPL-SCNC: 3.7 MMOL/L (ref 3.5–5.1)
PROT SERPL-MCNC: 5.6 GM/DL (ref 5.8–7.6)
PS (OHS): 10 CMH2O
RBC # BLD AUTO: 3.87 X10(6)/MCL (ref 4.7–6.1)
SAMPLE SITE BLOOD GAS (OHS): ABNORMAL
SAO2 % BLDA: 99.6 %
SODIUM BLOOD GAS (OHS): 134 MMOL/L (ref 137–145)
SODIUM SERPL-SCNC: 137 MMOL/L (ref 136–145)
SPONT+MECH VT ON VENT: 500 ML
WBC # BLD AUTO: 9.93 X10(3)/MCL (ref 4.5–11.5)

## 2025-05-22 PROCEDURE — 31720 CLEARANCE OF AIRWAYS: CPT

## 2025-05-22 PROCEDURE — 63600175 PHARM REV CODE 636 W HCPCS: Performed by: STUDENT IN AN ORGANIZED HEALTH CARE EDUCATION/TRAINING PROGRAM

## 2025-05-22 PROCEDURE — 94760 N-INVAS EAR/PLS OXIMETRY 1: CPT | Mod: XB

## 2025-05-22 PROCEDURE — 82803 BLOOD GASES ANY COMBINATION: CPT

## 2025-05-22 PROCEDURE — 27100171 HC OXYGEN HIGH FLOW UP TO 24 HOURS

## 2025-05-22 PROCEDURE — 25000003 PHARM REV CODE 250: Performed by: INTERNAL MEDICINE

## 2025-05-22 PROCEDURE — 99900031 HC PATIENT EDUCATION (STAT)

## 2025-05-22 PROCEDURE — 85025 COMPLETE CBC W/AUTO DIFF WBC: CPT

## 2025-05-22 PROCEDURE — 63600175 PHARM REV CODE 636 W HCPCS

## 2025-05-22 PROCEDURE — 84100 ASSAY OF PHOSPHORUS: CPT

## 2025-05-22 PROCEDURE — 94003 VENT MGMT INPAT SUBQ DAY: CPT

## 2025-05-22 PROCEDURE — 83735 ASSAY OF MAGNESIUM: CPT

## 2025-05-22 PROCEDURE — 99900026 HC AIRWAY MAINTENANCE (STAT)

## 2025-05-22 PROCEDURE — 20000000 HC ICU ROOM

## 2025-05-22 PROCEDURE — 92610 EVALUATE SWALLOWING FUNCTION: CPT

## 2025-05-22 PROCEDURE — 37799 UNLISTED PX VASCULAR SURGERY: CPT

## 2025-05-22 PROCEDURE — 25000003 PHARM REV CODE 250: Performed by: NURSE PRACTITIONER

## 2025-05-22 PROCEDURE — 94761 N-INVAS EAR/PLS OXIMETRY MLT: CPT | Mod: XB

## 2025-05-22 PROCEDURE — 25000003 PHARM REV CODE 250

## 2025-05-22 PROCEDURE — 99900035 HC TECH TIME PER 15 MIN (STAT)

## 2025-05-22 PROCEDURE — 27000221 HC OXYGEN, UP TO 24 HOURS

## 2025-05-22 PROCEDURE — 36415 COLL VENOUS BLD VENIPUNCTURE: CPT

## 2025-05-22 PROCEDURE — 80053 COMPREHEN METABOLIC PANEL: CPT

## 2025-05-22 PROCEDURE — 63600175 PHARM REV CODE 636 W HCPCS: Performed by: INTERNAL MEDICINE

## 2025-05-22 RX ORDER — POTASSIUM CHLORIDE 20 MEQ/1
40 TABLET, EXTENDED RELEASE ORAL ONCE
Status: COMPLETED | OUTPATIENT
Start: 2025-05-22 | End: 2025-05-22

## 2025-05-22 RX ORDER — MEXILETINE HYDROCHLORIDE 200 MG/1
200 CAPSULE ORAL EVERY 8 HOURS
Status: DISCONTINUED | OUTPATIENT
Start: 2025-05-22 | End: 2025-06-02

## 2025-05-22 RX ORDER — FUROSEMIDE 10 MG/ML
80 INJECTION INTRAMUSCULAR; INTRAVENOUS ONCE
Status: COMPLETED | OUTPATIENT
Start: 2025-05-22 | End: 2025-05-22

## 2025-05-22 RX ORDER — AMOXICILLIN 250 MG
1 CAPSULE ORAL DAILY
Status: DISCONTINUED | OUTPATIENT
Start: 2025-05-22 | End: 2025-06-02

## 2025-05-22 RX ORDER — AMIODARONE HYDROCHLORIDE 200 MG/1
200 TABLET ORAL DAILY
Status: DISCONTINUED | OUTPATIENT
Start: 2025-05-22 | End: 2025-06-01

## 2025-05-22 RX ADMIN — PROPOFOL 15 MCG/KG/MIN: 10 INJECTION, EMULSION INTRAVENOUS at 12:05

## 2025-05-22 RX ADMIN — POTASSIUM CHLORIDE 40 MEQ: 1500 TABLET, EXTENDED RELEASE ORAL at 03:05

## 2025-05-22 RX ADMIN — HEPARIN SODIUM 5000 UNITS: 5000 INJECTION, SOLUTION INTRAVENOUS; SUBCUTANEOUS at 03:05

## 2025-05-22 RX ADMIN — MEROPENEM 1 G: 1 INJECTION, POWDER, FOR SOLUTION INTRAVENOUS at 10:05

## 2025-05-22 RX ADMIN — HEPARIN SODIUM 5000 UNITS: 5000 INJECTION, SOLUTION INTRAVENOUS; SUBCUTANEOUS at 05:05

## 2025-05-22 RX ADMIN — SENNOSIDES AND DOCUSATE SODIUM 1 TABLET: 50; 8.6 TABLET ORAL at 08:05

## 2025-05-22 RX ADMIN — MICONAZOLE NITRATE: 20 POWDER TOPICAL at 08:05

## 2025-05-22 RX ADMIN — FAMOTIDINE 20 MG: 20 TABLET, FILM COATED ORAL at 08:05

## 2025-05-22 RX ADMIN — MEROPENEM 1 G: 1 INJECTION, POWDER, FOR SOLUTION INTRAVENOUS at 05:05

## 2025-05-22 RX ADMIN — MEXILETINE HYDROCHLORIDE 200 MG: 200 CAPSULE ORAL at 09:05

## 2025-05-22 RX ADMIN — AMIODARONE HYDROCHLORIDE 1 MG/MIN: 1.8 INJECTION, SOLUTION INTRAVENOUS at 12:05

## 2025-05-22 RX ADMIN — MEXILETINE HYDROCHLORIDE 200 MG: 200 CAPSULE ORAL at 02:05

## 2025-05-22 RX ADMIN — FINASTERIDE 5 MG: 5 TABLET, FILM COATED ORAL at 08:05

## 2025-05-22 RX ADMIN — AMIODARONE HYDROCHLORIDE 360 MG: 1.8 INJECTION, SOLUTION INTRAVENOUS at 12:05

## 2025-05-22 RX ADMIN — AMIODARONE HYDROCHLORIDE 200 MG: 200 TABLET ORAL at 02:05

## 2025-05-22 RX ADMIN — MEROPENEM 1 G: 1 INJECTION, POWDER, FOR SOLUTION INTRAVENOUS at 02:05

## 2025-05-22 RX ADMIN — HEPARIN SODIUM 5000 UNITS: 5000 INJECTION, SOLUTION INTRAVENOUS; SUBCUTANEOUS at 09:05

## 2025-05-22 RX ADMIN — PRAVASTATIN SODIUM 40 MG: 10 TABLET ORAL at 08:05

## 2025-05-22 RX ADMIN — FUROSEMIDE 80 MG: 10 INJECTION, SOLUTION INTRAVENOUS at 08:05

## 2025-05-22 RX ADMIN — AMIODARONE HYDROCHLORIDE 1 MG/MIN: 1.8 INJECTION, SOLUTION INTRAVENOUS at 08:05

## 2025-05-22 NOTE — PROGRESS NOTES
[facility]   Cardiology  Progress Note    Patient Name: Alcides Rosario  MRN: [id number]  Admission Date: 5/8/2025  Hospital Length of Stay: 14 days  Code Status: Full Code   Attending Physician: Mynor Oropeza MD   Primary Care Physician: Maycol Mcleod II, MD  Expected Discharge Date:   Principal Problem:<principal problem not specified>    Subjective:     Brief HPI: This is an 79-year-old male, who is known to Dr. Bravo, with a history of sick sinus syndrome/ppm, chronic systolic heart failure, PAF, HTN, HLD, CAD, PVD.  [place] [facility] [place] after syncopal episode.  Patient reported the has been having epigastric discomfort/pressure before leaving a restaurant.  His heart rate on seen was 190 beats per minute.  He was given 300 mg of amiodarone by EMS followed by synchronized cardioversion in the emergency room which only briefly improved his rate.  He was found to be in VT.  Echo at outside facility revealed an ejection fraction of 10%.  He was transferred to [facility] for higher level of care.  Plan was noted to have patient undergo left heart catheterization with Impella placement and EP study with VT ablation.  [facility] has been consulted for this reason.     Hospital Course:   5.10.25: NAD noted. Slow VT still on monitor. Impella in place. Bilateral groin benign. Denies CP/SOB/Palps.  5.11.25: NAD noted. Wide complex tachycardia on tele. Attempted Esmolol yesterday but had to be DCd  secondary to hypotension. Remains with Impella  5.12.25: NAD noted. WCT on tele. Remains on Amio and Lidocaine. NPO for VT ablation today. Denies CP/SOB/Palps.  5.13.25: NAD noted. WCT on tele. ON Amio and Lidocaine. Denies CP/SOB/palps. Impella in place.  5.14.25: NAD noted. WCT on tele. Remains on Lidocaine. Denies CP/sOB/palps. Impella removed.  5.15.25: NAD noted. ST with trigeminal. Denies CP/SOB/PALPS.    5.16.25: NAD noted. ST on tele. Denies  CP/SOB/palps. NPO for ICD today  5.17.25: NAD. Vented/Sedated. Intermittent VT.  ICD Upgrade/VT Ablation on 5.16.25 5.18.25: NAD. Vented/Sedated. Continues to have Intermittent VT/ST. Amiodarone 1mg/min, Lidocaine 1mg/min, Levophed 0.05mcg/kg/min   5.19.25: Vented and sedated. Still with intermittent VT on tele. Remains on IV amio, Levophed, and Lidocaine.   5.20.25: Vented/sedated. WCT with rates in the low 100s -110s. Remains on IV Amio, Levo, and Lidocaine  5.21.25: Vented/sedated. WCT on tele with rates in the 100s. Remains on IV Amio, Levo and Lidocaine  5.22.25: Extubated and on NC. WCT in the low 100s today. Denies CP/sOB/Palps. Right groin benign.    PMH: SSS/PPM, Chronic Systolic HF, PAF, HTN, HLD, CAD, PVD  PSH: PPM (SJM), Tonsillectomy, Embolectomy, LHC  Social History:  Former tobacco use, denies EtOH and illicit drug use  Family History:  Mother-MI; brother-CAD     Previous Cardiac Diagnostics:   EP Study/VT 5.16.25:  3D mapping performed with Ensite.  Intracardiac echo.  Transeptal puncture.  VT ablation  Successful Implantation of BiV ICD ([employer])    ECHO Limited 5.9.25  Limited echo to check impella placement.  Impella is seated 3.9 cm from aortic valve annulus.    L/RHC 5.9.25  The Prox Cx to Dist Cx lesion was 50% stenosed.  However, the IFR was 0.96, indicating the absence of any obstructive coronary artery disease at this level.  The Prox LAD to Dist LAD lesion was 60% stenosed.  However, the IFR was 0.96, indicating the absence of any obstructive coronary artery disease at this level.  The ejection fraction was calculated to be 15%.  There was severe left ventricular systolic dysfunction.  The left ventricular end diastolic pressure was severely elevated.  The pre-procedure left ventricular end diastolic pressure was 23.  The estimated blood loss was none.  There was single vessel coronary artery disease.  There was trivial (1+) mitral regurgitation.  There was no aortic valve stenosis.  The  right coronary artery showed a chronic total occlusion, starting almost at the ostium, which has been present for many years.  Strong distal collaterals from the left coronary system.    ECHO 7.25.24  The study quality is average.   Global left ventricular systolic function is mildly decreased. The left ventricular ejection fraction is 50%. Left ventricular diastolic function is indeterminate. Noted left ventricular hypertrophy. It is severe.  Moderate (2+) mitral regurgitation. ERO-A0.21cm^2  Mild (1+) pulmonic regurgitation. Mild (1+) tricuspid regurgitation.   The left atrial diameter is moderately increased 5.2 cms.  The estimated right atrial pressure is 15 mmHg.      PET 12.29.22  This is an abnormal perfusion study. Study is consistent with ischemia.   This scan is suggestive of moderate risk for future cardiovascular events.   Small partially reversible perfusion abnormality of severe intensity in the inferior lateral region.   The left ventricular cavity is noted to be moderately enlarged on the stress studies. The stress left ventricular ejection fraction was calculated to be 40% and left ventricular global function is mildly reduced. The rest left ventricular cavity is noted to be moderately enlarged. The rest left ventricular ejection fraction was calculated to be 32% and rest left ventricular global function is moderately reduced.   When compared to the resting ejection fraction (32%), the stress ejection fraction (40%) has increased.   The study quality is good.   There was a rise in myocardial blood flow between rest and stress.  Global myocardial blood flow reserve was 1.97.  Myocardial blood flow reserve is globally abnormal, placing the patient at a higher coronary event risk.    Review of Systems   Constitutional: Negative for chills and fever.   Cardiovascular:  Negative for chest pain and palpitations.   Respiratory:  Negative for shortness of breath.    Psychiatric/Behavioral:  Negative for  altered mental status.      Objective:     Vital Signs (Most Recent):  Temp: 97.9 °F (36.6 °C) (05/22/25 0800)  Pulse: 106 (05/22/25 0900)  Resp: (!) 21 (05/22/25 0900)  BP: 111/79 (05/22/25 0800)  SpO2: 99 % (05/22/25 0900) Vital Signs (24h Range):  Temp:  [97 °F (36.1 °C)-98.8 °F (37.1 °C)] 97.9 °F (36.6 °C)  Pulse:  [100-107] 106  Resp:  [10-31] 21  SpO2:  [94 %-100 %] 99 %  BP: ()/(63-82) 111/79  Arterial Line BP: ()/(49-85) 81/75     Weight: 115 kg (253 lb 8.5 oz)  Body mass index is 34.38 kg/m².    SpO2: 99 %         Intake/Output Summary (Last 24 hours) at 5/22/2025 1109  Last data filed at 5/22/2025 0841  Gross per 24 hour   Intake 2410.58 ml   Output 1035 ml   Net 1375.58 ml     Lines/Drains/Airways       Central Venous Catheter Line  Duration             Tunneled Central Line - Triple Lumen 05/16/25 2000 Femoral Vein Left 5 days              Drain  Duration                  Urethral Catheter 05/09/25 1500 12 days         NG/OG Tube 05/17/25 1900 Center mouth 4 days              Airway  Duration                  Airway - Non-Surgical 05/16/25 1912 Endotracheal Tube 5 days              Peripheral Intravenous Line  Duration                  Peripheral IV - Single Lumen 05/08/25 2100 20 G 2 1/4 in Anterior;Right;Lateral Upper Arm 13 days         Peripheral IV - Single Lumen 05/16/25 1211 22 G Left Antecubital 5 days                  Significant Labs: CMP   Recent Labs   Lab 05/21/25  0312 05/22/25  0329    137   K 3.9 3.7    106   CO2 21* 23   GLU 89 102   BUN 21.4 21.1   CREATININE 0.88 0.77   CALCIUM 7.7* 8.0*   PROT 5.0* 5.6*   ALBUMIN 1.9* 1.8*   BILITOT 0.5 0.5   ALKPHOS 86 100   AST 18 66*   ALT 13 16    and CBC   Recent Labs   Lab 05/21/25  0312 05/22/25  0328   WBC 10.84 9.93   HGB 11.5* 11.2*   HCT 38.1* 36.7*    288     Telemetry:  ST with NSVT    Physical Exam  Constitutional:       General: He is not in acute distress.     Appearance: Normal appearance. He is obese.    HENT:      Head: Normocephalic.      Mouth/Throat:      Mouth: Mucous membranes are dry.   Cardiovascular:      Rate and Rhythm: Tachycardia present. Rhythm irregular.      Pulses: Normal pulses.      Heart sounds: Normal heart sounds. No murmur heard.  Pulmonary:      Effort: Pulmonary effort is normal. No respiratory distress.      Comments:     Abdominal:      Palpations: Abdomen is soft.   Skin:     General: Skin is warm and dry.      Comments: L CW Pacer Site Dressing C/D/I. Bilateral Groins Soft/Flat, Non-Tender, No Sign of Bleed/Infection. +2 BLE Palpable Pedal Pulses    Neurological:      General: No focal deficit present.       Current Inpatient Medications:  Current Medications[1]    Assessment:   VT requiring Multiple Antiarrhythmics     - s/p (5.16.25) - EP Study and Successful VT Ablation and Device Upgrade to BiV ICD (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%  Syncope  PAF - Now WCT    - CHADsVASc - 5 Points - 7.2% Stroke Risk per Year   SSS/PPM (SJM)  HTN  HLD  Leukocytosis   Chronic Systolic HF/EF 20-25%    Plan:   DC IV Amiodarone and Lidocaine  Start Amiodarone 200mg daily  Start Mexilitine 200mg q8h  Add GDMT for when BP/HR Allows   Keep K > 4.0 and Mg > 2.0   Will Continue to Follow  Labs and EKG in AM: CBC, CMP and Mg    LAUREL Rios-BC  Cardiology  [facility]  05/22/2025  Physician addendum:  I have seen and examined this patient as a split-shared visit with the ANGELA d/t complicated medical management of above problems written in assessment and high acuity requiring physician expertise in medical decision-making. I performed the substantive portion of the history and exam. Above medical decision-making is also formulated by me.    Cardiovascular exam:  S1,  S2  Lungs:  fine crackles at bases.  Extremities:  + edema bilaterally    Status post VT ablation  Plan:  Medications as above.  Continue supportive therapy.     Asad Salinas MD  Cardiologist           [1]   Current Facility-Administered Medications:     acetaminophen tablet 650 mg, 650 mg, Oral, Q4H PRN, Asad Randle MD, 650 mg at 05/14/25 2348    amiodarone tablet 200 mg, 200 mg, Oral, Daily, Jhon Ramsey, [facility]    famotidine tablet 20 mg, 20 mg, Oral, BID, Mynor Oropeza MD, 20 mg at 05/22/25 0821    finasteride tablet 5 mg, 5 mg, Oral, Daily, Misha Johansen DO, 5 mg at 05/22/25 0822    heparin (porcine) injection 5,000 Units, 5,000 Units, Subcutaneous, Q8H, Mynor Oropeza MD, 5,000 Units at 05/22/25 0500    HYDROcodone-acetaminophen 5-325 mg per tablet 1 tablet, 1 tablet, Oral, Q4H PRN, Isac Samayoa MD    LIDOcaine 2000 mg in D5W 250 mL infusion, , , Continuous PRN, Lalo Dominguez MD, Last Rate: 7.5 mL/hr at 05/10/25 1935, Rate Verify at 05/10/25 1935    meropenem injection 1 g, 1 g, Intravenous, Q8H, Willam Brito MD, 1 g at 05/22/25 1055    methocarbamoL tablet 500 mg, 500 mg, Oral, Once, Shawn Olivas MD    mexiletine capsule 200 mg, 200 mg, Oral, Q8H, Jhon Ramsey [facility]-BC    miconazole NITRATE 2 % top powder, , Topical (Top), BID, Asad Randle MD, Given at 05/22/25 0812    morphine injection 2 mg, 2 mg, Intravenous, Q4H PRN, Isac Samayoa MD    NORepinephrine 8 mg in dextrose 5% 250 mL infusion, 0-3 mcg/kg/min, Intravenous, Continuous, Mynor Oropeza MD, Stopped at 05/21/25 1702    ondansetron disintegrating tablet 8 mg, 8 mg, Oral, Q8H PRN, Asad Randle MD    pravastatin tablet 40 mg, 40 mg, Oral, Daily, Misha Johansen DO, 40 mg at 05/22/25 0821    propofol (DIPRIVAN) 10 mg/mL infusion, 0-50 mcg/kg/min, Intravenous, Continuous, Isac Samayoa MD, Stopped at 05/22/25 0613    sodium chloride 0.9% flush 10 mL, 10 mL, Intravenous, PRN, Misha Johansen DO     sodium chloride 0.9% flush 10 mL, 10 mL, Intravenous, PRN, Jhon Ramsey, [facility]-BC    Flushing PICC/Midline Protocol, , , Until Discontinued **AND** sodium chloride 0.9% flush 10 mL, 10 mL, Intravenous, Q12H LOIS, Isac Samayoa MD

## 2025-05-22 NOTE — NURSING
Art line removed, not correlating, line flat. Pressure held. Gauze and tape on puncture. No redness, swelling or drainage.

## 2025-05-22 NOTE — PT/OT/SLP EVAL
"[facility]  Speech Language Pathology Department  Clinical Swallow Evaluation    Patient Name:  Alcides Rosario   MRN:  [id number]    Recommendations     General recommendations:  Modified Barium Swallow Study  Solid texture recommendation:  NPO  Liquid consistency recommendation: NPO   Medications: crushed in puree  Precautions: aspiration    History     Alcides Rosario is a/n 79 y.o. male admitted with NSTEMI type II due to VT.  S/p ICD placement and VT ablations.    Past Medical History:   Diagnosis Date    Atrial fibrillation     HTN (hypertension)     Peripheral vascular disease, unspecified      Past Surgical History:   Procedure Laterality Date    ABLATION N/A 5/16/2025    Procedure: Ablation;  Surgeon: Isac Samayoa MD;  Location: [facility] CATH LAB;  Service: Cardiology;  Laterality: N/A;  VT ABLATION W/ ANEST.    CARDIAC ELECTROPHYSIOLOGY STUDY N/A 5/21/2025    Procedure: Study possible ablation;  Surgeon: Isac Samayoa MD;  Location: [facility] CATH LAB;  Service: Cardiology;  Laterality: N/A;    IMPELLA, REMOVAL Left 5/13/2025    Procedure: Impella, Removal;  Surgeon: Asad Randle MD;  Location: [facility] CATH LAB;  Service: Cardiology;  Laterality: Left;    INSERTION OF PACEMAKER N/A 3/31/2023    Procedure: INSERTION, PACEMAKER;  Surgeon: Joaquin Bravo MD;  Location: [facility] CATH LAB;  Service: Cardiology;  Laterality: N/A;  single chamber PPM    LEFT HEART CATHETERIZATION Left 5/9/2025    Procedure: Left heart cath;  Surgeon: Lalo Dominguez MD;  Location: [facility] CATH LAB;  Service: Cardiology;  Laterality: Left;    RIGHT HEART CATHETERIZATION Right 5/9/2025    Procedure: INSERTION, CATHETER, RIGHT HEART;  Surgeon: Lalo Dominguez MD;  Location: [facility] CATH LAB;  Service: Cardiology;  Laterality: Right;     Prior Intubation HX:  intubated 5/17/25, extubated 5/22/25    Current method of nutrition: NPO    Patient complaint: "I want some water."    Imaging   Results for orders placed during the " "hospital encounter of 05/08/25    X-Ray Chest 1 View    Narrative  EXAMINATION:  XR CHEST 1 VIEW    CLINICAL HISTORY:  intubated;    COMPARISON:  16 May 2025    FINDINGS:  Frontal view of the chest was obtained. Support structures are in similar position.  Stable cardiomegaly.  Similar bilateral interstitial predominant opacities.  No pneumothorax.    Impression  Little interval change.      Electronically signed by: Javon Porter  Date:    05/20/2025  Time:    11:26    No results found for this or any previous visit.    No results found for this or any previous visit.    Subjective     Patient awake, alert, calm, and cooperative.    Patient goals: "I want water."   Spiritual/Cultural/Pentecostalism Beliefs/Practices that affect care: no    Pain/Comfort: Pain Rating 1: 0/10    Respiratory Status:  Nasal cannula    Restraints/positioning devices: none    Objective     ORAL MUSCULATURE  Dentition: edentulous  Secretion Management: adequate  Mucosal Quality: good  Facial Movement: general weakness  Buccal Strength & Mobility: impaired  Mandibular Strength & Mobility: WFL  Oral Labial Strength & Mobility: impaired retraction  Lingual Strength & Mobility: impaired strength  Vocal Quality: hoarse and wet    PO TRIALS  Consistency Fed By Oral Symptoms Pharyngeal Symptoms   Thin liquid by spoon SLP Bolus holding Wet vocal quality after swallow   Puree SLP Bolus holding None     Ok'd nursing to provide meds in puree.  Med given whole in puree (unable to be crushed) was administered by nursing and remained in oral cavity after the swallow however was able to be swallowed with an additional bite of pudding.    Assessment     Pt presents with signs/sx of aspiration.  Rec: NPO, ok for meds crushed in puree (if not crushable, ok to administer whole with additional bite of pudding after).  MBS warranted to further assess swallow function.    Outcome Measures     Functional Oral Intake Scale: 1 - Nothing by mouth    Goals "     Multidisciplinary Problems       SLP Goals       Not on file                  Education     Patient provided with verbal education regarding risks of aspiration.  Additional teaching is warranted.    Plan     SLP Follow-Up:      Patient to be seen:      Plan of Care expires:     Plan of Care reviewed with:  patient     Time Tracking     SLP Treatment Date:   05/22/25  Speech Start Time:  1500  Speech Stop Time:  1515     Speech Total Time (min):  15 min    Billable minutes:  Swallow and Oral Function Evaluation, 15 minutes     05/22/2025
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
## 2025-05-23 LAB
ALBUMIN SERPL-MCNC: 2.2 G/DL (ref 3.4–4.8)
ALBUMIN/GLOB SERPL: 0.6 RATIO (ref 1.1–2)
ALP SERPL-CCNC: 120 UNIT/L (ref 40–150)
ALT SERPL-CCNC: 18 UNIT/L (ref 0–55)
ANION GAP SERPL CALC-SCNC: 12 MEQ/L
AST SERPL-CCNC: 42 UNIT/L (ref 11–45)
BASOPHILS # BLD AUTO: 0.03 X10(3)/MCL
BASOPHILS NFR BLD AUTO: 0.7 %
BILIRUB SERPL-MCNC: 1 MG/DL
BUN SERPL-MCNC: 21.1 MG/DL (ref 8.4–25.7)
CALCIUM SERPL-MCNC: 8.2 MG/DL (ref 8.8–10)
CHLORIDE SERPL-SCNC: 109 MMOL/L (ref 98–107)
CO2 SERPL-SCNC: 23 MMOL/L (ref 23–31)
CREAT SERPL-MCNC: 0.78 MG/DL (ref 0.72–1.25)
CREAT/UREA NIT SERPL: 27
EOSINOPHIL # BLD AUTO: 0.04 X10(3)/MCL (ref 0–0.9)
EOSINOPHIL NFR BLD AUTO: 0.9 %
ERYTHROCYTE [DISTWIDTH] IN BLOOD BY AUTOMATED COUNT: 14.8 % (ref 11.5–17)
GFR SERPLBLD CREATININE-BSD FMLA CKD-EPI: >60 ML/MIN/1.73/M2
GLOBULIN SER-MCNC: 3.7 GM/DL (ref 2.4–3.5)
GLUCOSE SERPL-MCNC: 96 MG/DL (ref 82–115)
HCT VFR BLD AUTO: 39.4 % (ref 42–52)
HGB BLD-MCNC: 12.5 G/DL (ref 14–18)
IMM GRANULOCYTES # BLD AUTO: 0.07 X10(3)/MCL (ref 0–0.04)
IMM GRANULOCYTES NFR BLD AUTO: 1.5 %
LYMPHOCYTES # BLD AUTO: 0.47 X10(3)/MCL (ref 0.6–4.6)
LYMPHOCYTES NFR BLD AUTO: 10.4 %
MAGNESIUM SERPL-MCNC: 2 MG/DL (ref 1.6–2.6)
MCH RBC QN AUTO: 29.1 PG (ref 27–31)
MCHC RBC AUTO-ENTMCNC: 31.7 G/DL (ref 33–36)
MCV RBC AUTO: 91.6 FL (ref 80–94)
MONOCYTES # BLD AUTO: 0.1 X10(3)/MCL (ref 0.1–1.3)
MONOCYTES NFR BLD AUTO: 2.2 %
NEUTROPHILS # BLD AUTO: 3.82 X10(3)/MCL (ref 2.1–9.2)
NEUTROPHILS NFR BLD AUTO: 84.3 %
NRBC BLD AUTO-RTO: 0.4 %
PHOSPHATE SERPL-MCNC: 2.2 MG/DL (ref 2.3–4.7)
PLATELET # BLD AUTO: 263 X10(3)/MCL (ref 130–400)
PMV BLD AUTO: 10.9 FL (ref 7.4–10.4)
POTASSIUM SERPL-SCNC: 4.4 MMOL/L (ref 3.5–5.1)
PROT SERPL-MCNC: 5.9 GM/DL (ref 5.8–7.6)
RBC # BLD AUTO: 4.3 X10(6)/MCL (ref 4.7–6.1)
SODIUM SERPL-SCNC: 144 MMOL/L (ref 136–145)
WBC # BLD AUTO: 4.53 X10(3)/MCL (ref 4.5–11.5)

## 2025-05-23 PROCEDURE — 92611 MOTION FLUOROSCOPY/SWALLOW: CPT

## 2025-05-23 PROCEDURE — 25000003 PHARM REV CODE 250: Performed by: INTERNAL MEDICINE

## 2025-05-23 PROCEDURE — 97162 PT EVAL MOD COMPLEX 30 MIN: CPT

## 2025-05-23 PROCEDURE — 84100 ASSAY OF PHOSPHORUS: CPT

## 2025-05-23 PROCEDURE — A9698 NON-RAD CONTRAST MATERIALNOC: HCPCS | Performed by: INTERNAL MEDICINE

## 2025-05-23 PROCEDURE — 63600175 PHARM REV CODE 636 W HCPCS

## 2025-05-23 PROCEDURE — 20000000 HC ICU ROOM

## 2025-05-23 PROCEDURE — 97167 OT EVAL HIGH COMPLEX 60 MIN: CPT

## 2025-05-23 PROCEDURE — 25000003 PHARM REV CODE 250

## 2025-05-23 PROCEDURE — 25000003 PHARM REV CODE 250: Performed by: NURSE PRACTITIONER

## 2025-05-23 PROCEDURE — 94799 UNLISTED PULMONARY SVC/PX: CPT

## 2025-05-23 PROCEDURE — 63600175 PHARM REV CODE 636 W HCPCS: Performed by: INTERNAL MEDICINE

## 2025-05-23 PROCEDURE — 83735 ASSAY OF MAGNESIUM: CPT

## 2025-05-23 PROCEDURE — 99900035 HC TECH TIME PER 15 MIN (STAT)

## 2025-05-23 PROCEDURE — 25500020 PHARM REV CODE 255: Performed by: INTERNAL MEDICINE

## 2025-05-23 PROCEDURE — 80053 COMPREHEN METABOLIC PANEL: CPT

## 2025-05-23 PROCEDURE — 36415 COLL VENOUS BLD VENIPUNCTURE: CPT

## 2025-05-23 PROCEDURE — 85025 COMPLETE CBC W/AUTO DIFF WBC: CPT

## 2025-05-23 PROCEDURE — 99900031 HC PATIENT EDUCATION (STAT)

## 2025-05-23 RX ADMIN — FINASTERIDE 5 MG: 5 TABLET, FILM COATED ORAL at 08:05

## 2025-05-23 RX ADMIN — AMIODARONE HYDROCHLORIDE 200 MG: 200 TABLET ORAL at 09:05

## 2025-05-23 RX ADMIN — HEPARIN SODIUM 5000 UNITS: 5000 INJECTION, SOLUTION INTRAVENOUS; SUBCUTANEOUS at 02:05

## 2025-05-23 RX ADMIN — BARIUM SULFATE 5 ML: 0.81 POWDER, FOR SUSPENSION ORAL at 02:05

## 2025-05-23 RX ADMIN — FAMOTIDINE 20 MG: 20 TABLET, FILM COATED ORAL at 08:05

## 2025-05-23 RX ADMIN — MEROPENEM 1 G: 1 INJECTION, POWDER, FOR SOLUTION INTRAVENOUS at 11:05

## 2025-05-23 RX ADMIN — MEROPENEM 1 G: 1 INJECTION, POWDER, FOR SOLUTION INTRAVENOUS at 06:05

## 2025-05-23 RX ADMIN — SENNOSIDES AND DOCUSATE SODIUM 1 TABLET: 50; 8.6 TABLET ORAL at 09:05

## 2025-05-23 RX ADMIN — PRAVASTATIN SODIUM 40 MG: 10 TABLET ORAL at 09:05

## 2025-05-23 RX ADMIN — FAMOTIDINE 20 MG: 20 TABLET, FILM COATED ORAL at 09:05

## 2025-05-23 RX ADMIN — HEPARIN SODIUM 5000 UNITS: 5000 INJECTION, SOLUTION INTRAVENOUS; SUBCUTANEOUS at 09:05

## 2025-05-23 RX ADMIN — MICONAZOLE NITRATE: 20 POWDER TOPICAL at 08:05

## 2025-05-23 RX ADMIN — MEXILETINE HYDROCHLORIDE 200 MG: 200 CAPSULE ORAL at 05:05

## 2025-05-23 RX ADMIN — HEPARIN SODIUM 5000 UNITS: 5000 INJECTION, SOLUTION INTRAVENOUS; SUBCUTANEOUS at 05:05

## 2025-05-23 RX ADMIN — MEXILETINE HYDROCHLORIDE 200 MG: 200 CAPSULE ORAL at 02:05

## 2025-05-23 RX ADMIN — MEROPENEM 1 G: 1 INJECTION, POWDER, FOR SOLUTION INTRAVENOUS at 02:05

## 2025-05-23 RX ADMIN — MEXILETINE HYDROCHLORIDE 200 MG: 200 CAPSULE ORAL at 09:05

## 2025-05-23 NOTE — PLAN OF CARE
Problem: Physical Therapy  Goal: Physical Therapy Goal  Description: Goals to be met by: 2025     Patient will increase functional independence with mobility by performin. Supine to sit with MInimal Assistance  2. Sit to stand transfer with Minimal Assistance  3. Gait  x 50 feet with Minimal Assistance using Rolling Walker.   4. Sitting at edge of bed x5 minutes with Supervision    Outcome: Progressing
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
## 2025-05-23 NOTE — PLAN OF CARE
Problem: SLP  Goal: SLP Goal  Description: LTG: Pt will tolerate least restrictive PO diet with no clinical signs/sx aspiration    STGs:  1.  Pt will tolerate pureed solids with adequate bolus formation/transport and no clinical signs/sx aspiration  2.  Pt will tolerate ice chips with no clinical signs/sx aspiration  3.  Pt will complete laryngeal strengthening exercises and luz with minimal cues  4.  Pt will tolerate thermal stimulation to the anterior faucial pillars x10 with 100% effortful swallows and delay less than 2 seconds    Outcome: Progressing     POC initiated and goals created.  Bonnie
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
## 2025-05-23 NOTE — PLAN OF CARE
Problem: Occupational Therapy  Goal: Occupational Therapy Goal  Description: Goals to be met by: 6/23/25     Patient will increase functional independence with ADLs by performing:    UE Dressing with Minimal Assistance.  LE Dressing with Mod Assistance.  Toileting from toilet/BSC with Minimal Assistance for hygiene and clothing management.   Toilet transfer to toilet with Minimal Assistance.  Increased functional strength to 4/5 through therEX.    Outcome: Progressing
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
## 2025-05-23 NOTE — PROGRESS NOTES
Inpatient Nutrition Assessment    Admit Date: 5/8/2025   Total duration of encounter: 15 days   Patient Age: 79 y.o.    Nutrition Recommendation/Prescription     Advance diet when appropriate per SLP. Would likely benefit from ONS.  Can add Boost Plus (provides 360 kcal, 14 g protein per serving) TID.    If not able to advance diet, consider NG placement for feeds.  If placed, consider:    Impact Peptide 1.5 goal rate 90 ml/hr to provide  2700 kcal/d  (94% est needs)  169 g protein/d (104% est needs)  1386 ml free water/d (60% est needs)  (calculations based on estimated 20 hr/d run time)     If no IV fluids running, can give 100ml q 2hr water flushes. Total water provided: 2386ml (100% est needs.)     Due to dx of malnutrition, pt at risk for refeeding syndrome. Start TF @ 20ml/hr and increase as tolerated 10ml/hr q8hr until goal rate reached.   May also need to consider additional MVI and thiamine per MD.      Communication of Recommendations: reviewed with nurse    Nutrition Assessment     Malnutrition Assessment/Nutrition-Focused Physical Exam    Malnutrition Context: acute illness or injury (05/19/25 1246)  Malnutrition Level: moderate (05/19/25 1246)  Energy Intake (Malnutrition): other (see comments) (Does not meet criteria) (05/19/25 1246)  Weight Loss (Malnutrition): other (see comments) (Unable to assess) (05/19/25 1246)              Muscle Mass (Malnutrition): mild depletion (05/19/25 1246)  [place] (Muscle Loss): mild depletion                       Fluid Accumulation (Malnutrition): moderate (05/19/25 1246)        A minimum of two characteristics is recommended for diagnosis of either severe or non-severe malnutrition.    Chart Review    Reason Seen: length of stay    Malnutrition Screening Tool Results   Have you recently lost weight without trying?: No  Have you been eating poorly because of a decreased appetite?: No   MST Score: 0   Diagnosis:  Acute decompensated heart failure with reduced  ejection fraction  Persistent ventricular tachycardia  Chronic atrial fibrillation     Relevant Medical History: Afib. CVA. HTN, PVD, CHF    Scheduled Medications:  amiodarone, 200 mg, Daily  famotidine, 20 mg, BID  finasteride, 5 mg, Daily  heparin (porcine), 5,000 Units, Q8H  meropenem, 1 g, Q8H  methocarbamoL, 500 mg, Once  mexiletine, 200 mg, Q8H  miconazole NITRATE 2 %, , BID  pravastatin, 40 mg, Daily  senna-docusate, 1 tablet, Daily    Continuous Infusions:  LIDOcaine, Last Rate: 7.5 mL/hr at 05/10/25 1935  NORepinephrine bitartrate-D5W, Last Rate: Stopped (05/21/25 1702)  propofoL, Last Rate: Stopped (05/22/25 0613)    PRN Medications:  acetaminophen, 650 mg, Q4H PRN  barium sulfate, 5 mL, ONCE PRN  barium sulfate, 5 mL, ONCE PRN  barium sulfate, 5 mL, ONCE PRN  HYDROcodone-acetaminophen, 1 tablet, Q4H PRN  LIDOcaine, , Continuous PRN  morphine, 2 mg, Q4H PRN  ondansetron, 8 mg, Q8H PRN  sodium chloride 0.9%, 10 mL, PRN  sodium chloride 0.9%, 10 mL, PRN  sodium chloride 0.9%, 10 mL, Q12H PRN    Calorie Containing IV Medications: no significant kcals from medications at this time    Recent Labs   Lab 05/17/25  0315 05/18/25  0231 05/19/25  0628 05/20/25  0610 05/21/25  0312 05/22/25  0328 05/22/25  0329 05/23/25  0359    139 140 139 138  --  137 144   K 4.1 3.8 4.1 3.6 3.9  --  3.7 4.4   CALCIUM 8.1* 8.2* 8.2* 8.1* 7.7*  --  8.0* 8.2*   PHOS 4.1 3.6 3.4 3.1 2.7  --  3.4 2.2*   MG 2.60 2.50 2.10 2.00 1.90  --  2.00 2.00    108* 107 106 106  --  106 109*   CO2 21* 22* 22* 25 21*  --  23 23   BUN 17.0 21.4 19.3 19.5 21.4  --  21.1 21.1   CREATININE 1.14 1.06 1.06 0.88 0.88  --  0.77 0.78   EGFRNORACEVR >60 >60 >60 >60 >60  --  >60 >60   * 130* 113 102 89  --  102 96   BILITOT 0.6 0.5 0.6 0.6 0.5  --  0.5 1.0   ALKPHOS 75 73 88 85 86  --  100 120   ALT 24 20 16 13 13  --  16 18   AST 81* 39 23 17 18  --  66* 42   ALBUMIN 2.3* 2.1* 2.0* 1.9* 1.9*  --  1.8* 2.2*   WBC 18.32* 14.07* 15.96* 11.97*  "10.84 9.93  --  4.53   HGB 12.6* 12.4* 12.9* 11.7* 11.5* 11.2*  --  12.5*   HCT 38.7* 38.2* 39.3* 37.0* 38.1* 36.7*  --  39.4*     Nutrition Orders:  Diet NPO      Appetite/Oral Intake: NPO/NPO  Factors Affecting Nutritional Intake: none identified  Social Needs Impacting Access to Food: unable to assess at this time; will attempt on follow-up  Food/Restoration/Cultural Preferences: unable to obtain  Food Allergies: no known food allergies  Last Bowel Movement: 05/16/25  Wound(s):  None documented     Comments    5/15/25: Pt with 100% po intake of liquid diet. Stated appetite good. Unable to obtain further info or complete physical assessment. Interrupted by MD at time of visit. Will attempt upon F/U.     5/19/25: Pt now intubate.d Possibly going for procedure vs extubation today. Will provide TF recommendation in case needed. Pt also meets criteria for intake in malnutrition assessment, Junum not updating though. No kcal from meds.     5/21/25: Still no TF running. Plans for procedure today with possible extubation after, per RN. Receiving small amount of kcal from meds.     5/23/25: Still no po intake/nutrition. Plans for MBS today. Discussed needing NG placed if not able to advance diet. Will provide TF recommendations in case needed. Pt currently very hungry, wanting food.     Anthropometrics    Height: 6' 0.01" (182.9 cm), Height Method: Measured  Last Weight: 115 kg (253 lb 8.5 oz) (05/12/25 1033), Weight Method: Bed Scale  BMI (Calculated): 34.4  BMI Classification: obese grade I (BMI 30-34.9)        Ideal Body Weight (IBW), Male: 178.06 lb     % Ideal Body Weight, Male (lb): 142.43 %                          Usual Weight Provided By: unable to obtain usual weight    Wt Readings from Last 5 Encounters:   05/12/25 115 kg (253 lb 8.5 oz)   04/02/25 113.4 kg (250 lb)   09/23/24 107 kg (236 lb)   03/06/24 108.4 kg (239 lb)   09/06/23 119.7 kg (264 lb)     Weight Change(s) Since Admission:   Wt Readings from Last 1 " Encounters:   05/12/25 1033 115 kg (253 lb 8.5 oz)   05/08/25 1750 115 kg (253 lb 8.5 oz)   Admit Weight: 115 kg (253 lb 8.5 oz) (05/08/25 1750), Weight Method: Bed Scale    Estimated Needs    Weight Used For Calorie Calculations: 115 kg (253 lb 8.5 oz)  Energy Calorie Requirements (kcal): 2875-3450kcal (25-30kcal/kg)  Energy Need Method: Kcal/kg  Weight Used For Protein Calculations: 80.9 kg (178 lb 5.6 oz) (IBW)  Protein Requirements: 162gm (2g/kg IBW)  Fluid Requirements (mL): 2300ml (1ml/kcal)  CHO Requirement: 320kcal (45% est kcal needs)     Enteral Nutrition     Patient not receiving enteral nutrition at this time.    Parenteral Nutrition     Patient not receiving parenteral nutrition support at this time.    Evaluation of Received Nutrient Intake    Calories: not meeting estimated needs  Protein: not meeting estimated needs    Patient Education     Not applicable.    Nutrition Diagnosis     PES: Inadequate energy intake related to acute illness as evidenced by NPO or liquid diet since 5/9/25. (active)     PES: Moderate acute disease or injury related malnutrition Related to current condition As Evidenced by:  - weight loss: Unable to assess - muscle mass depletion: 1 area of mild muscle loss (Temporalis) - fluid accumulation: 2 areas of moderate or severe fluid accumulation (Lower extremities edema, Upper extremities edema) active    Nutrition Interventions     Intervention(s): modified composition of enteral nutrition, modified rate of enteral nutrition, and collaboration with other providers  Intervention(s):      Goal: Meet greater than 80% of nutritional needs by follow-up. (goal progressing)  Goal: Consume % of meals/snacks by follow-up. (goal discontinued)    Nutrition Goals & Monitoring     Dietitian will monitor: energy intake and enteral nutrition intake  Discharge planning: resume home regimen  Nutrition Risk/Follow-Up: patient at increased nutrition risk; dietitian will follow-up twice  weekly   Please consult if re-assessment needed sooner.

## 2025-05-23 NOTE — PLAN OF CARE
Therapy recommending moderate intensity post care. CM sent secure chat to SUKHWINDER David to call in [facility] then CM will complete PASRR. Patient 's son would like referral sent to [facility] for SNF placement. His Mother just got a bed at [facility] and family would like patient to get SNF services there. Vaishali with [facility] asked CM to send clinicals. CM sent referral to [facility] . Clinicals sent.

## 2025-05-23 NOTE — PLAN OF CARE
Problem: Adult Inpatient Plan of Care  Goal: Plan of Care Review  Outcome: Progressing  Goal: Patient-Specific Goal (Individualized)  Outcome: Progressing  Goal: Absence of Hospital-Acquired Illness or Injury  Outcome: Progressing  Goal: Optimal Comfort and Wellbeing  Outcome: Progressing  Goal: Readiness for Transition of Care  Outcome: Progressing     Problem: Fall Injury Risk  Goal: Absence of Fall and Fall-Related Injury  Outcome: Progressing     Problem: Skin Injury Risk Increased  Goal: Skin Health and Integrity  Outcome: Progressing     Problem: Comorbidity Management  Goal: Maintenance of Heart Failure Symptom Control  Outcome: Progressing     Problem: Wound  Goal: Optimal Coping  Outcome: Progressing  Goal: Optimal Functional Ability  Outcome: Progressing  Goal: Absence of Infection Signs and Symptoms  Outcome: Progressing  Goal: Improved Oral Intake  Outcome: Progressing  Goal: Optimal Pain Control and Function  Outcome: Progressing  Goal: Skin Health and Integrity  Outcome: Progressing  Goal: Optimal Wound Healing  Outcome: Progressing

## 2025-05-24 LAB
ALBUMIN SERPL-MCNC: 1.8 G/DL (ref 3.4–4.8)
ALBUMIN/GLOB SERPL: 0.6 RATIO (ref 1.1–2)
ALP SERPL-CCNC: 119 UNIT/L (ref 40–150)
ALT SERPL-CCNC: 16 UNIT/L (ref 0–55)
ANION GAP SERPL CALC-SCNC: 11 MEQ/L
AST SERPL-CCNC: 29 UNIT/L (ref 11–45)
BASOPHILS # BLD AUTO: 0.05 X10(3)/MCL
BASOPHILS NFR BLD AUTO: 0.3 %
BILIRUB SERPL-MCNC: 0.8 MG/DL
BUN SERPL-MCNC: 29.1 MG/DL (ref 8.4–25.7)
CALCIUM SERPL-MCNC: 7.7 MG/DL (ref 8.8–10)
CHLORIDE SERPL-SCNC: 111 MMOL/L (ref 98–107)
CO2 SERPL-SCNC: 23 MMOL/L (ref 23–31)
CREAT SERPL-MCNC: 0.87 MG/DL (ref 0.72–1.25)
CREAT/UREA NIT SERPL: 33
EOSINOPHIL # BLD AUTO: 0.06 X10(3)/MCL (ref 0–0.9)
EOSINOPHIL NFR BLD AUTO: 0.4 %
ERYTHROCYTE [DISTWIDTH] IN BLOOD BY AUTOMATED COUNT: 15 % (ref 11.5–17)
GFR SERPLBLD CREATININE-BSD FMLA CKD-EPI: >60 ML/MIN/1.73/M2
GLOBULIN SER-MCNC: 3 GM/DL (ref 2.4–3.5)
GLUCOSE SERPL-MCNC: 128 MG/DL (ref 82–115)
HCT VFR BLD AUTO: 32.8 % (ref 42–52)
HGB BLD-MCNC: 10.4 G/DL (ref 14–18)
IMM GRANULOCYTES # BLD AUTO: 0.16 X10(3)/MCL (ref 0–0.04)
IMM GRANULOCYTES NFR BLD AUTO: 1 %
LYMPHOCYTES # BLD AUTO: 0.93 X10(3)/MCL (ref 0.6–4.6)
LYMPHOCYTES NFR BLD AUTO: 5.9 %
MAGNESIUM SERPL-MCNC: 2.1 MG/DL (ref 1.6–2.6)
MCH RBC QN AUTO: 29.3 PG (ref 27–31)
MCHC RBC AUTO-ENTMCNC: 31.7 G/DL (ref 33–36)
MCV RBC AUTO: 92.4 FL (ref 80–94)
MONOCYTES # BLD AUTO: 0.95 X10(3)/MCL (ref 0.1–1.3)
MONOCYTES NFR BLD AUTO: 6 %
NEUTROPHILS # BLD AUTO: 13.6 X10(3)/MCL (ref 2.1–9.2)
NEUTROPHILS NFR BLD AUTO: 86.4 %
NRBC BLD AUTO-RTO: 0 %
PHOSPHATE SERPL-MCNC: 1.7 MG/DL (ref 2.3–4.7)
PLATELET # BLD AUTO: 251 X10(3)/MCL (ref 130–400)
PMV BLD AUTO: 10.8 FL (ref 7.4–10.4)
POTASSIUM SERPL-SCNC: 3.2 MMOL/L (ref 3.5–5.1)
PROT SERPL-MCNC: 4.8 GM/DL (ref 5.8–7.6)
RBC # BLD AUTO: 3.55 X10(6)/MCL (ref 4.7–6.1)
SODIUM SERPL-SCNC: 145 MMOL/L (ref 136–145)
WBC # BLD AUTO: 15.75 X10(3)/MCL (ref 4.5–11.5)

## 2025-05-24 PROCEDURE — 84100 ASSAY OF PHOSPHORUS: CPT

## 2025-05-24 PROCEDURE — 25000003 PHARM REV CODE 250

## 2025-05-24 PROCEDURE — 80053 COMPREHEN METABOLIC PANEL: CPT

## 2025-05-24 PROCEDURE — 85025 COMPLETE CBC W/AUTO DIFF WBC: CPT

## 2025-05-24 PROCEDURE — 63600175 PHARM REV CODE 636 W HCPCS: Performed by: NURSE PRACTITIONER

## 2025-05-24 PROCEDURE — 11000001 HC ACUTE MED/SURG PRIVATE ROOM

## 2025-05-24 PROCEDURE — 83735 ASSAY OF MAGNESIUM: CPT

## 2025-05-24 PROCEDURE — 63600175 PHARM REV CODE 636 W HCPCS: Performed by: INTERNAL MEDICINE

## 2025-05-24 PROCEDURE — 63600175 PHARM REV CODE 636 W HCPCS

## 2025-05-24 PROCEDURE — 36415 COLL VENOUS BLD VENIPUNCTURE: CPT

## 2025-05-24 PROCEDURE — 25000003 PHARM REV CODE 250: Performed by: NURSE PRACTITIONER

## 2025-05-24 PROCEDURE — 25000003 PHARM REV CODE 250: Performed by: INTERNAL MEDICINE

## 2025-05-24 RX ORDER — BISACODYL 10 MG/1
10 SUPPOSITORY RECTAL DAILY PRN
Status: DISCONTINUED | OUTPATIENT
Start: 2025-05-24 | End: 2025-06-09 | Stop reason: HOSPADM

## 2025-05-24 RX ORDER — ACETYLCYSTEINE 100 MG/ML
4 SOLUTION ORAL; RESPIRATORY (INHALATION) 3 TIMES DAILY PRN
Status: DISCONTINUED | OUTPATIENT
Start: 2025-05-24 | End: 2025-06-09 | Stop reason: HOSPADM

## 2025-05-24 RX ORDER — POLYETHYLENE GLYCOL 3350 17 G/17G
17 POWDER, FOR SOLUTION ORAL 2 TIMES DAILY
Status: DISCONTINUED | OUTPATIENT
Start: 2025-05-24 | End: 2025-06-04

## 2025-05-24 RX ORDER — GUAIFENESIN 100 MG/5ML
200 LIQUID ORAL EVERY 4 HOURS PRN
Status: DISCONTINUED | OUTPATIENT
Start: 2025-05-24 | End: 2025-06-09 | Stop reason: HOSPADM

## 2025-05-24 RX ORDER — MAGNESIUM SULFATE HEPTAHYDRATE 40 MG/ML
2 INJECTION, SOLUTION INTRAVENOUS ONCE
Status: COMPLETED | OUTPATIENT
Start: 2025-05-24 | End: 2025-05-24

## 2025-05-24 RX ORDER — MAGNESIUM SULFATE 1 G/100ML
1 INJECTION INTRAVENOUS ONCE
Status: COMPLETED | OUTPATIENT
Start: 2025-05-24 | End: 2025-05-24

## 2025-05-24 RX ADMIN — MEXILETINE HYDROCHLORIDE 200 MG: 200 CAPSULE ORAL at 05:05

## 2025-05-24 RX ADMIN — MEROPENEM 1 G: 1 INJECTION, POWDER, FOR SOLUTION INTRAVENOUS at 03:05

## 2025-05-24 RX ADMIN — HEPARIN SODIUM 5000 UNITS: 5000 INJECTION, SOLUTION INTRAVENOUS; SUBCUTANEOUS at 01:05

## 2025-05-24 RX ADMIN — MICONAZOLE NITRATE: 20 POWDER TOPICAL at 09:05

## 2025-05-24 RX ADMIN — HEPARIN SODIUM 5000 UNITS: 5000 INJECTION, SOLUTION INTRAVENOUS; SUBCUTANEOUS at 05:05

## 2025-05-24 RX ADMIN — MEROPENEM 1 G: 1 INJECTION, POWDER, FOR SOLUTION INTRAVENOUS at 10:05

## 2025-05-24 RX ADMIN — MAGNESIUM SULFATE HEPTAHYDRATE 2 G: 40 INJECTION, SOLUTION INTRAVENOUS at 01:05

## 2025-05-24 RX ADMIN — HEPARIN SODIUM 5000 UNITS: 5000 INJECTION, SOLUTION INTRAVENOUS; SUBCUTANEOUS at 09:05

## 2025-05-24 RX ADMIN — PRAVASTATIN SODIUM 40 MG: 10 TABLET ORAL at 08:05

## 2025-05-24 RX ADMIN — POLYETHYLENE GLYCOL 3350 17 G: 17 POWDER, FOR SOLUTION ORAL at 09:05

## 2025-05-24 RX ADMIN — MICONAZOLE NITRATE: 20 POWDER TOPICAL at 08:05

## 2025-05-24 RX ADMIN — POTASSIUM BICARBONATE 20 MEQ: 391 TABLET, EFFERVESCENT ORAL at 08:05

## 2025-05-24 RX ADMIN — POLYETHYLENE GLYCOL 3350 17 G: 17 POWDER, FOR SOLUTION ORAL at 08:05

## 2025-05-24 RX ADMIN — DEXTROSE MONOHYDRATE 30 MMOL: 5 INJECTION, SOLUTION INTRAVENOUS at 08:05

## 2025-05-24 RX ADMIN — FAMOTIDINE 20 MG: 20 TABLET, FILM COATED ORAL at 08:05

## 2025-05-24 RX ADMIN — SENNOSIDES AND DOCUSATE SODIUM 1 TABLET: 50; 8.6 TABLET ORAL at 08:05

## 2025-05-24 RX ADMIN — FAMOTIDINE 20 MG: 20 TABLET, FILM COATED ORAL at 09:05

## 2025-05-24 RX ADMIN — FINASTERIDE 5 MG: 5 TABLET, FILM COATED ORAL at 08:05

## 2025-05-24 RX ADMIN — MEROPENEM 1 G: 1 INJECTION, POWDER, FOR SOLUTION INTRAVENOUS at 06:05

## 2025-05-24 RX ADMIN — MEXILETINE HYDROCHLORIDE 200 MG: 200 CAPSULE ORAL at 01:05

## 2025-05-24 RX ADMIN — AMIODARONE HYDROCHLORIDE 200 MG: 200 TABLET ORAL at 08:05

## 2025-05-24 RX ADMIN — MAGNESIUM SULFATE IN DEXTROSE 1 G: 10 INJECTION, SOLUTION INTRAVENOUS at 12:05

## 2025-05-24 RX ADMIN — MEXILETINE HYDROCHLORIDE 200 MG: 200 CAPSULE ORAL at 09:05

## 2025-05-24 NOTE — PROGRESS NOTES
Inpatient Nutrition Assessment    Admit Date: 5/8/2025   Total duration of encounter: 16 days   Patient Age: 79 y.o.    Nutrition Recommendation/Prescription     Tube feeding:    Impact Peptide 1.5 goal rate 90 ml/hr to provide  2700 kcal/d  (94% est needs)  169 g protein/d (104% est needs)  1386 ml free water/d (60% est needs)  (calculations based on estimated 20 hr/d run time)     If no IV fluids running, can give 100ml q 2hr water flushes. Total water provided: 2386ml (100% est needs.)     Due to dx of malnutrition, pt at risk for refeeding syndrome. Start TF @ 20ml/hr and increase as tolerated 10ml/hr q8hr until goal rate reached.   May also need to consider additional MVI and thiamine per MD.      Communication of Recommendations: reviewed with nurse    Nutrition Assessment     Malnutrition Assessment/Nutrition-Focused Physical Exam    Malnutrition Context: acute illness or injury (05/19/25 1246)  Malnutrition Level: moderate (05/19/25 1246)  Energy Intake (Malnutrition): other (see comments) (Does not meet criteria) (05/19/25 1246)  Weight Loss (Malnutrition): other (see comments) (Unable to assess) (05/19/25 1246)              Muscle Mass (Malnutrition): mild depletion (05/19/25 1246)  [place] (Muscle Loss): mild depletion                       Fluid Accumulation (Malnutrition): moderate (05/19/25 1246)        A minimum of two characteristics is recommended for diagnosis of either severe or non-severe malnutrition.    Chart Review    Reason Seen: continuous nutrition monitoring and follow-up    Malnutrition Screening Tool Results   Have you recently lost weight without trying?: No  Have you been eating poorly because of a decreased appetite?: No   MST Score: 0   Diagnosis:  Acute decompensated heart failure with reduced ejection fraction  Persistent ventricular tachycardia  Chronic atrial fibrillation     Relevant Medical History: Afib. CVA. HTN, PVD, CHF    Scheduled Medications:  amiodarone, 200 mg,  Daily  famotidine, 20 mg, BID  finasteride, 5 mg, Daily  heparin (porcine), 5,000 Units, Q8H  magnesium sulfate 1 g IVPB, 1 g, Once   Followed by  magnesium sulfate 2 g IVPB, 2 g, Once  meropenem, 1 g, Q8H  methocarbamoL, 500 mg, Once  mexiletine, 200 mg, Q8H  miconazole NITRATE 2 %, , BID  polyethylene glycol, 17 g, BID  potassium phosphate IVPB, 30 mmol, Once  pravastatin, 40 mg, Daily  senna-docusate, 1 tablet, Daily    Continuous Infusions:  LIDOcaine, Last Rate: 7.5 mL/hr at 05/10/25 1935  NORepinephrine bitartrate-D5W, Last Rate: Stopped (05/23/25 2022)  propofoL, Last Rate: Stopped (05/22/25 0613)    PRN Medications:  acetaminophen, 650 mg, Q4H PRN  acetylcysteine 100 mg/ml (10%), 4 mL, TID PRN  bisacodyL, 10 mg, Daily PRN  HYDROcodone-acetaminophen, 1 tablet, Q4H PRN  LIDOcaine, , Continuous PRN  morphine, 2 mg, Q4H PRN  ondansetron, 8 mg, Q8H PRN  sodium chloride 0.9%, 10 mL, PRN  sodium chloride 0.9%, 10 mL, PRN  sodium chloride 0.9%, 10 mL, Q12H PRN    Calorie Containing IV Medications: no significant kcals from medications at this time    Recent Labs   Lab 05/18/25  0231 05/19/25  0628 05/20/25  0610 05/21/25  0312 05/22/25  0328 05/22/25  0329 05/23/25  0359 05/24/25  0311    140 139 138  --  137 144 145   K 3.8 4.1 3.6 3.9  --  3.7 4.4 3.2*   CALCIUM 8.2* 8.2* 8.1* 7.7*  --  8.0* 8.2* 7.7*   PHOS 3.6 3.4 3.1 2.7  --  3.4 2.2* 1.7*   MG 2.50 2.10 2.00 1.90  --  2.00 2.00 2.10   * 107 106 106  --  106 109* 111*   CO2 22* 22* 25 21*  --  23 23 23   BUN 21.4 19.3 19.5 21.4  --  21.1 21.1 29.1*   CREATININE 1.06 1.06 0.88 0.88  --  0.77 0.78 0.87   EGFRNORACEVR >60 >60 >60 >60  --  >60 >60 >60   * 113 102 89  --  102 96 128*   BILITOT 0.5 0.6 0.6 0.5  --  0.5 1.0 0.8   ALKPHOS 73 88 85 86  --  100 120 119   ALT 20 16 13 13  --  16 18 16   AST 39 23 17 18  --  66* 42 29   ALBUMIN 2.1* 2.0* 1.9* 1.9*  --  1.8* 2.2* 1.8*   WBC 14.07* 15.96* 11.97* 10.84 9.93  --  4.53 15.75*   HGB 12.4*  "12.9* 11.7* 11.5* 11.2*  --  12.5* 10.4*   HCT 38.2* 39.3* 37.0* 38.1* 36.7*  --  39.4* 32.8*     Nutrition Orders:  Diet NPO  Tube Feedings/Formulas 20; Impact Peptide 1.5; NG    Appetite/Oral Intake: NPO/NPO  Factors Affecting Nutritional Intake: none identified  Social Needs Impacting Access to Food: unable to assess at this time; will attempt on follow-up  Food/Jew/Cultural Preferences: unable to obtain  Food Allergies: no known food allergies  Last Bowel Movement: 05/16/25  Wound(s):  None documented     Comments    5/15/25: Pt with 100% po intake of liquid diet. Stated appetite good. Unable to obtain further info or complete physical assessment. Interrupted by MD at time of visit. Will attempt upon F/U.     5/19/25: Pt now intubate.d Possibly going for procedure vs extubation today. Will provide TF recommendation in case needed. Pt also meets criteria for intake in malnutrition assessment, Junum not updating though. No kcal from meds.     5/21/25: Still no TF running. Plans for procedure today with possible extubation after, per RN. Receiving small amount of kcal from meds.     5/23/25: Still no po intake/nutrition. Plans for MBS today. Discussed needing NG placed if not able to advance diet. Will provide TF recommendations in case needed. Pt currently very hungry, wanting food.     5/24/25: Tube feeding initiated.    Anthropometrics    Height: 6' 0.01" (182.9 cm), Height Method: Measured  Last Weight: 115 kg (253 lb 8.5 oz) (05/12/25 1033), Weight Method: Bed Scale  BMI (Calculated): 34.4  BMI Classification: obese grade I (BMI 30-34.9)        Ideal Body Weight (IBW), Male: 178.06 lb     % Ideal Body Weight, Male (lb): 142.43 %                          Usual Weight Provided By: unable to obtain usual weight    Wt Readings from Last 5 Encounters:   05/12/25 115 kg (253 lb 8.5 oz)   04/02/25 113.4 kg (250 lb)   09/23/24 107 kg (236 lb)   03/06/24 108.4 kg (239 lb)   09/06/23 119.7 kg (264 lb)     Weight " Change(s) Since Admission:   Wt Readings from Last 1 Encounters:   05/12/25 1033 115 kg (253 lb 8.5 oz)   05/08/25 1750 115 kg (253 lb 8.5 oz)   Admit Weight: 115 kg (253 lb 8.5 oz) (05/08/25 1750), Weight Method: Bed Scale    Estimated Needs    Weight Used For Calorie Calculations: 115 kg (253 lb 8.5 oz)  Energy Calorie Requirements (kcal): 2875-3450kcal (25-30kcal/kg)  Energy Need Method: Kcal/kg  Weight Used For Protein Calculations: 80.9 kg (178 lb 5.6 oz) (IBW)  Protein Requirements: 162gm (2g/kg IBW)  Fluid Requirements (mL): 2300ml (1ml/kcal)  CHO Requirement: 320kcal (45% est kcal needs)     Enteral Nutrition     Formula: Impact Peptide 1.5 [place]  Rate/Volume: 50 ml/hr  Water Flushes: none ordered  Additives/Modulars: none at this time  Route: nasogastric tube  Method: continuous  Total Nutrition Provided by Tube Feeding, Additives, and Flushes:  Calories Provided  1500 kcal/d, 52% needs   Protein Provided  94 g/d, 58% needs   Fluid Provided  770 ml/d, 34% needs   Continuous feeding calculations based on estimated 20 hr/d run time unless otherwise stated.    Parenteral Nutrition     Patient not receiving parenteral nutrition support at this time.    Evaluation of Received Nutrient Intake    Calories: not meeting estimated needs  Protein: not meeting estimated needs    Patient Education     Not applicable.    Nutrition Diagnosis     PES: Inadequate energy intake related to acute illness as evidenced by NPO or liquid diet since 5/9/25. (active)     PES: Moderate acute disease or injury related malnutrition Related to current condition As Evidenced by:  - weight loss: Unable to assess - muscle mass depletion: 1 area of mild muscle loss (Temporalis) - fluid accumulation: 2 areas of moderate or severe fluid accumulation (Lower extremities edema, Upper extremities edema) active    Nutrition Interventions     Intervention(s): modified composition of enteral nutrition, modified rate of enteral nutrition, and  collaboration with other providers  Intervention(s):      Goal: Meet greater than 80% of nutritional needs by follow-up. (goal progressing)    Nutrition Goals & Monitoring     Dietitian will monitor: energy intake and enteral nutrition intake  Discharge planning: resume home regimen  Nutrition Risk/Follow-Up: patient at increased nutrition risk; dietitian will follow-up twice weekly   Please consult if re-assessment needed sooner.
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
## 2025-05-25 LAB
ALBUMIN SERPL-MCNC: 1.8 G/DL (ref 3.4–4.8)
ALBUMIN/GLOB SERPL: 0.5 RATIO (ref 1.1–2)
ALP SERPL-CCNC: 116 UNIT/L (ref 40–150)
ALT SERPL-CCNC: 17 UNIT/L (ref 0–55)
ANION GAP SERPL CALC-SCNC: 10 MEQ/L
AST SERPL-CCNC: 23 UNIT/L (ref 11–45)
BASOPHILS # BLD AUTO: 0.04 X10(3)/MCL
BASOPHILS NFR BLD AUTO: 0.3 %
BILIRUB SERPL-MCNC: 0.7 MG/DL
BUN SERPL-MCNC: 32.5 MG/DL (ref 8.4–25.7)
CALCIUM SERPL-MCNC: 8.7 MG/DL (ref 8.8–10)
CHLORIDE SERPL-SCNC: 112 MMOL/L (ref 98–107)
CO2 SERPL-SCNC: 25 MMOL/L (ref 23–31)
CREAT SERPL-MCNC: 0.65 MG/DL (ref 0.72–1.25)
CREAT/UREA NIT SERPL: 50
EOSINOPHIL # BLD AUTO: 0.24 X10(3)/MCL (ref 0–0.9)
EOSINOPHIL NFR BLD AUTO: 1.6 %
ERYTHROCYTE [DISTWIDTH] IN BLOOD BY AUTOMATED COUNT: 14.9 % (ref 11.5–17)
GFR SERPLBLD CREATININE-BSD FMLA CKD-EPI: >60 ML/MIN/1.73/M2
GLOBULIN SER-MCNC: 3.9 GM/DL (ref 2.4–3.5)
GLUCOSE SERPL-MCNC: 114 MG/DL (ref 82–115)
HCT VFR BLD AUTO: 33.9 % (ref 42–52)
HGB BLD-MCNC: 10.4 G/DL (ref 14–18)
IMM GRANULOCYTES # BLD AUTO: 0.24 X10(3)/MCL (ref 0–0.04)
IMM GRANULOCYTES NFR BLD AUTO: 1.6 %
LYMPHOCYTES # BLD AUTO: 0.96 X10(3)/MCL (ref 0.6–4.6)
LYMPHOCYTES NFR BLD AUTO: 6.3 %
MAGNESIUM SERPL-MCNC: 2.2 MG/DL (ref 1.6–2.6)
MCH RBC QN AUTO: 28.7 PG (ref 27–31)
MCHC RBC AUTO-ENTMCNC: 30.7 G/DL (ref 33–36)
MCV RBC AUTO: 93.4 FL (ref 80–94)
MONOCYTES # BLD AUTO: 1.47 X10(3)/MCL (ref 0.1–1.3)
MONOCYTES NFR BLD AUTO: 9.6 %
NEUTROPHILS # BLD AUTO: 12.35 X10(3)/MCL (ref 2.1–9.2)
NEUTROPHILS NFR BLD AUTO: 80.6 %
NRBC BLD AUTO-RTO: 0 %
PHOSPHATE SERPL-MCNC: 1.9 MG/DL (ref 2.3–4.7)
PLATELET # BLD AUTO: 258 X10(3)/MCL (ref 130–400)
PMV BLD AUTO: 11 FL (ref 7.4–10.4)
POTASSIUM SERPL-SCNC: 3.7 MMOL/L (ref 3.5–5.1)
PROT SERPL-MCNC: 5.7 GM/DL (ref 5.8–7.6)
RBC # BLD AUTO: 3.63 X10(6)/MCL (ref 4.7–6.1)
SODIUM SERPL-SCNC: 147 MMOL/L (ref 136–145)
WBC # BLD AUTO: 15.3 X10(3)/MCL (ref 4.5–11.5)

## 2025-05-25 PROCEDURE — 21400001 HC TELEMETRY ROOM

## 2025-05-25 PROCEDURE — 63600175 PHARM REV CODE 636 W HCPCS

## 2025-05-25 PROCEDURE — 25000003 PHARM REV CODE 250: Performed by: NURSE PRACTITIONER

## 2025-05-25 PROCEDURE — 99900035 HC TECH TIME PER 15 MIN (STAT)

## 2025-05-25 PROCEDURE — 93010 ELECTROCARDIOGRAM REPORT: CPT | Mod: ,,, | Performed by: INTERNAL MEDICINE

## 2025-05-25 PROCEDURE — 63600175 PHARM REV CODE 636 W HCPCS: Performed by: INTERNAL MEDICINE

## 2025-05-25 PROCEDURE — 25000003 PHARM REV CODE 250

## 2025-05-25 PROCEDURE — 85025 COMPLETE CBC W/AUTO DIFF WBC: CPT

## 2025-05-25 PROCEDURE — 25000003 PHARM REV CODE 250: Performed by: INTERNAL MEDICINE

## 2025-05-25 PROCEDURE — 36415 COLL VENOUS BLD VENIPUNCTURE: CPT

## 2025-05-25 PROCEDURE — 99900031 HC PATIENT EDUCATION (STAT)

## 2025-05-25 PROCEDURE — 83735 ASSAY OF MAGNESIUM: CPT

## 2025-05-25 PROCEDURE — 84100 ASSAY OF PHOSPHORUS: CPT

## 2025-05-25 PROCEDURE — 93005 ELECTROCARDIOGRAM TRACING: CPT

## 2025-05-25 PROCEDURE — 94799 UNLISTED PULMONARY SVC/PX: CPT

## 2025-05-25 PROCEDURE — 94760 N-INVAS EAR/PLS OXIMETRY 1: CPT

## 2025-05-25 PROCEDURE — 27000221 HC OXYGEN, UP TO 24 HOURS

## 2025-05-25 PROCEDURE — 80053 COMPREHEN METABOLIC PANEL: CPT

## 2025-05-25 RX ADMIN — FAMOTIDINE 20 MG: 20 TABLET, FILM COATED ORAL at 09:05

## 2025-05-25 RX ADMIN — FAMOTIDINE 20 MG: 20 TABLET, FILM COATED ORAL at 11:05

## 2025-05-25 RX ADMIN — POLYETHYLENE GLYCOL 3350 17 G: 17 POWDER, FOR SOLUTION ORAL at 11:05

## 2025-05-25 RX ADMIN — AMIODARONE HYDROCHLORIDE 200 MG: 200 TABLET ORAL at 09:05

## 2025-05-25 RX ADMIN — MEROPENEM 1 G: 1 INJECTION, POWDER, FOR SOLUTION INTRAVENOUS at 11:05

## 2025-05-25 RX ADMIN — POLYETHYLENE GLYCOL 3350 17 G: 17 POWDER, FOR SOLUTION ORAL at 09:05

## 2025-05-25 RX ADMIN — MEROPENEM 1 G: 1 INJECTION, POWDER, FOR SOLUTION INTRAVENOUS at 02:05

## 2025-05-25 RX ADMIN — MEXILETINE HYDROCHLORIDE 200 MG: 200 CAPSULE ORAL at 11:05

## 2025-05-25 RX ADMIN — MEXILETINE HYDROCHLORIDE 200 MG: 200 CAPSULE ORAL at 07:05

## 2025-05-25 RX ADMIN — MICONAZOLE NITRATE: 20 POWDER TOPICAL at 09:05

## 2025-05-25 RX ADMIN — HEPARIN SODIUM 5000 UNITS: 5000 INJECTION, SOLUTION INTRAVENOUS; SUBCUTANEOUS at 11:05

## 2025-05-25 RX ADMIN — MEXILETINE HYDROCHLORIDE 200 MG: 200 CAPSULE ORAL at 01:05

## 2025-05-25 RX ADMIN — HEPARIN SODIUM 5000 UNITS: 5000 INJECTION, SOLUTION INTRAVENOUS; SUBCUTANEOUS at 06:05

## 2025-05-25 RX ADMIN — PRAVASTATIN SODIUM 40 MG: 10 TABLET ORAL at 09:05

## 2025-05-25 RX ADMIN — FINASTERIDE 5 MG: 5 TABLET, FILM COATED ORAL at 09:05

## 2025-05-25 RX ADMIN — SENNOSIDES AND DOCUSATE SODIUM 1 TABLET: 50; 8.6 TABLET ORAL at 09:05

## 2025-05-25 RX ADMIN — MICONAZOLE NITRATE: 20 POWDER TOPICAL at 11:05

## 2025-05-25 RX ADMIN — MEROPENEM 1 G: 1 INJECTION, POWDER, FOR SOLUTION INTRAVENOUS at 06:05

## 2025-05-25 RX ADMIN — HEPARIN SODIUM 5000 UNITS: 5000 INJECTION, SOLUTION INTRAVENOUS; SUBCUTANEOUS at 01:05

## 2025-05-25 NOTE — PLAN OF CARE
Problem: Adult Inpatient Plan of Care  Goal: Plan of Care Review  Outcome: Progressing  Goal: Patient-Specific Goal (Individualized)  Outcome: Progressing  Goal: Absence of Hospital-Acquired Illness or Injury  Outcome: Progressing  Goal: Optimal Comfort and Wellbeing  Outcome: Progressing  Goal: Readiness for Transition of Care  Outcome: Progressing     Problem: Fall Injury Risk  Goal: Absence of Fall and Fall-Related Injury  Outcome: Progressing     Problem: Skin Injury Risk Increased  Goal: Skin Health and Integrity  Outcome: Progressing     Problem: Comorbidity Management  Goal: Maintenance of Heart Failure Symptom Control  Outcome: Progressing     Problem: Fatigue  Goal: Improved Activity Tolerance  Outcome: Progressing     Problem: Wound  Goal: Optimal Coping  Outcome: Progressing  Goal: Optimal Functional Ability  Outcome: Progressing  Goal: Absence of Infection Signs and Symptoms  Outcome: Progressing  Goal: Improved Oral Intake  Outcome: Progressing  Goal: Optimal Pain Control and Function  Outcome: Progressing  Goal: Skin Health and Integrity  Outcome: Progressing  Goal: Optimal Wound Healing  Outcome: Progressing     Problem: Coping Ineffective  Goal: Effective Coping  Outcome: Progressing
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
## 2025-05-26 LAB
ALBUMIN SERPL-MCNC: 2 G/DL (ref 3.4–4.8)
ALBUMIN/GLOB SERPL: 0.6 RATIO (ref 1.1–2)
ALP SERPL-CCNC: 105 UNIT/L (ref 40–150)
ALT SERPL-CCNC: 16 UNIT/L (ref 0–55)
ANION GAP SERPL CALC-SCNC: 7 MEQ/L
AST SERPL-CCNC: 20 UNIT/L (ref 11–45)
BASOPHILS # BLD AUTO: 0.04 X10(3)/MCL
BASOPHILS NFR BLD AUTO: 0.3 %
BILIRUB SERPL-MCNC: 0.8 MG/DL
BUN SERPL-MCNC: 27.2 MG/DL (ref 8.4–25.7)
CALCIUM SERPL-MCNC: 8.2 MG/DL (ref 8.8–10)
CHLORIDE SERPL-SCNC: 112 MMOL/L (ref 98–107)
CO2 SERPL-SCNC: 26 MMOL/L (ref 23–31)
CREAT SERPL-MCNC: 0.67 MG/DL (ref 0.72–1.25)
CREAT/UREA NIT SERPL: 41
EOSINOPHIL # BLD AUTO: 0.22 X10(3)/MCL (ref 0–0.9)
EOSINOPHIL NFR BLD AUTO: 1.9 %
ERYTHROCYTE [DISTWIDTH] IN BLOOD BY AUTOMATED COUNT: 15 % (ref 11.5–17)
GFR SERPLBLD CREATININE-BSD FMLA CKD-EPI: >60 ML/MIN/1.73/M2
GLOBULIN SER-MCNC: 3.3 GM/DL (ref 2.4–3.5)
GLUCOSE SERPL-MCNC: 110 MG/DL (ref 82–115)
HCT VFR BLD AUTO: 34.9 % (ref 42–52)
HGB BLD-MCNC: 10.8 G/DL (ref 14–18)
IMM GRANULOCYTES # BLD AUTO: 0.11 X10(3)/MCL (ref 0–0.04)
IMM GRANULOCYTES NFR BLD AUTO: 0.9 %
LYMPHOCYTES # BLD AUTO: 0.9 X10(3)/MCL (ref 0.6–4.6)
LYMPHOCYTES NFR BLD AUTO: 7.7 %
MAGNESIUM SERPL-MCNC: 2.1 MG/DL (ref 1.6–2.6)
MCH RBC QN AUTO: 29.3 PG (ref 27–31)
MCHC RBC AUTO-ENTMCNC: 30.9 G/DL (ref 33–36)
MCV RBC AUTO: 94.8 FL (ref 80–94)
MONOCYTES # BLD AUTO: 0.91 X10(3)/MCL (ref 0.1–1.3)
MONOCYTES NFR BLD AUTO: 7.8 %
NEUTROPHILS # BLD AUTO: 9.46 X10(3)/MCL (ref 2.1–9.2)
NEUTROPHILS NFR BLD AUTO: 81.4 %
NRBC BLD AUTO-RTO: 0 %
OHS QRS DURATION: 168 MS
OHS QTC CALCULATION: 596 MS
PHOSPHATE SERPL-MCNC: 2.3 MG/DL (ref 2.3–4.7)
PLATELET # BLD AUTO: 263 X10(3)/MCL (ref 130–400)
PMV BLD AUTO: 11.2 FL (ref 7.4–10.4)
POCT GLUCOSE: 133 MG/DL (ref 70–110)
POTASSIUM SERPL-SCNC: 4.3 MMOL/L (ref 3.5–5.1)
PROT SERPL-MCNC: 5.3 GM/DL (ref 5.8–7.6)
RBC # BLD AUTO: 3.68 X10(6)/MCL (ref 4.7–6.1)
SODIUM SERPL-SCNC: 145 MMOL/L (ref 136–145)
WBC # BLD AUTO: 11.64 X10(3)/MCL (ref 4.5–11.5)

## 2025-05-26 PROCEDURE — 25000003 PHARM REV CODE 250

## 2025-05-26 PROCEDURE — 25000003 PHARM REV CODE 250: Performed by: INTERNAL MEDICINE

## 2025-05-26 PROCEDURE — 85025 COMPLETE CBC W/AUTO DIFF WBC: CPT

## 2025-05-26 PROCEDURE — 63600175 PHARM REV CODE 636 W HCPCS: Performed by: INTERNAL MEDICINE

## 2025-05-26 PROCEDURE — 83735 ASSAY OF MAGNESIUM: CPT

## 2025-05-26 PROCEDURE — 80053 COMPREHEN METABOLIC PANEL: CPT

## 2025-05-26 PROCEDURE — 84100 ASSAY OF PHOSPHORUS: CPT

## 2025-05-26 PROCEDURE — 94799 UNLISTED PULMONARY SVC/PX: CPT

## 2025-05-26 PROCEDURE — 63600175 PHARM REV CODE 636 W HCPCS

## 2025-05-26 PROCEDURE — 27000221 HC OXYGEN, UP TO 24 HOURS

## 2025-05-26 PROCEDURE — 25000003 PHARM REV CODE 250: Performed by: NURSE PRACTITIONER

## 2025-05-26 PROCEDURE — 21400001 HC TELEMETRY ROOM

## 2025-05-26 PROCEDURE — 99900031 HC PATIENT EDUCATION (STAT)

## 2025-05-26 PROCEDURE — 94760 N-INVAS EAR/PLS OXIMETRY 1: CPT

## 2025-05-26 PROCEDURE — 36415 COLL VENOUS BLD VENIPUNCTURE: CPT

## 2025-05-26 PROCEDURE — 92526 ORAL FUNCTION THERAPY: CPT

## 2025-05-26 RX ADMIN — HEPARIN SODIUM 5000 UNITS: 5000 INJECTION, SOLUTION INTRAVENOUS; SUBCUTANEOUS at 03:05

## 2025-05-26 RX ADMIN — FAMOTIDINE 20 MG: 20 TABLET, FILM COATED ORAL at 09:05

## 2025-05-26 RX ADMIN — MEROPENEM 1 G: 1 INJECTION, POWDER, FOR SOLUTION INTRAVENOUS at 03:05

## 2025-05-26 RX ADMIN — MEXILETINE HYDROCHLORIDE 200 MG: 200 CAPSULE ORAL at 07:05

## 2025-05-26 RX ADMIN — AMIODARONE HYDROCHLORIDE 200 MG: 200 TABLET ORAL at 09:05

## 2025-05-26 RX ADMIN — MICONAZOLE NITRATE: 20 POWDER TOPICAL at 10:05

## 2025-05-26 RX ADMIN — MEROPENEM 1 G: 1 INJECTION, POWDER, FOR SOLUTION INTRAVENOUS at 11:05

## 2025-05-26 RX ADMIN — MEXILETINE HYDROCHLORIDE 200 MG: 200 CAPSULE ORAL at 10:05

## 2025-05-26 RX ADMIN — MEXILETINE HYDROCHLORIDE 200 MG: 200 CAPSULE ORAL at 03:05

## 2025-05-26 RX ADMIN — HEPARIN SODIUM 5000 UNITS: 5000 INJECTION, SOLUTION INTRAVENOUS; SUBCUTANEOUS at 10:05

## 2025-05-26 RX ADMIN — POLYETHYLENE GLYCOL 3350 17 G: 17 POWDER, FOR SOLUTION ORAL at 10:05

## 2025-05-26 RX ADMIN — PRAVASTATIN SODIUM 40 MG: 10 TABLET ORAL at 09:05

## 2025-05-26 RX ADMIN — MEROPENEM 1 G: 1 INJECTION, POWDER, FOR SOLUTION INTRAVENOUS at 06:05

## 2025-05-26 RX ADMIN — FAMOTIDINE 20 MG: 20 TABLET, FILM COATED ORAL at 10:05

## 2025-05-26 RX ADMIN — SENNOSIDES AND DOCUSATE SODIUM 1 TABLET: 50; 8.6 TABLET ORAL at 09:05

## 2025-05-26 RX ADMIN — FINASTERIDE 5 MG: 5 TABLET, FILM COATED ORAL at 09:05

## 2025-05-26 NOTE — PLAN OF CARE
Call in LOCET for Patient, [facility] closed.  Will call LOCET in on Tuesday, terrance PARSONS.

## 2025-05-26 NOTE — PT/OT/SLP PROGRESS
[facility]  Speech Language Pathology Department  Dysphagia Therapy Progress Note    Patient Name:  Alcides Rosario   MRN:  [id number]  Admitting Diagnosis: hypoxemic resp failure, NSTEMI     Recommendations     General recommendations:  dysphagia therapy  Solid texture recommendation:  NPO  Liquid consistency recommendation: NPO   Medications: NPO  Discharge therapy intensity: Moderate Intensity Therapy   Barriers to safe discharge:  severity of impairment and limited insight into deficits    Subjective     Patient awake and alert.  Spiritual/Cultural/Jewish Beliefs/Practices that affect care: no    Pain/Comfort: Pain Rating 1: 0/10    Respiratory Status:  Nasal cannula    Objective     Oral Musculature  Secretion Management: problems swallowing saliva  Volitional Cough: Unable to clear secretions    Oral care completed.  Patient with intermittent wet vocal quality and nonproductive cough.    Therapeutic PO Trials:  Consistency Amount Fed By Oral Symptoms Pharyngeal Symptoms   Ice chips x3 SLP Bolus holding Reduced laryngeal movement to palpation  Multiple swallows  Throat clear after swallow     Assessment     Pt continues to present with oropharyngeal dysphagia and remains unsafe for PO intake.    Nursing updated regarding session, reduced secretion management, and necessity for oral care completion.    Outcome Measures     Functional Oral Intake Scale: 1 - Nothing by mouth    Goals     Multidisciplinary Problems       SLP Goals          Problem: SLP    Goal Priority Disciplines Outcome   SLP Goal     SLP Progressing   Description: LTG: Pt will tolerate least restrictive PO diet with no clinical signs/sx aspiration    STGs:  1.  Pt will tolerate pureed solids with adequate bolus formation/transport and no clinical signs/sx aspiration  2.  Pt will tolerate ice chips with no clinical signs/sx aspiration  3.  Pt will complete laryngeal strengthening exercises and luz with minimal  cues  4.  Pt will tolerate thermal stimulation to the anterior faucial pillars x10 with 100% effortful swallows and delay less than 2 seconds                       Patient Education     Patient provided with verbal education regarding results/recommendations.  Understanding was verbalized, however additional teaching warranted.    Plan     Will continue to follow and tx as appropriate.    SLP Follow-Up:  Yes   Patient to be seen:  daily   Plan of Care expires:  05/30/25  Plan of Care reviewed with:  patient       Time Tracking     SLP Treatment Date:   05/26/25  Speech Start Time:  1240  Speech Stop Time:  1250     Speech Total Time (min):  10 min    Billable minutes:  Treatment of Swallow Dysfunction, 10 minutes       05/26/2025

## 2025-05-26 NOTE — PROGRESS NOTES
Inpatient Nutrition Assessment    Admit Date: 5/8/2025   Total duration of encounter: 18 days   Patient Age: 79 y.o.    Nutrition Recommendation/Prescription     Tube feeding:    Isosource 1.5 @ 75 mL/hr + 100 mL free water flushes Q2H  Kcal: 2250 kcal/day (~98% est min kcal needs)  Protein: 102 g/day (~90% est min protein needs)  Fluid: 2346 mL/day (~102% est min fluid needs)    Due to dx of malnutrition, pt at risk for refeeding syndrome. Start TF @ 20ml/hr and increase as tolerated 10ml/hr q8hr until goal rate reached.   May also need to consider additional MVI and thiamine per MD.      Communication of Recommendations: reviewed with nurse    Nutrition Assessment     Malnutrition Assessment/Nutrition-Focused Physical Exam    Malnutrition Context: acute illness or injury (05/19/25 1246)  Malnutrition Level: moderate (05/19/25 1246)  Energy Intake (Malnutrition): other (see comments) (Does not meet criteria) (05/19/25 1246)  Weight Loss (Malnutrition): other (see comments) (Unable to assess) (05/19/25 1246)              Muscle Mass (Malnutrition): mild depletion (05/19/25 1246)  Restorationism Region (Muscle Loss): mild depletion                       Fluid Accumulation (Malnutrition): moderate (05/19/25 1246)        A minimum of two characteristics is recommended for diagnosis of either severe or non-severe malnutrition.    Chart Review    Reason Seen: continuous nutrition monitoring and follow-up    Malnutrition Screening Tool Results   Have you recently lost weight without trying?: No  Have you been eating poorly because of a decreased appetite?: No   MST Score: 0   Diagnosis:  Acute decompensated heart failure with reduced ejection fraction  Persistent ventricular tachycardia  Chronic atrial fibrillation     Relevant Medical History: Afib. CVA. HTN, PVD, CHF    Scheduled Medications:  amiodarone, 200 mg, Daily  famotidine, 20 mg, BID  finasteride, 5 mg, Daily  heparin (porcine), 5,000 Units, Q8H  meropenem, 1 g,  Q8H  methocarbamoL, 500 mg, Once  mexiletine, 200 mg, Q8H  miconazole NITRATE 2 %, , BID  polyethylene glycol, 17 g, BID  pravastatin, 40 mg, Daily  senna-docusate, 1 tablet, Daily    Continuous Infusions:  LIDOcaine, Last Rate: 7.5 mL/hr at 05/10/25 1935    PRN Medications:  acetaminophen, 650 mg, Q4H PRN  acetylcysteine 100 mg/ml (10%), 4 mL, TID PRN  bisacodyL, 10 mg, Daily PRN  guaiFENesin 100 mg/5 ml, 200 mg, Q4H PRN  HYDROcodone-acetaminophen, 1 tablet, Q4H PRN  LIDOcaine, , Continuous PRN  morphine, 2 mg, Q4H PRN  ondansetron, 8 mg, Q8H PRN  sodium chloride 0.9%, 10 mL, PRN  sodium chloride 0.9%, 10 mL, PRN  sodium chloride 0.9%, 10 mL, Q12H PRN    Calorie Containing IV Medications: no significant kcals from medications at this time    Recent Labs   Lab 05/20/25  0610 05/21/25  0312 05/22/25  0328 05/22/25  0329 05/23/25  0359 05/24/25  0311 05/25/25  0305 05/26/25  0456    138  --  137 144 145 147* 145   K 3.6 3.9  --  3.7 4.4 3.2* 3.7 4.3   CALCIUM 8.1* 7.7*  --  8.0* 8.2* 7.7* 8.7* 8.2*   PHOS 3.1 2.7  --  3.4 2.2* 1.7* 1.9* 2.3   MG 2.00 1.90  --  2.00 2.00 2.10 2.20 2.10    106  --  106 109* 111* 112* 112*   CO2 25 21*  --  23 23 23 25 26   BUN 19.5 21.4  --  21.1 21.1 29.1* 32.5* 27.2*   CREATININE 0.88 0.88  --  0.77 0.78 0.87 0.65* 0.67*   EGFRNORACEVR >60 >60  --  >60 >60 >60 >60 >60    89  --  102 96 128* 114 110   BILITOT 0.6 0.5  --  0.5 1.0 0.8 0.7 0.8   ALKPHOS 85 86  --  100 120 119 116 105   ALT 13 13  --  16 18 16 17 16   AST 17 18  --  66* 42 29 23 20   ALBUMIN 1.9* 1.9*  --  1.8* 2.2* 1.8* 1.8* 2.0*   WBC 11.97* 10.84 9.93  --  4.53 15.75* 15.30* 11.64*   HGB 11.7* 11.5* 11.2*  --  12.5* 10.4* 10.4* 10.8*   HCT 37.0* 38.1* 36.7*  --  39.4* 32.8* 33.9* 34.9*     Nutrition Orders:  Diet NPO  Tube Feedings/Formulas 20; Impact Peptide 1.5; NG    Appetite/Oral Intake: NPO/NPO  Factors Affecting Nutritional Intake: none identified  Social Needs Impacting Access to Food: unable  "to assess at this time; will attempt on follow-up  Food/Muslim/Cultural Preferences: unable to obtain  Food Allergies: no known food allergies  Last Bowel Movement: 05/16/25  Wound(s):  None documented     Comments    5/15/25: Pt with 100% po intake of liquid diet. Stated appetite good. Unable to obtain further info or complete physical assessment. Interrupted by MD at time of visit. Will attempt upon F/U.     5/19/25: Pt now intubate.d Possibly going for procedure vs extubation today. Will provide TF recommendation in case needed. Pt also meets criteria for intake in malnutrition assessment, Junum not updating though. No kcal from meds.     5/21/25: Still no TF running. Plans for procedure today with possible extubation after, per RN. Receiving small amount of kcal from meds.     5/23/25: Still no po intake/nutrition. Plans for MBS today. Discussed needing NG placed if not able to advance diet. Will provide TF recommendations in case needed. Pt currently very hungry, wanting food.     5/24/25: Tube feeding initiated.    5/26/2025: TF running. Provided TF recommendations. No reported N/V. Last BM noted. Will monitor.    Anthropometrics    Height: 6' 0.01" (182.9 cm), Height Method: Measured  Last Weight: 115 kg (253 lb 8.5 oz) (05/12/25 1033), Weight Method: Bed Scale  BMI (Calculated): 34.4  BMI Classification: obese grade I (BMI 30-34.9)        Ideal Body Weight (IBW), Male: 178.06 lb     % Ideal Body Weight, Male (lb): 142.43 %                          Usual Weight Provided By: unable to obtain usual weight    Wt Readings from Last 5 Encounters:   05/12/25 115 kg (253 lb 8.5 oz)   04/02/25 113.4 kg (250 lb)   09/23/24 107 kg (236 lb)   03/06/24 108.4 kg (239 lb)   09/06/23 119.7 kg (264 lb)   5/26/2025: no new wts  Weight Change(s) Since Admission:   Wt Readings from Last 1 Encounters:   05/12/25 1033 115 kg (253 lb 8.5 oz)   05/08/25 1750 115 kg (253 lb 8.5 oz)   Admit Weight: 115 kg (253 lb 8.5 oz) (05/08/25 " 1750), Weight Method: Bed Scale    Estimated Needs    Weight Used For Calorie Calculations: 115 kg (253 lb 8.5 oz)  Energy Calorie Requirements (kcal): 2277 (MSJ x 1.2)  Energy Need Method: Loup-St Jeor  Weight Used For Protein Calculations: 80.9 kg (178 lb 5.6 oz) (IBW)  Protein Requirements: 113 (1.4 g/kg IBW)  Fluid Requirements (mL): 2300ml (1ml/kcal)  CHO Requirement: 320kcal (45% est kcal needs)     Enteral Nutrition     Formula: Impact Peptide 1.5 Ulysses  Rate/Volume: 50 ml/hr  Water Flushes: none ordered  Additives/Modulars: none at this time  Route: nasogastric tube  Method: continuous  Total Nutrition Provided by Tube Feeding, Additives, and Flushes:  Calories Provided  1500 kcal/d, 52% needs   Protein Provided  94 g/d, 58% needs   Fluid Provided  770 ml/d, 34% needs   Continuous feeding calculations based on estimated 20 hr/d run time unless otherwise stated.    Parenteral Nutrition     Patient not receiving parenteral nutrition support at this time.    Evaluation of Received Nutrient Intake    Calories: not meeting estimated needs  Protein: not meeting estimated needs    Patient Education     Not applicable.    Nutrition Diagnosis     PES: Inadequate energy intake related to acute illness as evidenced by NPO or liquid diet since 5/9/25. (active)     PES: Moderate acute disease or injury related malnutrition Related to current condition As Evidenced by:  - weight loss: Unable to assess - muscle mass depletion: 1 area of mild muscle loss (Temporalis) - fluid accumulation: 2 areas of moderate or severe fluid accumulation (Lower extremities edema, Upper extremities edema) active    Nutrition Interventions     Intervention(s): modified composition of enteral nutrition, modified rate of enteral nutrition, and collaboration with other providers  Intervention(s):      Goal: Meet greater than 80% of nutritional needs by follow-up. (goal progressing)    Nutrition Goals & Monitoring     Dietitian will monitor:  energy intake and enteral nutrition intake  Discharge planning: too early to determine; pending clinical course  Nutrition Risk/Follow-Up: patient at increased nutrition risk; dietitian will follow-up twice weekly   Please consult if re-assessment needed sooner.
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
## 2025-05-26 NOTE — PT/OT/SLP PROGRESS
Physical Therapy      Patient Name:  Alcides Rosario   MRN:  [id number]    Patient not seen today secondary to pt in/out of Afib with activity per nurse, requesting to hold this afternoon. Will follow-up tomorrow as able.    5/26/25

## 2025-05-27 LAB
ALBUMIN SERPL-MCNC: 2.1 G/DL (ref 3.4–4.8)
ALBUMIN/GLOB SERPL: 0.6 RATIO (ref 1.1–2)
ALP SERPL-CCNC: 115 UNIT/L (ref 40–150)
ALT SERPL-CCNC: 15 UNIT/L (ref 0–55)
ANION GAP SERPL CALC-SCNC: 10 MEQ/L
AST SERPL-CCNC: 20 UNIT/L (ref 11–45)
BASOPHILS # BLD AUTO: 0.06 X10(3)/MCL
BASOPHILS NFR BLD AUTO: 0.5 %
BILIRUB SERPL-MCNC: 0.7 MG/DL
BUN SERPL-MCNC: 28.9 MG/DL (ref 8.4–25.7)
CALCIUM SERPL-MCNC: 8.8 MG/DL (ref 8.8–10)
CHLORIDE SERPL-SCNC: 110 MMOL/L (ref 98–107)
CO2 SERPL-SCNC: 23 MMOL/L (ref 23–31)
CREAT SERPL-MCNC: 0.67 MG/DL (ref 0.72–1.25)
CREAT/UREA NIT SERPL: 43
EOSINOPHIL # BLD AUTO: 0.56 X10(3)/MCL (ref 0–0.9)
EOSINOPHIL NFR BLD AUTO: 5 %
ERYTHROCYTE [DISTWIDTH] IN BLOOD BY AUTOMATED COUNT: 15.1 % (ref 11.5–17)
GFR SERPLBLD CREATININE-BSD FMLA CKD-EPI: >60 ML/MIN/1.73/M2
GLOBULIN SER-MCNC: 3.4 GM/DL (ref 2.4–3.5)
GLUCOSE SERPL-MCNC: 126 MG/DL (ref 82–115)
HCT VFR BLD AUTO: 37.9 % (ref 42–52)
HGB BLD-MCNC: 11.5 G/DL (ref 14–18)
IMM GRANULOCYTES # BLD AUTO: 0.2 X10(3)/MCL (ref 0–0.04)
IMM GRANULOCYTES NFR BLD AUTO: 1.8 %
LYMPHOCYTES # BLD AUTO: 1.1 X10(3)/MCL (ref 0.6–4.6)
LYMPHOCYTES NFR BLD AUTO: 9.8 %
MAGNESIUM SERPL-MCNC: 1.9 MG/DL (ref 1.6–2.6)
MCH RBC QN AUTO: 29.1 PG (ref 27–31)
MCHC RBC AUTO-ENTMCNC: 30.3 G/DL (ref 33–36)
MCV RBC AUTO: 95.9 FL (ref 80–94)
MONOCYTES # BLD AUTO: 0.87 X10(3)/MCL (ref 0.1–1.3)
MONOCYTES NFR BLD AUTO: 7.7 %
NEUTROPHILS # BLD AUTO: 8.49 X10(3)/MCL (ref 2.1–9.2)
NEUTROPHILS NFR BLD AUTO: 75.2 %
NRBC BLD AUTO-RTO: 0.2 %
OHS QRS DURATION: 178 MS
OHS QTC CALCULATION: 608 MS
PHOSPHATE SERPL-MCNC: 2.3 MG/DL (ref 2.3–4.7)
PLATELET # BLD AUTO: 247 X10(3)/MCL (ref 130–400)
PMV BLD AUTO: 11.6 FL (ref 7.4–10.4)
POTASSIUM SERPL-SCNC: 4.3 MMOL/L (ref 3.5–5.1)
PROT SERPL-MCNC: 5.5 GM/DL (ref 5.8–7.6)
RBC # BLD AUTO: 3.95 X10(6)/MCL (ref 4.7–6.1)
SODIUM SERPL-SCNC: 143 MMOL/L (ref 136–145)
WBC # BLD AUTO: 11.28 X10(3)/MCL (ref 4.5–11.5)

## 2025-05-27 PROCEDURE — 93005 ELECTROCARDIOGRAM TRACING: CPT | Performed by: INTERNAL MEDICINE

## 2025-05-27 PROCEDURE — 21400001 HC TELEMETRY ROOM

## 2025-05-27 PROCEDURE — 25000003 PHARM REV CODE 250: Performed by: INTERNAL MEDICINE

## 2025-05-27 PROCEDURE — 93010 ELECTROCARDIOGRAM REPORT: CPT | Mod: ,,, | Performed by: INTERNAL MEDICINE

## 2025-05-27 PROCEDURE — 36415 COLL VENOUS BLD VENIPUNCTURE: CPT

## 2025-05-27 PROCEDURE — 63600175 PHARM REV CODE 636 W HCPCS: Performed by: INTERNAL MEDICINE

## 2025-05-27 PROCEDURE — 80053 COMPREHEN METABOLIC PANEL: CPT

## 2025-05-27 PROCEDURE — 25000003 PHARM REV CODE 250

## 2025-05-27 PROCEDURE — 85025 COMPLETE CBC W/AUTO DIFF WBC: CPT

## 2025-05-27 PROCEDURE — 63600175 PHARM REV CODE 636 W HCPCS

## 2025-05-27 PROCEDURE — 83735 ASSAY OF MAGNESIUM: CPT

## 2025-05-27 PROCEDURE — 84100 ASSAY OF PHOSPHORUS: CPT

## 2025-05-27 PROCEDURE — 25000003 PHARM REV CODE 250: Performed by: NURSE PRACTITIONER

## 2025-05-27 PROCEDURE — 93005 ELECTROCARDIOGRAM TRACING: CPT

## 2025-05-27 PROCEDURE — 63600175 PHARM REV CODE 636 W HCPCS: Performed by: NURSE PRACTITIONER

## 2025-05-27 RX ORDER — METOPROLOL TARTRATE 1 MG/ML
5 INJECTION, SOLUTION INTRAVENOUS ONCE
Status: COMPLETED | OUTPATIENT
Start: 2025-05-27 | End: 2025-05-27

## 2025-05-27 RX ORDER — METOPROLOL TARTRATE 50 MG/1
50 TABLET ORAL 3 TIMES DAILY
Status: DISCONTINUED | OUTPATIENT
Start: 2025-05-27 | End: 2025-06-04

## 2025-05-27 RX ORDER — METOPROLOL TARTRATE 1 MG/ML
5 INJECTION, SOLUTION INTRAVENOUS EVERY 6 HOURS PRN
Status: DISCONTINUED | OUTPATIENT
Start: 2025-05-27 | End: 2025-06-09 | Stop reason: HOSPADM

## 2025-05-27 RX ADMIN — HEPARIN SODIUM 5000 UNITS: 5000 INJECTION, SOLUTION INTRAVENOUS; SUBCUTANEOUS at 01:05

## 2025-05-27 RX ADMIN — HEPARIN SODIUM 5000 UNITS: 5000 INJECTION, SOLUTION INTRAVENOUS; SUBCUTANEOUS at 05:05

## 2025-05-27 RX ADMIN — AMIODARONE HYDROCHLORIDE 1 MG/MIN: 1.8 INJECTION, SOLUTION INTRAVENOUS at 06:05

## 2025-05-27 RX ADMIN — MEXILETINE HYDROCHLORIDE 200 MG: 200 CAPSULE ORAL at 08:05

## 2025-05-27 RX ADMIN — MEROPENEM 1 G: 1 INJECTION, POWDER, FOR SOLUTION INTRAVENOUS at 01:05

## 2025-05-27 RX ADMIN — POLYETHYLENE GLYCOL 3350 17 G: 17 POWDER, FOR SOLUTION ORAL at 08:05

## 2025-05-27 RX ADMIN — SENNOSIDES AND DOCUSATE SODIUM 1 TABLET: 50; 8.6 TABLET ORAL at 09:05

## 2025-05-27 RX ADMIN — AMIODARONE HYDROCHLORIDE 200 MG: 200 TABLET ORAL at 09:05

## 2025-05-27 RX ADMIN — METOPROLOL TARTRATE 50 MG: 50 TABLET, FILM COATED ORAL at 02:05

## 2025-05-27 RX ADMIN — MICONAZOLE NITRATE: 20 POWDER TOPICAL at 09:05

## 2025-05-27 RX ADMIN — MEROPENEM 1 G: 1 INJECTION, POWDER, FOR SOLUTION INTRAVENOUS at 06:05

## 2025-05-27 RX ADMIN — FAMOTIDINE 20 MG: 20 TABLET, FILM COATED ORAL at 08:05

## 2025-05-27 RX ADMIN — METOPROLOL TARTRATE 50 MG: 50 TABLET, FILM COATED ORAL at 08:05

## 2025-05-27 RX ADMIN — MEXILETINE HYDROCHLORIDE 200 MG: 200 CAPSULE ORAL at 05:05

## 2025-05-27 RX ADMIN — FAMOTIDINE 20 MG: 20 TABLET, FILM COATED ORAL at 09:05

## 2025-05-27 RX ADMIN — POLYETHYLENE GLYCOL 3350 17 G: 17 POWDER, FOR SOLUTION ORAL at 09:05

## 2025-05-27 RX ADMIN — METOPROLOL TARTRATE 5 MG: 1 INJECTION, SOLUTION INTRAVENOUS at 12:05

## 2025-05-27 RX ADMIN — MEXILETINE HYDROCHLORIDE 200 MG: 200 CAPSULE ORAL at 01:05

## 2025-05-27 RX ADMIN — MEROPENEM 1 G: 1 INJECTION, POWDER, FOR SOLUTION INTRAVENOUS at 09:05

## 2025-05-27 RX ADMIN — FINASTERIDE 5 MG: 5 TABLET, FILM COATED ORAL at 09:05

## 2025-05-27 RX ADMIN — HEPARIN SODIUM 5000 UNITS: 5000 INJECTION, SOLUTION INTRAVENOUS; SUBCUTANEOUS at 08:05

## 2025-05-27 RX ADMIN — PRAVASTATIN SODIUM 40 MG: 10 TABLET ORAL at 09:05

## 2025-05-27 NOTE — PT/OT/SLP PROGRESS
Physical Therapy      Patient Name:  Alcides Rosario   MRN:  [id number]    Patient not seen today secondary to RN hold. Pt with HR in 140s and vtach. Will follow-up as schedule allows and pt is appropriate.    5/27/2025

## 2025-05-27 NOTE — ANESTHESIA POSTPROCEDURE EVALUATION
Anesthesia Post Evaluation    Patient: Alcides Rosario    Procedure(s) Performed: Procedure(s) (LRB):  Study possible ablation (N/A)    Final Anesthesia Type: general      Patient location during evaluation: PACU  Patient participation: Yes- Able to Participate  Level of consciousness: awake and alert  Post-procedure vital signs: reviewed and stable  Pain management: adequate  Airway patency: patent    PONV status at discharge: No PONV  Anesthetic complications: no      Cardiovascular status: blood pressure returned to baseline  Respiratory status: unassisted            Vitals Value Taken Time   /93 05/27/25 08:23   Temp 36.4 °C (97.6 °F) 05/27/25 08:23   Pulse 125 05/27/25 08:23   Resp 21 05/27/25 08:23   SpO2 99 % 05/27/25 08:23         No case tracking events are documented in the log.      Pain/Kathrine Score: No data recorded

## 2025-05-27 NOTE — PLAN OF CARE
Recd 142, uploaded [id number] to  in Patient Chart.     Sent PASRR\142 to [facility] as per  via [employer]

## 2025-05-27 NOTE — PT/OT/SLP PROGRESS
SLP attempting to see pt x2 for therapy however upon first attempt CNA completing pt care and upon second attempt nursing entering room for med administration do to HR in 140s at rest.  SLP to continue to follow.

## 2025-05-27 NOTE — PT/OT/SLP PROGRESS
Occupational Therapy      Patient Name:  Alcides Rosario   MRN:  [id number]    Patient not seen today secondary to nursing hold. Pt with HR of 140s at rest and vtach. Will follow-up as appropriate.      5/27/2025
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
## 2025-05-28 LAB
ALBUMIN SERPL-MCNC: 2 G/DL (ref 3.4–4.8)
ALBUMIN/GLOB SERPL: 0.5 RATIO (ref 1.1–2)
ALP SERPL-CCNC: 97 UNIT/L (ref 40–150)
ALT SERPL-CCNC: 19 UNIT/L (ref 0–55)
ANION GAP SERPL CALC-SCNC: 8 MEQ/L
AST SERPL-CCNC: 29 UNIT/L (ref 11–45)
BASOPHILS # BLD AUTO: 0.07 X10(3)/MCL
BASOPHILS NFR BLD AUTO: 0.6 %
BILIRUB SERPL-MCNC: 0.6 MG/DL
BUN SERPL-MCNC: 28.5 MG/DL (ref 8.4–25.7)
CALCIUM SERPL-MCNC: 8.4 MG/DL (ref 8.8–10)
CHLORIDE SERPL-SCNC: 113 MMOL/L (ref 98–107)
CO2 SERPL-SCNC: 23 MMOL/L (ref 23–31)
CREAT SERPL-MCNC: 0.71 MG/DL (ref 0.72–1.25)
CREAT/UREA NIT SERPL: 40
EOSINOPHIL # BLD AUTO: 0.43 X10(3)/MCL (ref 0–0.9)
EOSINOPHIL NFR BLD AUTO: 3.8 %
ERYTHROCYTE [DISTWIDTH] IN BLOOD BY AUTOMATED COUNT: 15.2 % (ref 11.5–17)
GFR SERPLBLD CREATININE-BSD FMLA CKD-EPI: >60 ML/MIN/1.73/M2
GLOBULIN SER-MCNC: 3.7 GM/DL (ref 2.4–3.5)
GLUCOSE SERPL-MCNC: 99 MG/DL (ref 82–115)
HCT VFR BLD AUTO: 37.9 % (ref 42–52)
HGB BLD-MCNC: 12 G/DL (ref 14–18)
IMM GRANULOCYTES # BLD AUTO: 0.19 X10(3)/MCL (ref 0–0.04)
IMM GRANULOCYTES NFR BLD AUTO: 1.7 %
LYMPHOCYTES # BLD AUTO: 0.93 X10(3)/MCL (ref 0.6–4.6)
LYMPHOCYTES NFR BLD AUTO: 8.2 %
MAGNESIUM SERPL-MCNC: 2 MG/DL (ref 1.6–2.6)
MCH RBC QN AUTO: 29.7 PG (ref 27–31)
MCHC RBC AUTO-ENTMCNC: 31.7 G/DL (ref 33–36)
MCV RBC AUTO: 93.8 FL (ref 80–94)
MONOCYTES # BLD AUTO: 0.96 X10(3)/MCL (ref 0.1–1.3)
MONOCYTES NFR BLD AUTO: 8.5 %
NEUTROPHILS # BLD AUTO: 8.78 X10(3)/MCL (ref 2.1–9.2)
NEUTROPHILS NFR BLD AUTO: 77.2 %
NRBC BLD AUTO-RTO: 0.8 %
PHOSPHATE SERPL-MCNC: 2.9 MG/DL (ref 2.3–4.7)
PLATELET # BLD AUTO: 217 X10(3)/MCL (ref 130–400)
PMV BLD AUTO: 11.7 FL (ref 7.4–10.4)
POTASSIUM SERPL-SCNC: 5.3 MMOL/L (ref 3.5–5.1)
PROT SERPL-MCNC: 5.7 GM/DL (ref 5.8–7.6)
RBC # BLD AUTO: 4.04 X10(6)/MCL (ref 4.7–6.1)
SODIUM SERPL-SCNC: 144 MMOL/L (ref 136–145)
WBC # BLD AUTO: 11.36 X10(3)/MCL (ref 4.5–11.5)

## 2025-05-28 PROCEDURE — 36415 COLL VENOUS BLD VENIPUNCTURE: CPT

## 2025-05-28 PROCEDURE — 99900031 HC PATIENT EDUCATION (STAT)

## 2025-05-28 PROCEDURE — 93010 ELECTROCARDIOGRAM REPORT: CPT | Mod: 76,,, | Performed by: INTERNAL MEDICINE

## 2025-05-28 PROCEDURE — 84100 ASSAY OF PHOSPHORUS: CPT

## 2025-05-28 PROCEDURE — 63600175 PHARM REV CODE 636 W HCPCS: Performed by: INTERNAL MEDICINE

## 2025-05-28 PROCEDURE — 94799 UNLISTED PULMONARY SVC/PX: CPT

## 2025-05-28 PROCEDURE — 25000003 PHARM REV CODE 250: Performed by: NURSE PRACTITIONER

## 2025-05-28 PROCEDURE — 94760 N-INVAS EAR/PLS OXIMETRY 1: CPT

## 2025-05-28 PROCEDURE — 25000003 PHARM REV CODE 250: Performed by: INTERNAL MEDICINE

## 2025-05-28 PROCEDURE — 93005 ELECTROCARDIOGRAM TRACING: CPT

## 2025-05-28 PROCEDURE — 25000003 PHARM REV CODE 250

## 2025-05-28 PROCEDURE — 92526 ORAL FUNCTION THERAPY: CPT

## 2025-05-28 PROCEDURE — 63600175 PHARM REV CODE 636 W HCPCS: Performed by: STUDENT IN AN ORGANIZED HEALTH CARE EDUCATION/TRAINING PROGRAM

## 2025-05-28 PROCEDURE — 80053 COMPREHEN METABOLIC PANEL: CPT

## 2025-05-28 PROCEDURE — 63600175 PHARM REV CODE 636 W HCPCS: Performed by: NURSE PRACTITIONER

## 2025-05-28 PROCEDURE — 20000000 HC ICU ROOM

## 2025-05-28 PROCEDURE — 85025 COMPLETE CBC W/AUTO DIFF WBC: CPT

## 2025-05-28 PROCEDURE — 83735 ASSAY OF MAGNESIUM: CPT

## 2025-05-28 PROCEDURE — 27000207 HC ISOLATION

## 2025-05-28 PROCEDURE — 27000221 HC OXYGEN, UP TO 24 HOURS

## 2025-05-28 PROCEDURE — 99900035 HC TECH TIME PER 15 MIN (STAT)

## 2025-05-28 PROCEDURE — 93010 ELECTROCARDIOGRAM REPORT: CPT | Mod: ,,, | Performed by: INTERNAL MEDICINE

## 2025-05-28 RX ADMIN — PRAVASTATIN SODIUM 40 MG: 10 TABLET ORAL at 08:05

## 2025-05-28 RX ADMIN — MEROPENEM 1 G: 1 INJECTION, POWDER, FOR SOLUTION INTRAVENOUS at 06:05

## 2025-05-28 RX ADMIN — METOPROLOL TARTRATE 50 MG: 50 TABLET, FILM COATED ORAL at 02:05

## 2025-05-28 RX ADMIN — MEXILETINE HYDROCHLORIDE 200 MG: 200 CAPSULE ORAL at 09:05

## 2025-05-28 RX ADMIN — HEPARIN SODIUM 5000 UNITS: 5000 INJECTION, SOLUTION INTRAVENOUS; SUBCUTANEOUS at 01:05

## 2025-05-28 RX ADMIN — HEPARIN SODIUM 5000 UNITS: 5000 INJECTION, SOLUTION INTRAVENOUS; SUBCUTANEOUS at 04:05

## 2025-05-28 RX ADMIN — MICONAZOLE NITRATE: 20 POWDER TOPICAL at 09:05

## 2025-05-28 RX ADMIN — METOPROLOL TARTRATE 5 MG: 1 INJECTION, SOLUTION INTRAVENOUS at 04:05

## 2025-05-28 RX ADMIN — MEROPENEM 1 G: 1 INJECTION, POWDER, FOR SOLUTION INTRAVENOUS at 04:05

## 2025-05-28 RX ADMIN — METOPROLOL TARTRATE 50 MG: 50 TABLET, FILM COATED ORAL at 09:05

## 2025-05-28 RX ADMIN — MEXILETINE HYDROCHLORIDE 200 MG: 200 CAPSULE ORAL at 02:05

## 2025-05-28 RX ADMIN — MEXILETINE HYDROCHLORIDE 200 MG: 200 CAPSULE ORAL at 04:05

## 2025-05-28 RX ADMIN — AMIODARONE HYDROCHLORIDE 200 MG: 200 TABLET ORAL at 08:05

## 2025-05-28 RX ADMIN — FAMOTIDINE 20 MG: 20 TABLET, FILM COATED ORAL at 09:05

## 2025-05-28 RX ADMIN — MEROPENEM 1 G: 1 INJECTION, POWDER, FOR SOLUTION INTRAVENOUS at 10:05

## 2025-05-28 RX ADMIN — FINASTERIDE 5 MG: 5 TABLET, FILM COATED ORAL at 08:05

## 2025-05-28 RX ADMIN — MORPHINE SULFATE 2 MG: 4 INJECTION INTRAVENOUS at 11:05

## 2025-05-28 RX ADMIN — AMIODARONE HYDROCHLORIDE 0.5 MG/MIN: 1.8 INJECTION, SOLUTION INTRAVENOUS at 01:05

## 2025-05-28 RX ADMIN — FAMOTIDINE 20 MG: 20 TABLET, FILM COATED ORAL at 08:05

## 2025-05-28 RX ADMIN — HEPARIN SODIUM 5000 UNITS: 5000 INJECTION, SOLUTION INTRAVENOUS; SUBCUTANEOUS at 09:05

## 2025-05-28 RX ADMIN — METOPROLOL TARTRATE 50 MG: 50 TABLET, FILM COATED ORAL at 08:05

## 2025-05-28 RX ADMIN — MICONAZOLE NITRATE: 20 POWDER TOPICAL at 08:05
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
## 2025-05-28 NOTE — PT/OT/SLP PROGRESS
[facility]  Speech Language Pathology Department  Dysphagia Therapy Progress Note    Patient Name:  Alcdies Rosario   MRN:  [id number]  Admitting Diagnosis: hypoxemic resp failure, NSTEMI     Recommendations     General recommendations:  dysphagia therapy  Solid texture recommendation:  NPO  Liquid consistency recommendation: NPO   Medications: NPO    Discharge therapy intensity: Moderate Intensity Therapy       Subjective     Patient awake, alert, and cooperative.  Spiritual/Cultural/Episcopalian Beliefs/Practices that affect care: no    Pain/Comfort: Pain Rating 1: 0/10    Respiratory Status:  Nasal cannula    Objective       Therapeutic Activities:  Pt tolerated thermal stimulation to the anterior faucial pillars x10. Wet vocal quality noted at baseline and following all trials. Patient unable to clear with forced throat clear. As trials progressed, swallow delay increased significantly. Hyolaryngeal excursion palpable but weakened/slowed as trials progressed.      Assessment     Pt continues to present with oropharyngeal dysphagia and remains unsafe for PO intake. Patient demonstrating limited endurance during PO trials impacting safety.  Pt with continued decline in oropharyngeal swallow.  Skilled SLP services continue to be warranted.    Outcome Measures     Functional Oral Intake Scale: 1 - Nothing by mouth    Goals     Multidisciplinary Problems       SLP Goals          Problem: SLP    Goal Priority Disciplines Outcome   SLP Goal     SLP Progressing   Description: LTG: Pt will tolerate least restrictive PO diet with no clinical signs/sx aspiration    STGs:  1.  Pt will tolerate pureed solids with adequate bolus formation/transport and no clinical signs/sx aspiration  2.  Pt will tolerate ice chips with no clinical signs/sx aspiration  3.  Pt will complete laryngeal strengthening exercises and luz with minimal cues  4.  Pt will tolerate thermal stimulation to the anterior faucial  pillars x10 with 100% effortful swallows and delay less than 2 seconds                       Patient Education     Patient provided with verbal education regarding swallow function and risks of aspiration.  Understanding was verbalized, however additional teaching warranted.    Plan     Continue strict NPO. Will continue to follow and tx as appropriate.         Time Tracking     SLP Treatment Date:   05/28/25  Speech Start Time:  1220  Speech Stop Time:  1235     Speech Total Time (min):  15 min    Billable minutes:  Treatment of Swallow Dysfunction, 15 minutes       05/28/2025

## 2025-05-28 NOTE — PT/OT/SLP PROGRESS
Physical Therapy      Patient Name:  Alcides Rosario   MRN:  [id number]    Patient not seen for therapy today due to elevated HR (140s at rest). RN requesting to hold. Will follow-up as appropriate.    5/28/2025

## 2025-05-28 NOTE — NURSING
[facility] - 9th Floor Medical Telemetry  Wound Care    Patient Name:  Alcides Rosario   MRN:  [id number]  Date: 5/28/2025  Diagnosis: <principal problem not specified>    History:     Past Medical History:   Diagnosis Date    Atrial fibrillation     HTN (hypertension)     Peripheral vascular disease, unspecified        Social History[1]    Precautions:     Allergies as of 05/08/2025    (No Known Allergies)       WOC Assessment Details/Treatment      05/28/25 1100   Pressure Injury Prevention    Check Medical Devices Done        Wound 05/14/25 2115 Rash Right Abdomen   Date First Assessed/Time First Assessed: 05/14/25 2115   Present on Original Admission: No  Primary Wound Type: Rash  Side: Right  Location: Abdomen   Wound Image   (cleared)   Rash Care   (folds continue to clean with remedy skin cleanser, and use of antifungal powder.)   Skin Interventions   Skin Protection incontinence pads utilized     Re eval of abd rash-80 y/o male in with  v-tach problems.  He is awake alert oriented but does not tolerated being  flat in bed.  Still with cardiac issues.     Abd folds and groins are resolving- no changes to antifungal powder use.   Spoke with nurse Ashley.       05/28/2025         [1]   Social History  Socioeconomic History    Marital status:    Tobacco Use    Smoking status: Former     Types: Cigarettes     Passive exposure: Never    Smokeless tobacco: Never   Substance and Sexual Activity    Alcohol use: Never    Drug use: Never    Sexual activity: Never     Social Drivers of Health     Financial Resource Strain: Low Risk  (5/12/2025)    Overall Financial Resource Strain (CARDIA)     Difficulty of Paying Living Expenses: Not hard at all   Food Insecurity: No Food Insecurity (5/12/2025)    Hunger Vital Sign     Worried About Running Out of Food in the Last Year: Never true     Ran Out of Food in the Last Year: Never true   Transportation Needs: No Transportation Needs (5/12/2025)    PRAPARE -  Transportation     Lack of Transportation (Medical): No     Lack of Transportation (Non-Medical): No   Recent Concern: Transportation Needs - High Risk (3/16/2025)    Received from [facility] SDOH Screening     Has lack of transportation kept you from medical appointments, meetings, work or from getting things needed for daily living? choose all that apply.: Yes, it has kept me from non-medical meetings, appointments, work or from getting things that i need     Has lack of transportation kept you from medical appointments, meetings, work or from getting things needed for daily living? choose all that apply.: Yes, it has kept me from medical appointments or from getting my medications   Physical Activity: Inactive (4/1/2025)    Exercise Vital Sign     Days of Exercise per Week: 0 days     Minutes of Exercise per Session: 10 min   Stress: No Stress Concern Present (5/8/2025)    [employer] [place] - Occupational Stress Questionnaire     Feeling of Stress : Not at all   Housing Stability: Low Risk  (5/12/2025)    Housing Stability Vital Sign     Unable to Pay for Housing in the Last Year: No     Number of Times Moved in the Last Year: 0     Homeless in the Last Year: No

## 2025-05-28 NOTE — PT/OT/SLP PROGRESS
Occupational Therapy      Patient Name:  Alcides Rosario   MRN:  [id number]    Patient not seen today secondary to RN hold, pt with resting HR of 140s at this time. Pt not appropriate for mobility. Will follow-up as appropriate.    5/28/2025

## 2025-05-29 LAB
ALBUMIN SERPL-MCNC: 2 G/DL (ref 3.4–4.8)
ALBUMIN/GLOB SERPL: 0.5 RATIO (ref 1.1–2)
ALLENS TEST BLOOD GAS (OHS): YES
ALP SERPL-CCNC: 289 UNIT/L (ref 40–150)
ALT SERPL-CCNC: 30 UNIT/L (ref 0–55)
ANION GAP SERPL CALC-SCNC: 11 MEQ/L
AST SERPL-CCNC: 38 UNIT/L (ref 11–45)
BASE EXCESS BLD CALC-SCNC: 3.6 MMOL/L
BASOPHILS # BLD AUTO: 0.09 X10(3)/MCL
BASOPHILS NFR BLD AUTO: 0.9 %
BILIRUB SERPL-MCNC: 0.7 MG/DL
BLOOD GAS SAMPLE TYPE (OHS): ABNORMAL
BUN SERPL-MCNC: 37.3 MG/DL (ref 8.4–25.7)
BURR CELLS (OLG): ABNORMAL
CA-I BLD-SCNC: 1.21 MMOL/L (ref 1.12–1.23)
CALCIUM SERPL-MCNC: 8.6 MG/DL (ref 8.8–10)
CHLORIDE SERPL-SCNC: 112 MMOL/L (ref 98–107)
CO2 BLDA-SCNC: 30.5 MMOL/L
CO2 SERPL-SCNC: 17 MMOL/L (ref 23–31)
CREAT SERPL-MCNC: 1.03 MG/DL (ref 0.72–1.25)
CREAT/UREA NIT SERPL: 36
DRAWN BY BLOOD GAS (OHS): ABNORMAL
EOSINOPHIL # BLD AUTO: 0.42 X10(3)/MCL (ref 0–0.9)
EOSINOPHIL NFR BLD AUTO: 4.2 %
ERYTHROCYTE [DISTWIDTH] IN BLOOD BY AUTOMATED COUNT: 15.7 % (ref 11.5–17)
GFR SERPLBLD CREATININE-BSD FMLA CKD-EPI: >60 ML/MIN/1.73/M2
GLOBULIN SER-MCNC: 4.2 GM/DL (ref 2.4–3.5)
GLUCOSE SERPL-MCNC: 122 MG/DL (ref 82–115)
HCO3 BLDA-SCNC: 29.1 MMOL/L (ref 22–26)
HCT VFR BLD AUTO: 40.9 % (ref 42–52)
HGB BLD-MCNC: 11.5 G/DL (ref 14–18)
IMM GRANULOCYTES # BLD AUTO: 0.25 X10(3)/MCL (ref 0–0.04)
IMM GRANULOCYTES NFR BLD AUTO: 2.5 %
LPM (OHS): 2
LYMPHOCYTES # BLD AUTO: 1.15 X10(3)/MCL (ref 0.6–4.6)
LYMPHOCYTES NFR BLD AUTO: 11.6 %
MAGNESIUM SERPL-MCNC: 2.3 MG/DL (ref 1.6–2.6)
MCH RBC QN AUTO: 28.7 PG (ref 27–31)
MCHC RBC AUTO-ENTMCNC: 28.1 G/DL (ref 33–36)
MCV RBC AUTO: 102 FL (ref 80–94)
MONOCYTES # BLD AUTO: 0.71 X10(3)/MCL (ref 0.1–1.3)
MONOCYTES NFR BLD AUTO: 7.2 %
NEUTROPHILS # BLD AUTO: 7.3 X10(3)/MCL (ref 2.1–9.2)
NEUTROPHILS NFR BLD AUTO: 73.6 %
NRBC BLD AUTO-RTO: 0.7 %
OHS QRS DURATION: 162 MS
OHS QTC CALCULATION: 574 MS
OXYGEN DEVICE BLOOD GAS (OHS): ABNORMAL
PCO2 BLDA: 47 MMHG (ref 35–45)
PH BLDA: 7.4 [PH] (ref 7.35–7.45)
PHOSPHATE SERPL-MCNC: 3.6 MG/DL (ref 2.3–4.7)
PLATELET # BLD AUTO: 175 X10(3)/MCL (ref 130–400)
PLATELET # BLD EST: NORMAL 10*3/UL
PLATELETS.RETICULATED NFR BLD AUTO: 12.6 % (ref 0.9–11.2)
PMV BLD AUTO: 12.9 FL (ref 7.4–10.4)
PO2 BLDA: 118 MMHG (ref 80–100)
POIKILOCYTOSIS BLD QL SMEAR: ABNORMAL
POTASSIUM BLOOD GAS (OHS): 4.7 MMOL/L (ref 3.5–5)
POTASSIUM SERPL-SCNC: 5.2 MMOL/L (ref 3.5–5.1)
PROT SERPL-MCNC: 6.2 GM/DL (ref 5.8–7.6)
RBC # BLD AUTO: 4.01 X10(6)/MCL (ref 4.7–6.1)
RBC MORPH BLD: ABNORMAL
SAMPLE SITE BLOOD GAS (OHS): ABNORMAL
SAO2 % BLDA: 99 %
SODIUM BLOOD GAS (OHS): 138 MMOL/L (ref 137–145)
SODIUM SERPL-SCNC: 140 MMOL/L (ref 136–145)
WBC # BLD AUTO: 9.92 X10(3)/MCL (ref 4.5–11.5)

## 2025-05-29 PROCEDURE — 20000000 HC ICU ROOM

## 2025-05-29 PROCEDURE — 25000003 PHARM REV CODE 250: Performed by: NURSE PRACTITIONER

## 2025-05-29 PROCEDURE — 94799 UNLISTED PULMONARY SVC/PX: CPT

## 2025-05-29 PROCEDURE — 25000003 PHARM REV CODE 250

## 2025-05-29 PROCEDURE — 27000221 HC OXYGEN, UP TO 24 HOURS

## 2025-05-29 PROCEDURE — 84100 ASSAY OF PHOSPHORUS: CPT

## 2025-05-29 PROCEDURE — 63600175 PHARM REV CODE 636 W HCPCS: Performed by: NURSE PRACTITIONER

## 2025-05-29 PROCEDURE — 63600175 PHARM REV CODE 636 W HCPCS: Performed by: INTERNAL MEDICINE

## 2025-05-29 PROCEDURE — 85025 COMPLETE CBC W/AUTO DIFF WBC: CPT

## 2025-05-29 PROCEDURE — 83735 ASSAY OF MAGNESIUM: CPT

## 2025-05-29 PROCEDURE — 25000003 PHARM REV CODE 250: Performed by: INTERNAL MEDICINE

## 2025-05-29 PROCEDURE — 99900035 HC TECH TIME PER 15 MIN (STAT)

## 2025-05-29 PROCEDURE — 94760 N-INVAS EAR/PLS OXIMETRY 1: CPT | Mod: XB

## 2025-05-29 PROCEDURE — 80053 COMPREHEN METABOLIC PANEL: CPT

## 2025-05-29 PROCEDURE — 63600175 PHARM REV CODE 636 W HCPCS

## 2025-05-29 PROCEDURE — 99900031 HC PATIENT EDUCATION (STAT)

## 2025-05-29 PROCEDURE — 93005 ELECTROCARDIOGRAM TRACING: CPT

## 2025-05-29 PROCEDURE — 36600 WITHDRAWAL OF ARTERIAL BLOOD: CPT

## 2025-05-29 PROCEDURE — 82803 BLOOD GASES ANY COMBINATION: CPT

## 2025-05-29 PROCEDURE — 36415 COLL VENOUS BLD VENIPUNCTURE: CPT

## 2025-05-29 PROCEDURE — 93010 ELECTROCARDIOGRAM REPORT: CPT | Mod: ,,, | Performed by: INTERNAL MEDICINE

## 2025-05-29 PROCEDURE — 27000207 HC ISOLATION

## 2025-05-29 RX ORDER — SODIUM CHLORIDE 0.9 % (FLUSH) 0.9 %
10 SYRINGE (ML) INJECTION
Status: DISCONTINUED | OUTPATIENT
Start: 2025-05-29 | End: 2025-06-09 | Stop reason: HOSPADM

## 2025-05-29 RX ORDER — FUROSEMIDE 10 MG/ML
40 INJECTION INTRAMUSCULAR; INTRAVENOUS ONCE
Status: COMPLETED | OUTPATIENT
Start: 2025-05-29 | End: 2025-05-29

## 2025-05-29 RX ADMIN — FAMOTIDINE 20 MG: 20 TABLET, FILM COATED ORAL at 08:05

## 2025-05-29 RX ADMIN — MEROPENEM 1 G: 1 INJECTION, POWDER, FOR SOLUTION INTRAVENOUS at 10:05

## 2025-05-29 RX ADMIN — SENNOSIDES AND DOCUSATE SODIUM 1 TABLET: 50; 8.6 TABLET ORAL at 08:05

## 2025-05-29 RX ADMIN — MICONAZOLE NITRATE: 20 POWDER TOPICAL at 10:05

## 2025-05-29 RX ADMIN — POLYETHYLENE GLYCOL 3350 17 G: 17 POWDER, FOR SOLUTION ORAL at 08:05

## 2025-05-29 RX ADMIN — FAMOTIDINE 20 MG: 20 TABLET, FILM COATED ORAL at 09:05

## 2025-05-29 RX ADMIN — PRAVASTATIN SODIUM 40 MG: 10 TABLET ORAL at 08:05

## 2025-05-29 RX ADMIN — FUROSEMIDE 40 MG: 10 INJECTION, SOLUTION INTRAVENOUS at 06:05

## 2025-05-29 RX ADMIN — AMIODARONE HYDROCHLORIDE 200 MG: 200 TABLET ORAL at 08:05

## 2025-05-29 RX ADMIN — HEPARIN SODIUM 5000 UNITS: 5000 INJECTION, SOLUTION INTRAVENOUS; SUBCUTANEOUS at 02:05

## 2025-05-29 RX ADMIN — MEROPENEM 1 G: 1 INJECTION, POWDER, FOR SOLUTION INTRAVENOUS at 05:05

## 2025-05-29 RX ADMIN — HEPARIN SODIUM 5000 UNITS: 5000 INJECTION, SOLUTION INTRAVENOUS; SUBCUTANEOUS at 10:05

## 2025-05-29 RX ADMIN — AMIODARONE HYDROCHLORIDE 0.5 MG/MIN: 1.8 INJECTION, SOLUTION INTRAVENOUS at 03:05

## 2025-05-29 RX ADMIN — METOPROLOL TARTRATE 50 MG: 50 TABLET, FILM COATED ORAL at 09:05

## 2025-05-29 RX ADMIN — MEROPENEM 1 G: 1 INJECTION, POWDER, FOR SOLUTION INTRAVENOUS at 03:05

## 2025-05-29 RX ADMIN — MEXILETINE HYDROCHLORIDE 200 MG: 200 CAPSULE ORAL at 02:05

## 2025-05-29 RX ADMIN — FINASTERIDE 5 MG: 5 TABLET, FILM COATED ORAL at 08:05

## 2025-05-29 RX ADMIN — HEPARIN SODIUM 5000 UNITS: 5000 INJECTION, SOLUTION INTRAVENOUS; SUBCUTANEOUS at 06:05

## 2025-05-29 RX ADMIN — MEXILETINE HYDROCHLORIDE 200 MG: 200 CAPSULE ORAL at 09:05

## 2025-05-29 RX ADMIN — MICONAZOLE NITRATE: 20 POWDER TOPICAL at 09:05

## 2025-05-29 RX ADMIN — POLYETHYLENE GLYCOL 3350 17 G: 17 POWDER, FOR SOLUTION ORAL at 09:05

## 2025-05-29 RX ADMIN — AMIODARONE HYDROCHLORIDE 0.5 MG/MIN: 1.8 INJECTION, SOLUTION INTRAVENOUS at 02:05

## 2025-05-29 NOTE — PLAN OF CARE
CM reviewed chart. Patient Congestion worse. Patient to continue Meropenem 1 G every 8 hours for total of 14 day treatment. CM will continue to follow.
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
## 2025-05-29 NOTE — PT/OT/SLP PROGRESS
[facility] Speech Language Pathology Department    Patient Name:  Alcides Rosario   MRN:  [id number]    Pt upgraded to ICU.  Seen this AM, HR in 137 at rest and minimally arousable.  POC discussed with ICU attending.  MD may consider long term alternate means of nutrition vs palliative care with pleasure feeds secondary to severity of impairments and comorbidities.  SLP to sign off at this time due to continued decline in status, please reconsult as needed.

## 2025-05-30 ENCOUNTER — ANESTHESIA EVENT (OUTPATIENT)
Dept: CARDIOLOGY | Facility: HOSPITAL | Age: 80
DRG: 215 | End: 2025-05-30
Payer: MEDICARE

## 2025-05-30 ENCOUNTER — ANESTHESIA (OUTPATIENT)
Dept: CARDIOLOGY | Facility: HOSPITAL | Age: 80
DRG: 215 | End: 2025-05-30
Payer: MEDICARE

## 2025-05-30 LAB
ALBUMIN SERPL-MCNC: 2.1 G/DL (ref 3.4–4.8)
ALBUMIN SERPL-MCNC: 2.1 G/DL (ref 3.4–4.8)
ALBUMIN/GLOB SERPL: 0.5 RATIO (ref 1.1–2)
ALBUMIN/GLOB SERPL: 0.6 RATIO (ref 1.1–2)
ALP SERPL-CCNC: 232 UNIT/L (ref 40–150)
ALP SERPL-CCNC: 246 UNIT/L (ref 40–150)
ALT SERPL-CCNC: 125 UNIT/L (ref 0–55)
ALT SERPL-CCNC: 864 UNIT/L (ref 0–55)
ANION GAP SERPL CALC-SCNC: 12 MEQ/L
ANION GAP SERPL CALC-SCNC: 13 MEQ/L
AST SERPL-CCNC: 149 UNIT/L (ref 11–45)
AST SERPL-CCNC: 1534 UNIT/L (ref 11–45)
BASOPHILS # BLD AUTO: 0.08 X10(3)/MCL
BASOPHILS NFR BLD AUTO: 0.7 %
BILIRUB SERPL-MCNC: 1 MG/DL
BILIRUB SERPL-MCNC: 1.2 MG/DL
BUN SERPL-MCNC: 48.7 MG/DL (ref 8.4–25.7)
BUN SERPL-MCNC: 54.6 MG/DL (ref 8.4–25.7)
CALCIUM SERPL-MCNC: 8 MG/DL (ref 8.8–10)
CALCIUM SERPL-MCNC: 8.8 MG/DL (ref 8.8–10)
CHLORIDE SERPL-SCNC: 106 MMOL/L (ref 98–107)
CHLORIDE SERPL-SCNC: 110 MMOL/L (ref 98–107)
CO2 SERPL-SCNC: 21 MMOL/L (ref 23–31)
CO2 SERPL-SCNC: 24 MMOL/L (ref 23–31)
CREAT SERPL-MCNC: 1.26 MG/DL (ref 0.72–1.25)
CREAT SERPL-MCNC: 1.45 MG/DL (ref 0.72–1.25)
CREAT/UREA NIT SERPL: 38
CREAT/UREA NIT SERPL: 39
EOSINOPHIL # BLD AUTO: 0.14 X10(3)/MCL (ref 0–0.9)
EOSINOPHIL NFR BLD AUTO: 1.2 %
ERYTHROCYTE [DISTWIDTH] IN BLOOD BY AUTOMATED COUNT: 16.1 % (ref 11.5–17)
GFR SERPLBLD CREATININE-BSD FMLA CKD-EPI: 49 ML/MIN/1.73/M2
GFR SERPLBLD CREATININE-BSD FMLA CKD-EPI: 58 ML/MIN/1.73/M2
GLOBULIN SER-MCNC: 3.3 GM/DL (ref 2.4–3.5)
GLOBULIN SER-MCNC: 4 GM/DL (ref 2.4–3.5)
GLUCOSE SERPL-MCNC: 106 MG/DL (ref 82–115)
GLUCOSE SERPL-MCNC: 97 MG/DL (ref 82–115)
HCT VFR BLD AUTO: 39.3 % (ref 42–52)
HGB BLD-MCNC: 12 G/DL (ref 14–18)
IMM GRANULOCYTES # BLD AUTO: 0.3 X10(3)/MCL (ref 0–0.04)
IMM GRANULOCYTES NFR BLD AUTO: 2.6 %
LYMPHOCYTES # BLD AUTO: 1.01 X10(3)/MCL (ref 0.6–4.6)
LYMPHOCYTES NFR BLD AUTO: 8.8 %
MAGNESIUM SERPL-MCNC: 2.5 MG/DL (ref 1.6–2.6)
MCH RBC QN AUTO: 29.3 PG (ref 27–31)
MCHC RBC AUTO-ENTMCNC: 30.5 G/DL (ref 33–36)
MCV RBC AUTO: 95.9 FL (ref 80–94)
MONOCYTES # BLD AUTO: 0.89 X10(3)/MCL (ref 0.1–1.3)
MONOCYTES NFR BLD AUTO: 7.7 %
NEUTROPHILS # BLD AUTO: 9.09 X10(3)/MCL (ref 2.1–9.2)
NEUTROPHILS NFR BLD AUTO: 79 %
NRBC BLD AUTO-RTO: 2.1 %
PHOSPHATE SERPL-MCNC: 5.4 MG/DL (ref 2.3–4.7)
PLATELET # BLD AUTO: 224 X10(3)/MCL (ref 130–400)
PLATELETS.RETICULATED NFR BLD AUTO: 9.5 % (ref 0.9–11.2)
PMV BLD AUTO: 13.4 FL (ref 7.4–10.4)
POTASSIUM SERPL-SCNC: 5.5 MMOL/L (ref 3.5–5.1)
POTASSIUM SERPL-SCNC: 5.8 MMOL/L (ref 3.5–5.1)
PROT SERPL-MCNC: 5.4 GM/DL (ref 5.8–7.6)
PROT SERPL-MCNC: 6.1 GM/DL (ref 5.8–7.6)
RBC # BLD AUTO: 4.1 X10(6)/MCL (ref 4.7–6.1)
SODIUM SERPL-SCNC: 143 MMOL/L (ref 136–145)
SODIUM SERPL-SCNC: 143 MMOL/L (ref 136–145)
WBC # BLD AUTO: 11.51 X10(3)/MCL (ref 4.5–11.5)

## 2025-05-30 PROCEDURE — 37000009 HC ANESTHESIA EA ADD 15 MINS: Performed by: STUDENT IN AN ORGANIZED HEALTH CARE EDUCATION/TRAINING PROGRAM

## 2025-05-30 PROCEDURE — 99222 1ST HOSP IP/OBS MODERATE 55: CPT | Mod: ,,,

## 2025-05-30 PROCEDURE — 25000003 PHARM REV CODE 250

## 2025-05-30 PROCEDURE — 93662 INTRACARDIAC ECG (ICE): CPT | Performed by: STUDENT IN AN ORGANIZED HEALTH CARE EDUCATION/TRAINING PROGRAM

## 2025-05-30 PROCEDURE — 25000003 PHARM REV CODE 250: Performed by: INTERNAL MEDICINE

## 2025-05-30 PROCEDURE — 87181 SC STD AGAR DILUTION PER AGT: CPT | Performed by: INTERNAL MEDICINE

## 2025-05-30 PROCEDURE — C1732 CATH, EP, DIAG/ABL, 3D/VECT: HCPCS | Performed by: STUDENT IN AN ORGANIZED HEALTH CARE EDUCATION/TRAINING PROGRAM

## 2025-05-30 PROCEDURE — 94760 N-INVAS EAR/PLS OXIMETRY 1: CPT

## 2025-05-30 PROCEDURE — 20000000 HC ICU ROOM

## 2025-05-30 PROCEDURE — 37000008 HC ANESTHESIA 1ST 15 MINUTES: Performed by: STUDENT IN AN ORGANIZED HEALTH CARE EDUCATION/TRAINING PROGRAM

## 2025-05-30 PROCEDURE — 94761 N-INVAS EAR/PLS OXIMETRY MLT: CPT

## 2025-05-30 PROCEDURE — 0BC78ZZ EXTIRPATION OF MATTER FROM LEFT MAIN BRONCHUS, VIA NATURAL OR ARTIFICIAL OPENING ENDOSCOPIC: ICD-10-PCS | Performed by: INTERNAL MEDICINE

## 2025-05-30 PROCEDURE — 0W9D30Z DRAINAGE OF PERICARDIAL CAVITY WITH DRAINAGE DEVICE, PERCUTANEOUS APPROACH: ICD-10-PCS | Performed by: STUDENT IN AN ORGANIZED HEALTH CARE EDUCATION/TRAINING PROGRAM

## 2025-05-30 PROCEDURE — 63600175 PHARM REV CODE 636 W HCPCS: Performed by: NURSE ANESTHETIST, CERTIFIED REGISTERED

## 2025-05-30 PROCEDURE — 84100 ASSAY OF PHOSPHORUS: CPT

## 2025-05-30 PROCEDURE — 63600175 PHARM REV CODE 636 W HCPCS

## 2025-05-30 PROCEDURE — 63600175 PHARM REV CODE 636 W HCPCS: Performed by: NURSE PRACTITIONER

## 2025-05-30 PROCEDURE — C1894 INTRO/SHEATH, NON-LASER: HCPCS | Performed by: STUDENT IN AN ORGANIZED HEALTH CARE EDUCATION/TRAINING PROGRAM

## 2025-05-30 PROCEDURE — 27201423 OPTIME MED/SURG SUP & DEVICES STERILE SUPPLY: Performed by: STUDENT IN AN ORGANIZED HEALTH CARE EDUCATION/TRAINING PROGRAM

## 2025-05-30 PROCEDURE — 94002 VENT MGMT INPAT INIT DAY: CPT

## 2025-05-30 PROCEDURE — 27000207 HC ISOLATION

## 2025-05-30 PROCEDURE — 80053 COMPREHEN METABOLIC PANEL: CPT | Performed by: INTERNAL MEDICINE

## 2025-05-30 PROCEDURE — 99900025 HC BRONCHOSCOPY-ASST (STAT)

## 2025-05-30 PROCEDURE — C1769 GUIDE WIRE: HCPCS | Performed by: STUDENT IN AN ORGANIZED HEALTH CARE EDUCATION/TRAINING PROGRAM

## 2025-05-30 PROCEDURE — C1760 CLOSURE DEV, VASC: HCPCS | Performed by: STUDENT IN AN ORGANIZED HEALTH CARE EDUCATION/TRAINING PROGRAM

## 2025-05-30 PROCEDURE — 27202055 HC BRONCHOSCOPE, DISP

## 2025-05-30 PROCEDURE — 99900026 HC AIRWAY MAINTENANCE (STAT)

## 2025-05-30 PROCEDURE — 99900031 HC PATIENT EDUCATION (STAT)

## 2025-05-30 PROCEDURE — 85025 COMPLETE CBC W/AUTO DIFF WBC: CPT

## 2025-05-30 PROCEDURE — 63600175 PHARM REV CODE 636 W HCPCS: Performed by: INTERNAL MEDICINE

## 2025-05-30 PROCEDURE — 80053 COMPREHEN METABOLIC PANEL: CPT

## 2025-05-30 PROCEDURE — 27200966 HC CLOSED SUCTION SYSTEM

## 2025-05-30 PROCEDURE — 93005 ELECTROCARDIOGRAM TRACING: CPT

## 2025-05-30 PROCEDURE — 36415 COLL VENOUS BLD VENIPUNCTURE: CPT | Performed by: INTERNAL MEDICINE

## 2025-05-30 PROCEDURE — 36415 COLL VENOUS BLD VENIPUNCTURE: CPT

## 2025-05-30 PROCEDURE — 27100171 HC OXYGEN HIGH FLOW UP TO 24 HOURS

## 2025-05-30 PROCEDURE — 93654 COMPRE EP EVAL TX VT: CPT | Performed by: STUDENT IN AN ORGANIZED HEALTH CARE EDUCATION/TRAINING PROGRAM

## 2025-05-30 PROCEDURE — 83735 ASSAY OF MAGNESIUM: CPT

## 2025-05-30 PROCEDURE — 0BCB8ZZ EXTIRPATION OF MATTER FROM LEFT LOWER LOBE BRONCHUS, VIA NATURAL OR ARTIFICIAL OPENING ENDOSCOPIC: ICD-10-PCS | Performed by: INTERNAL MEDICINE

## 2025-05-30 PROCEDURE — C1753 CATH, INTRAVAS ULTRASOUND: HCPCS | Performed by: STUDENT IN AN ORGANIZED HEALTH CARE EDUCATION/TRAINING PROGRAM

## 2025-05-30 PROCEDURE — 02583ZZ DESTRUCTION OF CONDUCTION MECHANISM, PERCUTANEOUS APPROACH: ICD-10-PCS | Performed by: STUDENT IN AN ORGANIZED HEALTH CARE EDUCATION/TRAINING PROGRAM

## 2025-05-30 PROCEDURE — 25000003 PHARM REV CODE 250: Performed by: NURSE PRACTITIONER

## 2025-05-30 PROCEDURE — 93010 ELECTROCARDIOGRAM REPORT: CPT | Mod: ,,, | Performed by: INTERNAL MEDICINE

## 2025-05-30 PROCEDURE — C2630 CATH, EP, COOL-TIP: HCPCS | Performed by: STUDENT IN AN ORGANIZED HEALTH CARE EDUCATION/TRAINING PROGRAM

## 2025-05-30 PROCEDURE — 99900035 HC TECH TIME PER 15 MIN (STAT)

## 2025-05-30 PROCEDURE — 25000003 PHARM REV CODE 250: Performed by: NURSE ANESTHETIST, CERTIFIED REGISTERED

## 2025-05-30 RX ORDER — FUROSEMIDE 10 MG/ML
20 INJECTION INTRAMUSCULAR; INTRAVENOUS ONCE
Status: COMPLETED | OUTPATIENT
Start: 2025-05-30 | End: 2025-05-30

## 2025-05-30 RX ORDER — MORPHINE SULFATE 4 MG/ML
2 INJECTION, SOLUTION INTRAMUSCULAR; INTRAVENOUS EVERY 4 HOURS PRN
Refills: 0 | Status: DISCONTINUED | OUTPATIENT
Start: 2025-05-30 | End: 2025-06-09 | Stop reason: HOSPADM

## 2025-05-30 RX ORDER — NOREPINEPHRINE BITARTRATE/D5W 8 MG/250ML
0-1 PLASTIC BAG, INJECTION (ML) INTRAVENOUS CONTINUOUS
Status: DISCONTINUED | OUTPATIENT
Start: 2025-05-30 | End: 2025-06-02

## 2025-05-30 RX ORDER — LIDOCAINE HYDROCHLORIDE 20 MG/ML
INJECTION, SOLUTION EPIDURAL; INFILTRATION; INTRACAUDAL; PERINEURAL
Status: DISCONTINUED | OUTPATIENT
Start: 2025-05-30 | End: 2025-05-30

## 2025-05-30 RX ORDER — FENTANYL CITRATE 50 UG/ML
200 INJECTION, SOLUTION INTRAMUSCULAR; INTRAVENOUS ONCE
Refills: 0 | Status: COMPLETED | OUTPATIENT
Start: 2025-05-30 | End: 2025-05-30

## 2025-05-30 RX ORDER — HEPARIN SODIUM 5000 [USP'U]/ML
5000 INJECTION, SOLUTION INTRAVENOUS; SUBCUTANEOUS EVERY 12 HOURS
Status: DISCONTINUED | OUTPATIENT
Start: 2025-05-30 | End: 2025-06-04

## 2025-05-30 RX ORDER — MIDAZOLAM HYDROCHLORIDE 2 MG/2ML
2 INJECTION, SOLUTION INTRAMUSCULAR; INTRAVENOUS ONCE
Status: COMPLETED | OUTPATIENT
Start: 2025-05-30 | End: 2025-05-30

## 2025-05-30 RX ORDER — VASOPRESSIN 20 [USP'U]/ML
INJECTION, SOLUTION INTRAMUSCULAR; SUBCUTANEOUS
Status: DISCONTINUED | OUTPATIENT
Start: 2025-05-30 | End: 2025-05-30

## 2025-05-30 RX ORDER — FENTANYL CITRATE 50 UG/ML
INJECTION, SOLUTION INTRAMUSCULAR; INTRAVENOUS
Status: DISCONTINUED | OUTPATIENT
Start: 2025-05-30 | End: 2025-05-30

## 2025-05-30 RX ORDER — LIDOCAINE HYDROCHLORIDE 10 MG/ML
10 INJECTION, SOLUTION INFILTRATION; PERINEURAL ONCE
Status: COMPLETED | OUTPATIENT
Start: 2025-05-30 | End: 2025-05-30

## 2025-05-30 RX ORDER — ETOMIDATE 2 MG/ML
INJECTION INTRAVENOUS
Status: DISCONTINUED | OUTPATIENT
Start: 2025-05-30 | End: 2025-05-30

## 2025-05-30 RX ORDER — PROPOFOL 10 MG/ML
INJECTION, EMULSION INTRAVENOUS
Status: DISPENSED
Start: 2025-05-30 | End: 2025-05-31

## 2025-05-30 RX ORDER — MORPHINE SULFATE 4 MG/ML
1 INJECTION, SOLUTION INTRAMUSCULAR; INTRAVENOUS ONCE
Status: DISCONTINUED | OUTPATIENT
Start: 2025-05-30 | End: 2025-06-09 | Stop reason: HOSPADM

## 2025-05-30 RX ORDER — ROCURONIUM BROMIDE 10 MG/ML
INJECTION, SOLUTION INTRAVENOUS
Status: DISCONTINUED | OUTPATIENT
Start: 2025-05-30 | End: 2025-05-30

## 2025-05-30 RX ORDER — SODIUM CHLORIDE FOR INHALATION 3 %
4 VIAL, NEBULIZER (ML) INHALATION EVERY 8 HOURS
Status: DISCONTINUED | OUTPATIENT
Start: 2025-05-31 | End: 2025-06-09 | Stop reason: HOSPADM

## 2025-05-30 RX ORDER — PROPOFOL 10 MG/ML
0-50 INJECTION, EMULSION INTRAVENOUS CONTINUOUS
Status: DISCONTINUED | OUTPATIENT
Start: 2025-05-30 | End: 2025-06-01

## 2025-05-30 RX ORDER — SODIUM CHLORIDE FOR INHALATION 3 %
4 VIAL, NEBULIZER (ML) INHALATION EVERY 8 HOURS
Status: DISCONTINUED | OUTPATIENT
Start: 2025-05-30 | End: 2025-05-30

## 2025-05-30 RX ADMIN — MEXILETINE HYDROCHLORIDE 200 MG: 200 CAPSULE ORAL at 05:05

## 2025-05-30 RX ADMIN — MEROPENEM 1 G: 1 INJECTION, POWDER, FOR SOLUTION INTRAVENOUS at 02:05

## 2025-05-30 RX ADMIN — ROCURONIUM BROMIDE 50 MG: 10 SOLUTION INTRAVENOUS at 08:05

## 2025-05-30 RX ADMIN — POLYETHYLENE GLYCOL 3350 17 G: 17 POWDER, FOR SOLUTION ORAL at 09:05

## 2025-05-30 RX ADMIN — ETOMIDATE 8 MG: 2 INJECTION INTRAVENOUS at 07:05

## 2025-05-30 RX ADMIN — MICONAZOLE NITRATE: 20 POWDER TOPICAL at 01:05

## 2025-05-30 RX ADMIN — ROCURONIUM BROMIDE 50 MG: 10 SOLUTION INTRAVENOUS at 12:05

## 2025-05-30 RX ADMIN — MEROPENEM 1 G: 1 INJECTION, POWDER, FOR SOLUTION INTRAVENOUS at 01:05

## 2025-05-30 RX ADMIN — VASOPRESSIN 2 UNITS: 20 INJECTION INTRAVENOUS at 08:05

## 2025-05-30 RX ADMIN — AMIODARONE HYDROCHLORIDE 0.5 MG/MIN: 1.8 INJECTION, SOLUTION INTRAVENOUS at 01:05

## 2025-05-30 RX ADMIN — NOREPINEPHRINE BITARTRATE 0.02 MCG/KG/MIN: 8 INJECTION, SOLUTION INTRAVENOUS at 07:05

## 2025-05-30 RX ADMIN — HEPARIN SODIUM 5000 UNITS: 5000 INJECTION, SOLUTION INTRAVENOUS; SUBCUTANEOUS at 05:05

## 2025-05-30 RX ADMIN — HEPARIN SODIUM 5000 UNITS: 5000 INJECTION, SOLUTION INTRAVENOUS; SUBCUTANEOUS at 09:05

## 2025-05-30 RX ADMIN — MEXILETINE HYDROCHLORIDE 200 MG: 200 CAPSULE ORAL at 09:05

## 2025-05-30 RX ADMIN — MICONAZOLE NITRATE: 20 POWDER TOPICAL at 09:05

## 2025-05-30 RX ADMIN — FUROSEMIDE 20 MG: 10 INJECTION, SOLUTION INTRAMUSCULAR; INTRAVENOUS at 03:05

## 2025-05-30 RX ADMIN — HEPARIN SODIUM 5000 UNITS: 5000 INJECTION, SOLUTION INTRAVENOUS; SUBCUTANEOUS at 01:05

## 2025-05-30 RX ADMIN — FENTANYL CITRATE 50 MCG: 50 INJECTION, SOLUTION INTRAMUSCULAR; INTRAVENOUS at 08:05

## 2025-05-30 RX ADMIN — MEXILETINE HYDROCHLORIDE 200 MG: 200 CAPSULE ORAL at 02:05

## 2025-05-30 RX ADMIN — ROCURONIUM BROMIDE 50 MG: 10 SOLUTION INTRAVENOUS at 09:05

## 2025-05-30 RX ADMIN — LIDOCAINE HYDROCHLORIDE 40 MG: 20 INJECTION, SOLUTION EPIDURAL; INFILTRATION; INTRACAUDAL; PERINEURAL at 07:05

## 2025-05-30 RX ADMIN — FAMOTIDINE 20 MG: 20 TABLET, FILM COATED ORAL at 09:05

## 2025-05-30 RX ADMIN — FENTANYL CITRATE 50 MCG: 50 INJECTION, SOLUTION INTRAMUSCULAR; INTRAVENOUS at 09:05

## 2025-05-30 RX ADMIN — PROPOFOL 30 MCG/KG/MIN: 10 INJECTION, EMULSION INTRAVENOUS at 04:05

## 2025-05-30 RX ADMIN — ROCURONIUM BROMIDE 50 MG: 10 SOLUTION INTRAVENOUS at 07:05

## 2025-05-30 RX ADMIN — NOREPINEPHRINE BITARTRATE 0.05 MCG/KG/MIN: 1 INJECTION, SOLUTION, CONCENTRATE INTRAVENOUS at 08:05

## 2025-05-30 RX ADMIN — SODIUM CHLORIDE, SODIUM GLUCONATE, SODIUM ACETATE, POTASSIUM CHLORIDE AND MAGNESIUM CHLORIDE: 526; 502; 368; 37; 30 INJECTION, SOLUTION INTRAVENOUS at 07:05

## 2025-05-30 RX ADMIN — ROCURONIUM BROMIDE 50 MG: 10 SOLUTION INTRAVENOUS at 10:05
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
## 2025-05-30 NOTE — PROCEDURES
"Alcides Rosario is a 79 y.o. male patient.    Temp: 97.7 °F (36.5 °C) (05/30/25 1600)  Pulse: 80 (05/30/25 1703)  Resp: 20 (05/30/25 1703)  BP: (!) 144/83 (05/30/25 1703)  SpO2: 100 % (05/30/25 1703)  Weight: 115 kg (253 lb 8.5 oz) (05/12/25 1033)  Height: 6' 0.01" (182.9 cm) (05/23/25 1405)  Mallampati Scale: Class III  ASA Classification: Class 2    Procedures    5/30/2025    [facility]  Bronchoscopy Procedure Note    SUMMARY     Date of Procedure: 5/30/2025    Procedure: Bronchoscopy, Diagnostic  Bronchoscopy, Therapeutic    Performed by: Oleksandr Cochran MD    Pre-Procedure Diagnosis:  Mucous plugging with atelectasis of the left lung    Post-Procedure Diagnosis:  Same    Anesthesia: General Anesthesia  Patient already on propofol drip intubated in the ICU on mechanical ventilation      Description of the Findings of the Procedure:     I called the patient's son, King, over the phone and obtained consent for bronchoscopy.  The bronchocope was inserted into the main airway via the endotracheal tube. An anatomical survey was done of the main airways and the subsegmental bronchus.  The distal trachea was visualized to be widely patent without any signs of mucosal abnormalities or obstructions, the alison was visualized to be sharp without any signs of mucosal abnormalities or obstructions the right mainstem bronchus was visualized to be widely patent without any signs of mucosal abnormalities or obstructions complete survey of the right lung including right upper lobe right middle lobe bronchus intermedius and right lower lobe as well as all the subsegments of the right lung were visualized to be widely patent without any signs of mucosal abnormalities or obstructions, the bronchoscope was then placed into the left mainstem bronchus and at the distal aspect was visualized to be completely occluded by thick whitish mucus which was very difficult to aspirate back but after aspiration of this thick plug " patency was re-established for the rest of the left lung except for the lingula and the superior segment of the left lower lobe which were occluded with suctioning of the same thick whitish mucus which was removed.  Survey of the left lung including left upper lobe, lingula, and left lower lobe demonstrated widely patent airways without any signs of mucosal abnormalities or obstructions there was some mild wall trauma secondary to the bronchoscope at the proximal aspect of the left mainstem bronchus takeoff.  The bronchoscope was pulled back to the trachea with visualization of widely patent airways.  Bronchoscope was removed from the airway in the patient was placed back on closed circuit mechanical ventilation.      Complications: None; patient tolerated the procedure well.    Estimated Blood Loss (EBL): 0           Specimens:  Aspirated mucus sent for culture       Condition: Stable        Disposition: ICU - intubated and hemodynamically stable.    Oleksandr Cochran MD  [facility]

## 2025-05-30 NOTE — ANESTHESIA PROCEDURE NOTES
Intubation    Date/Time: 5/30/2025 7:54 AM    Performed by: Jacobo Granados CRNA  Authorized by: Faheem Fuentes MD    Intubation:     Induction:  Intravenous    Intubated:  Postinduction    Mask Ventilation:  Easy mask    Attempted By:  CRNA    Method of Intubation:  Direct    Blade:  Purcell 2    Laryngeal View Grade: Grade I - full view of cords      Difficult Airway Encountered?: No      Complications:  None    Airway Device Size:  8.0    Tube secured:  23    Secured at:  The lips    Placement Verified By:  Capnometry    Complicating Factors:  None    Findings Post-Intubation:  BS equal bilateral

## 2025-05-30 NOTE — ANESTHESIA PROCEDURE NOTES
Central Line    Diagnosis: Venous access  Patient location during procedure: done in OR  Procedure Urgency: Routine  Procedure start time: 5/30/2025 8:01 AM  Timeout: 5/30/2025 8:00 AM  Procedure end time: 5/30/2025 8:06 AM      Staffing  Authorizing Provider: Faheem Fuentes MD  Performing Provider: Faheem Fuentes MD    Staffing  Performed by: Faheem Fuentes MD  Authorized by: Faheem Fuentes MD    Anesthesiologist was present at the time of the procedure.  Preanesthetic Checklist  Completed: patient identified, IV checked, site marked, risks and benefits discussed, surgical consent, monitors and equipment checked, pre-op evaluation, timeout performed and anesthesia consent given  Indication   Indication: vascular access     Anesthesia   general anesthesia    Central Line   Skin Prep: skin prepped with ChloraPrep, skin prep agent completely dried prior to procedure  Sterile Barriers Followed: Yes    All five maximal barriers used- gloves, gown, cap, mask, and large sterile sheet    hand hygiene performed prior to central venous catheter insertion  Location: right internal jugular.   Catheter type: single lumen  Catheter Size: 4 Fr  Inserted Catheter Length: 4 cm  Ultrasound: vascular probe with ultrasound   Vessel Caliber: large, patent  Vascular Doppler:  not done, compressibility normal  Guidewire confirmed in vessel.    Manometry: none  Insertion Attempts: 1   Securement:sterile dressing applied    Post-Procedure   X-Ray: successful placement   Adverse Events:none      Guidewire Guidewire removed intact. Guidewire removed intact, verified with nurse.

## 2025-05-30 NOTE — ANESTHESIA POSTPROCEDURE EVALUATION
Anesthesia Post Evaluation    Patient: Alcides Rosario    Procedure(s) Performed: Procedure(s) (LRB):  Ablation VT (N/A)  EP - diagnostic    Final Anesthesia Type: general      Patient location during evaluation: ICU  Patient participation: No - Unable to Participate, Intubation  Level of consciousness: sedated  Post-procedure vital signs: reviewed and stable  Pain management: adequate  Airway patency: patent    PONV status at discharge: No PONV  Anesthetic complications: no      Cardiovascular status: blood pressure returned to baseline  Respiratory status: unassisted  Hydration status: euvolemic  Follow-up not needed.              Vitals Value Taken Time   /70 05/30/25 14:00   Temp  05/30/25 14:00   Pulse 80 05/30/25 13:59   Resp 18 05/30/25 13:59   SpO2 94 % 05/30/25 13:59   Vitals shown include unfiled device data.      No case tracking events are documented in the log.      Pain/Kathrine Score: No data recorded

## 2025-05-30 NOTE — NURSING
"Spoke to Dr. Magdaleno about heparin order, due to the patient having surgery tomorrow. He said " to give the heparin as ordered at 22:00 and at 06:00 am.  "
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
## 2025-05-30 NOTE — ANESTHESIA PREPROCEDURE EVALUATION
05/30/2025  Alcides Rosario is a 79 y.o., male.  Patient in ICU for days with resistant Vtach, at least secondEPs with ablation  On Amiodarone and lidocaine gtt    Pre-op Assessment    I have reviewed the Patient Summary Reports.     I have reviewed the Nursing Notes. I have reviewed the NPO Status.   I have reviewed the Medications.     Review of Systems  Anesthesia Hx:  No problems with previous Anesthesia             Denies Family Hx of Anesthesia complications.    Denies Personal Hx of Anesthesia complications.                    Cardiovascular:  Cardiovascular Normal                   No Cardiac Complaints                           Pulmonary:  Pulmonary Normal        No Pulmonary Complaints               Hepatic/GI:        No Current GERD Sx                 Physical Exam    Airway:  Mallampati: unable to assess   Pre-Existing Airway: Oral Endotracheal tube    Chest/Lungs:  Normal Respiratory Rate    Heart:  Rate: Tachycardia      Anesthesia Plan  Type of Anesthesia, risks & benefits discussed:    Anesthesia Type: Gen ETT  Intra-op Monitoring Plan: Standard ASA Monitors and Art Line  Post Op Pain Control Plan: multimodal analgesia and IV/PO Opioids PRN  Airway Plan: Direct, Post-Induction  Informed Consent: Informed consent signed with the Patient and all parties understand the risks and agree with anesthesia plan.  All questions answered. Patient consented to blood products? Yes  ASA Score: 4 Emergent  Day of Surgery Review of History & Physical: H&P Update referred to the surgeon/provider.  Anesthesia Plan Notes: Consent received from son who poa    Ready For Surgery From Anesthesia Perspective.     .    Lab Results   Component Value Date    WBC 11.51 (H) 05/30/2025    HGB 12.0 (L) 05/30/2025    HCT 39.3 (L) 05/30/2025    MCV 95.9 (H) 05/30/2025     05/30/2025

## 2025-05-30 NOTE — PLAN OF CARE
Problem: Adult Inpatient Plan of Care  Goal: Plan of Care Review  Outcome: Not Progressing  Goal: Patient-Specific Goal (Individualized)  Outcome: Not Progressing  Goal: Absence of Hospital-Acquired Illness or Injury  Outcome: Not Progressing  Goal: Optimal Comfort and Wellbeing  Outcome: Not Progressing  Goal: Readiness for Transition of Care  Outcome: Not Progressing     Problem: Fall Injury Risk  Goal: Absence of Fall and Fall-Related Injury  Outcome: Not Progressing     Problem: Skin Injury Risk Increased  Goal: Skin Health and Integrity  Outcome: Not Progressing     Problem: Comorbidity Management  Goal: Maintenance of Heart Failure Symptom Control  Outcome: Not Progressing     Problem: Fatigue  Goal: Improved Activity Tolerance  Outcome: Not Progressing     Problem: Wound  Goal: Optimal Coping  Outcome: Not Progressing  Goal: Optimal Functional Ability  Outcome: Not Progressing  Goal: Absence of Infection Signs and Symptoms  Outcome: Not Progressing  Goal: Improved Oral Intake  Outcome: Not Progressing  Goal: Optimal Pain Control and Function  Outcome: Not Progressing  Goal: Skin Health and Integrity  Outcome: Not Progressing  Goal: Optimal Wound Healing  Outcome: Not Progressing     Problem: Coping Ineffective  Goal: Effective Coping  Outcome: Not Progressing

## 2025-05-30 NOTE — TRANSFER OF CARE
"Anesthesia Transfer of Care Note    Patient: Alcides Rosario    Procedure(s) Performed: Procedure(s) (LRB):  Ablation VT (N/A)  EP - diagnostic    Patient location: ICU    Anesthesia Type: general    Transport from OR: Transported from OR intubated on 100% O2 by AMBU with adequate controlled ventilation    Post pain: adequate analgesia    Post assessment: no apparent anesthetic complications and tolerated procedure well    Post vital signs: stable    Level of consciousness: sedated    Nausea/Vomiting: no nausea/vomiting    Complications: none    Transfer of care protocol was followed    Last vitals: Visit Vitals  BP 90/73   Pulse (!) 120   Temp (!) 34 °C (93.2 °F) (Axillary)   Resp (!) 34   Ht 6' 0.01" (1.829 m)   Wt 115 kg (253 lb 8.5 oz)   SpO2 100%   BMI 34.38 kg/m²     "

## 2025-05-30 NOTE — ANESTHESIA PROCEDURE NOTES
Arterial    Diagnosis: Ablation    Patient location during procedure: done in OR  Timeout: 5/30/2025 7:15 AM  Procedure end time: 5/30/2025 7:24 AM    Staffing  Authorizing Provider: Faheem Fuentes MD  Performing Provider: Faheem Fuentes MD    Staffing  Performed by: Faheem Fuentes MD  Authorized by: Faheem Fuentes MD    Anesthesiologist was present at the time of the procedure.    Preanesthetic Checklist  Completed: patient identified, IV checked, site marked, risks and benefits discussed, surgical consent, monitors and equipment checked, pre-op evaluation, timeout performed and anesthesia consent givenArterial  Skin Prep: chlorhexidine gluconate  Local Infiltration: none  Orientation: left  Location: radial    Catheter Size: 4 Fr Cook  Catheter placement by Ultrasound guidance. Heme positive aspiration all ports.   Vessel Caliber: medium, patent, compressibility poor  Needle advanced into vessel with real time Ultrasound guidance.Insertion Attempts: 1  Assessment  Dressing: secured with tape and tegaderm

## 2025-05-31 LAB
ALBUMIN SERPL-MCNC: 1.8 G/DL (ref 3.4–4.8)
ALBUMIN/GLOB SERPL: 0.5 RATIO (ref 1.1–2)
ALP SERPL-CCNC: 187 UNIT/L (ref 40–150)
ALT SERPL-CCNC: 732 UNIT/L (ref 0–55)
ANION GAP SERPL CALC-SCNC: 12 MEQ/L
APICAL FOUR CHAMBER EJECTION FRACTION: 26 %
AST SERPL-CCNC: 770 UNIT/L (ref 11–45)
BASOPHILS # BLD AUTO: 0.04 X10(3)/MCL
BASOPHILS NFR BLD AUTO: 0.4 %
BILIRUB SERPL-MCNC: 1.1 MG/DL
BSA FOR ECHO PROCEDURE: 2.42 M2
BUN SERPL-MCNC: 52.4 MG/DL (ref 8.4–25.7)
CALCIUM SERPL-MCNC: 7.9 MG/DL (ref 8.8–10)
CHLORIDE SERPL-SCNC: 110 MMOL/L (ref 98–107)
CO2 SERPL-SCNC: 23 MMOL/L (ref 23–31)
CREAT SERPL-MCNC: 1.24 MG/DL (ref 0.72–1.25)
CREAT/UREA NIT SERPL: 42
CV ECHO LV RWT: 0.4 CM
ECHO LV POSTERIOR WALL: 1 CM (ref 0.6–1.1)
EOSINOPHIL # BLD AUTO: 0.27 X10(3)/MCL (ref 0–0.9)
EOSINOPHIL NFR BLD AUTO: 2.5 %
ERYTHROCYTE [DISTWIDTH] IN BLOOD BY AUTOMATED COUNT: 16.1 % (ref 11.5–17)
FRACTIONAL SHORTENING: 14 % (ref 28–44)
GFR SERPLBLD CREATININE-BSD FMLA CKD-EPI: 59 ML/MIN/1.73/M2
GLOBULIN SER-MCNC: 3.3 GM/DL (ref 2.4–3.5)
GLUCOSE SERPL-MCNC: 92 MG/DL (ref 82–115)
HCT VFR BLD AUTO: 33.3 % (ref 42–52)
HGB BLD-MCNC: 10.3 G/DL (ref 14–18)
IMM GRANULOCYTES # BLD AUTO: 0.25 X10(3)/MCL (ref 0–0.04)
IMM GRANULOCYTES NFR BLD AUTO: 2.3 %
INTERVENTRICULAR SEPTUM: 1.2 CM (ref 0.6–1.1)
LEFT INTERNAL DIMENSION IN SYSTOLE: 4.3 CM (ref 2.1–4)
LEFT VENTRICLE DIASTOLIC VOLUME INDEX: 50 ML/M2
LEFT VENTRICLE DIASTOLIC VOLUME: 118 ML
LEFT VENTRICLE END DIASTOLIC VOLUME APICAL 4 CHAMBER: 171 ML
LEFT VENTRICLE MASS INDEX: 87.8 G/M2
LEFT VENTRICLE SYSTOLIC VOLUME INDEX: 35.2 ML/M2
LEFT VENTRICLE SYSTOLIC VOLUME: 83 ML
LEFT VENTRICULAR INTERNAL DIMENSION IN DIASTOLE: 5 CM (ref 3.5–6)
LEFT VENTRICULAR MASS: 207.1 G
LVED V (TEICH): 118 ML
LVES V (TEICH): 83.1 ML
LYMPHOCYTES # BLD AUTO: 0.71 X10(3)/MCL (ref 0.6–4.6)
LYMPHOCYTES NFR BLD AUTO: 6.5 %
MAGNESIUM SERPL-MCNC: 2.4 MG/DL (ref 1.6–2.6)
MCH RBC QN AUTO: 28.7 PG (ref 27–31)
MCHC RBC AUTO-ENTMCNC: 30.9 G/DL (ref 33–36)
MCV RBC AUTO: 92.8 FL (ref 80–94)
MONOCYTES # BLD AUTO: 0.44 X10(3)/MCL (ref 0.1–1.3)
MONOCYTES NFR BLD AUTO: 4 %
NEUTROPHILS # BLD AUTO: 9.16 X10(3)/MCL (ref 2.1–9.2)
NEUTROPHILS NFR BLD AUTO: 84.3 %
NRBC BLD AUTO-RTO: 1.1 %
OHS QRS DURATION: 146 MS
OHS QRS DURATION: 150 MS
OHS QRS DURATION: 172 MS
OHS QRS DURATION: 180 MS
OHS QRS DURATION: 200 MS
OHS QTC CALCULATION: 558 MS
OHS QTC CALCULATION: 589 MS
OHS QTC CALCULATION: 594 MS
OHS QTC CALCULATION: 616 MS
OHS QTC CALCULATION: 634 MS
PHOSPHATE SERPL-MCNC: 4.8 MG/DL (ref 2.3–4.7)
PLATELET # BLD AUTO: 204 X10(3)/MCL (ref 130–400)
PMV BLD AUTO: 12.7 FL (ref 7.4–10.4)
POTASSIUM SERPL-SCNC: 4.5 MMOL/L (ref 3.5–5.1)
PROT SERPL-MCNC: 5.1 GM/DL (ref 5.8–7.6)
RBC # BLD AUTO: 3.59 X10(6)/MCL (ref 4.7–6.1)
SODIUM SERPL-SCNC: 145 MMOL/L (ref 136–145)
TRICUSPID ANNULAR PLANE SYSTOLIC EXCURSION: 1.4 CM
WBC # BLD AUTO: 10.87 X10(3)/MCL (ref 4.5–11.5)
Z-SCORE OF LEFT VENTRICULAR DIMENSION IN END DIASTOLE: -6.96
Z-SCORE OF LEFT VENTRICULAR DIMENSION IN END SYSTOLE: -2.67

## 2025-05-31 PROCEDURE — 25000003 PHARM REV CODE 250: Performed by: NURSE PRACTITIONER

## 2025-05-31 PROCEDURE — 99900035 HC TECH TIME PER 15 MIN (STAT)

## 2025-05-31 PROCEDURE — 27100171 HC OXYGEN HIGH FLOW UP TO 24 HOURS

## 2025-05-31 PROCEDURE — 94003 VENT MGMT INPAT SUBQ DAY: CPT

## 2025-05-31 PROCEDURE — 25000003 PHARM REV CODE 250

## 2025-05-31 PROCEDURE — 27000207 HC ISOLATION

## 2025-05-31 PROCEDURE — 80053 COMPREHEN METABOLIC PANEL: CPT

## 2025-05-31 PROCEDURE — 63600175 PHARM REV CODE 636 W HCPCS

## 2025-05-31 PROCEDURE — 84100 ASSAY OF PHOSPHORUS: CPT

## 2025-05-31 PROCEDURE — 36415 COLL VENOUS BLD VENIPUNCTURE: CPT

## 2025-05-31 PROCEDURE — 94640 AIRWAY INHALATION TREATMENT: CPT

## 2025-05-31 PROCEDURE — 93005 ELECTROCARDIOGRAM TRACING: CPT

## 2025-05-31 PROCEDURE — 99900031 HC PATIENT EDUCATION (STAT)

## 2025-05-31 PROCEDURE — 63600175 PHARM REV CODE 636 W HCPCS: Performed by: NURSE PRACTITIONER

## 2025-05-31 PROCEDURE — 94761 N-INVAS EAR/PLS OXIMETRY MLT: CPT

## 2025-05-31 PROCEDURE — 20000000 HC ICU ROOM

## 2025-05-31 PROCEDURE — 99900026 HC AIRWAY MAINTENANCE (STAT)

## 2025-05-31 PROCEDURE — 25000003 PHARM REV CODE 250: Performed by: INTERNAL MEDICINE

## 2025-05-31 PROCEDURE — 27200966 HC CLOSED SUCTION SYSTEM

## 2025-05-31 PROCEDURE — 25000242 PHARM REV CODE 250 ALT 637 W/ HCPCS: Performed by: INTERNAL MEDICINE

## 2025-05-31 PROCEDURE — 93010 ELECTROCARDIOGRAM REPORT: CPT | Mod: ,,, | Performed by: INTERNAL MEDICINE

## 2025-05-31 PROCEDURE — 94667 MNPJ CHEST WALL 1ST: CPT

## 2025-05-31 PROCEDURE — 83735 ASSAY OF MAGNESIUM: CPT

## 2025-05-31 PROCEDURE — 94760 N-INVAS EAR/PLS OXIMETRY 1: CPT

## 2025-05-31 PROCEDURE — 85025 COMPLETE CBC W/AUTO DIFF WBC: CPT

## 2025-05-31 PROCEDURE — 94668 MNPJ CHEST WALL SBSQ: CPT

## 2025-05-31 PROCEDURE — 63600175 PHARM REV CODE 636 W HCPCS: Performed by: INTERNAL MEDICINE

## 2025-05-31 RX ADMIN — MICONAZOLE NITRATE: 20 POWDER TOPICAL at 09:05

## 2025-05-31 RX ADMIN — METOPROLOL TARTRATE 50 MG: 50 TABLET, FILM COATED ORAL at 09:05

## 2025-05-31 RX ADMIN — Medication 4 ML: at 10:05

## 2025-05-31 RX ADMIN — Medication 4 ML: at 08:05

## 2025-05-31 RX ADMIN — FINASTERIDE 5 MG: 5 TABLET, FILM COATED ORAL at 08:05

## 2025-05-31 RX ADMIN — MEXILETINE HYDROCHLORIDE 200 MG: 200 CAPSULE ORAL at 09:05

## 2025-05-31 RX ADMIN — FAMOTIDINE 20 MG: 20 TABLET, FILM COATED ORAL at 09:05

## 2025-05-31 RX ADMIN — POLYETHYLENE GLYCOL 3350 17 G: 17 POWDER, FOR SOLUTION ORAL at 09:05

## 2025-05-31 RX ADMIN — HEPARIN SODIUM 5000 UNITS: 5000 INJECTION, SOLUTION INTRAVENOUS; SUBCUTANEOUS at 09:05

## 2025-05-31 RX ADMIN — MICONAZOLE NITRATE: 20 POWDER TOPICAL at 08:05

## 2025-05-31 RX ADMIN — Medication 4 ML: at 12:05

## 2025-05-31 RX ADMIN — MEXILETINE HYDROCHLORIDE 200 MG: 200 CAPSULE ORAL at 06:05

## 2025-05-31 RX ADMIN — Medication 4 ML: at 04:05

## 2025-05-31 RX ADMIN — AMIODARONE HYDROCHLORIDE 200 MG: 200 TABLET ORAL at 08:05

## 2025-05-31 RX ADMIN — AMIODARONE HYDROCHLORIDE 0.5 MG/MIN: 1.8 INJECTION, SOLUTION INTRAVENOUS at 02:05

## 2025-05-31 RX ADMIN — POLYETHYLENE GLYCOL 3350 17 G: 17 POWDER, FOR SOLUTION ORAL at 08:05

## 2025-05-31 RX ADMIN — HEPARIN SODIUM 5000 UNITS: 5000 INJECTION, SOLUTION INTRAVENOUS; SUBCUTANEOUS at 08:05

## 2025-05-31 RX ADMIN — PRAVASTATIN SODIUM 40 MG: 10 TABLET ORAL at 08:05

## 2025-05-31 RX ADMIN — AMIODARONE HYDROCHLORIDE 0.5 MG/MIN: 1.8 INJECTION, SOLUTION INTRAVENOUS at 03:05

## 2025-05-31 RX ADMIN — MEXILETINE HYDROCHLORIDE 200 MG: 200 CAPSULE ORAL at 01:05

## 2025-05-31 RX ADMIN — SENNOSIDES AND DOCUSATE SODIUM 1 TABLET: 50; 8.6 TABLET ORAL at 08:05

## 2025-05-31 RX ADMIN — FAMOTIDINE 20 MG: 20 TABLET, FILM COATED ORAL at 08:05

## 2025-05-31 RX ADMIN — PROPOFOL 15 MCG/KG/MIN: 10 INJECTION, EMULSION INTRAVENOUS at 03:05
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
## 2025-06-01 LAB
ALBUMIN SERPL-MCNC: 1.8 G/DL (ref 3.4–4.8)
ALBUMIN/GLOB SERPL: 0.5 RATIO (ref 1.1–2)
ALP SERPL-CCNC: 168 UNIT/L (ref 40–150)
ALT SERPL-CCNC: 755 UNIT/L (ref 0–55)
ANION GAP SERPL CALC-SCNC: 9 MEQ/L
AST SERPL-CCNC: 505 UNIT/L (ref 11–45)
BASOPHILS # BLD AUTO: 0.02 X10(3)/MCL
BASOPHILS NFR BLD AUTO: 0.2 %
BILIRUB SERPL-MCNC: 1 MG/DL
BUN SERPL-MCNC: 37.7 MG/DL (ref 8.4–25.7)
CALCIUM SERPL-MCNC: 7.9 MG/DL (ref 8.8–10)
CHLORIDE SERPL-SCNC: 110 MMOL/L (ref 98–107)
CO2 SERPL-SCNC: 25 MMOL/L (ref 23–31)
CREAT SERPL-MCNC: 0.82 MG/DL (ref 0.72–1.25)
CREAT/UREA NIT SERPL: 46
EOSINOPHIL # BLD AUTO: 0.23 X10(3)/MCL (ref 0–0.9)
EOSINOPHIL NFR BLD AUTO: 2.9 %
ERYTHROCYTE [DISTWIDTH] IN BLOOD BY AUTOMATED COUNT: 16.8 % (ref 11.5–17)
GFR SERPLBLD CREATININE-BSD FMLA CKD-EPI: >60 ML/MIN/1.73/M2
GLOBULIN SER-MCNC: 3.5 GM/DL (ref 2.4–3.5)
GLUCOSE SERPL-MCNC: 82 MG/DL (ref 82–115)
HCT VFR BLD AUTO: 33.5 % (ref 42–52)
HGB BLD-MCNC: 10.3 G/DL (ref 14–18)
IMM GRANULOCYTES # BLD AUTO: 0.11 X10(3)/MCL (ref 0–0.04)
IMM GRANULOCYTES NFR BLD AUTO: 1.4 %
LYMPHOCYTES # BLD AUTO: 0.52 X10(3)/MCL (ref 0.6–4.6)
LYMPHOCYTES NFR BLD AUTO: 6.5 %
MAGNESIUM SERPL-MCNC: 2.3 MG/DL (ref 1.6–2.6)
MCH RBC QN AUTO: 28.9 PG (ref 27–31)
MCHC RBC AUTO-ENTMCNC: 30.7 G/DL (ref 33–36)
MCV RBC AUTO: 93.8 FL (ref 80–94)
MONOCYTES # BLD AUTO: 0.74 X10(3)/MCL (ref 0.1–1.3)
MONOCYTES NFR BLD AUTO: 9.2 %
NEUTROPHILS # BLD AUTO: 6.42 X10(3)/MCL (ref 2.1–9.2)
NEUTROPHILS NFR BLD AUTO: 79.8 %
NRBC BLD AUTO-RTO: 0.5 %
OHS QRS DURATION: 176 MS
OHS QTC CALCULATION: 588 MS
PHOSPHATE SERPL-MCNC: 3.2 MG/DL (ref 2.3–4.7)
PLATELET # BLD AUTO: 182 X10(3)/MCL (ref 130–400)
PMV BLD AUTO: 12.4 FL (ref 7.4–10.4)
POTASSIUM SERPL-SCNC: 4.4 MMOL/L (ref 3.5–5.1)
PROT SERPL-MCNC: 5.3 GM/DL (ref 5.8–7.6)
RBC # BLD AUTO: 3.57 X10(6)/MCL (ref 4.7–6.1)
SODIUM SERPL-SCNC: 144 MMOL/L (ref 136–145)
WBC # BLD AUTO: 8.04 X10(3)/MCL (ref 4.5–11.5)

## 2025-06-01 PROCEDURE — 94668 MNPJ CHEST WALL SBSQ: CPT

## 2025-06-01 PROCEDURE — 84100 ASSAY OF PHOSPHORUS: CPT

## 2025-06-01 PROCEDURE — 25000003 PHARM REV CODE 250

## 2025-06-01 PROCEDURE — 25000003 PHARM REV CODE 250: Performed by: NURSE PRACTITIONER

## 2025-06-01 PROCEDURE — 25000242 PHARM REV CODE 250 ALT 637 W/ HCPCS: Performed by: INTERNAL MEDICINE

## 2025-06-01 PROCEDURE — 63600175 PHARM REV CODE 636 W HCPCS: Performed by: NURSE PRACTITIONER

## 2025-06-01 PROCEDURE — 99900031 HC PATIENT EDUCATION (STAT)

## 2025-06-01 PROCEDURE — 99900035 HC TECH TIME PER 15 MIN (STAT)

## 2025-06-01 PROCEDURE — 36415 COLL VENOUS BLD VENIPUNCTURE: CPT

## 2025-06-01 PROCEDURE — 93010 ELECTROCARDIOGRAM REPORT: CPT | Mod: ,,, | Performed by: INTERNAL MEDICINE

## 2025-06-01 PROCEDURE — 11000001 HC ACUTE MED/SURG PRIVATE ROOM

## 2025-06-01 PROCEDURE — 25000003 PHARM REV CODE 250: Performed by: INTERNAL MEDICINE

## 2025-06-01 PROCEDURE — 80053 COMPREHEN METABOLIC PANEL: CPT

## 2025-06-01 PROCEDURE — 27000207 HC ISOLATION

## 2025-06-01 PROCEDURE — 93005 ELECTROCARDIOGRAM TRACING: CPT

## 2025-06-01 PROCEDURE — 94760 N-INVAS EAR/PLS OXIMETRY 1: CPT

## 2025-06-01 PROCEDURE — 94640 AIRWAY INHALATION TREATMENT: CPT

## 2025-06-01 PROCEDURE — 94761 N-INVAS EAR/PLS OXIMETRY MLT: CPT

## 2025-06-01 PROCEDURE — 83735 ASSAY OF MAGNESIUM: CPT

## 2025-06-01 PROCEDURE — 63600175 PHARM REV CODE 636 W HCPCS: Performed by: INTERNAL MEDICINE

## 2025-06-01 PROCEDURE — 85025 COMPLETE CBC W/AUTO DIFF WBC: CPT

## 2025-06-01 PROCEDURE — 27000221 HC OXYGEN, UP TO 24 HOURS

## 2025-06-01 RX ORDER — FUROSEMIDE 10 MG/ML
40 INJECTION INTRAMUSCULAR; INTRAVENOUS DAILY
Status: DISCONTINUED | OUTPATIENT
Start: 2025-06-01 | End: 2025-06-02

## 2025-06-01 RX ORDER — SULFAMETHOXAZOLE AND TRIMETHOPRIM 800; 160 MG/1; MG/1
2 TABLET ORAL 2 TIMES DAILY
Status: DISCONTINUED | OUTPATIENT
Start: 2025-06-01 | End: 2025-06-02

## 2025-06-01 RX ORDER — AMIODARONE HYDROCHLORIDE 200 MG/1
200 TABLET ORAL 2 TIMES DAILY
Status: DISCONTINUED | OUTPATIENT
Start: 2025-06-01 | End: 2025-06-02

## 2025-06-01 RX ADMIN — MEXILETINE HYDROCHLORIDE 200 MG: 200 CAPSULE ORAL at 08:06

## 2025-06-01 RX ADMIN — SACUBITRIL AND VALSARTAN 1 TABLET: 24; 26 TABLET, FILM COATED ORAL at 08:06

## 2025-06-01 RX ADMIN — Medication 4 ML: at 08:06

## 2025-06-01 RX ADMIN — FAMOTIDINE 20 MG: 20 TABLET, FILM COATED ORAL at 09:06

## 2025-06-01 RX ADMIN — MEXILETINE HYDROCHLORIDE 200 MG: 200 CAPSULE ORAL at 05:06

## 2025-06-01 RX ADMIN — FINASTERIDE 5 MG: 5 TABLET, FILM COATED ORAL at 09:06

## 2025-06-01 RX ADMIN — AMIODARONE HYDROCHLORIDE 200 MG: 200 TABLET ORAL at 09:06

## 2025-06-01 RX ADMIN — METOPROLOL TARTRATE 50 MG: 50 TABLET, FILM COATED ORAL at 08:06

## 2025-06-01 RX ADMIN — HEPARIN SODIUM 5000 UNITS: 5000 INJECTION, SOLUTION INTRAVENOUS; SUBCUTANEOUS at 08:06

## 2025-06-01 RX ADMIN — AMIODARONE HYDROCHLORIDE 0.5 MG/MIN: 1.8 INJECTION, SOLUTION INTRAVENOUS at 05:06

## 2025-06-01 RX ADMIN — Medication 4 ML: at 05:06

## 2025-06-01 RX ADMIN — POLYETHYLENE GLYCOL 3350 17 G: 17 POWDER, FOR SOLUTION ORAL at 09:06

## 2025-06-01 RX ADMIN — SENNOSIDES AND DOCUSATE SODIUM 1 TABLET: 50; 8.6 TABLET ORAL at 09:06

## 2025-06-01 RX ADMIN — FAMOTIDINE 20 MG: 20 TABLET, FILM COATED ORAL at 08:06

## 2025-06-01 RX ADMIN — PRAVASTATIN SODIUM 40 MG: 10 TABLET ORAL at 09:06

## 2025-06-01 RX ADMIN — POLYETHYLENE GLYCOL 3350 17 G: 17 POWDER, FOR SOLUTION ORAL at 08:06

## 2025-06-01 RX ADMIN — HEPARIN SODIUM 5000 UNITS: 5000 INJECTION, SOLUTION INTRAVENOUS; SUBCUTANEOUS at 09:06

## 2025-06-01 RX ADMIN — FUROSEMIDE 40 MG: 10 INJECTION, SOLUTION INTRAVENOUS at 02:06

## 2025-06-01 RX ADMIN — MICONAZOLE NITRATE: 20 POWDER TOPICAL at 08:06

## 2025-06-01 RX ADMIN — SULFAMETHOXAZOLE AND TRIMETHOPRIM 2 TABLET: 800; 160 TABLET ORAL at 08:06

## 2025-06-01 RX ADMIN — MICONAZOLE NITRATE: 20 POWDER TOPICAL at 09:06

## 2025-06-01 RX ADMIN — AMIODARONE HYDROCHLORIDE 200 MG: 200 TABLET ORAL at 08:06

## 2025-06-01 RX ADMIN — MEXILETINE HYDROCHLORIDE 200 MG: 200 CAPSULE ORAL at 02:06
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
## 2025-06-02 LAB
ALBUMIN SERPL-MCNC: 1.9 G/DL (ref 3.4–4.8)
ALBUMIN/GLOB SERPL: 0.5 RATIO (ref 1.1–2)
ALP SERPL-CCNC: 188 UNIT/L (ref 40–150)
ALT SERPL-CCNC: 687 UNIT/L (ref 0–55)
ANION GAP SERPL CALC-SCNC: 7 MEQ/L
AST SERPL-CCNC: 322 UNIT/L (ref 11–45)
BASOPHILS # BLD AUTO: 0.03 X10(3)/MCL
BASOPHILS NFR BLD AUTO: 0.4 %
BILIRUB SERPL-MCNC: 0.8 MG/DL
BSA FOR ECHO PROCEDURE: 2.42 M2
BUN SERPL-MCNC: 31.4 MG/DL (ref 8.4–25.7)
CALCIUM SERPL-MCNC: 8.2 MG/DL (ref 8.8–10)
CHLORIDE SERPL-SCNC: 110 MMOL/L (ref 98–107)
CO2 SERPL-SCNC: 27 MMOL/L (ref 23–31)
CREAT SERPL-MCNC: 0.76 MG/DL (ref 0.72–1.25)
CREAT/UREA NIT SERPL: 41
EOSINOPHIL # BLD AUTO: 0.28 X10(3)/MCL (ref 0–0.9)
EOSINOPHIL NFR BLD AUTO: 3.3 %
ERYTHROCYTE [DISTWIDTH] IN BLOOD BY AUTOMATED COUNT: 17 % (ref 11.5–17)
GFR SERPLBLD CREATININE-BSD FMLA CKD-EPI: >60 ML/MIN/1.73/M2
GLOBULIN SER-MCNC: 3.7 GM/DL (ref 2.4–3.5)
GLUCOSE SERPL-MCNC: 148 MG/DL (ref 82–115)
HCT VFR BLD AUTO: 33.9 % (ref 42–52)
HGB BLD-MCNC: 10.6 G/DL (ref 14–18)
IMM GRANULOCYTES # BLD AUTO: 0.12 X10(3)/MCL (ref 0–0.04)
IMM GRANULOCYTES NFR BLD AUTO: 1.4 %
LDH SERPL L TO P-CCNC: 1 MMOL/L (ref 0.36–1.25)
LYMPHOCYTES # BLD AUTO: 0.69 X10(3)/MCL (ref 0.6–4.6)
LYMPHOCYTES NFR BLD AUTO: 8.3 %
MAGNESIUM SERPL-MCNC: 2.1 MG/DL (ref 1.6–2.6)
MCH RBC QN AUTO: 29.1 PG (ref 27–31)
MCHC RBC AUTO-ENTMCNC: 31.3 G/DL (ref 33–36)
MCV RBC AUTO: 93.1 FL (ref 80–94)
MONOCYTES # BLD AUTO: 0.87 X10(3)/MCL (ref 0.1–1.3)
MONOCYTES NFR BLD AUTO: 10.4 %
NEUTROPHILS # BLD AUTO: 6.37 X10(3)/MCL (ref 2.1–9.2)
NEUTROPHILS NFR BLD AUTO: 76.2 %
NRBC BLD AUTO-RTO: 0.2 %
PHOSPHATE SERPL-MCNC: 2.3 MG/DL (ref 2.3–4.7)
PLATELET # BLD AUTO: 196 X10(3)/MCL (ref 130–400)
PMV BLD AUTO: 12.2 FL (ref 7.4–10.4)
POTASSIUM SERPL-SCNC: 4.6 MMOL/L (ref 3.5–5.1)
PROT SERPL-MCNC: 5.6 GM/DL (ref 5.8–7.6)
RBC # BLD AUTO: 3.64 X10(6)/MCL (ref 4.7–6.1)
SAMPLE: NORMAL
SODIUM SERPL-SCNC: 144 MMOL/L (ref 136–145)
WBC # BLD AUTO: 8.36 X10(3)/MCL (ref 4.5–11.5)

## 2025-06-02 PROCEDURE — 36415 COLL VENOUS BLD VENIPUNCTURE: CPT

## 2025-06-02 PROCEDURE — 63600175 PHARM REV CODE 636 W HCPCS: Performed by: NURSE PRACTITIONER

## 2025-06-02 PROCEDURE — 25000003 PHARM REV CODE 250

## 2025-06-02 PROCEDURE — 94668 MNPJ CHEST WALL SBSQ: CPT

## 2025-06-02 PROCEDURE — 25000003 PHARM REV CODE 250: Performed by: NURSE PRACTITIONER

## 2025-06-02 PROCEDURE — 11000001 HC ACUTE MED/SURG PRIVATE ROOM

## 2025-06-02 PROCEDURE — 21400001 HC TELEMETRY ROOM

## 2025-06-02 PROCEDURE — 80053 COMPREHEN METABOLIC PANEL: CPT

## 2025-06-02 PROCEDURE — 97168 OT RE-EVAL EST PLAN CARE: CPT

## 2025-06-02 PROCEDURE — 25000242 PHARM REV CODE 250 ALT 637 W/ HCPCS: Performed by: INTERNAL MEDICINE

## 2025-06-02 PROCEDURE — 27000221 HC OXYGEN, UP TO 24 HOURS

## 2025-06-02 PROCEDURE — 99900031 HC PATIENT EDUCATION (STAT)

## 2025-06-02 PROCEDURE — 85025 COMPLETE CBC W/AUTO DIFF WBC: CPT

## 2025-06-02 PROCEDURE — 94760 N-INVAS EAR/PLS OXIMETRY 1: CPT

## 2025-06-02 PROCEDURE — 83735 ASSAY OF MAGNESIUM: CPT

## 2025-06-02 PROCEDURE — 94640 AIRWAY INHALATION TREATMENT: CPT

## 2025-06-02 PROCEDURE — 94799 UNLISTED PULMONARY SVC/PX: CPT | Mod: XB

## 2025-06-02 PROCEDURE — 97164 PT RE-EVAL EST PLAN CARE: CPT

## 2025-06-02 PROCEDURE — 25000003 PHARM REV CODE 250: Performed by: INTERNAL MEDICINE

## 2025-06-02 PROCEDURE — 94664 DEMO&/EVAL PT USE INHALER: CPT

## 2025-06-02 PROCEDURE — 27000207 HC ISOLATION

## 2025-06-02 PROCEDURE — 84100 ASSAY OF PHOSPHORUS: CPT

## 2025-06-02 PROCEDURE — 63600175 PHARM REV CODE 636 W HCPCS: Performed by: INTERNAL MEDICINE

## 2025-06-02 PROCEDURE — 27000646 HC AEROBIKA DEVICE

## 2025-06-02 PROCEDURE — 94761 N-INVAS EAR/PLS OXIMETRY MLT: CPT

## 2025-06-02 PROCEDURE — 99232 SBSQ HOSP IP/OBS MODERATE 35: CPT | Mod: ,,,

## 2025-06-02 PROCEDURE — 99900035 HC TECH TIME PER 15 MIN (STAT)

## 2025-06-02 RX ORDER — MEXILETINE HYDROCHLORIDE 200 MG/1
200 CAPSULE ORAL EVERY 8 HOURS
Status: DISCONTINUED | OUTPATIENT
Start: 2025-06-02 | End: 2025-06-04

## 2025-06-02 RX ORDER — FAMOTIDINE 20 MG/1
20 TABLET, FILM COATED ORAL 2 TIMES DAILY
Status: DISCONTINUED | OUTPATIENT
Start: 2025-06-02 | End: 2025-06-04

## 2025-06-02 RX ORDER — ACETAMINOPHEN 325 MG/1
650 TABLET ORAL EVERY 4 HOURS PRN
Status: DISCONTINUED | OUTPATIENT
Start: 2025-06-02 | End: 2025-06-09 | Stop reason: HOSPADM

## 2025-06-02 RX ORDER — PRAVASTATIN SODIUM 40 MG/1
40 TABLET ORAL DAILY
Status: DISCONTINUED | OUTPATIENT
Start: 2025-06-03 | End: 2025-06-04

## 2025-06-02 RX ORDER — ONDANSETRON 4 MG/1
8 TABLET, ORALLY DISINTEGRATING ORAL EVERY 8 HOURS PRN
Status: DISCONTINUED | OUTPATIENT
Start: 2025-06-02 | End: 2025-06-09 | Stop reason: HOSPADM

## 2025-06-02 RX ORDER — SULFAMETHOXAZOLE AND TRIMETHOPRIM 800; 160 MG/1; MG/1
2 TABLET ORAL 2 TIMES DAILY
Status: DISCONTINUED | OUTPATIENT
Start: 2025-06-02 | End: 2025-06-04

## 2025-06-02 RX ORDER — AMOXICILLIN 250 MG
1 CAPSULE ORAL DAILY
Status: DISCONTINUED | OUTPATIENT
Start: 2025-06-03 | End: 2025-06-04

## 2025-06-02 RX ORDER — FUROSEMIDE 10 MG/ML
40 INJECTION INTRAMUSCULAR; INTRAVENOUS 2 TIMES DAILY WITH MEALS
Status: DISCONTINUED | OUTPATIENT
Start: 2025-06-02 | End: 2025-06-09 | Stop reason: HOSPADM

## 2025-06-02 RX ORDER — AMIODARONE HYDROCHLORIDE 200 MG/1
200 TABLET ORAL 2 TIMES DAILY
Status: DISCONTINUED | OUTPATIENT
Start: 2025-06-02 | End: 2025-06-04

## 2025-06-02 RX ORDER — FINASTERIDE 5 MG/1
5 TABLET, FILM COATED ORAL DAILY
Status: DISCONTINUED | OUTPATIENT
Start: 2025-06-03 | End: 2025-06-04

## 2025-06-02 RX ADMIN — MICONAZOLE NITRATE: 20 POWDER TOPICAL at 08:06

## 2025-06-02 RX ADMIN — METOPROLOL TARTRATE 50 MG: 50 TABLET, FILM COATED ORAL at 04:06

## 2025-06-02 RX ADMIN — SULFAMETHOXAZOLE AND TRIMETHOPRIM 2 TABLET: 800; 160 TABLET ORAL at 08:06

## 2025-06-02 RX ADMIN — FUROSEMIDE 40 MG: 10 INJECTION, SOLUTION INTRAVENOUS at 11:06

## 2025-06-02 RX ADMIN — Medication 4 ML: at 03:06

## 2025-06-02 RX ADMIN — Medication 4 ML: at 09:06

## 2025-06-02 RX ADMIN — HEPARIN SODIUM 5000 UNITS: 5000 INJECTION, SOLUTION INTRAVENOUS; SUBCUTANEOUS at 11:06

## 2025-06-02 RX ADMIN — SENNOSIDES AND DOCUSATE SODIUM 1 TABLET: 50; 8.6 TABLET ORAL at 11:06

## 2025-06-02 RX ADMIN — SULFAMETHOXAZOLE AND TRIMETHOPRIM 2 TABLET: 800; 160 TABLET ORAL at 11:06

## 2025-06-02 RX ADMIN — POLYETHYLENE GLYCOL 3350 17 G: 17 POWDER, FOR SOLUTION ORAL at 11:06

## 2025-06-02 RX ADMIN — FUROSEMIDE 40 MG: 10 INJECTION, SOLUTION INTRAVENOUS at 08:06

## 2025-06-02 RX ADMIN — SACUBITRIL AND VALSARTAN 1 TABLET: 24; 26 TABLET, FILM COATED ORAL at 11:06

## 2025-06-02 RX ADMIN — AMIODARONE HYDROCHLORIDE 200 MG: 200 TABLET ORAL at 08:06

## 2025-06-02 RX ADMIN — PRAVASTATIN SODIUM 40 MG: 10 TABLET ORAL at 11:06

## 2025-06-02 RX ADMIN — METOPROLOL TARTRATE 50 MG: 50 TABLET, FILM COATED ORAL at 08:06

## 2025-06-02 RX ADMIN — FAMOTIDINE 20 MG: 20 TABLET, FILM COATED ORAL at 11:06

## 2025-06-02 RX ADMIN — MEXILETINE HYDROCHLORIDE 200 MG: 200 CAPSULE ORAL at 10:06

## 2025-06-02 RX ADMIN — BACITRACIN ZINC, NEOMYCIN, POLYMYXIN B: 400; 3.5; 5 OINTMENT TOPICAL at 04:06

## 2025-06-02 RX ADMIN — POLYETHYLENE GLYCOL 3350 17 G: 17 POWDER, FOR SOLUTION ORAL at 08:06

## 2025-06-02 RX ADMIN — MICONAZOLE NITRATE: 20 POWDER TOPICAL at 11:06

## 2025-06-02 RX ADMIN — FAMOTIDINE 20 MG: 20 TABLET, FILM COATED ORAL at 08:06

## 2025-06-02 RX ADMIN — HEPARIN SODIUM 5000 UNITS: 5000 INJECTION, SOLUTION INTRAVENOUS; SUBCUTANEOUS at 08:06

## 2025-06-02 RX ADMIN — BACITRACIN ZINC, NEOMYCIN, POLYMYXIN B: 400; 3.5; 5 OINTMENT TOPICAL at 08:06

## 2025-06-02 RX ADMIN — SACUBITRIL AND VALSARTAN 1 TABLET: 24; 26 TABLET, FILM COATED ORAL at 08:06

## 2025-06-02 RX ADMIN — MEXILETINE HYDROCHLORIDE 200 MG: 200 CAPSULE ORAL at 06:06

## 2025-06-02 RX ADMIN — MEXILETINE HYDROCHLORIDE 200 MG: 200 CAPSULE ORAL at 01:06

## 2025-06-02 RX ADMIN — AMIODARONE HYDROCHLORIDE 200 MG: 200 TABLET ORAL at 11:06

## 2025-06-02 RX ADMIN — Medication 4 ML: at 12:06

## 2025-06-02 RX ADMIN — METOPROLOL TARTRATE 50 MG: 50 TABLET, FILM COATED ORAL at 11:06

## 2025-06-02 RX ADMIN — BACITRACIN ZINC, NEOMYCIN, POLYMYXIN B: 400; 3.5; 5 OINTMENT TOPICAL at 11:06

## 2025-06-02 NOTE — PROGRESS NOTES
Inpatient Nutrition Assessment    Admit Date: 5/8/2025   Total duration of encounter: 25 days   Patient Age: 79 y.o.    Nutrition Recommendation/Prescription     Tube feeding:    Isosource 1.5 goal rate 90 ml/hr to provide  2700 kcal/d  (94% est needs)  122 g protein/d (89% est needs)  1386 ml free water/d (60% est needs)  (calculations based on estimated 20 hr/d run time)     If no IV fluids running, can give 100ml q 2hr water flushes. Total water provided: 2386ml (104% est needs.)     Communication of Recommendations: reviewed with nurse    Nutrition Assessment     Malnutrition Assessment/Nutrition-Focused Physical Exam    Malnutrition Context: acute illness or injury (05/19/25 1246)  Malnutrition Level: moderate (05/19/25 1246)  Energy Intake (Malnutrition): other (see comments) (Does not meet criteria) (05/19/25 1246)  Weight Loss (Malnutrition): other (see comments) (Unable to assess) (05/19/25 1246)              Muscle Mass (Malnutrition): mild depletion (05/19/25 1246)  [place] (Muscle Loss): mild depletion                       Fluid Accumulation (Malnutrition): moderate (05/19/25 1246)        A minimum of two characteristics is recommended for diagnosis of either severe or non-severe malnutrition.    Chart Review    Reason Seen: continuous nutrition monitoring and follow-up    Malnutrition Screening Tool Results   Have you recently lost weight without trying?: No  Have you been eating poorly because of a decreased appetite?: No   MST Score: 0   Diagnosis:  Acute decompensated heart failure with reduced ejection fraction  Persistent ventricular tachycardia  Chronic atrial fibrillation     Relevant Medical History: Afib. CVA. HTN, PVD, CHF    Scheduled Medications:  amiodarone, 200 mg, BID  famotidine, 20 mg, BID  finasteride, 5 mg, Daily  furosemide (LASIX) injection, 40 mg, BID WM  heparin (porcine), 5,000 Units, Q12H  methocarbamoL, 500 mg, Once  metoprolol tartrate, 50 mg, TID  mexiletine, 200 mg,  Q8H  miconazole NITRATE 2 %, , BID  morphine, 1 mg, Once  neomycin-bacitracin-polymyxin, , TID  polyethylene glycol, 17 g, BID  pravastatin, 40 mg, Daily  sacubitriL-valsartan, 1 tablet, BID  senna-docusate, 1 tablet, Daily  sodium chloride 3%, 4 mL, Q8H  sulfamethoxazole-trimethoprim 800-160mg, 2 tablet, BID    Continuous Infusions:  LIDOcaine, Last Rate: 7.5 mL/hr at 05/10/25 1935  NORepinephrine bitartrate-D5W, Last Rate: Stopped (05/31/25 1200)    PRN Medications:  acetaminophen, 650 mg, Q4H PRN  acetylcysteine 100 mg/ml (10%), 4 mL, TID PRN  bisacodyL, 10 mg, Daily PRN  guaiFENesin 100 mg/5 ml, 200 mg, Q4H PRN  LIDOcaine, , Continuous PRN  metoprolol, 5 mg, Q6H PRN  morphine, 2 mg, Q4H PRN  ondansetron, 8 mg, Q8H PRN  sodium chloride 0.9%, 10 mL, PRN  sodium chloride 0.9%, 10 mL, PRN  sodium chloride 0.9%, 10 mL, Q12H PRN  sodium chloride 0.9%, 10 mL, PRN    Calorie Containing IV Medications: no significant kcals from medications at this time    Recent Labs   Lab 05/27/25  0430 05/28/25  0501 05/29/25  0357 05/30/25  0320 05/30/25  1545 05/31/25  0333 06/01/25  0250 06/02/25  0331    144 140 143 143 145 144 144   K 4.3 5.3* 5.2* 5.8* 5.5* 4.5 4.4 4.6   CALCIUM 8.8 8.4* 8.6* 8.8 8.0* 7.9* 7.9* 8.2*   PHOS 2.3 2.9 3.6 5.4*  --  4.8* 3.2 2.3   MG 1.90 2.00 2.30 2.50  --  2.40 2.30 2.10   * 113* 112* 110* 106 110* 110* 110*   CO2 23 23 17* 21* 24 23 25 27   BUN 28.9* 28.5* 37.3* 48.7* 54.6* 52.4* 37.7* 31.4*   CREATININE 0.67* 0.71* 1.03 1.26* 1.45* 1.24 0.82 0.76   EGFRNORACEVR >60 >60 >60 58 49 59 >60 >60   * 99 122* 106 97 92 82 148*   BILITOT 0.7 0.6 0.7 1.0 1.2 1.1 1.0 0.8   ALKPHOS 115 97 289* 246* 232* 187* 168* 188*   ALT 15 19 30 125* 864* 732* 755* 687*   AST 20 29 38 149* 1,534* 770* 505* 322*   ALBUMIN 2.1* 2.0* 2.0* 2.1* 2.1* 1.8* 1.8* 1.9*   WBC 11.28 11.36 9.92 11.51*  --  10.87 8.04 8.36   HGB 11.5* 12.0* 11.5* 12.0*  --  10.3* 10.3* 10.6*   HCT 37.9* 37.9* 40.9* 39.3*  --  33.3*  "33.5* 33.9*     Nutrition Orders:  No diet orders on file  Tube Feedings/Formulas 75; 1,500; Isosource 1.5; NG; 100; Every 2 hours    Appetite/Oral Intake: NPO/NPO  Factors Affecting Nutritional Intake: none identified  Social Needs Impacting Access to Food: none identified  Food/[place]/Cultural Preferences: unable to obtain  Food Allergies: no known food allergies  Last Bowel Movement: 06/01/25  Wound(s):     Wound 05/15/25 2115 Puncture Left Groin-Tissue loss description: Not applicable     Comments    5/15/25: Pt with 100% po intake of liquid diet. Stated appetite good. Unable to obtain further info or complete physical assessment. Interrupted by MD at time of visit. Will attempt upon F/U.     5/19/25: Pt now intubate.d Possibly going for procedure vs extubation today. Will provide TF recommendation in case needed. Pt also meets criteria for intake in malnutrition assessment, Junum not updating though. No kcal from meds.     5/21/25: Still no TF running. Plans for procedure today with possible extubation after, per RN. Receiving small amount of kcal from meds.     5/23/25: Still no po intake/nutrition. Plans for MBS today. Discussed needing NG placed if not able to advance diet. Will provide TF recommendations in case needed. Pt currently very hungry, wanting food.     5/24/25: Tube feeding initiated.    5/26/2025: TF running. Provided TF recommendations. No reported N/V. Last BM noted. Will monitor.    6/2/25: TF continues, tolerated per RN. Updated est needs.     Anthropometrics    Height: 6' 0.01" (182.9 cm), Height Method: Measured  Last Weight: 115 kg (253 lb 8.5 oz) (05/12/25 1033), Weight Method: Bed Scale  BMI (Calculated): 34.4  BMI Classification: obese grade I (BMI 30-34.9)        Ideal Body Weight (IBW), Male: 178.06 lb     % Ideal Body Weight, Male (lb): 142.43 %                          Usual Weight Provided By: unable to obtain usual weight    Wt Readings from Last 5 Encounters:   05/12/25 115 " kg (253 lb 8.5 oz)   04/02/25 113.4 kg (250 lb)   09/23/24 107 kg (236 lb)   03/06/24 108.4 kg (239 lb)   09/06/23 119.7 kg (264 lb)   5/26/2025: no new wts  Weight Change(s) Since Admission:   Wt Readings from Last 1 Encounters:   05/12/25 1033 115 kg (253 lb 8.5 oz)   05/08/25 1750 115 kg (253 lb 8.5 oz)   Admit Weight: 115 kg (253 lb 8.5 oz) (05/08/25 1750), Weight Method: Bed Scale    Estimated Needs    Weight Used For Calorie Calculations: 115 kg (253 lb 8.5 oz)  Energy Calorie Requirements (kcal): 2875-3450kcal (25-30kcal/kg)  Energy Need Method: Kcal/kg  Weight Used For Protein Calculations: 115 kg (253 lb 8.5 oz)  Protein Requirements: 138-161gm (1.2-1.4g/kg)  Fluid Requirements (mL): 2300ml (2ml/kg)  CHO Requirement: 320kgm (45% est kcal needs)     Enteral Nutrition     Formula: Isosource 1.5 [place]  Rate/Volume: 75 ml/hr  Water Flushes: 100ml q2hr  Additives/Modulars: none at this time  Route: nasogastric tube  Method: continuous  Total Nutrition Provided by Tube Feeding, Additives, and Flushes:  Calories Provided  2250 kcal/d, 98% needs   Protein Provided  102 g/d, 90% needs   Fluid Provided  2346 ml/d, 102% needs   Continuous feeding calculations based on estimated 20 hr/d run time unless otherwise stated.    Parenteral Nutrition     Patient not receiving parenteral nutrition support at this time.    Evaluation of Received Nutrient Intake    Calories: meeting estimated needs  Protein: meeting estimated needs    Patient Education     Not applicable.    Nutrition Diagnosis     PES: Inadequate energy intake related to acute illness as evidenced by NPO or liquid diet since 5/9/25. (active)     PES: Moderate acute disease or injury related malnutrition Related to current condition As Evidenced by:  - weight loss: Unable to assess - muscle mass depletion: 1 area of mild muscle loss ([place]poralis) - fluid accumulation: 2 areas of moderate or severe fluid accumulation (Lower extremities edema, Upper extremities  edema) active    Nutrition Interventions     Intervention(s): collaboration with other providers  Intervention(s):      Goal: Meet greater than 80% of nutritional needs by follow-up. (goal progressing)    Nutrition Goals & Monitoring     Dietitian will monitor: energy intake and enteral nutrition intake  Discharge planning: too early to determine; pending clinical course  Nutrition Risk/Follow-Up: patient at increased nutrition risk; dietitian will follow-up twice weekly   Please consult if re-assessment needed sooner.

## 2025-06-02 NOTE — PT/OT/SLP RE-EVAL
"Physical Therapy Re-Evaluation    Patient Name:  Alcides Rosario   MRN:  [id number]    Recommendations:     Discharge therapy intensity: Moderate Intensity Therapy   Discharge Equipment Recommendations: to be determined by next level of care   Barriers to discharge: Impaired mobility and Ongoing medical needs    Assessment:     Alcides Rosario is a 79 y.o. male admitted with a medical diagnosis of acute on chronic CHF EF 20-25%, V-tach s/p ICD placement 5/16 and epicardial ablation 5/30, acute hypoxemic respiratory failure requiring intubation/re-intubation now extubated, ESBL klebsiella pneumoniae, NSTEMI, SSS s/p pacemaker placement.  He presents with the following impairments/functional limitations: weakness, impaired endurance, impaired functional mobility, impaired balance, decreased lower extremity function, edema . Pt with fair tolerance to PT re-eval given medical status. Presents with generalized deconditioning, required maxAx2 for bed mobility and partial stands from EOB. Able to sit EOB with min-SBA. Recommend moderate intensity therapy as pt will need prolonged period of rehabilitation in order to regain independence with functional mobility.    Rehab Prognosis: Good; patient would benefit from acute skilled PT services to address these deficits and reach maximum level of function.    Recent Surgery: Procedure(s) (LRB):  Ablation VT (N/A)  EP - diagnostic 3 Days Post-Op    Plan:     During this hospitalization, patient would benefit from acute PT services 5 x/week to address the identified rehab impairments via gait training, therapeutic activities, therapeutic exercises, neuromuscular re-education and progress toward the following goals:    Plan of Care Expires:  07/02/25      Subjective     Chief Complaint: none; "I'm alive"  Patient/Family Comments/goals: get stronger  Pain/Comfort:  Pain Rating 1: 0/10    Patients cultural, spiritual, Bahai conflicts given the current situation: no    Objective: "     Communicated with RN prior to session.  Patient found HOB elevated with telemetry, pulse ox (continuous), blood pressure cuff, carlisle catheter, peripheral IV, NG tube, PureWick  upon PT entry to room.    General Precautions: Standard, fall  Orthopedic Precautions:N/A   Braces: N/A  Respiratory Status: Nasal cannula, flow 2 L/min SpO2 96%  Blood Pressure: 153/87 HR 80      Exams:  Cognitive Exam:  Patient is oriented to Person, Place, Situation, and year  RLE Strength: 4/5 gross  LLE Strength: 4/5 gross  Skin integrity: sacral foam dressing, blanchable erythema sacrum      Functional Mobility:  Bed Mobility:     Rolling Left:  maximal assistance  Rolling Right: maximal assistance  Supine to Sit: maximal assistance and of 2 persons  Sit to Supine: maximal assistance and of 2 persons  Transfers:     Sit to Stand:  maximal assistance, of 2 persons, and minimal hip clearance from EOB with rolling walker  Balance: sitting balance = initially Mehdi 2/2 posterior sway, once feet on floor and brought to midline pt able to maintain balance with standby assist      AM-PAC 6 CLICK MOBILITY  Total Score:9       Treatment & Education:  2 STS trials from EOB with maxAx2. Max education required to allow therapists to place wedge and boots as pt reports they are uncomfortable.    Patient provided with verbal education education regarding PT role/goals/POC, fall prevention, safety awareness, discharge/DME recommendations, and pressure ulcer prevention.  Understanding was verbalized, however additional teaching warranted.     Patient left HOB elevated with all lines intact, call button in reach, wedge under L side, pressure relief boots, and RN notified.    GOALS:   Multidisciplinary Problems       Physical Therapy Goals          Problem: Physical Therapy    Goal Priority Disciplines Outcome Interventions   Physical Therapy Goal     PT, PT/OT Progressing    Description: Goals to be met by: 7/2/2025     Patient will increase  functional independence with mobility by performin. Supine to sit with MInimal Assistance  2. Sit to stand transfer with Minimal Assistance  3. Gait  x 50 feet with Minimal Assistance using Rolling Walker.   4. Sitting at edge of bed x5 minutes with Supervision                         History:     Past Medical History:   Diagnosis Date    Atrial fibrillation     HTN (hypertension)     Peripheral vascular disease, unspecified        Past Surgical History:   Procedure Laterality Date    ABLATION N/A 2025    Procedure: Ablation;  Surgeon: Isac Samayoa MD;  Location: [facility] CATH LAB;  Service: Cardiology;  Laterality: N/A;  VT ABLATION W/ ANEST.    CARDIAC DEFIBRILLATOR PLACEMENT N/A 2025    Procedure: Insertion, ICD;  Surgeon: Isac Samayoa MD;  Location: [facility] CATH LAB;  Service: Cardiology;  Laterality: N/A;    CARDIAC ELECTROPHYSIOLOGY STUDY N/A 2025    Procedure: Study possible ablation;  Surgeon: Isac Samayoa MD;  Location: [facility] CATH LAB;  Service: Cardiology;  Laterality: N/A;    IMPELLA, REMOVAL Left 2025    Procedure: Impella, Removal;  Surgeon: Asad Randle MD;  Location: [facility] CATH LAB;  Service: Cardiology;  Laterality: Left;    INSERTION OF PACEMAKER N/A 3/31/2023    Procedure: INSERTION, PACEMAKER;  Surgeon: Joaquin Bravo MD;  Location: [facility] CATH LAB;  Service: Cardiology;  Laterality: N/A;  single chamber PPM    LEFT HEART CATHETERIZATION Left 2025    Procedure: Left heart cath;  Surgeon: Lalo Dominguez MD;  Location: [facility] CATH LAB;  Service: Cardiology;  Laterality: Left;    RIGHT HEART CATHETERIZATION Right 2025    Procedure: INSERTION, CATHETER, RIGHT HEART;  Surgeon: Lalo Dominguez MD;  Location: [facility] CATH LAB;  Service: Cardiology;  Laterality: Right;       Time Tracking:     PT Received On: 25  PT Start Time: 1112     PT Stop Time: 1139  PT Total Time (min): 27 min     Billable Minutes: Re-eval 27 min      2025

## 2025-06-02 NOTE — PT/OT/SLP PROGRESS
[facility] Speech Language Pathology Department    Patient Name:  Alcides Rosario   MRN:  [id number]    Reconsult orders received, chart reviewed, POC discussed with nursing.  Pt with MBS completed by this clinician this admit on 5/23/25.  Recommendations were NPO, however was ok for meds in puree ONLY.  Unfortunately, patient with continued decline and worsening secretion management and resulted in strict NPO status.  Pt with transfer to ICU for ablation with intubation, SLP reconsulted after procedure.  Pt seen this AM, he is awake and alert however remains with poor secretion management and nonproductive cough with some increased work of breathing and increased coughing with speech.  Attempts at PO intake remain unsafe at this time.  Rec: continue strict NPO due to HIGH risk of aspiration.  SLP to follow.

## 2025-06-02 NOTE — PROGRESS NOTES
Patient Name: Alcides Rosario   MRN: [id number]   Admission Date: 5/8/2025   Hospital Length of Stay: 25   Attending Provider: Bar Peck MD   Consulting Provider: Latricia CRISTINA  Reason for Consult: Goals of Care  Primary Care Physician:  Maycol Mcleod II, MD     Principal Problem: <principal problem not specified>       Final diagnoses:  [I47.20] Ventricular tachycardia      Assessment/Plan:     I reviewed the patient and family's understanding of the seriousness of the illness and its expected prognosis. We discussed the patient's goals of care and treatment preferences.          Met with patient, introduced myself as he was intubated and sedated during my first visit. We discussed his current condition. He states his number one goal is to leave the hospital and go be with his wife. I informed him that this was a great goal, however we have some way to go and a few hurdles before he is able to leave the hospital. We discussed his current swallowing issue. I asked him if he would want a PEG tube if he would not be able to swallow safely. He states that he would not want a feeding tube. I explained that he is currently very weak and it could take some time for his swallowing to improve. I also explained that we could not keep the tube in his nose for an extended period of time nor could it go with him to rehab. He verbalized understanding. I informed him that I would follow along and assist him with these decisions and be available for any further questions.     Support provided.  Reflective listening, validation concerns, and normalization of fears provided.      Discussed with nursing and speech therapy    Recommendations:     Palliative medicine will continue to help with goals of care.    Interval History:     Patient was extubated on 05/31 without complications. Pericardial drain was removed. Sputum culture with stenotrophomonas maltophilia and remains on antibiotics. He remains NPO and unsafe  for PO intake per speech therapy.      Active Ambulatory Problems     Diagnosis Date Noted    Primary hypertension 09/08/2022    Hyperlipidemia 09/08/2022    Coronary artery disease 09/08/2022    PAD (peripheral artery disease) 09/08/2022    Leg edema 09/08/2022    Chronic a-fib 03/08/2023    Type 2 diabetes mellitus without complication, without long-term current use of insulin 03/08/2023    Obesity (BMI 30-39.9) 03/08/2023    Pacemaker complications 04/01/2023    Benign prostatic hyperplasia 09/23/2024    Cerebrovascular accident (CVA) 09/23/2024    Chronic kidney disease, stage 3a 09/23/2024    Type 2 diabetes mellitus with diabetic peripheral angiopathy without gangrene, without long-term current use of insulin 04/02/2025    Congestive heart failure, unspecified HF chronicity, unspecified heart failure type 04/02/2025     Resolved Ambulatory Problems     Diagnosis Date Noted    No Resolved Ambulatory Problems     Past Medical History:   Diagnosis Date    Atrial fibrillation     HTN (hypertension)     Peripheral vascular disease, unspecified         Past Surgical History:   Procedure Laterality Date    ABLATION N/A 5/16/2025    Procedure: Ablation;  Surgeon: Isac Samayoa MD;  Location: [facility] CATH LAB;  Service: Cardiology;  Laterality: N/A;  VT ABLATION W/ ANEST.    CARDIAC DEFIBRILLATOR PLACEMENT N/A 5/16/2025    Procedure: Insertion, ICD;  Surgeon: Isac Samayoa MD;  Location: [facility] CATH LAB;  Service: Cardiology;  Laterality: N/A;    CARDIAC ELECTROPHYSIOLOGY STUDY N/A 5/21/2025    Procedure: Study possible ablation;  Surgeon: Isac Samayoa MD;  Location: [facility] CATH LAB;  Service: Cardiology;  Laterality: N/A;    IMPELLA, REMOVAL Left 5/13/2025    Procedure: Impella, Removal;  Surgeon: Asad Randle MD;  Location: [facility] CATH LAB;  Service: Cardiology;  Laterality: Left;    INSERTION OF PACEMAKER N/A 3/31/2023    Procedure: INSERTION, PACEMAKER;  Surgeon: Joaquin Bravo MD;  Location: [facility] CATH LAB;   "Service: Cardiology;  Laterality: N/A;  single chamber PPM    LEFT HEART CATHETERIZATION Left 5/9/2025    Procedure: Left heart cath;  Surgeon: Lalo Dominguez MD;  Location: [facility] CATH LAB;  Service: Cardiology;  Laterality: Left;    RIGHT HEART CATHETERIZATION Right 5/9/2025    Procedure: INSERTION, CATHETER, RIGHT HEART;  Surgeon: Lalo Dominguez MD;  Location: [facility] CATH LAB;  Service: Cardiology;  Laterality: Right;        Review of patient's allergies indicates:  No Known Allergies     Current Medications[1]       Current Facility-Administered Medications:     acetaminophen, 650 mg, Per NG tube, Q4H PRN    acetylcysteine 100 mg/ml (10%), 4 mL, Nebulization, TID PRN    bisacodyL, 10 mg, Rectal, Daily PRN    guaiFENesin 100 mg/5 ml, 200 mg, Per NG tube, Q4H PRN    LIDOcaine, , , Continuous PRN    metoprolol, 5 mg, Intravenous, Q6H PRN    morphine, 2 mg, Intravenous, Q4H PRN    ondansetron, 8 mg, Per NG tube, Q8H PRN    sodium chloride 0.9%, 10 mL, Intravenous, PRN    sodium chloride 0.9%, 10 mL, Intravenous, PRN    Flushing PICC/Midline Protocol, , , Until Discontinued **AND** sodium chloride 0.9%, 10 mL, Intravenous, Q12H PRN    sodium chloride 0.9%, 10 mL, Intravenous, PRN     No family history on file.       Review of Systems   Neurological:  Positive for weakness.            Objective:   BP (!) 136/91   Pulse 80   Temp 97.9 °F (36.6 °C) (Oral)   Resp (!) 23   Ht 6' 0.01" (1.829 m)   Wt 115 kg (253 lb 8.5 oz)   SpO2 99%   BMI 34.38 kg/m²      Physical Exam   Constitutional: He appears ill.   HENT:   Head: Normocephalic and atraumatic.   Mouth/Throat: Mucous membranes are dry.   Cardiovascular:   Paced; edema to all extremities   Neurological: He is alert.            Review of Symptoms      Symptom Assessment (ESAS 0-10 Scale)  Pain:  0  Dyspnea:  0  Anxiety:  0  Nausea:  0  Depression:  0  Anorexia:  0  Fatigue:  0  Insomnia:  0  Restlessness:  0  Agitation:  0         Living Arrangements:  Lives " alone    Psychosocial/Cultural:   See Palliative Psychosocial Note: Yes  **Primary  to Follow**  Palliative Care  Consult: No      Advance Care Planning   Advance Directives:     Decision Making:  Patient answered questions  Goals of Care: What is most important right now is to focus on curative/life-prolongation (regardless of treatment burdens). Accordingly, we have decided that the best plan to meet the patient's goals includes continuing with treatment.          PAINAD: NA    Caregiver burden formerly assessed: Yes      No results displayed because visit has over 200 results.               > 50% of 35 min of encounter was spent in chart review, face to face discussion of goals of care, symptom assessment, coordination of care and emotional support.    Latricia Tobin, P  Palliative Medicine  [facility]         [1]   Current Facility-Administered Medications:     acetaminophen tablet 650 mg, 650 mg, Per NG tube, Q4H PRN, Bar Peck MD    acetylcysteine 100 mg/ml (10%) solution 4 mL, 4 mL, Nebulization, TID PRN, Peace Mott MD    amiodarone tablet 200 mg, 200 mg, Per NG tube, BID, Bar Peck MD    bisacodyL suppository 10 mg, 10 mg, Rectal, Daily PRN, Peace Mott MD    famotidine tablet 20 mg, 20 mg, Per NG tube, BID, Bar Peck MD    [START ON 6/3/2025] finasteride tablet 5 mg, 5 mg, Per NG tube, Daily, Bar Peck MD    furosemide injection 40 mg, 40 mg, Intravenous, BID WM, Jhon Ramsey, AGAP-BC, 40 mg at 06/02/25 1128    guaiFENesin 100 mg/5 ml syrup 200 mg, 200 mg, Per NG tube, Q4H PRN, Misha Johansen DO    heparin (porcine) injection 5,000 Units, 5,000 Units, Subcutaneous, Q12H, Oleksandr Cochran MD, 5,000 Units at 06/02/25 1131    LIDOcaine 2000 mg in D5W 250 mL infusion, , , Continuous PRN, Lalo Dominguez MD, Last Rate: 7.5 mL/hr at 05/10/25 1935, Rate Verify at 05/10/25 1935    methocarbamoL tablet 500 mg, 500  mg, Oral, Once, Shawn Olivas MD    metoprolol injection 5 mg, 5 mg, Intravenous, Q6H PRN, Amelia Gallardo FNP, 5 mg at 05/28/25 0438    metoprolol tartrate (LOPRESSOR) tablet 50 mg, 50 mg, Per NG tube, TID, Ana Hamilton, NP, 50 mg at 06/02/25 1129    mexiletine capsule 200 mg, 200 mg, Per NG tube, Q8H, Bar Peck MD    miconazole NITRATE 2 % top powder, , Topical (Top), BID, Asad Randle MD, Given at 06/02/25 1130    morphine injection 1 mg, 1 mg, Intravenous, Once, Devaughn Magdaleno MD    morphine injection 2 mg, 2 mg, Intravenous, Q4H PRN, Devaughn Magdaleno MD    neomycin-bacitracin-polymyxin ointment, , Topical (Top), TID, Jhon Ramsey, [facility], Given at 06/02/25 1130    NORepinephrine 8 mg in dextrose 5% 250 mL infusion, 0-1 mcg/kg/min, Intravenous, Continuous, Oleksandr Cochran MD, Stopped at 05/31/25 1200    ondansetron disintegrating tablet 8 mg, 8 mg, Per NG tube, Q8H PRN, Bar Peck MD    polyethylene glycol packet 17 g, 17 g, Per NG tube, BID, Peace Mott MD, 17 g at 06/02/25 1130    [START ON 6/3/2025] pravastatin tablet 40 mg, 40 mg, Per NG tube, Daily, Bar Peck MD    sacubitriL-valsartan 24-26 mg per tablet 1 tablet, 1 tablet, Nasogastric, BID, Bar Peck MD    [START ON 6/3/2025] senna-docusate 8.6-50 mg per tablet 1 tablet, 1 tablet, Per NG tube, Daily, Bar Peck MD    sodium chloride 0.9% flush 10 mL, 10 mL, Intravenous, PRN, Misha Johansen DO    sodium chloride 0.9% flush 10 mL, 10 mL, Intravenous, PRN, Jhon Ramsey, AGACNP-BC    Flushing PICC/Midline Protocol, , , Until Discontinued **AND** sodium chloride 0.9% flush 10 mL, 10 mL, Intravenous, Q12H PRN, Isac Samayoa MD    sodium chloride 0.9% flush 10 mL, 10 mL, Intravenous, PRN, Isac Samayoa MD    sodium chloride 3% nebulizer solution 4 mL, 4 mL, Nebulization, Q8H, Oleksandr Cochran MD, 4 mL at 06/02/25 0918    sulfamethoxazole-trimethoprim 800-160mg per  tablet 2 tablet, 2 tablet, Per NG tube, BID, Bra Peck MD
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
## 2025-06-02 NOTE — PT/OT/SLP RE-EVAL
"Occupational Therapy   Re-evaluation    Name: Alcides Rosario  MRN: [id number]  Recent Surgery: Procedure(s) (LRB):  Ablation VT (N/A)  EP - diagnostic 3 Days Post-Op    Recommendations:     Discharge therapy intensity: Moderate Intensity Therapy   Discharge Equipment Recommendations:  to be determined by next level of care  Barriers to discharge:       Assessment:     Alcides Rosario is a 79 y.o. male with a medical diagnosis of acute on chronic systolic CHF with EF 20-25%, persistent vtach storm s/p ICD placement 5/16 and epicardial ablation 5/20, acute resp failure, ESBL pneumoniae, NSTEMI, hx PAD.  He presents awake, cleared and re-consulted today by cardiology team.  Max x2 to transition to EOB, low endurance noted. Unable to clear buttock for standing. Pt planning on going live with his wife at [facility].  Pt would benefit from mod intensity upon arrival to [facility].  He presents with the following performance deficits affecting function: weakness, impaired endurance, impaired self care skills, impaired functional mobility, impaired balance, decreased upper extremity function, decreased lower extremity function.    Rehab Prognosis: Good; patient would benefit from acute skilled OT services to address these deficits and reach maximum level of function.       Plan:     Patient to be seen 5 x/week to address the above listed problems via self-care/home management, therapeutic activities, therapeutic exercises  Plan of Care Expires: 07/02/25  Plan of Care Reviewed with: patient    Subjective     Chief Complaint: "I don't like the wedge or the boots"   Patient/Family Comments/goals: "going live at [facility] when I leave here"    Pain/Comfort:  Pain Rating 1: 0/10    Patients cultural, spiritual, Scientology conflicts given the current situation:      Objective:     OT communicated with RN prior to session.      Patient was found HOB elevated with  (jennifer, vital monitoring, NG, 2L NC) upon OT entry to room.    General " Precautions: Standard, fall  Orthopedic Precautions: N/A  Braces: N/A    Vital Signs: 153/87 HR 80    Functional Mobility/Transfers:  Bed mobility:    Rolling Left:  maximal assistance  Rolling Right: maximal assistance  Supine to Sit: maxx2  Sit to Supine: maxx2  Transfers: unable to clear buttock for sit<>stand    Activities of Daily Living:  Feeding:  NG NPO  Lower Body Dressing: maximal assistance    Toileting: maximal assistance +BM, max A hygiene and max A rolling    AMPAC 6 Click ADL:  AMPAC Total Score: 11    Functional Cognition:  Intact    Visual Perceptual Skills:  Intact    Upper Extremity Function:  Right Upper Extremity:   3-/5 shoulder flexion  Distal 3+/5  Left Upper Extremity:  3-/5 shoulder flexion  Distal 3+/5  Balance:   SBA with BUE support, min A with 1 UE support, low endurance    Therapeutic Positioning  Risk for acquired pressure injuries is significantly increased due to relative decrease in mobility d/t hospitalization , impaired mobility, and severity of deficits resulting in prolonged immobility .    OT interventions performed during the course of today's session in an effort to prevent and/or reduce acquired pressure injuries:   Therapeutic positioning was provided at the conclusion of session to offload all bony prominences for the prevention and/or reduction of pressure injuries    Skin assessment: all bony prominences were assessed    Findings: redness on buttock but blanchable, heels clear    OT recommendations for therapeutic positioning throughout hospitalization:   Follow [facility] Pressure Injury Prevention Protocol  Geomat recommended for sacral protection while [facility]  Specialty Mattress-specialty mattress to be obtained on downgrade      Patient Education:  Patient provided with verbal education education regarding OT role/goals/POC, post op precautions, fall prevention, safety awareness, Discharge/DME recommendations, and pressure ulcer prevention.  Understanding was verbalized,  however additional teaching warranted.     Patient left R sidelying with PUP applied.  Pt resistive to PUP placement, RN left encouraging him.    GOALS:   Multidisciplinary Problems       Occupational Therapy Goals          Problem: Occupational Therapy    Goal Priority Disciplines Outcome Interventions   Occupational Therapy Goal     OT, PT/OT Progressing    Description: Goals to be met by: 7/2/25    Patient will increase functional independence with ADLs by performing:    UE Dressing with Minimal Assistance.  LE Dressing with Mod Assistance.  Toileting from toilet/BSC with Minimal Assistance for hygiene and clothing management.   Toilet transfer to toilet with Minimal Assistance.  Increased functional strength to 4/5 through therEX.                         History:     Past Medical History:   Diagnosis Date    Atrial fibrillation     HTN (hypertension)     Peripheral vascular disease, unspecified          Past Surgical History:   Procedure Laterality Date    ABLATION N/A 5/16/2025    Procedure: Ablation;  Surgeon: Isac Samayoa MD;  Location: [facility] CATH LAB;  Service: Cardiology;  Laterality: N/A;  VT ABLATION W/ ANEST.    CARDIAC DEFIBRILLATOR PLACEMENT N/A 5/16/2025    Procedure: Insertion, ICD;  Surgeon: Isac Samayoa MD;  Location: [facility] CATH LAB;  Service: Cardiology;  Laterality: N/A;    CARDIAC ELECTROPHYSIOLOGY STUDY N/A 5/21/2025    Procedure: Study possible ablation;  Surgeon: Isac Samayoa MD;  Location: [facility] CATH LAB;  Service: Cardiology;  Laterality: N/A;    IMPELLA, REMOVAL Left 5/13/2025    Procedure: Impella, Removal;  Surgeon: Asad Randle MD;  Location: [facility] CATH LAB;  Service: Cardiology;  Laterality: Left;    INSERTION OF PACEMAKER N/A 3/31/2023    Procedure: INSERTION, PACEMAKER;  Surgeon: Joaquin Bravo MD;  Location: [facility] CATH LAB;  Service: Cardiology;  Laterality: N/A;  single chamber PPM    LEFT HEART CATHETERIZATION Left 5/9/2025    Procedure: Left heart cath;  Surgeon: Alberto  Lalo ROSENBAUM MD;  Location: [facility] CATH LAB;  Service: Cardiology;  Laterality: Left;    RIGHT HEART CATHETERIZATION Right 5/9/2025    Procedure: INSERTION, CATHETER, RIGHT HEART;  Surgeon: Lalo Dominguez MD;  Location: [facility] CATH LAB;  Service: Cardiology;  Laterality: Right;       Time Tracking:     OT Date of Treatment:    OT Start Time: 1108  OT Stop Time: 1138  OT Total Time (min): 30 min    Billable Minutes:Re-eval 1    6/2/2025

## 2025-06-02 NOTE — PLAN OF CARE
Problem: Physical Therapy  Goal: Physical Therapy Goal  Description: Goals to be met by: 2025     Patient will increase functional independence with mobility by performin. Supine to sit with MInimal Assistance  2. Sit to stand transfer with Minimal Assistance  3. Gait  x 50 feet with Minimal Assistance using Rolling Walker.   4. Sitting at edge of bed x5 minutes with Supervision    2025 1327 by Richelle Malik, PT  Outcome: Progressing  2025 1327 by Richelle Malik, PT  Outcome: Progressing

## 2025-06-02 NOTE — PLAN OF CARE
Problem: Adult Inpatient Plan of Care  Goal: Plan of Care Review  Outcome: Progressing  Goal: Patient-Specific Goal (Individualized)  Outcome: Progressing  Goal: Absence of Hospital-Acquired Illness or Injury  Outcome: Progressing  Goal: Optimal Comfort and Wellbeing  Outcome: Progressing  Goal: Readiness for Transition of Care  Outcome: Progressing     Problem: Fall Injury Risk  Goal: Absence of Fall and Fall-Related Injury  Outcome: Progressing     Problem: Skin Injury Risk Increased  Goal: Skin Health and Integrity  Outcome: Progressing     Problem: Wound  Goal: Optimal Coping  Outcome: Progressing  Goal: Optimal Functional Ability  Outcome: Progressing  Goal: Absence of Infection Signs and Symptoms  Outcome: Progressing  Goal: Improved Oral Intake  Outcome: Progressing  Goal: Optimal Pain Control and Function  Outcome: Progressing  Goal: Skin Health and Integrity  Outcome: Progressing  Goal: Optimal Wound Healing  Outcome: Progressing     Problem: Fatigue  Goal: Improved Activity Tolerance  Outcome: Progressing     Problem: Coping Ineffective  Goal: Effective Coping  Outcome: Progressing

## 2025-06-02 NOTE — PLAN OF CARE
Problem: Occupational Therapy  Goal: Occupational Therapy Goal  Description: Goals to be met by: 7/2/25    Patient will increase functional independence with ADLs by performing:    UE Dressing with Minimal Assistance.  LE Dressing with Mod Assistance.  Toileting from toilet/BSC with Minimal Assistance for hygiene and clothing management.   Toilet transfer to toilet with Minimal Assistance.  Increased functional strength to 4/5 through therEX.    Outcome: Progressing

## 2025-06-03 LAB
ALBUMIN SERPL-MCNC: 2.1 G/DL (ref 3.4–4.8)
ALBUMIN/GLOB SERPL: 0.6 RATIO (ref 1.1–2)
ALP SERPL-CCNC: 179 UNIT/L (ref 40–150)
ALT SERPL-CCNC: 549 UNIT/L (ref 0–55)
ANION GAP SERPL CALC-SCNC: 8 MEQ/L
AST SERPL-CCNC: 167 UNIT/L (ref 11–45)
BACTERIA SPT CULT: ABNORMAL
BASOPHILS # BLD AUTO: 0.03 X10(3)/MCL
BASOPHILS NFR BLD AUTO: 0.3 %
BILIRUB SERPL-MCNC: 0.8 MG/DL
BUN SERPL-MCNC: 28.8 MG/DL (ref 8.4–25.7)
CALCIUM SERPL-MCNC: 8.2 MG/DL (ref 8.8–10)
CHLORIDE SERPL-SCNC: 107 MMOL/L (ref 98–107)
CO2 SERPL-SCNC: 28 MMOL/L (ref 23–31)
CREAT SERPL-MCNC: 0.85 MG/DL (ref 0.72–1.25)
CREAT/UREA NIT SERPL: 34
EOSINOPHIL # BLD AUTO: 0.43 X10(3)/MCL (ref 0–0.9)
EOSINOPHIL NFR BLD AUTO: 4.3 %
ERYTHROCYTE [DISTWIDTH] IN BLOOD BY AUTOMATED COUNT: 16.8 % (ref 11.5–17)
GFR SERPLBLD CREATININE-BSD FMLA CKD-EPI: >60 ML/MIN/1.73/M2
GLOBULIN SER-MCNC: 3.4 GM/DL (ref 2.4–3.5)
GLUCOSE SERPL-MCNC: 121 MG/DL (ref 82–115)
GRAM STN SPEC: ABNORMAL
HCT VFR BLD AUTO: 35.9 % (ref 42–52)
HGB BLD-MCNC: 10.8 G/DL (ref 14–18)
IMM GRANULOCYTES # BLD AUTO: 0.08 X10(3)/MCL (ref 0–0.04)
IMM GRANULOCYTES NFR BLD AUTO: 0.8 %
LYMPHOCYTES # BLD AUTO: 0.75 X10(3)/MCL (ref 0.6–4.6)
LYMPHOCYTES NFR BLD AUTO: 7.6 %
MAGNESIUM SERPL-MCNC: 1.9 MG/DL (ref 1.6–2.6)
MCH RBC QN AUTO: 28.8 PG (ref 27–31)
MCHC RBC AUTO-ENTMCNC: 30.1 G/DL (ref 33–36)
MCV RBC AUTO: 95.7 FL (ref 80–94)
MONOCYTES # BLD AUTO: 1.38 X10(3)/MCL (ref 0.1–1.3)
MONOCYTES NFR BLD AUTO: 14 %
NEUTROPHILS # BLD AUTO: 7.22 X10(3)/MCL (ref 2.1–9.2)
NEUTROPHILS NFR BLD AUTO: 73 %
NRBC BLD AUTO-RTO: 0.3 %
PHOSPHATE SERPL-MCNC: 2.5 MG/DL (ref 2.3–4.7)
PLATELET # BLD AUTO: 192 X10(3)/MCL (ref 130–400)
PMV BLD AUTO: 12 FL (ref 7.4–10.4)
POC ACTIVATED CLOTTING TIME K: 135 SEC (ref 74–137)
POC ACTIVATED CLOTTING TIME K: 135 SEC (ref 74–137)
POC ACTIVATED CLOTTING TIME K: 141 SEC (ref 74–137)
POC ACTIVATED CLOTTING TIME K: 147 SEC (ref 74–137)
POC ACTIVATED CLOTTING TIME K: 153 SEC (ref 74–137)
POC ACTIVATED CLOTTING TIME K: 153 SEC (ref 74–137)
POC ACTIVATED CLOTTING TIME K: 158 SEC (ref 74–137)
POC ACTIVATED CLOTTING TIME K: 164 SEC (ref 74–137)
POC ACTIVATED CLOTTING TIME K: 170 SEC (ref 74–137)
POC ACTIVATED CLOTTING TIME K: 176 SEC (ref 74–137)
POC ACTIVATED CLOTTING TIME K: 181 SEC (ref 74–137)
POC ACTIVATED CLOTTING TIME K: 182 SEC (ref 74–137)
POC ACTIVATED CLOTTING TIME K: 182 SEC (ref 74–137)
POC ACTIVATED CLOTTING TIME K: 187 SEC (ref 74–137)
POTASSIUM SERPL-SCNC: 4.4 MMOL/L (ref 3.5–5.1)
PROT SERPL-MCNC: 5.5 GM/DL (ref 5.8–7.6)
RBC # BLD AUTO: 3.75 X10(6)/MCL (ref 4.7–6.1)
SAMPLE: ABNORMAL
SAMPLE: NORMAL
SAMPLE: NORMAL
SODIUM SERPL-SCNC: 143 MMOL/L (ref 136–145)
WBC # BLD AUTO: 9.89 X10(3)/MCL (ref 4.5–11.5)

## 2025-06-03 PROCEDURE — 94640 AIRWAY INHALATION TREATMENT: CPT

## 2025-06-03 PROCEDURE — 97110 THERAPEUTIC EXERCISES: CPT | Mod: CQ

## 2025-06-03 PROCEDURE — 80053 COMPREHEN METABOLIC PANEL: CPT

## 2025-06-03 PROCEDURE — 99900035 HC TECH TIME PER 15 MIN (STAT)

## 2025-06-03 PROCEDURE — 63600175 PHARM REV CODE 636 W HCPCS: Performed by: INTERNAL MEDICINE

## 2025-06-03 PROCEDURE — 83735 ASSAY OF MAGNESIUM: CPT

## 2025-06-03 PROCEDURE — 36415 COLL VENOUS BLD VENIPUNCTURE: CPT

## 2025-06-03 PROCEDURE — 25000242 PHARM REV CODE 250 ALT 637 W/ HCPCS: Performed by: INTERNAL MEDICINE

## 2025-06-03 PROCEDURE — 84100 ASSAY OF PHOSPHORUS: CPT

## 2025-06-03 PROCEDURE — 21400001 HC TELEMETRY ROOM

## 2025-06-03 PROCEDURE — 25000003 PHARM REV CODE 250: Performed by: NURSE PRACTITIONER

## 2025-06-03 PROCEDURE — 99900031 HC PATIENT EDUCATION (STAT)

## 2025-06-03 PROCEDURE — 85025 COMPLETE CBC W/AUTO DIFF WBC: CPT

## 2025-06-03 PROCEDURE — 94760 N-INVAS EAR/PLS OXIMETRY 1: CPT

## 2025-06-03 PROCEDURE — 92611 MOTION FLUOROSCOPY/SWALLOW: CPT

## 2025-06-03 PROCEDURE — 25000003 PHARM REV CODE 250: Performed by: INTERNAL MEDICINE

## 2025-06-03 PROCEDURE — 97535 SELF CARE MNGMENT TRAINING: CPT

## 2025-06-03 PROCEDURE — 92610 EVALUATE SWALLOWING FUNCTION: CPT

## 2025-06-03 PROCEDURE — 63600175 PHARM REV CODE 636 W HCPCS: Performed by: NURSE PRACTITIONER

## 2025-06-03 PROCEDURE — 97530 THERAPEUTIC ACTIVITIES: CPT | Mod: CQ

## 2025-06-03 PROCEDURE — 92526 ORAL FUNCTION THERAPY: CPT

## 2025-06-03 PROCEDURE — 94761 N-INVAS EAR/PLS OXIMETRY MLT: CPT

## 2025-06-03 PROCEDURE — 27000207 HC ISOLATION

## 2025-06-03 RX ADMIN — MICONAZOLE NITRATE: 20 POWDER TOPICAL at 09:06

## 2025-06-03 RX ADMIN — AMIODARONE HYDROCHLORIDE 200 MG: 200 TABLET ORAL at 08:06

## 2025-06-03 RX ADMIN — SULFAMETHOXAZOLE AND TRIMETHOPRIM 2 TABLET: 800; 160 TABLET ORAL at 08:06

## 2025-06-03 RX ADMIN — MEXILETINE HYDROCHLORIDE 200 MG: 200 CAPSULE ORAL at 05:06

## 2025-06-03 RX ADMIN — HEPARIN SODIUM 5000 UNITS: 5000 INJECTION, SOLUTION INTRAVENOUS; SUBCUTANEOUS at 08:06

## 2025-06-03 RX ADMIN — MEXILETINE HYDROCHLORIDE 200 MG: 200 CAPSULE ORAL at 08:06

## 2025-06-03 RX ADMIN — METOPROLOL TARTRATE 50 MG: 50 TABLET, FILM COATED ORAL at 03:06

## 2025-06-03 RX ADMIN — SENNOSIDES AND DOCUSATE SODIUM 1 TABLET: 50; 8.6 TABLET ORAL at 09:06

## 2025-06-03 RX ADMIN — PRAVASTATIN SODIUM 40 MG: 40 TABLET ORAL at 09:06

## 2025-06-03 RX ADMIN — MEXILETINE HYDROCHLORIDE 200 MG: 200 CAPSULE ORAL at 03:06

## 2025-06-03 RX ADMIN — Medication 4 ML: at 03:06

## 2025-06-03 RX ADMIN — FAMOTIDINE 20 MG: 20 TABLET, FILM COATED ORAL at 08:06

## 2025-06-03 RX ADMIN — SACUBITRIL AND VALSARTAN 1 TABLET: 24; 26 TABLET, FILM COATED ORAL at 09:06

## 2025-06-03 RX ADMIN — FINASTERIDE 5 MG: 5 TABLET, FILM COATED ORAL at 09:06

## 2025-06-03 RX ADMIN — FUROSEMIDE 40 MG: 10 INJECTION, SOLUTION INTRAVENOUS at 04:06

## 2025-06-03 RX ADMIN — Medication 4 ML: at 12:06

## 2025-06-03 RX ADMIN — SULFAMETHOXAZOLE AND TRIMETHOPRIM 2 TABLET: 800; 160 TABLET ORAL at 09:06

## 2025-06-03 RX ADMIN — BACITRACIN ZINC, NEOMYCIN, POLYMYXIN B: 400; 3.5; 5 OINTMENT TOPICAL at 09:06

## 2025-06-03 RX ADMIN — HEPARIN SODIUM 5000 UNITS: 5000 INJECTION, SOLUTION INTRAVENOUS; SUBCUTANEOUS at 09:06

## 2025-06-03 RX ADMIN — BACITRACIN ZINC, NEOMYCIN, POLYMYXIN B: 400; 3.5; 5 OINTMENT TOPICAL at 03:06

## 2025-06-03 RX ADMIN — METOPROLOL TARTRATE 50 MG: 50 TABLET, FILM COATED ORAL at 09:06

## 2025-06-03 RX ADMIN — AMIODARONE HYDROCHLORIDE 200 MG: 200 TABLET ORAL at 09:06

## 2025-06-03 RX ADMIN — FUROSEMIDE 40 MG: 10 INJECTION, SOLUTION INTRAVENOUS at 09:06

## 2025-06-03 RX ADMIN — SACUBITRIL AND VALSARTAN 1 TABLET: 24; 26 TABLET, FILM COATED ORAL at 08:06

## 2025-06-03 RX ADMIN — Medication 4 ML: at 08:06

## 2025-06-03 RX ADMIN — METOPROLOL TARTRATE 50 MG: 50 TABLET, FILM COATED ORAL at 08:06

## 2025-06-03 RX ADMIN — FAMOTIDINE 20 MG: 20 TABLET, FILM COATED ORAL at 09:06

## 2025-06-03 NOTE — NURSING
[facility] 6th Floor Medical Telemetry  Wound Care    Patient Name:  Alcides Rosario   MRN:  [id number]  Date: 6/3/2025  Diagnosis: <principal problem not specified>    History:     Past Medical History:   Diagnosis Date    Atrial fibrillation     HTN (hypertension)     Peripheral vascular disease, unspecified        Social History[1]    Precautions:     Allergies as of 05/08/2025    (No Known Allergies)       WOC Assessment Details/Treatment      06/03/25 1000        Wound 05/14/25 2115 Rash Right Abdomen   Date First Assessed/Time First Assessed: 05/14/25 2115   Present on Original Admission: No  Primary Wound Type: Rash  Side: Right  Location: Abdomen   Wound Image   (clear)        Wound 06/03/25 1000 Other (comment) Left lower;medial Chest   Date First Assessed/Time First Assessed: 06/03/25 1000   Present on Original Admission: No  Primary Wound Type: (c) Other (comment)  Side: Left  Orientation: lower;medial  Location: Chest   Wound Image   (old drain site-single suture)   Dressing Appearance Intact;Moist drainage   Drainage Amount Scant   Drainage Characteristics/Odor Serous;No odor   Appearance Red;Moist  (single suture)   Periwound Area Redness   Wound Edges Irregular   Wound Length (cm) 0.1 cm   Wound Width (cm) 0.6 cm   Wound Surface Area (cm^2) 0.05 cm^2   Care Cleansed with:;Wound cleanser   Dressing   (triple antibiotic oint and folded 4x4 sponge-medipore tape)     Wocn [id number] awake alert oriented.   Remains on low airloss support .  Case mgnt working on placement.    Redness and rash to abd folds and groin resolved.    He does have and old drain site to left chest lower medial area with scant serous drainage and with a single black suture noted.  Noted triple antibitotic oint order from yesterday in place.        06/03/2025         [1]   Social History  Socioeconomic History    Marital status:    Tobacco Use    Smoking status: Former     Types: Cigarettes     Passive exposure:  Never    Smokeless tobacco: Never   Substance and Sexual Activity    Alcohol use: Never    Drug use: Never    Sexual activity: Never     Social Drivers of Health     Financial Resource Strain: Low Risk  (5/12/2025)    Overall Financial Resource Strain (CARDIA)     Difficulty of Paying Living Expenses: Not hard at all   Food Insecurity: No Food Insecurity (5/12/2025)    Hunger Vital Sign     Worried About Running Out of Food in the Last Year: Never true     Ran Out of Food in the Last Year: Never true   Transportation Needs: No Transportation Needs (5/12/2025)    PRAPARE - Transportation     Lack of Transportation (Medical): No     Lack of Transportation (Non-Medical): No   Recent Concern: Transportation Needs - High Risk (3/16/2025)    Received from [facility] SDOH Screening     Has lack of transportation kept you from medical appointments, meetings, work or from getting things needed for daily living? choose all that apply.: Yes, it has kept me from non-medical meetings, appointments, work or from getting things that i need     Has lack of transportation kept you from medical appointments, meetings, work or from getting things needed for daily living? choose all that apply.: Yes, it has kept me from medical appointments or from getting my medications   Physical Activity: Inactive (4/1/2025)    Exercise Vital Sign     Days of Exercise per Week: 0 days     Minutes of Exercise per Session: 10 min   Stress: No Stress Concern Present (5/8/2025)    [employer] - Occupational Stress Questionnaire     Feeling of Stress : Not at all   Housing Stability: Low Risk  (5/12/2025)    Housing Stability Vital Sign     Unable to Pay for Housing in the Last Year: No     Number of Times Moved in the Last Year: 0     Homeless in the Last Year: No

## 2025-06-03 NOTE — PT/OT/SLP EVAL
[facility]  Speech Language Pathology Department  Clinical Swallow Evaluation    Patient Name:  Alcides Rosario   MRN:  [id number]    Recommendations     General recommendations:  Modified Barium Swallow Study  Solid texture recommendation:  NPO  Liquid consistency recommendation: NPO   Medications: NPO  Precautions: aspiration    History     Alcides Rosario is a/n 79 y.o. male admitted with vtach.  MBS completed by this clinician this admit on 5/23/25. Recommendations were NPO, however was ok for meds in puree ONLY. Unfortunately, patient with continued decline and worsening secretion management and resulted in strict NPO status. Pt with transfer to ICU for ablation with intubation, SLP reconsulted after procedure and extubation.    Past Medical History:   Diagnosis Date    Atrial fibrillation     HTN (hypertension)     Peripheral vascular disease, unspecified      Past Surgical History:   Procedure Laterality Date    ABLATION N/A 5/16/2025    Procedure: Ablation;  Surgeon: Isac Samayoa MD;  Location: [facility] CATH LAB;  Service: Cardiology;  Laterality: N/A;  VT ABLATION W/ ANEST.    CARDIAC DEFIBRILLATOR PLACEMENT N/A 5/16/2025    Procedure: Insertion, ICD;  Surgeon: Isac Samayoa MD;  Location: [facility] CATH LAB;  Service: Cardiology;  Laterality: N/A;    CARDIAC ELECTROPHYSIOLOGY STUDY N/A 5/21/2025    Procedure: Study possible ablation;  Surgeon: Isac Samayoa MD;  Location: [facility] CATH LAB;  Service: Cardiology;  Laterality: N/A;    IMPELLA, REMOVAL Left 5/13/2025    Procedure: Impella, Removal;  Surgeon: Asad Randle MD;  Location: [facility] CATH LAB;  Service: Cardiology;  Laterality: Left;    INSERTION OF PACEMAKER N/A 3/31/2023    Procedure: INSERTION, PACEMAKER;  Surgeon: Joaquin Bravo MD;  Location: [facility] CATH LAB;  Service: Cardiology;  Laterality: N/A;  single chamber PPM    LEFT HEART CATHETERIZATION Left 5/9/2025    Procedure: Left heart cath;  Surgeon: Lalo Dominguez MD;   "Location: [facility] CATH LAB;  Service: Cardiology;  Laterality: Left;    RIGHT HEART CATHETERIZATION Right 5/9/2025    Procedure: INSERTION, CATHETER, RIGHT HEART;  Surgeon: Lalo Dominguez MD;  Location: [facility] CATH LAB;  Service: Cardiology;  Laterality: Right;     Prior Intubation HX:  multiple times this admit, most recently intubated 5/30 and extubated 5/31    Current method of nutrition: Tube feeding (via ng tube)    Patient complaint: "I want to be with my wife."    Imaging   Results for orders placed during the hospital encounter of 05/08/25    X-Ray Chest 1 View    Narrative  EXAMINATION:  XR CHEST 1 VIEW    CLINICAL HISTORY:  Ventricular tachycardia.F/u current process;    COMPARISON:  Yesterday    FINDINGS:  Frontal view of the chest was obtained. Enteric tube extends into the stomach.  Heart and mediastinum unchanged.  Similar appearance of the lungs with no new focal consolidation or pneumothorax.    Impression  Little interval change.      Electronically signed by: Javon Porter  Date:    05/29/2025  Time:    07:05    No results found for this or any previous visit.    No results found for this or any previous visit.    Subjective     Patient awake, alert, calm, and cooperative.    Patient goals: "I want to be with my wife."   Spiritual/Cultural/Sikh Beliefs/Practices that affect care: no    Pain/Comfort: Pain Rating 1: 0/10    Objective     ORAL MUSCULATURE  Dentition: edentulous  Mucosal Quality: good  Facial Movement: general weakness  Buccal Strength & Mobility: impaired  Mandibular Strength & Mobility: impaired  Oral Labial Strength & Mobility: WFL  Lingual Strength & Mobility: WFL  Vocal Quality: weak  Volitional Cough: Nonproductive    Oral care completed.    PO TRIALS  Consistency Fed By Oral Symptoms Pharyngeal Symptoms   Ice chips SLP Bolus holding Reduced laryngeal movement to palpation  Cough after swallow     Assessment     Pt NPO prior to recent medical decline.  Pallaitive consult noted. "  He is awake and alert, swallow response present with ice chips with subsequent signs of aspiration.   SLP to proceed with MBS to reassess swallow function.    Outcome Measures     Functional Oral Intake Scale: 1 - Nothing by mouth    Goals     Multidisciplinary Problems       SLP Goals          Problem: SLP    Goal Priority Disciplines Outcome   SLP Goal     SLP Progressing   Description: LTG: Pt will tolerate least restrictive PO diet with no clinical signs/sx aspiration    STGs:  1.  Pt will tolerate pureed solids with adequate bolus formation/transport and no clinical signs/sx aspiration  2.  Pt will tolerate ice chips with no clinical signs/sx aspiration  3.  Pt will complete laryngeal strengthening exercises and luz with minimal cues  4.  Pt will tolerate thermal stimulation to the anterior faucial pillars x10 with 100% effortful swallows and delay less than 2 seconds                       Education     Patient provided with verbal education regarding SLP POC.  Understanding was verbalized.    Plan     SLP Follow-Up:  Yes   Patient to be seen:  daily   Plan of Care expires:  05/30/25  Plan of Care reviewed with:  patient     Time Tracking     SLP Treatment Date:   06/03/25  Speech Start Time:  0930  Speech Stop Time:  0950     Speech Total Time (min):  20 min    Billable minutes:  Swallow and Oral Function Evaluation, 20 minutes     06/03/2025

## 2025-06-03 NOTE — PT/OT/SLP PROGRESS
Physical Therapy Treatment    Patient Name:  Alcides Rosario   MRN:  [id number]    Recommendations:     Discharge therapy intensity: Moderate Intensity Therapy   Discharge Equipment Recommendations: to be determined by next level of care  Barriers to discharge: Impaired mobility and Ongoing medical needs    Assessment:     Alcides Rosario is a 79 y.o. male admitted with a medical diagnosis of  acute on chronic systolic CHF with EF 20-25%, persistent vtach storm s/p ICD placement 5/16 and epicardial ablation 5/20, acute resp failure, ESBL pneumoniae, NSTEMI, hx PAD.  He presents awake, cleared and re-consulted today by cardiology team.  He presents with the following impairments/functional limitations: weakness, impaired endurance, impaired functional mobility, impaired balance, decreased lower extremity function, edema     Pt awake and agreeable to PT tx today. Sat on EOB maxAx2 and req mod A for sitting balance throughout session today along with constant vcs for anterior weight shifting while seated. Pt then attempted x4 STS from EOB max Ax2 for all with minimal clearance from bed and unable to stand for >5sec. While seated EOB it was noted that NG tube had been displaced, nsg notified and will await MBSS. Pt back supine max Ax2 and positioned for comfort, declined use of pressure relief boots and wedge. Education given on importance of pressure relief but pt still refusing.     Rehab Prognosis: Good; patient would benefit from acute skilled PT services to address these deficits and reach maximum level of function.    Recent Surgery: Procedure(s) (LRB):  Ablation VT (N/A)  EP - diagnostic 4 Days Post-Op    Plan:     During this hospitalization, patient would benefit from acute PT services 5 x/week to address the identified rehab impairments via gait training, therapeutic activities, therapeutic exercises, neuromuscular re-education and progress toward the following goals:    Plan of Care Expires:  07/02/25    Subjective  "    Chief Complaint: "I just want to go be with my wife, she is probably so confused"   Patient/Family Comments/goals:   Pain/Comfort:  Pain Rating 1: 0/10      Objective:     Communicated with nsg prior to session.  Patient found HOB elevated with NG tube, PureWick, pulse ox (continuous), telemetry upon PT entry to room.     General Precautions: Standard, fall, aspiration  Orthopedic Precautions: N/A  Braces: N/A  Respiratory Status: Room air  Blood Pressure: 128/80mmHg  Skin Integrity: Visible skin intact      Functional Mobility:  Bed Mobility:     Rolling Right: maximal assistance  Supine to Sit: maximal assistance and of 2 persons  Sit to Supine: maximal assistance and of 2 persons  Transfers:     Sit to Stand:  maximal assistance and of 2 persons with rolling walker and minimal clearance from EOB, x4 trials    Education:  Patient provided with verbal education education regarding PT role/goals/POC, fall prevention, safety awareness, and discharge/DME recommendations.  Understanding was verbalized, however additional teaching warranted.     Patient left HOB elevated with all lines intact, call button in reach, and nsg notified    GOALS:   Multidisciplinary Problems       Physical Therapy Goals          Problem: Physical Therapy    Goal Priority Disciplines Outcome Interventions   Physical Therapy Goal     PT, PT/OT Progressing    Description: Goals to be met by: 2025     Patient will increase functional independence with mobility by performin. Supine to sit with MInimal Assistance  2. Sit to stand transfer with Minimal Assistance  3. Gait  x 50 feet with Minimal Assistance using Rolling Walker.   4. Sitting at edge of bed x5 minutes with Supervision                         Time Tracking:     PT Received On: 25  PT Start Time: 1005     PT Stop Time: 1037  PT Total Time (min): 32 min     Billable Minutes: Therapeutic Activity 17 and Therapeutic Exercise 15    Treatment Type: Treatment  PT/PTA: " PTA     Number of PTA visits since last PT visit: 1 06/03/2025

## 2025-06-03 NOTE — PLAN OF CARE
Problem: SLP  Goal: SLP Goal  Description: LTG: Pt will tolerate least restrictive PO diet with no clinical signs/sx aspiration    STGs:  1.  Pt will tolerate pureed solids with adequate bolus formation/transport and no clinical signs/sx aspiration  2.  Pt will tolerate ice chips with no clinical signs/sx aspiration  3.  Pt will complete laryngeal strengthening exercises and luz with minimal cues  4.  Pt will tolerate thermal stimulation to the anterior faucial pillars x10 with 100% effortful swallows and delay less than 2 seconds    Outcome: Progressing     Continue current goals and POC.  Bonnie

## 2025-06-03 NOTE — NURSING
Patient refusing to turn with wedge or use heel boots. Educated patient on risks of not turning/ offloading pressure points. Pt remains adamant
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
## 2025-06-03 NOTE — PT/OT/SLP PROGRESS
[facility]  Speech Language Pathology Department  Dysphagia Therapy Progress Note    Patient Name:  Alcides Rosario   MRN:  [id number]  Admitting Diagnosis: vtach    Recommendations     General recommendations:  dysphagia therapy  Solid texture recommendation:  Puree Diet - IDDSI Level 4  Liquid consistency recommendation: Moderately thick liquids - IDDSI Level 3   Medications: crushed in puree  Aspiration precautions: upright for ALL PO intake, small bites/sips, MUST DOUBLE SWALLOW with each bite/sip    Discharge therapy intensity: Moderate Intensity Therapy   Barriers to safe discharge:  acuity of illness and severity of impairment    Subjective     Patient awake, alert, calm, and cooperative.  Spiritual/Cultural/Scientologist Beliefs/Practices that affect care: no    Pain/Comfort: Pain Rating 1: 0/10    Respiratory Status:  room air      Meal trial being completed after MBS to assess tolerance of pureed solids with moderately thick liquids.    Objective     Therapeutic PO Trials:  Consistency Amount Fed By Oral Symptoms Pharyngeal Symptoms   Moderately thick liquid by cup X4  oz Self Bolus holding Wet vocal quality after swallow  Throat clear x1 swallow (symptoms occurred x1 when patient did NOT double swallow after sip)   Puree X3.5 oz Self Bolus holding None     Assessment     Pt appears to tolerate pureed solids with moderately thick liquids when utilizing small bites/sips and DOUBLE SWALLOWING after each bite/sip.  Patient with x1 occurrence of wet vocal quality and throat clear which occurred when he did complete double swallow after PO presentation.    Discussed extensively with patient, son, and grandson.  Rec: pureed solids, moderately thick liquids, no straws, meds crushed in puree.  Ensure upright posture, small bites/sips, and DOUBLE SWALLOW.  Patient remains very HIGH RISK of aspiration despite diet modifications.  All verbalized understanding to diet recommendations, strategies,  and risk of aspiration.    Outcome Measures     Functional Oral Intake Scale: 5 - Total oral diet with multiple consistencies, by requiring special preparation or compensations    Goals     Multidisciplinary Problems       SLP Goals          Problem: SLP    Goal Priority Disciplines Outcome   SLP Goal     SLP Progressing   Description: LTG: Pt will tolerate least restrictive PO diet with no clinical signs/sx aspiration    STGs:  1.  Pt will tolerate pureed solids with adequate bolus formation/transport and no clinical signs/sx aspiration  2.  Pt will tolerate ice chips with no clinical signs/sx aspiration  3.  Pt will complete laryngeal strengthening exercises and luz with minimal cues  4.  Pt will tolerate thermal stimulation to the anterior faucial pillars x10 with 100% effortful swallows and delay less than 2 seconds                       Patient Education     Patient, son, and grandson provided with verbal education regarding swallow function, risks of aspiration, diet modifications, and SLP POC.  Understanding was verbalized.    Plan     Will continue to follow and tx as appropriate.    SLP Follow-Up:  Yes   Patient to be seen:  5 x/week   Plan of Care expires:  06/17/25  Plan of Care reviewed with:  patient, son, grandchild(danny)       Time Tracking     SLP Treatment Date:   06/03/25  Speech Start Time:  1425  Speech Stop Time:  1450     Speech Total Time (min):  25 min    Billable minutes:  Treatment of Swallow Dysfunction, 15 minutes  Self Care/Home Management, 10 minutes       06/03/2025

## 2025-06-03 NOTE — PROCEDURES
[facility]  Speech Language Pathology Department  Modified Barium Swallow (MBS) Study    Patient Name:  Alcides Rosario   MRN:  [id number]    Recommendations     General recommendations:  dysphagia therapy, meal trial of pureed solids with moderately thick liquids  Solid texture recommendation:  NPO  Liquid consistency recommendation: NPO   Medications: crushed in puree with DOUBLE SWALLOW  General precautions: aspiration    History     Alcides Rosario is a/n 79 y.o. male  admitted with vtach.  MBS completed by this clinician this admit on 5/23/25. Recommendations were NPO, however was ok for meds in puree ONLY. Unfortunately, patient with continued decline and worsening secretion management and resulted in strict NPO status. Pt with transfer to ICU for ablation with intubation, SLP reconsulted after procedure and extubation.    Past Medical History:   Diagnosis Date    Atrial fibrillation     HTN (hypertension)     Peripheral vascular disease, unspecified      Past Surgical History:   Procedure Laterality Date    ABLATION N/A 5/16/2025    Procedure: Ablation;  Surgeon: Isac Samayoa MD;  Location: [facility] CATH LAB;  Service: Cardiology;  Laterality: N/A;  VT ABLATION W/ ANEST.    CARDIAC DEFIBRILLATOR PLACEMENT N/A 5/16/2025    Procedure: Insertion, ICD;  Surgeon: Isac Samayoa MD;  Location: [facility] CATH LAB;  Service: Cardiology;  Laterality: N/A;    CARDIAC ELECTROPHYSIOLOGY STUDY N/A 5/21/2025    Procedure: Study possible ablation;  Surgeon: Isac Samayoa MD;  Location: [facility] CATH LAB;  Service: Cardiology;  Laterality: N/A;    IMPELLA, REMOVAL Left 5/13/2025    Procedure: Impella, Removal;  Surgeon: Asad Randle MD;  Location: [facility] CATH LAB;  Service: Cardiology;  Laterality: Left;    INSERTION OF PACEMAKER N/A 3/31/2023    Procedure: INSERTION, PACEMAKER;  Surgeon: Joaquin Bravo MD;  Location: [facility] CATH LAB;  Service: Cardiology;  Laterality: N/A;  single chamber PPM    LEFT HEART  "CATHETERIZATION Left 5/9/2025    Procedure: Left heart cath;  Surgeon: Lalo Dominguez MD;  Location: [facility] CATH LAB;  Service: Cardiology;  Laterality: Left;    RIGHT HEART CATHETERIZATION Right 5/9/2025    Procedure: INSERTION, CATHETER, RIGHT HEART;  Surgeon: Lalo Dominguez MD;  Location: [facility] CATH LAB;  Service: Cardiology;  Laterality: Right;     A MBS Study was completed at the bedside to assess the efficiency of his swallow function, rule out aspiration and make recommendations regarding safe dietary consistencies, effective compensatory strategies, and safe eating environment.     Prior Intubation HX:  multiple times this admit, most recently intubated 5/30 and extubated 5/31     Current method of nutrition: Tube feeding (via ng tube)     Patient complaint: "I want to be with my wife."    Imaging   Results for orders placed during the hospital encounter of 05/08/25    X-Ray Chest 1 View    Narrative  EXAMINATION:  XR CHEST 1 VIEW    CLINICAL HISTORY:  Ventricular tachycardia.F/u current process;    COMPARISON:  Yesterday    FINDINGS:  Frontal view of the chest was obtained. Enteric tube extends into the stomach.  Heart and mediastinum unchanged.  Similar appearance of the lungs with no new focal consolidation or pneumothorax.    Impression  Little interval change.      Electronically signed by: Javon Porter  Date:    05/29/2025  Time:    07:05    No results found for this or any previous visit.    No results found for this or any previous visit.    Subjective     Patient awake, alert, calm, and cooperative.    Spiritual/Cultural/Taoism Beliefs/Practices that affect care: no  Pain/Comfort: Pain Rating 1: 0/10    Respiratory Status:  room air    Restraints/positioning devices: soft mittens    Fluoroscopic Findings     Oral Musculature  Dentition: edentulous  Mucosal Quality: good  Facial Movement: general weakness  Buccal Strength & Mobility: impaired  Mandibular Strength & Mobility: impaired  Oral " Labial Strength & Mobility: WFL  Lingual Strength & Mobility: WFL  Vocal Quality: weak  Volitional Cough: Nonproductive    Setup  Upright in bed  Able to self feed  Adequate head control    Visualization  Lateral view    Oral Phase:   Dentition: edentulous  Secretion Management: adequate  Mucosal Quality: good  Facial Movement: general weakness  Buccal Strength & Mobility: impaired  Mandibular Strength & Mobility: WFL  Oral Labial Strength & Mobility: WFL  Lingual Strength & Mobility: WFL  Vocal Quality: hoarse    Pharyngeal Phase:   Swallow delay with spill to the pyriform insures  Reduced base of tongue retraction  Reduced epiglottic deflection  Reduced hyolaryngeal excursion  Poor airway protection  Laryngeal penetration with mildly thick liquid trials given    Consistency Fed by Laryngeal Penetration Aspiration Residue   Mildly thick liquid by spoon SLP After the swallow None Moderate  Base of tongue, Valleculae, Pyriform sinus, and Posterior pharyngeal wall  Double swallow resulted in laryngeal penetration of residual material   Puree SLP None None Moderate-severe  Base of tongue, Valleculae, and Posterior pharyngeal wall  Reduced with additional swallow   Moderately thick liquid by spoon SLP None None Moderate  Base of tongue, Valleculae, and Posterior pharyngeal wall  Reduced with additional swallow   Moderately thick liquid by cup Self None None Moderate  Base of tongue, Valleculae, and Posterior pharyngeal wall  Reduced with additional swallow       Assessment     Patient exhibited moderate-severe oropharyngeal dysphagia characterized by the findings noted above.  Laryngeal penetration of mildly thick liquids was visualized after the swallow and did NOT clear from the laryngeal vestibule.  Moderate-severe residual material of pureed solids and moderately thick liquids was reduced with an additional swallow without airway compromise.  Both swallow safety and efficiency are impaired.  Rec: SLP to assess  tolerance of pureed solids with moderately thick liquids with meal trial.    Patient appears to be at high risk for aspiration related pneumonia when considering severity of dysphagia, complicated medical status, reduced mobility, and weak/ineffective cough.  Prognosis for behavioral swallow rehabilitation is fair.    Outcome Measures     Functional Oral Intake Scale: 1 - Nothing by mouth    Goals     Multidisciplinary Problems       SLP Goals          Problem: SLP    Goal Priority Disciplines Outcome   SLP Goal     SLP Progressing   Description: LTG: Pt will tolerate least restrictive PO diet with no clinical signs/sx aspiration    STGs:  1.  Pt will tolerate pureed solids with adequate bolus formation/transport and no clinical signs/sx aspiration  2.  Pt will tolerate ice chips with no clinical signs/sx aspiration  3.  Pt will complete laryngeal strengthening exercises and luz with minimal cues  4.  Pt will tolerate thermal stimulation to the anterior faucial pillars x10 with 100% effortful swallows and delay less than 2 seconds                       Education     Patient provided with verbal education regarding swallow function.  Understanding was verbalized.    Plan     SLP Follow-Up:  Yes    Patient to be seen:  daily   Plan of Care expires:  06/17/25  Plan of Care reviewed with:  patient     Time Tracking     SLP Treatment Date:   06/03/25  Speech Start Time:  1400  Speech Stop Time:  1425     Speech Total Time (min):  25 min    Billable minutes:   Motion Fluoroscopic Evaluation, Video Recording, 25 minutes     06/03/2025

## 2025-06-04 LAB
ALBUMIN SERPL-MCNC: 2.2 G/DL (ref 3.4–4.8)
ALBUMIN/GLOB SERPL: 0.5 RATIO (ref 1.1–2)
ALP SERPL-CCNC: 145 UNIT/L (ref 40–150)
ALT SERPL-CCNC: 383 UNIT/L (ref 0–55)
ANION GAP SERPL CALC-SCNC: 7 MEQ/L
AST SERPL-CCNC: 88 UNIT/L (ref 11–45)
BASOPHILS # BLD AUTO: 0.03 X10(3)/MCL
BASOPHILS NFR BLD AUTO: 0.4 %
BILIRUB SERPL-MCNC: 1 MG/DL
BUN SERPL-MCNC: 24.8 MG/DL (ref 8.4–25.7)
CALCIUM SERPL-MCNC: 8.4 MG/DL (ref 8.8–10)
CHLORIDE SERPL-SCNC: 106 MMOL/L (ref 98–107)
CO2 SERPL-SCNC: 29 MMOL/L (ref 23–31)
CREAT SERPL-MCNC: 0.83 MG/DL (ref 0.72–1.25)
CREAT/UREA NIT SERPL: 30
EOSINOPHIL # BLD AUTO: 0.31 X10(3)/MCL (ref 0–0.9)
EOSINOPHIL NFR BLD AUTO: 3.9 %
ERYTHROCYTE [DISTWIDTH] IN BLOOD BY AUTOMATED COUNT: 17 % (ref 11.5–17)
GFR SERPLBLD CREATININE-BSD FMLA CKD-EPI: >60 ML/MIN/1.73/M2
GLOBULIN SER-MCNC: 4.2 GM/DL (ref 2.4–3.5)
GLUCOSE SERPL-MCNC: 85 MG/DL (ref 82–115)
HCT VFR BLD AUTO: 38.4 % (ref 42–52)
HGB BLD-MCNC: 11.8 G/DL (ref 14–18)
IMM GRANULOCYTES # BLD AUTO: 0.08 X10(3)/MCL (ref 0–0.04)
IMM GRANULOCYTES NFR BLD AUTO: 1 %
LYMPHOCYTES # BLD AUTO: 0.72 X10(3)/MCL (ref 0.6–4.6)
LYMPHOCYTES NFR BLD AUTO: 9.2 %
MAGNESIUM SERPL-MCNC: 1.8 MG/DL (ref 1.6–2.6)
MCH RBC QN AUTO: 29 PG (ref 27–31)
MCHC RBC AUTO-ENTMCNC: 30.7 G/DL (ref 33–36)
MCV RBC AUTO: 94.3 FL (ref 80–94)
MONOCYTES # BLD AUTO: 0.62 X10(3)/MCL (ref 0.1–1.3)
MONOCYTES NFR BLD AUTO: 7.9 %
NEUTROPHILS # BLD AUTO: 6.1 X10(3)/MCL (ref 2.1–9.2)
NEUTROPHILS NFR BLD AUTO: 77.6 %
NRBC BLD AUTO-RTO: 0 %
PHOSPHATE SERPL-MCNC: 2.9 MG/DL (ref 2.3–4.7)
PLATELET # BLD AUTO: 175 X10(3)/MCL (ref 130–400)
PMV BLD AUTO: 12.7 FL (ref 7.4–10.4)
POTASSIUM SERPL-SCNC: 4.5 MMOL/L (ref 3.5–5.1)
PROT SERPL-MCNC: 6.4 GM/DL (ref 5.8–7.6)
RBC # BLD AUTO: 4.07 X10(6)/MCL (ref 4.7–6.1)
SODIUM SERPL-SCNC: 142 MMOL/L (ref 136–145)
WBC # BLD AUTO: 7.86 X10(3)/MCL (ref 4.5–11.5)

## 2025-06-04 PROCEDURE — 36415 COLL VENOUS BLD VENIPUNCTURE: CPT

## 2025-06-04 PROCEDURE — 25000003 PHARM REV CODE 250: Performed by: NURSE PRACTITIONER

## 2025-06-04 PROCEDURE — 83735 ASSAY OF MAGNESIUM: CPT

## 2025-06-04 PROCEDURE — 25000003 PHARM REV CODE 250: Performed by: INTERNAL MEDICINE

## 2025-06-04 PROCEDURE — 84100 ASSAY OF PHOSPHORUS: CPT

## 2025-06-04 PROCEDURE — 25000242 PHARM REV CODE 250 ALT 637 W/ HCPCS: Performed by: INTERNAL MEDICINE

## 2025-06-04 PROCEDURE — 63600175 PHARM REV CODE 636 W HCPCS: Performed by: INTERNAL MEDICINE

## 2025-06-04 PROCEDURE — 21400001 HC TELEMETRY ROOM

## 2025-06-04 PROCEDURE — 97535 SELF CARE MNGMENT TRAINING: CPT

## 2025-06-04 PROCEDURE — 85025 COMPLETE CBC W/AUTO DIFF WBC: CPT

## 2025-06-04 PROCEDURE — 97530 THERAPEUTIC ACTIVITIES: CPT | Mod: CQ

## 2025-06-04 PROCEDURE — 94668 MNPJ CHEST WALL SBSQ: CPT

## 2025-06-04 PROCEDURE — 99900031 HC PATIENT EDUCATION (STAT)

## 2025-06-04 PROCEDURE — 94640 AIRWAY INHALATION TREATMENT: CPT

## 2025-06-04 PROCEDURE — 11000001 HC ACUTE MED/SURG PRIVATE ROOM

## 2025-06-04 PROCEDURE — 97110 THERAPEUTIC EXERCISES: CPT | Mod: CQ

## 2025-06-04 PROCEDURE — 94760 N-INVAS EAR/PLS OXIMETRY 1: CPT

## 2025-06-04 PROCEDURE — 80053 COMPREHEN METABOLIC PANEL: CPT

## 2025-06-04 PROCEDURE — 99900035 HC TECH TIME PER 15 MIN (STAT)

## 2025-06-04 PROCEDURE — 63600175 PHARM REV CODE 636 W HCPCS: Performed by: NURSE PRACTITIONER

## 2025-06-04 RX ORDER — SULFAMETHOXAZOLE AND TRIMETHOPRIM 800; 160 MG/1; MG/1
2 TABLET ORAL 2 TIMES DAILY
Status: COMPLETED | OUTPATIENT
Start: 2025-06-04 | End: 2025-06-06

## 2025-06-04 RX ORDER — FINASTERIDE 5 MG/1
5 TABLET, FILM COATED ORAL DAILY
Status: DISCONTINUED | OUTPATIENT
Start: 2025-06-05 | End: 2025-06-09 | Stop reason: HOSPADM

## 2025-06-04 RX ORDER — POLYETHYLENE GLYCOL 3350 17 G/17G
17 POWDER, FOR SOLUTION ORAL 2 TIMES DAILY
Status: DISCONTINUED | OUTPATIENT
Start: 2025-06-04 | End: 2025-06-09 | Stop reason: HOSPADM

## 2025-06-04 RX ORDER — FAMOTIDINE 20 MG/1
20 TABLET, FILM COATED ORAL 2 TIMES DAILY
Status: DISCONTINUED | OUTPATIENT
Start: 2025-06-04 | End: 2025-06-09 | Stop reason: HOSPADM

## 2025-06-04 RX ORDER — METOPROLOL TARTRATE 50 MG/1
50 TABLET ORAL 3 TIMES DAILY
Status: DISCONTINUED | OUTPATIENT
Start: 2025-06-04 | End: 2025-06-09 | Stop reason: HOSPADM

## 2025-06-04 RX ORDER — AMOXICILLIN 250 MG
1 CAPSULE ORAL DAILY
Status: DISCONTINUED | OUTPATIENT
Start: 2025-06-05 | End: 2025-06-09 | Stop reason: HOSPADM

## 2025-06-04 RX ORDER — MEXILETINE HYDROCHLORIDE 200 MG/1
200 CAPSULE ORAL EVERY 8 HOURS
Status: DISCONTINUED | OUTPATIENT
Start: 2025-06-04 | End: 2025-06-09 | Stop reason: HOSPADM

## 2025-06-04 RX ORDER — PRAVASTATIN SODIUM 40 MG/1
40 TABLET ORAL DAILY
Status: DISCONTINUED | OUTPATIENT
Start: 2025-06-05 | End: 2025-06-09 | Stop reason: HOSPADM

## 2025-06-04 RX ORDER — AMIODARONE HYDROCHLORIDE 200 MG/1
200 TABLET ORAL 2 TIMES DAILY
Status: DISCONTINUED | OUTPATIENT
Start: 2025-06-04 | End: 2025-06-06

## 2025-06-04 RX ADMIN — Medication 4 ML: at 04:06

## 2025-06-04 RX ADMIN — FUROSEMIDE 40 MG: 10 INJECTION, SOLUTION INTRAVENOUS at 08:06

## 2025-06-04 RX ADMIN — METOPROLOL TARTRATE 50 MG: 50 TABLET, FILM COATED ORAL at 04:06

## 2025-06-04 RX ADMIN — APIXABAN 5 MG: 5 TABLET, FILM COATED ORAL at 08:06

## 2025-06-04 RX ADMIN — FINASTERIDE 5 MG: 5 TABLET, FILM COATED ORAL at 08:06

## 2025-06-04 RX ADMIN — Medication 4 ML: at 01:06

## 2025-06-04 RX ADMIN — BACITRACIN ZINC, NEOMYCIN, POLYMYXIN B: 400; 3.5; 5 OINTMENT TOPICAL at 08:06

## 2025-06-04 RX ADMIN — AMIODARONE HYDROCHLORIDE 200 MG: 200 TABLET ORAL at 08:06

## 2025-06-04 RX ADMIN — MEXILETINE HYDROCHLORIDE 200 MG: 200 CAPSULE ORAL at 04:06

## 2025-06-04 RX ADMIN — MEXILETINE HYDROCHLORIDE 200 MG: 200 CAPSULE ORAL at 05:06

## 2025-06-04 RX ADMIN — MICONAZOLE NITRATE: 20 POWDER TOPICAL at 08:06

## 2025-06-04 RX ADMIN — BACITRACIN ZINC, NEOMYCIN, POLYMYXIN B: 400; 3.5; 5 OINTMENT TOPICAL at 04:06

## 2025-06-04 RX ADMIN — SACUBITRIL AND VALSARTAN 1 TABLET: 24; 26 TABLET, FILM COATED ORAL at 08:06

## 2025-06-04 RX ADMIN — Medication 4 ML: at 09:06

## 2025-06-04 RX ADMIN — FAMOTIDINE 20 MG: 20 TABLET, FILM COATED ORAL at 08:06

## 2025-06-04 RX ADMIN — HEPARIN SODIUM 5000 UNITS: 5000 INJECTION, SOLUTION INTRAVENOUS; SUBCUTANEOUS at 08:06

## 2025-06-04 RX ADMIN — MEXILETINE HYDROCHLORIDE 200 MG: 200 CAPSULE ORAL at 08:06

## 2025-06-04 RX ADMIN — FUROSEMIDE 40 MG: 10 INJECTION, SOLUTION INTRAVENOUS at 04:06

## 2025-06-04 RX ADMIN — METOPROLOL TARTRATE 50 MG: 50 TABLET, FILM COATED ORAL at 08:06

## 2025-06-04 RX ADMIN — PRAVASTATIN SODIUM 40 MG: 40 TABLET ORAL at 08:06

## 2025-06-04 RX ADMIN — SULFAMETHOXAZOLE AND TRIMETHOPRIM 2 TABLET: 800; 160 TABLET ORAL at 08:06

## 2025-06-04 RX ADMIN — SENNOSIDES AND DOCUSATE SODIUM 1 TABLET: 50; 8.6 TABLET ORAL at 08:06

## 2025-06-04 RX ADMIN — POLYETHYLENE GLYCOL 3350 17 G: 17 POWDER, FOR SOLUTION ORAL at 08:06

## 2025-06-04 NOTE — PHYSICIAN QUERY
Provider, due to conflicting documentation please clarify the acuity of the patients heart failure.   Chronic systolic heart failure (HFrEF or HFmrEF)
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
## 2025-06-04 NOTE — PT/OT/SLP PROGRESS
Physical Therapy Treatment    Patient Name:  Alcides Rosario   MRN:  [id number]    Recommendations:     Discharge therapy intensity: Moderate Intensity Therapy   Discharge Equipment Recommendations: to be determined by next level of care  Barriers to discharge: Impaired mobility    Assessment:     Alcides Rosario is a 79 y.o. male admitted with a medical diagnosis of acute on chronic systolic CHF with EF 20-25%, persistent vtach storm s/p ICD placement 5/16 and epicardial ablation 5/20, acute resp failure, ESBL pneumoniae, NSTEMI, hx PAD.  He presents with the following impairments/functional limitations: weakness, impaired endurance, impaired functional mobility, impaired balance, decreased lower extremity function, edema     Pt with HOB elevated and agreeable to PT tx today. Pt req max A to roll L<>R to ana brief before sitting EOB at max Ax2, sitting balance CGA for static sitting progressing to Mod A with dynamic sitting. Pt with c/o slight dizziness upon sitting on EOB that resolved with rest, SpO2 checked on vitals machine at 96%. Attempted stand from EOB at max Ax2 with pt minimally able to clear buttock from bed but unable to maintain stand. Pt then squat pivot t/f to bedside chair max Ax2 where he was positioned for comfort with acosta pad in place, all needs in reach and in no distress. Pt asking for water post tx but is currently on thick liquid, nsg notified of pt request.     Rehab Prognosis: Good; patient would benefit from acute skilled PT services to address these deficits and reach maximum level of function.    Recent Surgery: Procedure(s) (LRB):  Ablation VT (N/A)  EP - diagnostic 5 Days Post-Op    Plan:     During this hospitalization, patient would benefit from acute PT services 5 x/week to address the identified rehab impairments via gait training, therapeutic activities, therapeutic exercises, neuromuscular re-education and progress toward the following goals:    Plan of Care Expires:   25    Subjective     Chief Complaint:   Patient/Family Comments/goals:   Pain/Comfort:  Pain Rating 1: 0/10      Objective:     Communicated with nsg prior to session.  Patient found HOB elevated with PureWick, telemetry, pulse ox (continuous) upon PT entry to room.     General Precautions: Standard, fall, aspiration  Orthopedic Precautions: N/A  Braces: N/A  Respiratory Status: Room air  Blood Pressure: 116/80mmHg  Skin Integrity: Visible skin intact      Functional Mobility:  Bed Mobility:     Rolling Left:  maximal assistance  Rolling Right: maximal assistance  Scooting: minimum assistance  Supine to Sit: maximal assistance and of 2 persons  Transfers:     Bed to Chair: maximal assistance and of 2 persons with  no AD  using  Squat Pivot    Therapeutic Activities/Exercises:  Ankle pumps x20  Hip abd/add x10    Education:  Patient provided with verbal education education regarding PT role/goals/POC, fall prevention, and safety awareness.  Understanding was verbalized.     Patient left up in chair with all lines intact, call button in reach, acosta pad in place, and nsg notified    GOALS:   Multidisciplinary Problems       Physical Therapy Goals          Problem: Physical Therapy    Goal Priority Disciplines Outcome Interventions   Physical Therapy Goal     PT, PT/OT Progressing    Description: Goals to be met by: 2025     Patient will increase functional independence with mobility by performin. Supine to sit with MInimal Assistance  2. Sit to stand transfer with Minimal Assistance  3. Gait  x 50 feet with Minimal Assistance using Rolling Walker.   4. Sitting at edge of bed x5 minutes with Supervision                         Time Tracking:     PT Received On: 25  PT Start Time: 1000     PT Stop Time: 1038  PT Total Time (min): 38 min     Billable Minutes: Therapeutic Activity 30 and Therapeutic Exercise 8    Treatment Type: Treatment  PT/PTA: PTA     Number of PTA visits since last PT visit: 2      06/04/2025     Undermining Type: Entire Wound

## 2025-06-04 NOTE — PLAN OF CARE
Sent Clinical Updates to [place], requested update on Acceptance, waiting on confirmation.  Wife is a currently there as patient.

## 2025-06-04 NOTE — PT/OT/SLP PROGRESS
[facility]  Speech Language Pathology Department  Diet Tolerance Follow-up    Patient Name:  Alcides Rosario   MRN:  [id number]  Admitting Diagnosis: vtach    Recommendations     General recommendations:  dysphagia therapy  Solid texture recommendation:  Puree Diet - IDDSI Level 4  Liquid consistency recommendation: Moderately thick liquids - IDDSI Level 3   Medications: crushed in puree  Swallow strategies/precautions: upright for ALL PO intake, small bites/sips, MUST DOUBLE SWALLOW with each bite/sip   Precautions: aspiration    Diet Tolerance     Nursing reports no difficulty regarding diet tolerance.    Outcome Measures     Functional Oral Intake Scale: 5 - Total oral diet with multiple consistencies, by requiring special preparation or compensations    Plan     SLP to continue dysphagia POC.    06/04/2025

## 2025-06-04 NOTE — PT/OT/SLP PROGRESS
Occupational Therapy   Treatment    Name: Alcides Rosario  MRN: [id number]    Recommendations:     Recommended therapy intensity at discharge: Moderate Intensity Therapy   Discharge Equipment Recommendations:  to be determined by next level of care  Barriers to discharge:   medical needs, placement, functional deficits    Assessment:     Alcides Rosario is a 79 y.o. male with a medical diagnosis of acute on chronic systolic CHF with EF 20-25%, persistent vtach storm s/p ICD placement 5/16 and epicardial ablation 5/20, acute resp failure, ESBL pneumoniae, NSTEMI, hx PAD.     He presents with fair tolerance of today's session compared to previous session, noting improved static sit balance and partial sit<>stand from bedside, continues to require assist x2. He completed transfer bed>chair with maxx2. Performance deficits affecting function are weakness, impaired endurance, impaired self care skills, impaired functional mobility, impaired balance, decreased upper extremity function, decreased lower extremity function. Continue to rec moderate intensity upon d/c. Will progress as able.     Rehab Prognosis:  Good; patient would benefit from acute skilled OT services to address these deficits and reach maximum level of function.       Plan:     Patient to be seen 5 x/week to address the above listed problems via self-care/home management, therapeutic activities, therapeutic exercises  Plan of Care Expires: 07/02/25  Plan of Care Reviewed with: patient    Subjective     Pain/Comfort:  Pain Rating 1: 0/10    Objective:     Communicated with: RN prior to session.  Patient found HOB elevated with telemetry, pulse ox (continuous), PureWick upon OT entry to room.    General Precautions: Standard, fall    Orthopedic Precautions:N/A  Braces: N/A  Respiratory Status: Room air  Vital Signs: Blood Pressure: 116/80  Sp02: 96%sitting EOB, pt c/o dizziness with change in position. Resolves with seated rest.      Occupational Performance:      Functional Mobility/Transfers:  Bed mobility:   B rolling: max A  Sup>sit: maxx2 with HOB elevated and mattress on autofirm   Sit<>stand from EOB: maxx2 via B HHA. Pt only achieving partial stand, remains with fwd flexed posture and unable to fully extend at hips to achieve erect posture despite max V/T cues. Returned to sit EOB and chair set up for t/f.   Bed>chair t/f completed via squat pivot with maxx2.     Activities of Daily Living:  Lower Body Dressing: maximal assistance to don socks and brief from bed level  Toileting: maximal assistance max A for pericare from bed level, male purewick in place at end of session     Balance:   Static Sitting Balance: Bilateral UE support: Fair: Patient able to maintain balance with handhold support; may require occasional minimal assistance.  Dynamic Sitting Balance: Bilateral UE support: Poor: Patient unable to accept challenge or move without loss of balance. Requires mod A; posterior leaning noted.     Therapeutic Positioning  Risk for acquired pressure injuries is significantly increased due to relative decrease in mobility d/t hospitalization , impaired mobility, and severity of deficits resulting in prolonged immobility .     OT interventions performed during the course of today's session in an effort to prevent and/or reduce acquired pressure injuries:   Therapeutic positioning was provided at the conclusion of session to offload all bony prominences for the prevention and/or reduction of pressure injuries     Skin assessment: all bony prominences were assessed               Findings: redness on buttock but blanchable, heels clear     OT recommendations for therapeutic positioning throughout hospitalization:   Follow [facility] Pressure Injury Prevention Protocol  Geomat recommended for sacral protection while [facility]  Specialty Mattress-specialty mattress to be obtained on downgrade     [facility] 6 Click ADL: 12    Patient Education:  Patient provided with verbal education  education regarding OT role/goals/POC, post op precautions, fall prevention, safety awareness, Discharge/DME recommendations, and pressure ulcer prevention.  Understanding was verbalized, however additional teaching warranted.     Patient left up in chair with all lines intact, call button in reach, acosta pad in place, and RN notified.    GOALS:   Multidisciplinary Problems       Occupational Therapy Goals          Problem: Occupational Therapy    Goal Priority Disciplines Outcome Interventions   Occupational Therapy Goal     OT, PT/OT Progressing    Description: Goals to be met by: 7/2/25    Patient will increase functional independence with ADLs by performing:    UE Dressing with Minimal Assistance.  LE Dressing with Mod Assistance.  Toileting from toilet/BSC with Minimal Assistance for hygiene and clothing management.   Toilet transfer to toilet with Minimal Assistance.  Increased functional strength to 4/5 through therEX.                         Time Tracking:     OT Date of Treatment: 06/04/25  OT Start Time: 1000  OT Stop Time: 1038  OT Total Time (min): 38 min    Billable Minutes:Self Care/Home Management 3    OT/TESHA: OT     Number of TESHA visits since last OT visit: 1    6/4/2025

## 2025-06-05 LAB
ALBUMIN SERPL-MCNC: 2.2 G/DL (ref 3.4–4.8)
ALBUMIN/GLOB SERPL: 0.6 RATIO (ref 1.1–2)
ALP SERPL-CCNC: 136 UNIT/L (ref 40–150)
ALT SERPL-CCNC: 285 UNIT/L (ref 0–55)
ANION GAP SERPL CALC-SCNC: 7 MEQ/L
AST SERPL-CCNC: 47 UNIT/L (ref 11–45)
BASOPHILS # BLD AUTO: 0.04 X10(3)/MCL
BASOPHILS NFR BLD AUTO: 0.5 %
BILIRUB SERPL-MCNC: 1.1 MG/DL
BUN SERPL-MCNC: 27 MG/DL (ref 8.4–25.7)
CALCIUM SERPL-MCNC: 8.4 MG/DL (ref 8.8–10)
CHLORIDE SERPL-SCNC: 107 MMOL/L (ref 98–107)
CO2 SERPL-SCNC: 29 MMOL/L (ref 23–31)
CREAT SERPL-MCNC: 0.95 MG/DL (ref 0.72–1.25)
CREAT/UREA NIT SERPL: 28
EOSINOPHIL # BLD AUTO: 0.27 X10(3)/MCL (ref 0–0.9)
EOSINOPHIL NFR BLD AUTO: 3.1 %
ERYTHROCYTE [DISTWIDTH] IN BLOOD BY AUTOMATED COUNT: 17.3 % (ref 11.5–17)
GFR SERPLBLD CREATININE-BSD FMLA CKD-EPI: >60 ML/MIN/1.73/M2
GLOBULIN SER-MCNC: 4 GM/DL (ref 2.4–3.5)
GLUCOSE SERPL-MCNC: 88 MG/DL (ref 82–115)
HCT VFR BLD AUTO: 36.5 % (ref 42–52)
HGB BLD-MCNC: 10.8 G/DL (ref 14–18)
IMM GRANULOCYTES # BLD AUTO: 0.07 X10(3)/MCL (ref 0–0.04)
IMM GRANULOCYTES NFR BLD AUTO: 0.8 %
LYMPHOCYTES # BLD AUTO: 0.78 X10(3)/MCL (ref 0.6–4.6)
LYMPHOCYTES NFR BLD AUTO: 8.8 %
MAGNESIUM SERPL-MCNC: 1.9 MG/DL (ref 1.6–2.6)
MCH RBC QN AUTO: 28.9 PG (ref 27–31)
MCHC RBC AUTO-ENTMCNC: 29.6 G/DL (ref 33–36)
MCV RBC AUTO: 97.6 FL (ref 80–94)
MONOCYTES # BLD AUTO: 0.69 X10(3)/MCL (ref 0.1–1.3)
MONOCYTES NFR BLD AUTO: 7.8 %
NEUTROPHILS # BLD AUTO: 6.97 X10(3)/MCL (ref 2.1–9.2)
NEUTROPHILS NFR BLD AUTO: 79 %
NRBC BLD AUTO-RTO: 0 %
PHOSPHATE SERPL-MCNC: 3.3 MG/DL (ref 2.3–4.7)
PLATELET # BLD AUTO: 192 X10(3)/MCL (ref 130–400)
PMV BLD AUTO: 12.2 FL (ref 7.4–10.4)
POTASSIUM SERPL-SCNC: 4.2 MMOL/L (ref 3.5–5.1)
PROT SERPL-MCNC: 6.2 GM/DL (ref 5.8–7.6)
RBC # BLD AUTO: 3.74 X10(6)/MCL (ref 4.7–6.1)
SODIUM SERPL-SCNC: 143 MMOL/L (ref 136–145)
WBC # BLD AUTO: 8.82 X10(3)/MCL (ref 4.5–11.5)

## 2025-06-05 PROCEDURE — 99900031 HC PATIENT EDUCATION (STAT)

## 2025-06-05 PROCEDURE — 97530 THERAPEUTIC ACTIVITIES: CPT | Mod: CQ

## 2025-06-05 PROCEDURE — 92526 ORAL FUNCTION THERAPY: CPT

## 2025-06-05 PROCEDURE — 83735 ASSAY OF MAGNESIUM: CPT

## 2025-06-05 PROCEDURE — 63600175 PHARM REV CODE 636 W HCPCS: Performed by: NURSE PRACTITIONER

## 2025-06-05 PROCEDURE — 25000003 PHARM REV CODE 250: Performed by: NURSE PRACTITIONER

## 2025-06-05 PROCEDURE — 85025 COMPLETE CBC W/AUTO DIFF WBC: CPT

## 2025-06-05 PROCEDURE — 11000001 HC ACUTE MED/SURG PRIVATE ROOM

## 2025-06-05 PROCEDURE — 84100 ASSAY OF PHOSPHORUS: CPT

## 2025-06-05 PROCEDURE — 80053 COMPREHEN METABOLIC PANEL: CPT

## 2025-06-05 PROCEDURE — 94640 AIRWAY INHALATION TREATMENT: CPT

## 2025-06-05 PROCEDURE — 99900035 HC TECH TIME PER 15 MIN (STAT)

## 2025-06-05 PROCEDURE — 21400001 HC TELEMETRY ROOM

## 2025-06-05 PROCEDURE — 36415 COLL VENOUS BLD VENIPUNCTURE: CPT

## 2025-06-05 PROCEDURE — 25000242 PHARM REV CODE 250 ALT 637 W/ HCPCS: Performed by: INTERNAL MEDICINE

## 2025-06-05 PROCEDURE — 94760 N-INVAS EAR/PLS OXIMETRY 1: CPT

## 2025-06-05 PROCEDURE — 25000003 PHARM REV CODE 250: Performed by: INTERNAL MEDICINE

## 2025-06-05 RX ADMIN — MEXILETINE HYDROCHLORIDE 200 MG: 200 CAPSULE ORAL at 05:06

## 2025-06-05 RX ADMIN — MICONAZOLE NITRATE: 20 POWDER TOPICAL at 09:06

## 2025-06-05 RX ADMIN — PRAVASTATIN SODIUM 40 MG: 40 TABLET ORAL at 09:06

## 2025-06-05 RX ADMIN — MICONAZOLE NITRATE: 20 POWDER TOPICAL at 08:06

## 2025-06-05 RX ADMIN — METOPROLOL TARTRATE 50 MG: 50 TABLET, FILM COATED ORAL at 08:06

## 2025-06-05 RX ADMIN — METOPROLOL TARTRATE 50 MG: 50 TABLET, FILM COATED ORAL at 09:06

## 2025-06-05 RX ADMIN — BACITRACIN ZINC, NEOMYCIN, POLYMYXIN B: 400; 3.5; 5 OINTMENT TOPICAL at 08:06

## 2025-06-05 RX ADMIN — APIXABAN 5 MG: 5 TABLET, FILM COATED ORAL at 09:06

## 2025-06-05 RX ADMIN — Medication 4 ML: at 04:06

## 2025-06-05 RX ADMIN — METOPROLOL TARTRATE 50 MG: 50 TABLET, FILM COATED ORAL at 03:06

## 2025-06-05 RX ADMIN — MEXILETINE HYDROCHLORIDE 200 MG: 200 CAPSULE ORAL at 08:06

## 2025-06-05 RX ADMIN — Medication 4 ML: at 12:06

## 2025-06-05 RX ADMIN — POLYETHYLENE GLYCOL 3350 17 G: 17 POWDER, FOR SOLUTION ORAL at 08:06

## 2025-06-05 RX ADMIN — AMIODARONE HYDROCHLORIDE 200 MG: 200 TABLET ORAL at 08:06

## 2025-06-05 RX ADMIN — APIXABAN 5 MG: 5 TABLET, FILM COATED ORAL at 08:06

## 2025-06-05 RX ADMIN — FUROSEMIDE 40 MG: 10 INJECTION, SOLUTION INTRAVENOUS at 09:06

## 2025-06-05 RX ADMIN — MEXILETINE HYDROCHLORIDE 200 MG: 200 CAPSULE ORAL at 03:06

## 2025-06-05 RX ADMIN — FINASTERIDE 5 MG: 5 TABLET, FILM COATED ORAL at 09:06

## 2025-06-05 RX ADMIN — SULFAMETHOXAZOLE AND TRIMETHOPRIM 2 TABLET: 800; 160 TABLET ORAL at 09:06

## 2025-06-05 RX ADMIN — FAMOTIDINE 20 MG: 20 TABLET, FILM COATED ORAL at 08:06

## 2025-06-05 RX ADMIN — SACUBITRIL AND VALSARTAN 1 TABLET: 24; 26 TABLET, FILM COATED ORAL at 08:06

## 2025-06-05 RX ADMIN — BACITRACIN ZINC, NEOMYCIN, POLYMYXIN B: 400; 3.5; 5 OINTMENT TOPICAL at 03:06

## 2025-06-05 RX ADMIN — Medication 4 ML: at 08:06

## 2025-06-05 RX ADMIN — BACITRACIN ZINC, NEOMYCIN, POLYMYXIN B: 400; 3.5; 5 OINTMENT TOPICAL at 09:06

## 2025-06-05 RX ADMIN — FAMOTIDINE 20 MG: 20 TABLET, FILM COATED ORAL at 09:06

## 2025-06-05 RX ADMIN — SACUBITRIL AND VALSARTAN 1 TABLET: 24; 26 TABLET, FILM COATED ORAL at 09:06

## 2025-06-05 RX ADMIN — SENNOSIDES AND DOCUSATE SODIUM 1 TABLET: 50; 8.6 TABLET ORAL at 09:06

## 2025-06-05 RX ADMIN — FUROSEMIDE 40 MG: 10 INJECTION, SOLUTION INTRAVENOUS at 03:06

## 2025-06-05 RX ADMIN — AMIODARONE HYDROCHLORIDE 200 MG: 200 TABLET ORAL at 09:06

## 2025-06-05 RX ADMIN — SULFAMETHOXAZOLE AND TRIMETHOPRIM 2 TABLET: 800; 160 TABLET ORAL at 08:06

## 2025-06-05 NOTE — PT/OT/SLP PROGRESS
[facility]  Speech [facility] Pathology Department  Dysphagia Therapy Progress Note    Patient Name:  Alcides Rosario   MRN:  [id number]  Admitting Diagnosis: vtach    Recommendations     General recommendations:  dysphagia therapy  Solid texture recommendation:  Puree Diet - IDDSI Level 4  Liquid consistency recommendation: Moderately thick liquids - IDDSI Level 3   Medications: crushed in puree   Aspiration precautions:  upright for ALL PO intake, small bites/sips, MUST DOUBLE SWALLOW with each bite/sip   Discharge therapy intensity: Moderate Intensity Therapy   Barriers to safe discharge:  complicated medical history and severity of impairment    Subjective     Patient awake, alert, calm, and cooperative.  Spiritual/Cultural/Catholic Beliefs/Practices that affect care: no    Pain/Comfort: Pain Rating 1: 0/10    Respiratory Status:  room air    Objective     Therapeutic Activities:  Pt completed laryngeal exercises x5 with minimal cues.  Pt tolerated thermal stimulation to the anterior faucial pillars x15 with 100% swallow responses with effortful swallow.  Laryngeal excursion reduced.  Delay 3-5 seconds.    Therapeutic PO Trials:  Refused moderately thick liquids    Assessment     Pt continues to present with oropharyngeal dysphagia requiring diet modification to reduce the risk of aspiration.    Outcome Measures     Functional Oral Intake Scale: 5 - Total oral diet with multiple consistencies, by requiring special preparation or compensations    Goals     Multidisciplinary Problems       SLP Goals          Problem: SLP    Goal Priority Disciplines Outcome   SLP Goal     SLP Progressing   Description: LTG: Pt will tolerate least restrictive PO diet with no clinical signs/sx aspiration    STGs:  1.  Pt will tolerate pureed solids with adequate bolus formation/transport and no clinical signs/sx aspiration  2.  Pt will tolerate ice chips with no clinical signs/sx aspiration  3.  Pt will  complete laryngeal strengthening exercises and luz with minimal cues  4.  Pt will tolerate thermal stimulation to the anterior faucial pillars x10 with 100% effortful swallows and delay less than 2 seconds                       Patient Education     Patient provided with verbal education regarding diet modifications.  Understanding was verbalized, however additional teaching warranted.    Plan     Will continue to follow and tx as appropriate.    SLP Follow-Up:  Yes   Patient to be seen:  5 x/week   Plan of Care expires:  06/17/25  Plan of Care reviewed with:  patient       Time Tracking     SLP Treatment Date:   06/05/25  Speech Start Time:  1420  Speech Stop Time:  1435     Speech Total Time (min):  15 min    Billable minutes:  Treatment of Swallow Dysfunction, 15 minutes       06/05/2025
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
## 2025-06-05 NOTE — PT/OT/SLP PROGRESS
Physical Therapy Treatment    Patient Name:  Alcides Rosario   MRN:  [id number]    Recommendations:     Discharge therapy intensity: Moderate Intensity Therapy   Discharge Equipment Recommendations: to be determined by next level of care  Barriers to discharge: Ongoing medical needs and placement    Assessment:     Alcides Rosario is a 79 y.o. male admitted with a medical diagnosis of acute on chronic systolic CHF with EF 20-25%, persistent vtach storm s/p ICD placement 5/16 and epicardial ablation 5/20, acute resp failure, ESBL pneumoniae, NSTEMI, hx PAD. .  He presents with the following impairments/functional limitations: weakness, impaired endurance, impaired functional mobility, impaired balance, decreased lower extremity function, edema .    Pt sierra tx well. Pt requires maxAx2 for mobility. Pt unable to amb at this time 2/2 difficulty standing. Vitals stable through out session. Will attempt standing frame activity tomorrow.    Rehab Prognosis: Good; patient would benefit from acute skilled PT services to address these deficits and reach maximum level of function.    Recent Surgery: Procedure(s) (LRB):  Ablation VT (N/A)  EP - diagnostic 6 Days Post-Op    Plan:     During this hospitalization, patient would benefit from acute PT services 5 x/week to address the identified rehab impairments via gait training, therapeutic activities, therapeutic exercises, neuromuscular re-education and progress toward the following goals:    Plan of Care Expires:  07/02/25    Subjective     Chief Complaint: none  Patient/Family Comments/goals:   Pain/Comfort:         Objective:     Communicated with RN prior to session.  Patient found HOB elevated with PureWick, telemetry, pulse ox (continuous) upon PT entry to room.     General Precautions: Standard, fall, aspiration  Orthopedic Precautions: N/A  Braces: N/A  Respiratory Status: Room air  Skin Integrity: Visible skin intact      Functional Mobility:  Bed Mobility:     Supine to Sit:  stand by assistance  Transfers:     Sit to Stand:  maximal assistance and of 2 persons with hand-held assist and rolling walker  Bed to Chair: maximal assistance and of 2 persons with  hand-held assist  using  Squat Pivot    Therapeutic Activities/Exercises:  Performed 3 sit<>stands with RW and HHA. Posterior pushing present when transitioning to standing. Decreased hip ext noted with excessive pushing of RW.    Co-Treatment: No    Education:  Patient provided with verbal education education regarding PT role/goals/POC.  Understanding was verbalized.     Patient left up in chair with all lines intact, call button in reach, acosta pad in place, and rn notified    GOALS:   Multidisciplinary Problems       Physical Therapy Goals          Problem: Physical Therapy    Goal Priority Disciplines Outcome Interventions   Physical Therapy Goal     PT, PT/OT Progressing    Description: Goals to be met by: 2025     Patient will increase functional independence with mobility by performin. Supine to sit with MInimal Assistance  2. Sit to stand transfer with Minimal Assistance  3. Gait  x 50 feet with Minimal Assistance using Rolling Walker.   4. Sitting at edge of bed x5 minutes with Supervision                         Time Tracking:     PT Received On: 25  PT Start Time: 1448     PT Stop Time: 1516  PT Total Time (min): 28 min     Billable Minutes: Therapeutic Activity 28    Treatment Type: Treatment  PT/PTA: PTA     Number of PTA visits since last PT visit: 3     2025

## 2025-06-05 NOTE — PROGRESS NOTES
Inpatient Nutrition Assessment    Admit Date: 5/8/2025   Total duration of encounter: 28 days   Patient Age: 79 y.o.    Nutrition Recommendation/Prescription   Diet Pureed (IDDSI Level 4) No Straws; Moderately Thick Liquids (IDDSI Level 3); Isolation Tray - Styrofoam ordered  Encouraged adequate PO intake  Monitor appetite/PO intake, weight, and labs    Communication of Recommendations: reviewed with nurse    Nutrition Assessment     Malnutrition Assessment/Nutrition-Focused Physical Exam    Malnutrition Context: acute illness or injury (05/19/25 1246)  Malnutrition Level: moderate (05/19/25 1246)  Energy Intake (Malnutrition): other (see comments) (Does not meet criteria) (05/19/25 1246)  Weight Loss (Malnutrition): other (see comments) (Unable to assess) (05/19/25 1246)              Muscle Mass (Malnutrition): mild depletion (05/19/25 1246)  [place] (Muscle Loss): mild depletion                       Fluid Accumulation (Malnutrition): moderate (05/19/25 1246)        A minimum of two characteristics is recommended for diagnosis of either severe or non-severe malnutrition.    Chart Review    Reason Seen: continuous nutrition monitoring and follow-up    Malnutrition Screening Tool Results   Have you recently lost weight without trying?: No  Have you been eating poorly because of a decreased appetite?: No   MST Score: 0   Diagnosis:  Acute decompensated heart failure with reduced ejection fraction  Persistent ventricular tachycardia  Chronic atrial fibrillation     Relevant Medical History: Afib. CVA. HTN, PVD, CHF    Scheduled Medications:  amiodarone, 200 mg, BID  apixaban, 5 mg, BID  famotidine, 20 mg, BID  finasteride, 5 mg, Daily  furosemide (LASIX) injection, 40 mg, BID WM  methocarbamoL, 500 mg, Once  metoprolol tartrate, 50 mg, TID  mexiletine, 200 mg, Q8H  miconazole NITRATE 2 %, , BID  morphine, 1 mg, Once  neomycin-bacitracin-polymyxin, , TID  polyethylene glycol, 17 g, BID  pravastatin, 40 mg,  Daily  sacubitriL-valsartan, 1 tablet, BID  senna-docusate, 1 tablet, Daily  sodium chloride 3%, 4 mL, Q8H  sulfamethoxazole-trimethoprim 800-160mg, 2 tablet, BID    Continuous Infusions:  LIDOcaine, Last Rate: 7.5 mL/hr at 05/10/25 1935    PRN Medications:  acetaminophen, 650 mg, Q4H PRN  acetylcysteine 100 mg/ml (10%), 4 mL, TID PRN  bisacodyL, 10 mg, Daily PRN  guaiFENesin 100 mg/5 ml, 200 mg, Q4H PRN  LIDOcaine, , Continuous PRN  metoprolol, 5 mg, Q6H PRN  morphine, 2 mg, Q4H PRN  ondansetron, 8 mg, Q8H PRN  sodium chloride 0.9%, 10 mL, PRN  sodium chloride 0.9%, 10 mL, PRN  sodium chloride 0.9%, 10 mL, Q12H PRN  sodium chloride 0.9%, 10 mL, PRN    Calorie Containing IV Medications: no significant kcals from medications at this time    Recent Labs   Lab 05/30/25  0320 05/30/25  1545 05/31/25  0333 06/01/25  0250 06/02/25  0331 06/03/25  0424 06/04/25  0612 06/05/25  0430    143 145 144 144 143 142 143   K 5.8* 5.5* 4.5 4.4 4.6 4.4 4.5 4.2   CALCIUM 8.8 8.0* 7.9* 7.9* 8.2* 8.2* 8.4* 8.4*   PHOS 5.4*  --  4.8* 3.2 2.3 2.5 2.9 3.3   MG 2.50  --  2.40 2.30 2.10 1.90 1.80 1.90   * 106 110* 110* 110* 107 106 107   CO2 21* 24 23 25 27 28 29 29   BUN 48.7* 54.6* 52.4* 37.7* 31.4* 28.8* 24.8 27.0*   CREATININE 1.26* 1.45* 1.24 0.82 0.76 0.85 0.83 0.95   EGFRNORACEVR 58 49 59 >60 >60 >60 >60 >60    97 92 82 148* 121* 85 88   BILITOT 1.0 1.2 1.1 1.0 0.8 0.8 1.0 1.1   ALKPHOS 246* 232* 187* 168* 188* 179* 145 136   * 864* 732* 755* 687* 549* 383* 285*   * 1,534* 770* 505* 322* 167* 88* 47*   ALBUMIN 2.1* 2.1* 1.8* 1.8* 1.9* 2.1* 2.2* 2.2*   WBC 11.51*  --  10.87 8.04 8.36 9.89 7.86 8.82   HGB 12.0*  --  10.3* 10.3* 10.6* 10.8* 11.8* 10.8*   HCT 39.3*  --  33.3* 33.5* 33.9* 35.9* 38.4* 36.5*     Nutrition Orders:  Diet Pureed (IDDSI Level 4) No Straws; Moderately Thick Liquids (IDDSI Level 3); Isolation Tray - Styrofoam      Appetite/Oral Intake: good/% of meals  Factors Affecting  "Nutritional Intake: none identified  Social Needs Impacting Access to Food: none identified  Food/Synagogue/Cultural Preferences: unable to obtain  Food Allergies: no known food allergies  Last Bowel Movement: 06/01/25  Wound(s):     Wound 05/15/25 2115 Puncture Left Groin-Tissue loss description: Not applicable     Comments    5/15/25: Pt with 100% po intake of liquid diet. Stated appetite good. Unable to obtain further info or complete physical assessment. Interrupted by MD at time of visit. Will attempt upon F/U.     5/19/25: Pt now intubate.d Possibly going for procedure vs extubation today. Will provide TF recommendation in case needed. Pt also meets criteria for intake in malnutrition assessment, Junum not updating though. No kcal from meds.     5/21/25: Still no TF running. Plans for procedure today with possible extubation after, per RN. Receiving small amount of kcal from meds.     5/23/25: Still no po intake/nutrition. Plans for MBS today. Discussed needing NG placed if not able to advance diet. Will provide TF recommendations in case needed. Pt currently very hungry, wanting food.     5/24/25: Tube feeding initiated.    5/26/2025: TF running. Provided TF recommendations. No reported N/V. Last BM noted. Will monitor.    6/2/25: TF continues, tolerated per RN. Updated est needs.     6/5/2025: TF discontinued. The nurse reports a good appetite/PO intake. Pt denies N/V/D/C. Encouraged adequate PO intake. Last BM noted. Will monitor.    Anthropometrics    Height: 6' 0.01" (182.9 cm), Height Method: Measured  Last Weight: 115 kg (253 lb 8.5 oz) (05/12/25 1033), Weight Method: Bed Scale  BMI (Calculated): 34.4  BMI Classification: obese grade I (BMI 30-34.9)        Ideal Body Weight (IBW), Male: 178.06 lb     % Ideal Body Weight, Male (lb): 142.43 %                          Usual Weight Provided By: unable to obtain usual weight    Wt Readings from Last 5 Encounters:   05/12/25 115 kg (253 lb 8.5 oz)   04/02/25 " 113.4 kg (250 lb)   09/23/24 107 kg (236 lb)   03/06/24 108.4 kg (239 lb)   09/06/23 119.7 kg (264 lb)   5/26/2025: no new wts  6/5/2025: no new wts  Weight Change(s) Since Admission:   Wt Readings from Last 1 Encounters:   05/12/25 1033 115 kg (253 lb 8.5 oz)   05/08/25 1750 115 kg (253 lb 8.5 oz)   Admit Weight: 115 kg (253 lb 8.5 oz) (05/08/25 1750), Weight Method: Bed Scale    Estimated Needs    Weight Used For Calorie Calculations: 115 kg (253 lb 8.5 oz)  Energy Calorie Requirements (kcal): 2875-3450kcal (25-30kcal/kg)  Energy Need Method: Kcal/kg  Weight Used For Protein Calculations: 115 kg (253 lb 8.5 oz)  Protein Requirements: 138-161gm (1.2-1.4g/kg)  Fluid Requirements (mL): 2300ml (2ml/kg)  CHO Requirement: 320kgm (45% est kcal needs)     Enteral Nutrition   Patient not receiving enteral nutrition support at this time.    Parenteral Nutrition     Patient not receiving parenteral nutrition support at this time.    Evaluation of Received Nutrient Intake    Calories: meeting estimated needs  Protein: meeting estimated needs    Patient Education     Not applicable.    Nutrition Diagnosis     PES: Inadequate energy intake related to acute illness as evidenced by NPO or liquid diet since 5/9/25. (active)     PES: Moderate acute disease or injury related malnutrition Related to current condition As Evidenced by:  - weight loss: Unable to assess - muscle mass depletion: 1 area of mild muscle loss (Temporalis) - fluid accumulation: 2 areas of moderate or severe fluid accumulation (Lower extremities edema, Upper extremities edema) active    Nutrition Interventions     Intervention(s): collaboration with other providers  Intervention(s):      Goal: Meet greater than 80% of nutritional needs by follow-up. (goal progressing)    Nutrition Goals & Monitoring     Dietitian will monitor: energy intake and enteral nutrition intake  Discharge planning: pureed diet with texture modifications per SLP  Nutrition  Risk/Follow-Up: patient at increased nutrition risk; dietitian will follow-up twice weekly   Please consult if re-assessment needed sooner.

## 2025-06-05 NOTE — PLAN OF CARE
Sent Clinical Updates to [facility], clinically accepted.     Called Sasha @ [facility] to check on the status of Insurance Auth.and let her know  was  ready to DC today, She was in her morning meeting, so left a message for her to give me a call.     Waiting on Response.     Spoke with Ssaha, she asked for updated med list and latest Therapy notes, sent over and will send [id number] again as requested.     She will advise of insurance auth once she hears back.

## 2025-06-06 PROCEDURE — 97535 SELF CARE MNGMENT TRAINING: CPT

## 2025-06-06 PROCEDURE — 94760 N-INVAS EAR/PLS OXIMETRY 1: CPT

## 2025-06-06 PROCEDURE — 25000242 PHARM REV CODE 250 ALT 637 W/ HCPCS: Performed by: INTERNAL MEDICINE

## 2025-06-06 PROCEDURE — 25000003 PHARM REV CODE 250: Performed by: NURSE PRACTITIONER

## 2025-06-06 PROCEDURE — 21400001 HC TELEMETRY ROOM

## 2025-06-06 PROCEDURE — 97530 THERAPEUTIC ACTIVITIES: CPT | Mod: CQ

## 2025-06-06 PROCEDURE — 27000646 HC AEROBIKA DEVICE

## 2025-06-06 PROCEDURE — 63600175 PHARM REV CODE 636 W HCPCS: Performed by: NURSE PRACTITIONER

## 2025-06-06 PROCEDURE — 94640 AIRWAY INHALATION TREATMENT: CPT

## 2025-06-06 PROCEDURE — 25000003 PHARM REV CODE 250: Performed by: INTERNAL MEDICINE

## 2025-06-06 PROCEDURE — 99900031 HC PATIENT EDUCATION (STAT)

## 2025-06-06 PROCEDURE — 94664 DEMO&/EVAL PT USE INHALER: CPT

## 2025-06-06 PROCEDURE — 99900035 HC TECH TIME PER 15 MIN (STAT)

## 2025-06-06 RX ORDER — AMIODARONE HYDROCHLORIDE 200 MG/1
200 TABLET ORAL DAILY
Status: DISCONTINUED | OUTPATIENT
Start: 2025-06-07 | End: 2025-06-09 | Stop reason: HOSPADM

## 2025-06-06 RX ADMIN — SENNOSIDES AND DOCUSATE SODIUM 1 TABLET: 50; 8.6 TABLET ORAL at 10:06

## 2025-06-06 RX ADMIN — MICONAZOLE NITRATE: 20 POWDER TOPICAL at 10:06

## 2025-06-06 RX ADMIN — FUROSEMIDE 40 MG: 10 INJECTION, SOLUTION INTRAVENOUS at 05:06

## 2025-06-06 RX ADMIN — FUROSEMIDE 40 MG: 10 INJECTION, SOLUTION INTRAVENOUS at 10:06

## 2025-06-06 RX ADMIN — APIXABAN 5 MG: 5 TABLET, FILM COATED ORAL at 10:06

## 2025-06-06 RX ADMIN — MICONAZOLE NITRATE: 20 POWDER TOPICAL at 09:06

## 2025-06-06 RX ADMIN — Medication 4 ML: at 07:06

## 2025-06-06 RX ADMIN — FAMOTIDINE 20 MG: 20 TABLET, FILM COATED ORAL at 10:06

## 2025-06-06 RX ADMIN — FINASTERIDE 5 MG: 5 TABLET, FILM COATED ORAL at 10:06

## 2025-06-06 RX ADMIN — BACITRACIN ZINC, NEOMYCIN, POLYMYXIN B: 400; 3.5; 5 OINTMENT TOPICAL at 09:06

## 2025-06-06 RX ADMIN — SACUBITRIL AND VALSARTAN 1 TABLET: 24; 26 TABLET, FILM COATED ORAL at 09:06

## 2025-06-06 RX ADMIN — MEXILETINE HYDROCHLORIDE 200 MG: 200 CAPSULE ORAL at 05:06

## 2025-06-06 RX ADMIN — AMIODARONE HYDROCHLORIDE 200 MG: 200 TABLET ORAL at 10:06

## 2025-06-06 RX ADMIN — BACITRACIN ZINC, NEOMYCIN, POLYMYXIN B: 400; 3.5; 5 OINTMENT TOPICAL at 10:06

## 2025-06-06 RX ADMIN — METOPROLOL TARTRATE 50 MG: 50 TABLET, FILM COATED ORAL at 09:06

## 2025-06-06 RX ADMIN — SACUBITRIL AND VALSARTAN 1 TABLET: 24; 26 TABLET, FILM COATED ORAL at 10:06

## 2025-06-06 RX ADMIN — METOPROLOL TARTRATE 50 MG: 50 TABLET, FILM COATED ORAL at 10:06

## 2025-06-06 RX ADMIN — SULFAMETHOXAZOLE AND TRIMETHOPRIM 2 TABLET: 800; 160 TABLET ORAL at 10:06

## 2025-06-06 RX ADMIN — APIXABAN 5 MG: 5 TABLET, FILM COATED ORAL at 09:06

## 2025-06-06 RX ADMIN — PRAVASTATIN SODIUM 40 MG: 40 TABLET ORAL at 10:06

## 2025-06-06 RX ADMIN — Medication 4 ML: at 01:06

## 2025-06-06 RX ADMIN — FAMOTIDINE 20 MG: 20 TABLET, FILM COATED ORAL at 09:06

## 2025-06-06 RX ADMIN — MEXILETINE HYDROCHLORIDE 200 MG: 200 CAPSULE ORAL at 09:06

## 2025-06-06 NOTE — PT/OT/SLP PROGRESS
Occupational Therapy   Treatment    Name: Alcides Rosario  MRN: [id number]    Recommendations:     Recommended therapy intensity at discharge: Moderate Intensity Therapy   Discharge Equipment Recommendations:  to be determined by next level of care  Barriers to discharge:   (placement)    Assessment:     Alcides Rosario is a 79 y.o. male with a medical diagnosis of chronic systolic CHF with EF 20-25%, persistent vtach storm s/p ICD placement  and epicardial ablation , acute resp failure, ESBL pneumoniae, NSTEMI, hx PAD.     He presents with good motivation to particiapte. Performance deficits affecting function are weakness, impaired endurance, impaired self care skills, impaired functional mobility, impaired balance, decreased upper extremity function, decreased lower extremity function.     Rehab Prognosis:  Good; patient would benefit from acute skilled OT services to address these deficits and reach maximum level of function.       Plan:     Patient to be seen 5 x/week to address the above listed problems via self-care/home management, therapeutic activities, therapeutic exercises  Plan of Care Expires: 25  Plan of Care Reviewed with: patient    Subjective     Pain/Comfort:  Pain Rating 1: 0/10    Objective:     Communicated with: nurse prior to session.  Patient found HOB elevated with peripheral IV, telemetry, pulse ox (continuous), PureWick upon OT entry to room.    General Precautions: Standard, fall    Orthopedic Precautions:N/A  Braces: N/A  Respiratory Status: Room air  Vital Signs: Blood Pressure: standin/51; sitting 97/60; sitting after rest: 74/63     Occupational Performance:     Functional Mobility/Transfers:  Supine to Sit: stand by assistance and minimum assistance  Sit to Stand:  maximal assistance and of 2 persons with hand-held assist and rolling walker  Bed to Chair: maximal assistance and of 2 persons with  hand-held assist  using  Squat Pivot      Activities of Daily  Living:  Grooming: total A to wash and dry hair; min A to comb hair; min A to wash face with warm cloth    Lower Body Dressing: total assistance ana brief in supine  Toileting: maximal assistance purewick in place  Standing frame used for increased function of tasks but time standing was limited due to hypotension    Co-Treatment: Yes, due to Limited activity tolerance and hypotension    Patient Education:  Patient provided with verbal education education regarding OT role/goals/POC, fall prevention, safety awareness, Discharge/DME recommendations, and pressure ulcer prevention.  Understanding was verbalized, however additional teaching warranted.      Patient left up in chair with all lines intact, call button in reach, geomat cushion, acosta pad in place, and nurse notified.    GOALS:   Multidisciplinary Problems       Occupational Therapy Goals          Problem: Occupational Therapy    Goal Priority Disciplines Outcome Interventions   Occupational Therapy Goal     OT, PT/OT Progressing    Description: Goals to be met by: 7/2/25    Patient will increase functional independence with ADLs by performing:    UE Dressing with Minimal Assistance.  LE Dressing with Mod Assistance.  Toileting from toilet/BSC with Minimal Assistance for hygiene and clothing management.   Toilet transfer to toilet with Minimal Assistance.  Increased functional strength to 4/5 through therEX.                         Time Tracking:     OT Date of Treatment: 06/06/25  OT Start Time: 1324  OT Stop Time: 1409  OT Total Time (min): 45 min    Billable Minutes:Self Care/Home Management 45    OT/TESHA: OT     Number of TESHA visits since last OT visit: 2    6/6/2025
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
## 2025-06-06 NOTE — PT/OT/SLP PROGRESS
Physical Therapy Treatment    Patient Name:  Alcides Rosario   MRN:  [id number]    Recommendations:     Discharge therapy intensity: Moderate Intensity Therapy   Discharge Equipment Recommendations: to be determined by next level of care  Barriers to discharge: Ongoing medical needs and placement    Assessment:     Alcides Rosario is a 79 y.o. male admitted with a medical diagnosis of acute on chronic systolic CHF with EF 20-25%, persistent vtach storm s/p ICD placement 5/16 and epicardial ablation 5/20, acute resp failure, ESBL pneumoniae, NSTEMI, hx PAD. .  He presents with the following impairments/functional limitations: weakness, impaired endurance, impaired functional mobility, impaired balance, decreased lower extremity function, edema .     Performed standing frame activity but pt became hypotensive once up in standing after ~2min. Returned pt to sitting EOB to assess bp, 94/66. Attempted standing once more for 3 min, BP machine unable to assess BP, returned pt back to sitting for safety.    Rehab Prognosis: Good; patient would benefit from acute skilled PT services to address these deficits and reach maximum level of function.    Recent Surgery: Procedure(s) (LRB):  Ablation VT (N/A)  EP - diagnostic 7 Days Post-Op    Plan:     During this hospitalization, patient would benefit from acute PT services 5 x/week to address the identified rehab impairments via gait training, therapeutic activities, therapeutic exercises, neuromuscular re-education and progress toward the following goals:    Plan of Care Expires:  07/02/25    Subjective     Chief Complaint: none  Patient/Family Comments/goals:   Pain/Comfort:         Objective:     Communicated with RN prior to session.  Patient found HOB elevated with PureWick, telemetry, pulse ox (continuous) upon PT entry to room.     General Precautions: Standard, fall, aspiration  Orthopedic Precautions: N/A  Braces: N/A  Respiratory Status: Room air  BP: 77/51 with ax  Skin  Integrity: Visible skin intact      Functional Mobility:  Bed Mobility:     Supine to Sit: stand by assistance and minimum assistance  Transfers:     Sit to Stand:  maximal assistance and of 2 persons with hand-held assist and rolling walker  Bed to Chair: maximal assistance and of 2 persons with  hand-held assist  using  Squat Pivot    Therapeutic Activities/Exercises:  Performed standing frame for ~2min, then 4min, ~10min sitting rest break between trial. Standing time limited 2/2 hypotention    Co-Treatment: Yes, due to Limited activity tolerance    Education:  Patient provided with verbal education education regarding PT role/goals/POC.  Understanding was verbalized.     Patient left up in chair with all lines intact, call button in reach, acosta pad in place, and rn notified    GOALS:   Multidisciplinary Problems       Physical Therapy Goals          Problem: Physical Therapy    Goal Priority Disciplines Outcome Interventions   Physical Therapy Goal     PT, PT/OT Progressing    Description: Goals to be met by: 2025     Patient will increase functional independence with mobility by performin. Supine to sit with MInimal Assistance  2. Sit to stand transfer with Minimal Assistance  3. Gait  x 50 feet with Minimal Assistance using Rolling Walker.   4. Sitting at edge of bed x5 minutes with Supervision                         Time Tracking:     PT Received On: 25  PT Start Time: 1330     PT Stop Time: 1409  PT Total Time (min): 39 min     Billable Minutes: Therapeutic Activity 39    Treatment Type: Treatment  PT/PTA: PTA     Number of PTA visits since last PT visit: 2025

## 2025-06-06 NOTE — PLAN OF CARE
Sent Clinical Updates and PASRR / 142 to [facility].  PASRR/142 did not go through yesterday.     Verified with [facility] that they received all document needed to submit to Insurance.   Confirmed submitted and pending Insurance Authorization.

## 2025-06-07 PROCEDURE — 94664 DEMO&/EVAL PT USE INHALER: CPT

## 2025-06-07 PROCEDURE — 63600175 PHARM REV CODE 636 W HCPCS: Performed by: NURSE PRACTITIONER

## 2025-06-07 PROCEDURE — 94640 AIRWAY INHALATION TREATMENT: CPT

## 2025-06-07 PROCEDURE — 94799 UNLISTED PULMONARY SVC/PX: CPT

## 2025-06-07 PROCEDURE — 25000003 PHARM REV CODE 250: Performed by: NURSE PRACTITIONER

## 2025-06-07 PROCEDURE — 99900035 HC TECH TIME PER 15 MIN (STAT)

## 2025-06-07 PROCEDURE — 25000003 PHARM REV CODE 250: Performed by: INTERNAL MEDICINE

## 2025-06-07 PROCEDURE — 25000242 PHARM REV CODE 250 ALT 637 W/ HCPCS: Performed by: INTERNAL MEDICINE

## 2025-06-07 PROCEDURE — 99900031 HC PATIENT EDUCATION (STAT)

## 2025-06-07 PROCEDURE — 94760 N-INVAS EAR/PLS OXIMETRY 1: CPT

## 2025-06-07 PROCEDURE — 21400001 HC TELEMETRY ROOM

## 2025-06-07 RX ADMIN — MEXILETINE HYDROCHLORIDE 200 MG: 200 CAPSULE ORAL at 08:06

## 2025-06-07 RX ADMIN — FUROSEMIDE 40 MG: 10 INJECTION, SOLUTION INTRAVENOUS at 09:06

## 2025-06-07 RX ADMIN — Medication 4 ML: at 03:06

## 2025-06-07 RX ADMIN — BACITRACIN ZINC, NEOMYCIN, POLYMYXIN B: 400; 3.5; 5 OINTMENT TOPICAL at 04:06

## 2025-06-07 RX ADMIN — FAMOTIDINE 20 MG: 20 TABLET, FILM COATED ORAL at 08:06

## 2025-06-07 RX ADMIN — APIXABAN 5 MG: 5 TABLET, FILM COATED ORAL at 08:06

## 2025-06-07 RX ADMIN — FAMOTIDINE 20 MG: 20 TABLET, FILM COATED ORAL at 09:06

## 2025-06-07 RX ADMIN — SACUBITRIL AND VALSARTAN 1 TABLET: 24; 26 TABLET, FILM COATED ORAL at 09:06

## 2025-06-07 RX ADMIN — Medication 4 ML: at 08:06

## 2025-06-07 RX ADMIN — METOPROLOL TARTRATE 50 MG: 50 TABLET, FILM COATED ORAL at 08:06

## 2025-06-07 RX ADMIN — APIXABAN 5 MG: 5 TABLET, FILM COATED ORAL at 09:06

## 2025-06-07 RX ADMIN — METOPROLOL TARTRATE 50 MG: 50 TABLET, FILM COATED ORAL at 09:06

## 2025-06-07 RX ADMIN — MEXILETINE HYDROCHLORIDE 200 MG: 200 CAPSULE ORAL at 06:06

## 2025-06-07 RX ADMIN — BACITRACIN ZINC, NEOMYCIN, POLYMYXIN B: 400; 3.5; 5 OINTMENT TOPICAL at 08:06

## 2025-06-07 RX ADMIN — AMIODARONE HYDROCHLORIDE 200 MG: 200 TABLET ORAL at 09:06

## 2025-06-07 RX ADMIN — SENNOSIDES AND DOCUSATE SODIUM 1 TABLET: 50; 8.6 TABLET ORAL at 09:06

## 2025-06-07 RX ADMIN — BACITRACIN ZINC, NEOMYCIN, POLYMYXIN B: 400; 3.5; 5 OINTMENT TOPICAL at 09:06

## 2025-06-07 RX ADMIN — Medication 4 ML: at 12:06

## 2025-06-07 RX ADMIN — Medication 4 ML: at 11:06

## 2025-06-07 RX ADMIN — MICONAZOLE NITRATE: 20 POWDER TOPICAL at 08:06

## 2025-06-07 RX ADMIN — PRAVASTATIN SODIUM 40 MG: 40 TABLET ORAL at 09:06

## 2025-06-07 RX ADMIN — MICONAZOLE NITRATE: 20 POWDER TOPICAL at 09:06

## 2025-06-07 RX ADMIN — FUROSEMIDE 40 MG: 10 INJECTION, SOLUTION INTRAVENOUS at 04:06

## 2025-06-07 RX ADMIN — METOPROLOL TARTRATE 50 MG: 50 TABLET, FILM COATED ORAL at 04:06

## 2025-06-07 RX ADMIN — MEXILETINE HYDROCHLORIDE 200 MG: 200 CAPSULE ORAL at 04:06

## 2025-06-07 RX ADMIN — SACUBITRIL AND VALSARTAN 1 TABLET: 24; 26 TABLET, FILM COATED ORAL at 08:06

## 2025-06-07 RX ADMIN — FINASTERIDE 5 MG: 5 TABLET, FILM COATED ORAL at 09:06

## 2025-06-07 NOTE — PROGRESS NOTES
[facility]   Cardiology  Progress Note    Patient Name: Alcides Rosario  MRN: [id number]  Admission Date: 5/8/2025  Hospital Length of Stay: 30 days  Code Status: Full Code   Attending Physician: Bar Peck MD   Primary Care Physician: Maycol Mcleod II, MD  Expected Discharge Date:   Principal Problem:<principal problem not specified>    Subjective:     Brief HPI: This is an 79-year-old male, who is known to Dr. Bravo, with a history of sick sinus syndrome/ppm, chronic systolic heart failure, PAF, HTN, HLD, CAD, PVD.  [place] [facility] [place] after syncopal episode.  Patient reported the has been having epigastric discomfort/pressure before leaving a restaurant.  His heart rate on seen was 190 beats per minute.  He was given 300 mg of amiodarone by EMS followed by synchronized cardioversion in the emergency room which only briefly improved his rate.  He was found to be in VT.  Echo at outside facility revealed an ejection fraction of 10%.  He was transferred to [facility] for higher level of care.  Plan was noted to have patient undergo left heart catheterization with Impella placement and EP study with VT ablation.  [facility] has been consulted for this reason.     Hospital Course:   5.10.25: NAD noted. Slow VT still on monitor. Impella in place. Bilateral groin benign. Denies CP/SOB/Palps.  5.11.25: NAD noted. Wide complex tachycardia on tele. Attempted Esmolol yesterday but had to be DCd  secondary to hypotension. Remains with Impella  5.12.25: NAD noted. WCT on tele. Remains on Amio and Lidocaine. NPO for VT ablation today. Denies CP/SOB/Palps.  5.13.25: NAD noted. WCT on tele. ON Amio and Lidocaine. Denies CP/SOB/palps. Impella in place.  5.14.25: NAD noted. WCT on tele. Remains on Lidocaine. Denies CP/sOB/palps. Impella removed.  5.15.25: NAD noted. ST with trigeminal. Denies CP/SOB/PALPS.    5.16.25: NAD noted. ST on tele. Denies  "CP/SOB/palps. NPO for ICD today  5.17.25: NAD. Vented/Sedated. Intermittent VT.  ICD Upgrade/VT Ablation on 5.16.25 5.18.25: NAD. Vented/Sedated. Continues to have Intermittent VT/ST. Amiodarone 1mg/min, Lidocaine 1mg/min, Levophed 0.05mcg/kg/min   5.19.25: Vented and sedated. Still with intermittent VT on tele. Remains on IV amio, Levophed, and Lidocaine.   5.20.25: Vented/sedated. WCT with rates in the low 100s -110s. Remains on IV Amio, Levo, and Lidocaine  5.21.25: Vented/sedated. WCT on tele with rates in the 100s. Remains on IV Amio, Levo and Lidocaine  5.22.25: Extubated and on NC. WCT in the low 100s today. Denies CP/sOB/Palps. Right groin benign.  5.23.25: ST on tele. Denies CP/SOB/Palps. Awaiting ST eval.  5.24.25: NAD. Remains in ST. Denies CP, SOB and Palps. "I am ok." NC O2  5.25.25: NAD. ST. Denies CP, SOB and Palps. "I am fine." NC O2  5.26.25: NAD. Feeling OK; frustrated with being in the hospital. SOB improving.   5.27.25: Feeling OK. NAD. Breathing better.    5.28.25: Restarted an amiodarone gtt on 5.27.25 and BB  5.30.25: Vented/sedated. Underwent epicardial VT ablation today. Pericardial drain in place  5.31.25: NAD. Vented/Sedated. S/P VT Ablation. Pericardial Drain. AST//732  6.1.25: NAD. "I am feeling better." Denies CP, SOB and Palps. AST//755  6.3.25: NAD noted. Paced on tele. Denies CP/SOB/palps.   6.4.25: NAD noted. Paced on tele. Denies CP/SOB/palps. Cleared for Diet now.  6.5.25: NAD noted. Paced on tele. Denies CP/SOB/Palps. He is upset about diet restrictions. SNF Placement pending.  6.6.25: NAD noted. Paced on tele. Denies CP/SOB/Palps. SNF pending.  6.7.25: NAD. "I am good." Denies, CP, SOB and Palps. Pending SNF Placement. No VT Noted on Telemetry.    PMH: SSS/PPM, Chronic Systolic HF, PAF, HTN, HLD, CAD, PVD  PSH: PPM (SJM), Tonsillectomy, Embolectomy, LHC  Social History:  Former Tobacco Use, Denies ETOH and Illicit Drug Use  Family History:  Mother-MI; " brother-CAD     Previous Cardiac Diagnostics:   EP Study/VT 5.21.25:  3D mapping performed with Ensite.  Intracardiac ECHO.  Transeptal puncture.  VT ablation.    EP Study/VT 5.16.25:  3D mapping performed with Ensite.  Intracardiac echo.  Transeptal puncture.  VT ablation  Successful Implantation of BiV ICD ([employer])    ECHO Limited 5.9.25  Limited echo to check impella placement.  Impella is seated 3.9 cm from aortic valve annulus.    L/RHC 5.9.25  The Prox Cx to Dist Cx lesion was 50% stenosed.  However, the IFR was 0.96, indicating the absence of any obstructive coronary artery disease at this level.  The Prox LAD to Dist LAD lesion was 60% stenosed.  However, the IFR was 0.96, indicating the absence of any obstructive coronary artery disease at this level.  The ejection fraction was calculated to be 15%.  There was severe left ventricular systolic dysfunction.  The left ventricular end diastolic pressure was severely elevated.  The pre-procedure left ventricular end diastolic pressure was 23.  The estimated blood loss was none.  There was single vessel coronary artery disease.  There was trivial (1+) mitral regurgitation.  There was no aortic valve stenosis.  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  Strong distal collaterals from the left coronary system.    ECHO 7.25.24  The study quality is average.   Global left ventricular systolic function is mildly decreased. The left ventricular ejection fraction is 50%. Left ventricular diastolic function is indeterminate. Noted left ventricular hypertrophy. It is severe.  Moderate (2+) mitral regurgitation. ERO-A0.21cm^2  Mild (1+) pulmonic regurgitation. Mild (1+) tricuspid regurgitation.   The left atrial diameter is moderately increased 5.2 cms.  The estimated right atrial pressure is 15 mmHg.      PET 12.29.22  This is an abnormal perfusion study. Study is consistent with  ischemia.   This scan is suggestive of moderate risk for future cardiovascular events.   Small partially reversible perfusion abnormality of severe intensity in the inferior lateral region.   The left ventricular cavity is noted to be moderately enlarged on the stress studies. The stress left ventricular ejection fraction was calculated to be 40% and left ventricular global function is mildly reduced. The rest left ventricular cavity is noted to be moderately enlarged. The rest left ventricular ejection fraction was calculated to be 32% and rest left ventricular global function is moderately reduced.   When compared to the resting ejection fraction (32%), the stress ejection fraction (40%) has increased.   The study quality is good.   There was a rise in myocardial blood flow between rest and stress.  Global myocardial blood flow reserve was 1.97.  Myocardial blood flow reserve is globally abnormal, placing the patient at a higher coronary event risk.    Review of Systems   Constitutional: Positive for malaise/fatigue. Negative for night sweats.   Cardiovascular:  Negative for chest pain, leg swelling and palpitations.   Respiratory:  Negative for shortness of breath.    All other systems reviewed and are negative.    Objective:     Vital Signs (Most Recent):  Temp: 97.4 °F (36.3 °C) (06/07/25 0755)  Pulse: 80 (06/07/25 0816)  Resp: 18 (06/07/25 0816)  BP: 108/66 (06/07/25 0755)  SpO2: 99 % (06/07/25 0816) Vital Signs (24h Range):  Temp:  [97.3 °F (36.3 °C)-97.9 °F (36.6 °C)] 97.4 °F (36.3 °C)  Pulse:  [79-86] 80  Resp:  [16-18] 18  SpO2:  [93 %-100 %] 99 %  BP: (102-108)/(66-77) 108/66     Weight: 115 kg (253 lb 8.5 oz)  Body mass index is 34.38 kg/m².    SpO2: 99 %         Intake/Output Summary (Last 24 hours) at 6/7/2025 1039  Last data filed at 6/6/2025 1503  Gross per 24 hour   Intake 360 ml   Output 900 ml   Net -540 ml     Lines/Drains/Airways       Drain  Duration             Male External Urinary Catheter  "06/01/25 0900 6 days              Peripheral Intravenous Line  Duration                  Peripheral IV - Single Lumen 05/28/25 1500 Anterior;Proximal;Right Upper Arm 9 days                  Significant Labs: CMP   No results for input(s): "NA", "K", "CL", "CO2", "GLU", "BUN", "CREATININE", "CALCIUM", "PROT", "ALBUMIN", "BILITOT", "ALKPHOS", "AST", "ALT", "ANIONGAP", "ESTGFRAFRICA", "EGFRNONAA" in the last 48 hours.   and CBC   No results for input(s): "WBC", "HGB", "HCT", "PLT" in the last 48 hours.    Telemetry: SR    Physical Exam  Constitutional:       General: He is not in acute distress.     Appearance: Normal appearance. He is obese. He is ill-appearing.   HENT:      Head: Normocephalic.      Mouth/Throat:      Mouth: Mucous membranes are moist.   Eyes:      Extraocular Movements: Extraocular movements intact.   Cardiovascular:      Rate and Rhythm: Normal rate and regular rhythm.      Pulses: Normal pulses.      Heart sounds: Normal heart sounds. No murmur heard.     Comments: Paced  Pulmonary:      Effort: Pulmonary effort is normal. No respiratory distress.      Breath sounds: Examination of the right-lower field reveals decreased breath sounds. Examination of the left-lower field reveals decreased breath sounds. Decreased breath sounds present.      Comments: NC O2  Abdominal:      Palpations: Abdomen is soft.   Skin:     General: Skin is warm.      Comments: L CW Pacer Site Dressing C/D/I. Bilateral Groins Soft/Flat, Non-Tender, No Sign of Bleed/Infection. +2 BLE Palpable Pedal Pulses    Neurological:      General: No focal deficit present.      Mental Status: He is alert and oriented to person, place, and time.   Psychiatric:         Behavior: Behavior normal.         Judgment: Judgment normal.       Current Inpatient Medications:  Current Medications[1]    Assessment:   VT requiring Multiple Antiarrhythmics - Stable     - s/p (5.21.25) - 3D mapping performed with Ensite, Intracardiac ECHO, Transeptal " puncture, VT Ablation    - s/p (5.16.25) - EP Study and Successful VT Ablation and Device Upgrade to BiV ICD (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%    - ECHO (5.31.25) - LVEF 25-35%  Syncope  PAF - Now WCT - Now SR with Episdoes of NSVT - Improved     - CHADsVASc - 5 Points - 7.2% Stroke Risk per Year   SSS/PPM (SJM) - Upgraded - See Above  HTN  HLD  Leukocytosis - Resolved    Chronic Systolic HF/EF 25-30% - Compensated     - ECHO (5.31.25) - LVEF 25-30%  Transaminitis - Improving   Electrolyte Derangements - Hypokalemia, Hypomagnesemia - Resolved     Plan:   Continue Amiodarone, BB, Mexiletine, Entresto   Continue Eliquis Re Stroke Risk Reduction   Continue IV Lasix   Accurate I&Os and Daily Weight   PT/OT/ST Following  Mobilize as Able with PT/OT   Keep K > 4.0 and Mg > 2.0   Case Management for SNF Placement  Labs from Today Not Resulted (6.7.25 @ 2:19 PM)  Labs in AM: CBC, CMP and Mg     FROYLAN Ann  Cardiology  [facility]  06/07/2025         [1]   Current Facility-Administered Medications:     acetaminophen tablet 650 mg, 650 mg, Per NG tube, Q4H PRN, Bar Peck MD    acetylcysteine 100 mg/ml (10%) solution 4 mL, 4 mL, Nebulization, TID PRN, Peace Mott MD    amiodarone tablet 200 mg, 200 mg, Oral, Daily, Jhon Ramsey AGACNP-BC, 200 mg at 06/07/25 0901    apixaban tablet 5 mg, 5 mg, Oral, BID, Jhon Ramsey AGACNP-BC, 5 mg at 06/07/25 0901    bisacodyL suppository 10 mg, 10 mg, Rectal, Daily PRN, Peace Mott MD    famotidine tablet 20 mg, 20 mg, Oral, BID, Bar Peck MD, 20 mg at 06/07/25 0901    finasteride tablet 5 mg, 5 mg, Oral, Daily, Bar Peck MD, 5 mg at 06/07/25 0902    furosemide injection 40 mg,  40 mg, Intravenous, BID WM, Jhon Ramsey AGACNPONCHO-BC, 40 mg at 06/07/25 0901    guaiFENesin 100 mg/5 ml syrup 200 mg, 200 mg, Per NG tube, Q4H PRN, Misha Johansen DO    LIDOcaine 2000 mg in D5W 250 mL infusion, , , Continuous PRN, Lalo Dominguez MD, Last Rate: 7.5 mL/hr at 05/10/25 1935, Rate Verify at 05/10/25 1935    methocarbamoL tablet 500 mg, 500 mg, Oral, Once, Shawn Olivas MD    metoprolol injection 5 mg, 5 mg, Intravenous, Q6H PRN, Amelia Gallardo FNP, 5 mg at 05/28/25 0438    metoprolol tartrate (LOPRESSOR) tablet 50 mg, 50 mg, Oral, TID, Bar Peck MD, 50 mg at 06/07/25 0901    mexiletine capsule 200 mg, 200 mg, Oral, Q8H, Bar Peck MD, 200 mg at 06/07/25 0600    miconazole NITRATE 2 % top powder, , Topical (Top), BID, Asad Randle MD, Given at 06/07/25 0902    morphine injection 1 mg, 1 mg, Intravenous, Once, Devaughn Magdaleno MD    morphine injection 2 mg, 2 mg, Intravenous, Q4H PRN, Devaughn Magdaleno MD    neomycin-bacitracin-polymyxin ointment, , Topical (Top), TID, Jhon Ramsey AGACN-BC, Given at 06/07/25 0901    ondansetron disintegrating tablet 8 mg, 8 mg, Per NG tube, Q8H PRN, Bar Peck MD    polyethylene glycol packet 17 g, 17 g, Oral, BID, Bar Peck MD, 17 g at 06/05/25 2037    pravastatin tablet 40 mg, 40 mg, Oral, Daily, Bar Peck MD, 40 mg at 06/07/25 0901    sacubitriL-valsartan 24-26 mg per tablet 1 tablet, 1 tablet, Oral, BID, Bar Peck MD, 1 tablet at 06/07/25 0901    senna-docusate 8.6-50 mg per tablet 1 tablet, 1 tablet, Oral, Daily, Bar Peck MD, 1 tablet at 06/07/25 0901    sodium chloride 0.9% flush 10 mL, 10 mL, Intravenous, PRN, Misha Johansen DO    sodium chloride 0.9% flush 10 mL, 10 mL, Intravenous, PRN, Jhon Ramsey, [facility]    Flushing PICC/Midline Protocol, , , Until Discontinued **AND** sodium chloride 0.9% flush 10 mL, 10 mL, Intravenous, Q12H PRN, Isac Samayoa MD    sodium  chloride 0.9% flush 10 mL, 10 mL, Intravenous, PRN, Isac Samayoa MD    sodium chloride 3% nebulizer solution 4 mL, 4 mL, Nebulization, Q8H, Oleksandr Cochran MD, 4 mL at 06/07/25 0811

## 2025-06-08 LAB
ALBUMIN SERPL-MCNC: 2.3 G/DL (ref 3.4–4.8)
ALBUMIN/GLOB SERPL: 0.6 RATIO (ref 1.1–2)
ALP SERPL-CCNC: 111 UNIT/L (ref 40–150)
ALT SERPL-CCNC: 136 UNIT/L (ref 0–55)
ANION GAP SERPL CALC-SCNC: 10 MEQ/L
AST SERPL-CCNC: 26 UNIT/L (ref 11–45)
BILIRUB SERPL-MCNC: 1.2 MG/DL
BUN SERPL-MCNC: 24.5 MG/DL (ref 8.4–25.7)
CALCIUM SERPL-MCNC: 8.4 MG/DL (ref 8.8–10)
CHLORIDE SERPL-SCNC: 114 MMOL/L (ref 98–107)
CO2 SERPL-SCNC: 24 MMOL/L (ref 23–31)
COLOR STL: ABNORMAL
CONSISTENCY STL: ABNORMAL
CREAT SERPL-MCNC: 1 MG/DL (ref 0.72–1.25)
CREAT/UREA NIT SERPL: 25
ERYTHROCYTE [DISTWIDTH] IN BLOOD BY AUTOMATED COUNT: 18.6 % (ref 11.5–17)
GFR SERPLBLD CREATININE-BSD FMLA CKD-EPI: >60 ML/MIN/1.73/M2
GLOBULIN SER-MCNC: 4.1 GM/DL (ref 2.4–3.5)
GLUCOSE SERPL-MCNC: 97 MG/DL (ref 82–115)
HCT VFR BLD AUTO: 39.5 % (ref 42–52)
HCT VFR BLD AUTO: 41 % (ref 42–52)
HEMOCCULT SP1 STL QL: POSITIVE
HGB BLD-MCNC: 11.8 G/DL (ref 14–18)
HGB BLD-MCNC: 12.9 G/DL (ref 14–18)
MAGNESIUM SERPL-MCNC: 2 MG/DL (ref 1.6–2.6)
MCH RBC QN AUTO: 28.7 PG (ref 27–31)
MCHC RBC AUTO-ENTMCNC: 29.9 G/DL (ref 33–36)
MCV RBC AUTO: 96.1 FL (ref 80–94)
NRBC BLD AUTO-RTO: 0 %
PLATELET # BLD AUTO: 208 X10(3)/MCL (ref 130–400)
PMV BLD AUTO: 12.4 FL (ref 7.4–10.4)
POTASSIUM SERPL-SCNC: 4 MMOL/L (ref 3.5–5.1)
PROT SERPL-MCNC: 6.4 GM/DL (ref 5.8–7.6)
RBC # BLD AUTO: 4.11 X10(6)/MCL (ref 4.7–6.1)
SODIUM SERPL-SCNC: 148 MMOL/L (ref 136–145)
WBC # BLD AUTO: 9.66 X10(3)/MCL (ref 4.5–11.5)

## 2025-06-08 PROCEDURE — 36415 COLL VENOUS BLD VENIPUNCTURE: CPT | Performed by: NURSE PRACTITIONER

## 2025-06-08 PROCEDURE — 94640 AIRWAY INHALATION TREATMENT: CPT

## 2025-06-08 PROCEDURE — 85027 COMPLETE CBC AUTOMATED: CPT | Performed by: NURSE PRACTITIONER

## 2025-06-08 PROCEDURE — 51798 US URINE CAPACITY MEASURE: CPT

## 2025-06-08 PROCEDURE — 94664 DEMO&/EVAL PT USE INHALER: CPT

## 2025-06-08 PROCEDURE — 25000242 PHARM REV CODE 250 ALT 637 W/ HCPCS: Performed by: INTERNAL MEDICINE

## 2025-06-08 PROCEDURE — 99900031 HC PATIENT EDUCATION (STAT)

## 2025-06-08 PROCEDURE — 25000003 PHARM REV CODE 250: Performed by: INTERNAL MEDICINE

## 2025-06-08 PROCEDURE — 51702 INSERT TEMP BLADDER CATH: CPT

## 2025-06-08 PROCEDURE — 99900035 HC TECH TIME PER 15 MIN (STAT)

## 2025-06-08 PROCEDURE — 63600175 PHARM REV CODE 636 W HCPCS: Performed by: NURSE PRACTITIONER

## 2025-06-08 PROCEDURE — 21400001 HC TELEMETRY ROOM

## 2025-06-08 PROCEDURE — 94760 N-INVAS EAR/PLS OXIMETRY 1: CPT

## 2025-06-08 PROCEDURE — 25000003 PHARM REV CODE 250: Performed by: NURSE PRACTITIONER

## 2025-06-08 PROCEDURE — 85018 HEMOGLOBIN: CPT | Performed by: NURSE PRACTITIONER

## 2025-06-08 PROCEDURE — 82272 OCCULT BLD FECES 1-3 TESTS: CPT | Performed by: NURSE PRACTITIONER

## 2025-06-08 PROCEDURE — 80053 COMPREHEN METABOLIC PANEL: CPT | Performed by: NURSE PRACTITIONER

## 2025-06-08 PROCEDURE — 94799 UNLISTED PULMONARY SVC/PX: CPT

## 2025-06-08 PROCEDURE — 83735 ASSAY OF MAGNESIUM: CPT | Performed by: NURSE PRACTITIONER

## 2025-06-08 RX ADMIN — MEXILETINE HYDROCHLORIDE 200 MG: 200 CAPSULE ORAL at 05:06

## 2025-06-08 RX ADMIN — SENNOSIDES AND DOCUSATE SODIUM 1 TABLET: 50; 8.6 TABLET ORAL at 10:06

## 2025-06-08 RX ADMIN — Medication 4 ML: at 03:06

## 2025-06-08 RX ADMIN — AMIODARONE HYDROCHLORIDE 200 MG: 200 TABLET ORAL at 10:06

## 2025-06-08 RX ADMIN — MICONAZOLE NITRATE: 20 POWDER TOPICAL at 10:06

## 2025-06-08 RX ADMIN — FAMOTIDINE 20 MG: 20 TABLET, FILM COATED ORAL at 08:06

## 2025-06-08 RX ADMIN — METOPROLOL TARTRATE 50 MG: 50 TABLET, FILM COATED ORAL at 08:06

## 2025-06-08 RX ADMIN — FINASTERIDE 5 MG: 5 TABLET, FILM COATED ORAL at 10:06

## 2025-06-08 RX ADMIN — METOPROLOL TARTRATE 50 MG: 50 TABLET, FILM COATED ORAL at 10:06

## 2025-06-08 RX ADMIN — FAMOTIDINE 20 MG: 20 TABLET, FILM COATED ORAL at 10:06

## 2025-06-08 RX ADMIN — Medication 4 ML: at 08:06

## 2025-06-08 RX ADMIN — MEXILETINE HYDROCHLORIDE 200 MG: 200 CAPSULE ORAL at 08:06

## 2025-06-08 RX ADMIN — BACITRACIN ZINC, NEOMYCIN, POLYMYXIN B: 400; 3.5; 5 OINTMENT TOPICAL at 10:06

## 2025-06-08 RX ADMIN — MEXILETINE HYDROCHLORIDE 200 MG: 200 CAPSULE ORAL at 02:06

## 2025-06-08 RX ADMIN — METOPROLOL TARTRATE 50 MG: 50 TABLET, FILM COATED ORAL at 02:06

## 2025-06-08 RX ADMIN — MICONAZOLE NITRATE: 20 POWDER TOPICAL at 08:06

## 2025-06-08 RX ADMIN — BACITRACIN ZINC, NEOMYCIN, POLYMYXIN B: 400; 3.5; 5 OINTMENT TOPICAL at 02:06

## 2025-06-08 RX ADMIN — PRAVASTATIN SODIUM 40 MG: 40 TABLET ORAL at 10:06

## 2025-06-08 RX ADMIN — SACUBITRIL AND VALSARTAN 1 TABLET: 24; 26 TABLET, FILM COATED ORAL at 08:06

## 2025-06-08 RX ADMIN — SACUBITRIL AND VALSARTAN 1 TABLET: 24; 26 TABLET, FILM COATED ORAL at 10:06

## 2025-06-08 RX ADMIN — FUROSEMIDE 40 MG: 10 INJECTION, SOLUTION INTRAVENOUS at 01:06

## 2025-06-08 RX ADMIN — FUROSEMIDE 40 MG: 10 INJECTION, SOLUTION INTRAVENOUS at 08:06

## 2025-06-08 RX ADMIN — BACITRACIN ZINC, NEOMYCIN, POLYMYXIN B: 400; 3.5; 5 OINTMENT TOPICAL at 08:06

## 2025-06-08 NOTE — CONSULTS
Consult Note    Reason for Consult:      We were consulted to evaluate this patient for GIB.     HPI:   79-year-old male previously known to Dr. Curry with PMH of sick sinus syndrome s/p bpm, CHF, PAF, HTN, CAD, PVD [facility] with syncopal episode.  Found to be in V-tach and echo showed EF of 10%.  Transferred to [facility] 5/8.  Underwent right and left heart catheterization 5/9 showing multivessel CAD and EF of 15%, severe L ventricular systolic dysfunction, severely elevated L ventricular end-diastolic pressure and an Impella was placed (removed 5/14).  He was also placed on amiodarone and lidocaine drips.  Underwent VT ablation on 05/12. On 05/16/2025 he underwent ICD placement with another VT ablation and was subsequently transferred back to the ICU intubated and sedated.  It appears patient continued to have runs of VT and remained on amnio and lidocaine as well as Levophed.  He was extubated again on 05/22.  Patient continued to have a wide complex rhythm and underwent epicardial VT ablation 5/30 with pericardial drain placement which has since been removed. Sputum culture positive for stenotrophomonas maltophilia, treated with Bactrim. Also had ESBL Klebsiella PNA treated with Merrem. He has been stable and was downgraded to the floor last week. Changed to PO Amiodarone without any further VT per chart review.  Awaiting SNF placement. Around 3 AM today, pt noted to have bloody BMs so Eliquis was placed on hold and STAT labs ordered. Hgb 11.8, repeat at 1102 was 12.9. GI consulted for GIB.    Pt mildly hypotensive last night, BP as low as 97/62. Improved today. Most recent /83.    Pt hasn't noticed melena or hematochezia at home but says he doesn't really look at his stool. Denies abdominal pain, nausea, vomiting, acid reflux, constipation, diarrhea. He is very upset about his current diet of thickened liquids.    Last colonoscopy as below. No prior  EGD.    Previous records reviewed...  12/13/2018 colonoscopy for GI bleeding:  Polyp in proximal SC.  Polyp in SC.  Diverticulosis of whole colon.  Path: TA X 1.  Colonic mucosa with benign lymphoid aggregates.    PCP:  Maycol Mcleod II, MD    Review of patient's allergies indicates:  No Known Allergies     Current Medications[1]  Prescriptions Prior to Admission[2]    Past Medical History:  Past Medical History:   Diagnosis Date    Atrial fibrillation     HTN (hypertension)     Peripheral vascular disease, unspecified       Past Surgical History:  Past Surgical History:   Procedure Laterality Date    ABLATION N/A 5/16/2025    Procedure: Ablation;  Surgeon: Isac Samayoa MD;  Location: [facility] CATH LAB;  Service: Cardiology;  Laterality: N/A;  VT ABLATION W/ ANEST.    CARDIAC DEFIBRILLATOR PLACEMENT N/A 5/16/2025    Procedure: Insertion, ICD;  Surgeon: Isac Samayoa MD;  Location: [facility] CATH LAB;  Service: Cardiology;  Laterality: N/A;    CARDIAC ELECTROPHYSIOLOGY STUDY N/A 5/21/2025    Procedure: Study possible ablation;  Surgeon: Isac Samayoa MD;  Location: [facility] CATH LAB;  Service: Cardiology;  Laterality: N/A;    IMPELLA, REMOVAL Left 5/13/2025    Procedure: Impella, Removal;  Surgeon: Asad Randle MD;  Location: [facility] CATH LAB;  Service: Cardiology;  Laterality: Left;    INSERTION OF PACEMAKER N/A 3/31/2023    Procedure: INSERTION, PACEMAKER;  Surgeon: Joaquin Bravo MD;  Location: [facility] CATH LAB;  Service: Cardiology;  Laterality: N/A;  single chamber PPM    LEFT HEART CATHETERIZATION Left 5/9/2025    Procedure: Left heart cath;  Surgeon: Lalo Dominguez MD;  Location: [facility] CATH LAB;  Service: Cardiology;  Laterality: Left;    RIGHT HEART CATHETERIZATION Right 5/9/2025    Procedure: INSERTION, CATHETER, RIGHT HEART;  Surgeon: Lalo Dominguez MD;  Location: [facility] CATH LAB;  Service: Cardiology;  Laterality: Right;      Family History:  No family history on file.  Social History:  Social History      Tobacco Use    Smoking status: Former     Types: Cigarettes     Passive exposure: Never    Smokeless tobacco: Never   Substance Use Topics    Alcohol use: Never       Review of Systems:     Review of Systems   Constitutional:  Negative for chills, fatigue and fever.   Respiratory:  Negative for cough and shortness of breath.    Cardiovascular:  Negative for chest pain.   Gastrointestinal:  Positive for anal bleeding. Negative for abdominal distention, abdominal pain, blood in stool, constipation, diarrhea, nausea and vomiting.       Objective:     VITAL SIGNS: 24 HR MIN & MAX LAST    Temp  Min: 97.4 °F (36.3 °C)  Max: 97.6 °F (36.4 °C)  97.4 °F (36.3 °C)        BP  Min: 97/62  Max: 142/83  (!) 142/83     Pulse  Min: 77  Max: 81  80     Resp  Min: 18  Max: 20  18    SpO2  Min: 94 %  Max: 100 %  100 %        Intake/Output Summary (Last 24 hours) at 6/8/2025 1134  Last data filed at 6/8/2025 0527  Gross per 24 hour   Intake 380 ml   Output 550 ml   Net -170 ml       Physical Exam  Constitutional:       General: He is not in acute distress.     Appearance: He is not ill-appearing.   HENT:      Head: Normocephalic and atraumatic.   Eyes:      General: No scleral icterus.     Extraocular Movements: Extraocular movements intact.   Cardiovascular:      Rate and Rhythm: Normal rate and regular rhythm.   Pulmonary:      Effort: Pulmonary effort is normal. No respiratory distress.   Abdominal:      General: Bowel sounds are normal. There is no distension.      Palpations: Abdomen is soft. There is no mass.      Tenderness: There is no abdominal tenderness. There is no guarding or rebound.   Skin:     General: Skin is warm and dry.      Coloration: Skin is not jaundiced.   Neurological:      Mental Status: He is alert and oriented to person, place, and time.   Psychiatric:         Mood and Affect: Mood normal.         Behavior: Behavior normal.           Recent Results (from the past 48 hours)   Comprehensive Metabolic  Panel    Collection Time: 06/08/25  4:48 AM   Result Value Ref Range    Sodium 148 (H) 136 - 145 mmol/L    Potassium 4.0 3.5 - 5.1 mmol/L    Chloride 114 (H) 98 - 107 mmol/L    CO2 24 23 - 31 mmol/L    Glucose 97 82 - 115 mg/dL    Blood Urea Nitrogen 24.5 8.4 - 25.7 mg/dL    Creatinine 1.00 0.72 - 1.25 mg/dL    Calcium 8.4 (L) 8.8 - 10.0 mg/dL    Protein Total 6.4 5.8 - 7.6 gm/dL    Albumin 2.3 (L) 3.4 - 4.8 g/dL    Globulin 4.1 (H) 2.4 - 3.5 gm/dL    Albumin/Globulin Ratio 0.6 (L) 1.1 - 2.0 ratio    Bilirubin Total 1.2 <=1.5 mg/dL     40 - 150 unit/L     (H) 0 - 55 unit/L    AST 26 11 - 45 unit/L    eGFR >60 mL/min/1.73/m2    Anion Gap 10.0 mEq/L    BUN/Creatinine Ratio 25    CBC Without Differential    Collection Time: 06/08/25  4:48 AM   Result Value Ref Range    WBC 9.66 4.50 - 11.50 x10(3)/mcL    RBC 4.11 (L) 4.70 - 6.10 x10(6)/mcL    Hgb 11.8 (L) 14.0 - 18.0 g/dL    Hct 39.5 (L) 42.0 - 52.0 %    MCV 96.1 (H) 80.0 - 94.0 fL    MCH 28.7 27.0 - 31.0 pg    MCHC 29.9 (L) 33.0 - 36.0 g/dL    RDW 18.6 (H) 11.5 - 17.0 %    Platelet 208 130 - 400 x10(3)/mcL    MPV 12.4 (H) 7.4 - 10.4 fL    NRBC% 0.0 %   Magnesium    Collection Time: 06/08/25  4:48 AM   Result Value Ref Range    Magnesium Level 2.00 1.60 - 2.60 mg/dL   Occult Blood, Stool 1st Specimen    Collection Time: 06/08/25  5:14 AM   Result Value Ref Range    Stool Color 1 Brown     Stool Consistancy 1 soft     Occult Blood Stool 1 Positive (A) Negative   Hemoglobin and Hematocrit    Collection Time: 06/08/25 11:02 AM   Result Value Ref Range    Hgb 12.9 (L) 14.0 - 18.0 g/dL    Hct 41.0 (L) 42.0 - 52.0 %       Fl Modified Barium Swallow Speech  Result Date: 6/3/2025  See procedure notes from Speech Pathologist. This procedure was auto-finalized.    Echo Saline Bubble? No; Ultrasound enhancing contrast? No  Result Date: 6/2/2025    Limited study to assess for pericardial effusion.   Pericardium: There is a trivial effusion. Left pleural effusion.      XR Gastric tube check, non-radiologist performed  Result Date: 6/2/2025  EXAMINATION: XR GASTRIC TUBE CHECK, NON-RADIOLOGIST PERFORMED CLINICAL HISTORY: new NG tube; COMPARISON: 29 May 2025     Frontal image of the upper abdomen.  Enteric tube extends well into the stomach. Electronically signed by: Javon Porter Date:    06/02/2025 Time:    09:59    Echo  Result Date: 5/31/2025    Left Ventricle: There is severely reduced systolic function with a visually estimated ejection fraction of 25 - 30%.   Pericardium: There is a trivial effusion. No indication of cardiac tamponade. TDS; limited acoustic windows.   Limited echo for effusion.     X-Ray Chest AP Portable  Result Date: 5/30/2025  EXAMINATION: XR CHEST AP PORTABLE CLINICAL HISTORY: Status post intubation and V-tach ablation with pericardial drain in place, evaluate placement and also help explain hypoxia; COMPARISON: Yesterday FINDINGS: Frontal view of the chest was obtained. Endotracheal tube tip midthoracic trachea.  Enteric tube extends inferiorly off of this film.  Heart is not significantly enlarged.  There are increased opacities in the left lung.  There is no pneumothorax.     Increased opacities throughout the left lung. Electronically signed by: Javon Porter Date:    05/30/2025 Time:    16:48    Electrophysiology Procedure  Result Date: 5/30/2025    Epicardial VT ablation. I certify that I was present for the critical steps of the procedure including the diagnostic, surgical and/or interventional portions. Procedure Log documented by No documenter listed and verified by Isac Samayoa MD. Date: 5/30/2025  Time: 3:08 PM    XR Gastric tube check, non-radiologist performed  Result Date: 5/30/2025  EXAMINATION XR GASTRIC TUBE CHECK, NON-RADIOLOGIST PERFORMED CLINICAL HISTORY confirm ng placement; TECHNIQUE A total of 1 AP image(s) submitted of the partially visualized lower chest and upper abdomen. COMPARISON 29 May 2025, 06:19 FINDINGS  Lines/tubes/devices: Enteric tube is present, following the expected esophageal course and terminating over the left upper abdomen.  The catheter side port is visualized distal to the level of the GE junction. Multiple ECG and pacemaker/ICD leads again overlie the imaged region. There are no interval changes to suggest new or worsening high-grade mechanical bowel obstruction.  No intra-abdominal mass effect is developed. Detection of air-fluid levels and low-volume pneumoperitoneum is limited due to supine technique. Included lower thoracic cavity and osseous structures are similar in comparison. IMPRESSION 1. Similar acceptable positioning of NG tube. 2. No new intra-abdominal process or other acute interval change. Electronically signed by: Aman Mao Date:    05/30/2025 Time:    06:45    X-Ray Chest 1 View  Result Date: 5/29/2025  EXAMINATION: XR CHEST 1 VIEW CLINICAL HISTORY: Ventricular tachycardia.F/u current process; COMPARISON: Yesterday FINDINGS: Frontal view of the chest was obtained. Enteric tube extends into the stomach.  Heart and mediastinum unchanged.  Similar appearance of the lungs with no new focal consolidation or pneumothorax.     Little interval change. Electronically signed by: Javon Porter Date:    05/29/2025 Time:    07:05    XR Gastric tube check, non-radiologist performed  Result Date: 5/29/2025  EXAMINATION: XR GASTRIC TUBE CHECK, NON-RADIOLOGIST PERFORMED CLINICAL HISTORY: placement; COMPARISON: 27 May 2025     Frontal image of the upper abdomen.  Enteric tube extends into the stomach with the proximal side hole past the GE junction. Electronically signed by: Javon Porter Date:    05/29/2025 Time:    06:39    XR Gastric tube check, non-radiologist performed  Result Date: 5/29/2025  EXAMINATION: XR GASTRIC TUBE CHECK, NON-RADIOLOGIST PERFORMED CLINICAL HISTORY: placement of ng tube; COMPARISON: Earlier today     Frontal image of the upper abdomen.  Enteric tube extends into the stomach  with the proximal side hole past the GE junction. Electronically signed by: Javon Porter Date:    05/29/2025 Time:    06:38    X-Ray Chest 1 View  Result Date: 5/28/2025  EXAMINATION: XR CHEST 1 VIEW CLINICAL HISTORY: congestion; TECHNIQUE: Single frontal view of the chest was performed. COMPARISON: 05/22/2025 FINDINGS: LINES AND TUBES: Enteric tube courses below the diaphragm.  Triple lead cardiac pacer device is in place via left subclavian approach with leads overlying the right atrium, coronary vein and right ventricle.  EKG/telemetry leads overlie the chest. MEDIASTINUM AND PRIETO: Cardiac silhouette is enlarged. LUNGS: Vascular congestion without overt alveolar edema. PLEURA:No pleural effusion. No pneumothorax. OTHER: No acute osseous abnormality.     Vascular congestion without overt alveolar edema.  Mild improved aeration compared to previous. Electronically signed by: Elo Salazar Date:    05/28/2025 Time:    17:29    XR Gastric tube check, non-radiologist performed  Result Date: 5/27/2025  EXAMINATION: XR GASTRIC TUBE CHECK, NON-RADIOLOGIST PERFORMED CLINICAL HISTORY: NG tube placement check; COMPARISON: 25 May 2025     Frontal image of the upper abdomen.  Enteric tube extends well into the stomach. Electronically signed by: Javon Porter Date:    05/27/2025 Time:    15:51    XR NG/OG tube placement check, non-radiologist performed  Result Date: 5/26/2025  EXAMINATION: XR NG/OG TUBE PLACEMENT CHECK, NON-RADIOLOGIST PERFORMED CLINICAL HISTORY: ng tube placement; COMPARISON: 25 May 2025     Frontal image of the upper abdomen.  Enteric tube extends well into the stomach. Electronically signed by: Javon Porter Date:    05/26/2025 Time:    08:27    XR Gastric tube check, non-radiologist performed  Result Date: 5/26/2025  Technique: 1 portable AP view of the abdomen was submitted. Comparison: None. Clinical History: NGT. Version: Lines and Tubes: An NG tube is present with the tip below the GE Junction in the  stomach however the proximal port is not definitively identified being obscured by overlying lines and leads. Consider advancement by 5cm with followup reimaging. Bowel Gas Pattern: The visualized bowel gas pattern is nonspecific. Peritoneum: This is a supine study and free air and air fluid levels cannot be evaluated or excluded. Lung Bases: There is opacity at the visualized lung bases. Consider chest radiography followup.     Impression: 1. An NG tube is present with the tip below the GE Junction in the stomach however the proximal port is not definitively identified being obscured by overlying lines and leads. Consider advancement by 5cm with followup reimaging. 2. The visualized bowel gas pattern is nonspecific. 3. Details as above. No significant discrepancy with overnight report Electronically signed by: Onesimo Rodgers Date:    05/26/2025 Time:    08:04    XR Gastric tube check, non-radiologist performed  Result Date: 5/24/2025  EXAMINATION: XR GASTRIC TUBE CHECK, NON-RADIOLOGIST PERFORMED CLINICAL HISTORY: NG tube placement; TECHNIQUE: One view COMPARISON: None available FINDINGS: Enteric tube traverses the GE junction and tip of the tube is within the proximal gastric body.  Side port of the tube is proximal to the GE junction.  Please further advance the tube by 10 cm.     Please further advance the enteric tube by 10 cm Electronically signed by: Onesimo Rodgers Date:    05/24/2025 Time:    08:28    Fl Modified Barium Swallow Speech  Result Date: 5/23/2025  See procedure notes from Speech Pathologist. This procedure was auto-finalized.    X-Ray Chest 1 View for Line/Tube Placement  Result Date: 5/22/2025  EXAMINATION: XR CHEST 1 VIEW FOR LINE/TUBE PLACEMENT CLINICAL HISTORY: resp fx; TECHNIQUE: Single frontal portable view of the chest was performed. COMPARISON: May 20, 2025 FINDINGS: Examination reveals cardiomediastinal silhouette improved parenchymal changes to be essentially unchanged as compared with the  previous exam     No significant change as compared with the previous exam Electronically signed by: Jam Tena Date:    05/22/2025 Time:    12:10    Electrophysiology Procedure  Result Date: 5/21/2025    3D mapping performed with Ensite.   Intracardiac echo.   Transeptal puncture.   VT ablation. I certify that I was present for the critical steps of the procedure including the diagnostic, surgical and/or interventional portions. Procedure Log documented by Documenter: Wong Raymond and verified by Isac Samayoa MD. Date: 5/21/2025  Time: 5:52 PM    X-Ray Chest 1 View  Result Date: 5/20/2025  EXAMINATION: XR CHEST 1 VIEW CLINICAL HISTORY: intubated; COMPARISON: 16 May 2025 FINDINGS: Frontal view of the chest was obtained. Support structures are in similar position.  Stable cardiomegaly.  Similar bilateral interstitial predominant opacities.  No pneumothorax.     Little interval change. Electronically signed by: Javon Porter Date:    05/20/2025 Time:    11:26    Electrophysiology Procedure  Addendum Date: 5/19/2025    3D mapping performed with Ensite.   Intracardiac echo.   Transeptal puncture.   VT ablation. I certify that I was present for the critical steps of the procedure including the diagnostic, surgical and/or interventional portions. Procedure Log documented by No documenter listed and verified by Isac Samayoa MD. Date: 5/19/2025  Time: 7:09 PM     Result Date: 5/19/2025    3D mapping performed with Ensite.   Intracardiac echo.   Transeptal puncture.   VT ablation. I certify that I was present for the critical steps of the procedure including the diagnostic, surgical and/or interventional portions. Procedure Log documented by No documenter listed and verified by Isac Samayoa MD. Date: 5/16/2025  Time: 7:09 PM    X-Ray Chest 1 View  Result Date: 5/16/2025  EXAMINATION XR CHEST 1 VIEW CLINICAL HISTORY s/p ETT placement; TECHNIQUE A total of 1 frontal image(s) of the chest. COMPARISON 13 May  2025 FINDINGS Lines/tubes/devices: Endotracheal tube now visualized, terminates approximately 2.5 cm superior to the alison.  Enteric tube extends to the left upper quadrant with side port just at the GE junction.  Remaining lines/tubes similar. The cardiac silhouette and central vascular structures are unchanged.  The trachea is midline. Ill-defined bilateral airspace infiltrates are slightly worsened in comparison.  There is no enlarging pleural effusion or convincing pneumothorax. Regional osseous structures and extrathoracic soft tissues are similar. IMPRESSION 1. Interval tracheal and esophageal intubation, as described above.  Recommend advancement of the esophagogastric tube approximately 5 cm. 2. Mildly worsened appearance of bilateral lung infiltrates. Electronically signed by: Aman Mao Date:    05/16/2025 Time:    19:30    Electrophysiology Procedure  Result Date: 5/16/2025    Successful implantation of ICD BIV. I certify that I was present for the critical steps of the procedure including the diagnostic, surgical and/or interventional portions. Procedure Log documented by No documenter listed and verified by Isac Samayoa MD. Date: 5/16/2025  Time: 7:07 PM    Cardiac catheterization  Result Date: 5/13/2025    The estimated blood loss was between 50 mL and 150 mL.   Successful removal of an Impella CP with hemostasis using a Manta   Patient vasculature on pump removal. Operators: Dr Randle and Dr Harman. Patient brought to the cath lab in a fasting state. Placed on the table and prepped an draped in the usual sterile fashion. Time out completed. Impella site was cleaned throughly. PA sat was measure from the [place]-Corinne cath along with a PA pressure. It was removed without complications. Next, the sutures for the impella were cut. The impella was removed successfully and a 14F Cook sheath was placed to achieve hemostasis. Attempts were made to use the exsisting preclose to achieve hemostasis,  however, the sutures failed. The 14F was reintroduced for hemostasis. A Manta device was then used x2 which failed after they both abruptly dislodged from the arteriotomy. 14F sheath was placed. Dr Harman was called for assistance. A secondart groin angiogram was performed. A 0.014 wire was placed along side the Amplatz wire. An 18F Manta device was deployed successfully to achieve hemostasis. Using the 0.014 wire a micro sheath was introduced and a hand angiogram was performed to show patent vessel and no complications. Manual pressure over the access site was used once the micro sheath was removed. Patient transported back to the ICU in stable condition. Complications: none EBL Approx 100cc  Contrast: <50cc Findings: Widely patent Left EIA, CFA and prox SFA/prof PA sat 64% PAP - 32/25 (26)     Success removal of an Impella CP using a Manta for hemostasis Plan: Routine post op care C/w aggressive med rx for NICMO. The procedure log was documented by Documenter: Viky Arce RN and verified by Asad Randle MD. Date: 5/13/2025  Time: 7:57 PM     X-Ray Chest 1 View  Result Date: 5/13/2025  EXAMINATION: XR CHEST 1 VIEW CPT 90428 CLINICAL HISTORY: swan corinne catheter; Heart failure, unspecified COMPARISON: May 12, 2025 FINDINGS: Examination reveals cardiomediastinal silhouette and pleuroparenchymal changes to be essentially unchanged as compared with the previous exam What appears to be a [place]-Corinne catheter identified tip in the right atrium     No significant change as compared with the previous exam Electronically signed by: Jam Tena Date:    05/13/2025 Time:    10:44    X-Ray Chest 1 View  Result Date: 5/12/2025  EXAMINATION: XR CHEST 1 VIEW CLINICAL HISTORY: [place] Corinne catheter; COMPARISON: 9 May 2025 FINDINGS: Frontal view of the chest was obtained. Impella remains in place.  Heart is not enlarged.  Improved aeration of the lungs compared to prior.  No pneumothorax.     Improved aeration of the lungs  compared to prior. Electronically signed by: Javon Porter Date:    05/12/2025 Time:    10:28    Echo  Result Date: 5/9/2025    Limited echo to check impella placement.   Impella is seated 3.9 cm from aortic valve annulus.     Cardiac catheterization  Result Date: 5/9/2025  Table formatting from the original result was not included.   The Prox Cx to Dist Cx lesion was 50% stenosed.  However, the IFR was 0.96, indicating the absence of any obstructive coronary artery disease at this level.   The Prox LAD to Dist LAD lesion was 60% stenosed.  However, the IFR was 0.96, indicating the absence of any obstructive coronary artery disease at this level.   The ejection fraction was calculated to be 15%.   There was severe left ventricular systolic dysfunction.   The left ventricular end diastolic pressure was severely elevated.   The pre-procedure left ventricular end diastolic pressure was 23.   The estimated blood loss was none.   There was single vessel coronary artery disease.   There was trivial (1+) mitral regurgitation.   There was no aortic valve stenosis.   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  Strong distal collaterals from the left coronary system. PROCEDURES PERFORMED: 1. Left heart catheterization, selective coronary angiography and left ventriculogram.  2. Right heart catheterization.  3.  Moderate conscious sedation.  4. 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  The bifurcation of the aorta shows significant disease, with 60% stenosis of the ostium of the right common iliac, with severe calcification.  The ostium of the left common iliac also shows disease but it appears to be about 40-50%, also with calcification. There is no evidence of dissection or thrombus in the distal aorta or iliac  systems.  The left side appears more favorable for placement of the Impella device, due to the presence of less tortuosity and less plaque. 5. Instantaneous, wave free ratio (IFR) to the LAD and to the left circumflex for the presence of disease of 50-60% in the LAD with heavy calcification in the mid segment and 40-50% in the left circumflex/1st obtuse marginal. Both IFR measurements were higher than 0.90 (for the LAD and the circumflex) indicating the absence of any obstructive disease in those vessels. Heparin was used for anticoagulation. The guide catheter for the IFR was an EBU 3.5.  6. Placement of an Impella device for mechanical, hemodynamic support in this patient with an ejection fraction of 15% and enlarged left ventricle with elevated wedge pressures, and presenting with a electrical instability and ventricular tachycardia leading to complete syncope, near cardiac arrest. The Impella was placed via left common femoral artery.  The decision to place the Impella on the left side was based on the more favorable anatomy of the iliac system on the left.  The left common femoral was also healthier than the right. Preclosure was performed, utilizing 2 Perclose ProGlide devices. The Impella device was left on P6 to P7 with excellent hemodynamic instability after placement.  No complications. 7. Measurement of lactic acid levels in the cath lab: Lactic acid levels were measured in the cath lab twice. The 1st measurement was 0.55.  The 2nd measurement was 0.50. Lactic acid levels were normal. The patient's blood pressure remained stable throughout the procedure.  No evidence of shock Procedural complications: none. Vascular access: 1. Right common femoral artery, with a 5 Sami sheath, which was used for coronary angiography, left ventriculogram and abdominal aortogram.  It was left in place at the end of the procedure to be used in ICU as the main arterial line, connected to a transducer.  2. Right common  femoral vein, with a 6 Hebrew sheath, which was used primarily as a central line for administration of intravenous drips, medications and supplementation. 3. Left common femoral arterial sheath, with a 6 Hebrew sheath initially, later used to place the Impella device with a 14 Hebrew short sheath. 4. Left common femoral venous sheath, with an 8 Hebrew sheath, which was used for right heart catheterization, and the [place] was left in place through this access, for further hemodynamic monitoring in ICU in this patient with severe left ventricular systolic dysfunction and elevated left ventricular filling pressures.  Of note, this sheath can also be used as a secondary central line. Coronary angiography: 1. Left main coronary artery:  The left main shows a long, large vessel with moderate calcification but no obstructive disease.  It shows disease of about 20%. 2. Left anterior descending coronary artery:  The LAD shows disease of 60% in the mid segment, with severe calcification and a partially ulcerated plaque.  However, IFR across this lesion was negative for obstructive disease. 3. Left circumflex coronary artery:  The left circumflex shows disease of 50% proximally.  Calcification was present.  No obstructive disease.  Again, IFR to the left circumflex did not show evidence of obstructive lesions. 4. Right coronary artery:  Very large and dominant.  The right coronary artery is 100 % occluded at the ostium.  This is a chronic total occlusion which has been reported in the past, for many years.  Very strong collaterals are present from both the circumflex and the LAD systems, reconstituting entirely the posterior descending artery and the posterolateral branches of the RCA with good flow. Description of the instantaneous, free wave ratio evaluation (IFR) to the LAD and the left circumflex: Instantaneous, wave free ratio (IFR) to the LAD and to the left circumflex for the presence of disease of 50-60% in the LAD  with heavy calcification in the mid segment and 40-50% in the left circumflex/1st obtuse marginal. Both IFR measurements were higher than 0.90  (0.91 for the LAD and 0.96 for the circumflex) indicating the absence of any obstructive disease in those vessels. Heparin was used for anticoagulation. The guide catheter for the IFR was an EBU 3.5.  Placement of the Impella device, via left common femoral artery: Placement of an Impella device for mechanical, hemodynamic support in this patient with an ejection fraction of 15% and enlarged left ventricle with elevated wedge pressures, and presenting with a electrical instability and ventricular tachycardia leading to complete syncope. The Impella was placed via left common femoral artery.  The decision to place the Impella on the left side was based on the more favorable anatomy of the iliac system on the left.  The left common femoral was also healthier than the right. Preclosure was performed, utilizing 2 Perclose ProGlide devices. The Impella device was left on P 6 to P7 with excellent hemodynamic instability after placement.  No complications. Results of the right heart catheterization: 1. There is only mild pulmonary hypertension, with a pulmonary artery pressure of 38/30 mmHg, and a mean pulmonary artery pressure of 33 mmHg.  2. Elevated right-sided cardiac pressures, with right ventricular pressure of 39 over 18 mmHg and a right atrial pressure of 15 mmHg.  3. Very decreased cardiac index at 1.17 L/min per meters squared by thermodilution and 1.66 by Yasmin analysis. 4. Elevated left ventricular filling pressures with a wedge pressure of 24 mmHg and left ventricular end-diastolic pressure of 23 mmHg, severely elevated.  The patient received 80 mg of IV Lasix in the cath lab.  5. No evidence of intracardiac shunts by oximetry run. HEMODYNAMICS PRESSURES AIR REST  Time Systolic (mmHg) Diastolic (mmHg) Mean (mmHg) A Wave (mmHg) V Wave (mmHg) EDP (mmHg) Right Atrium 12:25  PM   15  17  20   Right Ventricle 12:25 PM 39  7     18  Pulmonary Artery 12:31 PM 38  30  33     Pulmonary Wedge 12:30 PM   24  26  26   Aortic Artery 11:34 AM -6  -7  -6      12:13 PM 75  59  65     Left Ventricle  1:06 PM 83  2     21    1:06 PM 82  1     23    1:49 PM 88  1     18  Aortic Artery pullback  1:08 PM 78  54  63     Left Ventricle pullback  1:08 PM 79  2     26  CARDIAC OUTPUT AIR REST  CO CI ((L/min)/BSA) SV Flow (L/min) Flow Index (L/min) EF DV (L/min) SV (L/min) Yasmin 3.9 L/min  1.66        Thermal 2.75 L/min  1.17  20.7       Pulm Flow    4.33  1.84     Systemic Flow    3.9  1.66     Effective Flow    3.9  1.66     VASCULAR RESISTANCE AIR REST  Resistance (HRU) Resistance Index Ratio PVR 2.08  392 (dyne*sec)/cm5   TPVR 7.63  17.98 HRUI   SATURATIONS AIR REST  LABEL SAT (%) PO2 O2 (mL/L) Sys Art FA  91   178.21  Sys Carlos Eduardo RA  50   97.92  Pulm Art PA  54   105.75  Pulm Carlos Eduardo FA  91   178.21  Abdominal aortogram with bilateral aortofemoral runoff Abdominal aortogram with bilateral aortofemoral runoff 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  The bifurcation of the aorta shows significant disease, with 60% stenosis of the ostium of the right common iliac, with severe calcification.  The ostium of the left common iliac also shows disease but it appears to be about 40-50%, also with calcification. There is no evidence of dissection or thrombus in the distal aorta or iliac systems.  The left side appears more favorable for placement of the Impella device. Recommendations: 1. Transfer to Intensive Care, for continued support with Impella, antiarrhythmic medications with lidocaine and amiodarone drips, supplementation of magnesium and potassium as needed and continued cardiac and hemodynamic monitoring.  [place] catheter was left in place.  Five French sheath was left in place in the right common femoral artery for arterial pressure monitoring continuously in ICU.  He was stable at the  time of transfer to ICU, alert and oriented and without specific cardiac complaints.  2. I anticipate the patient will need to be in Impella support for 48-72 hours.  After that, the Impella can be removed and he should be treated with maximal medical therapy for heart failure with reduced ejection fraction/EF of 15% including Entresto, beta blockers, spironolactone, Farxiga, loop diuretics. 3. From the coronary standpoint, the  of the RCA has been present for many years and he has excellent distal collateral flow from the left coronary system.  The  occurs at the ostium, almost like a flush occlusion.  Extremely unfavorable anatomy for a  intervention.  The IFR evaluation to the LAD and left circumflex systems was negative for any obstructive disease.  I strongly favor medical management for his coronary artery disease.  He is certainly not a candidate for bypass surgery.  4. I would treat with Plavix and aspirin, or at least with aspirin therapy alone given his coronary disease with a  of the right coronary.  Continue IV heparin while on the Impella system.  Please remove the 5 Slovak right common femoral arterial sheath as soon as the heparin is no longer needed and hold manual pressure for hemostasis for at least 20 minutes. High-intensity statins to keep an LDL lower than 55 in the long term. 5. Spoke to King, the patient's son extensively.  I explained to him that his father has an extremely weak heart with very low ejection fraction and congestive heart failure and his situation is very serious, with guarded prognosis. The patient will need an ICD placed prior to discharge.  I will defer further decisions in regards to the possibility of ventricular tachycardia ablation and the timing of the upgrade of his pacemaker to an ICD (or biventricular ICD) to my electrophysiology partner, Dr. Samayoa, who will continue to see this patient in follow-up from the EP standpoint. 6. It is likely that some  damage was already present from the occlusion of his right coronary artery in the past, and he already has evidence of akinesis of the posterobasal and inferoapical segments.  However, his ejection fraction in December of 2024 by echo was 50%.  I had a long conversation with the patient's son, King, who told me that Mr. Rosario's wife has been very ill.  Since 2020, she has been diagnosed with severe Parkinson's disease and dementia and she has been bed ridden for 5 years, requiring continuous attention and care.  She has been deteriorating even more lately.  Besides that, Mr. Rosario's other son passed away relatively recently from colon cancer.  This patient has been under very severe stress.  It is possible that an added component of stress-induced cardiomyopathy may be responsible for his more acute deterioration of the left ventricular systolic function.  Likewise, the possibility of viral/idiopathic cardiomyopathy is also present.  Undiagnosed COVID-19 is a possibility.  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  7. The [facility] team will continue to follow him closely during and after this admission.  Lalo Dominguez MD, FACC, FACP,  FAHA, [facility] Interventional Cardiologist [facility] The procedure log was documented by No documenter listed and verified by Lalo Dominguez MD. Date: 5/9/2025  Time: 11:47 AM     X-Ray Chest AP Portable  Result Date: 5/9/2025  EXAMINATION: XR CHEST AP PORTABLE CLINICAL HISTORY: Impella; TECHNIQUE: Single frontal view of the chest was performed. COMPARISON: 03/31/2023 FINDINGS: There is some pulmonary edema pulmonary venous congestion bilaterally.  There is a Impella device seen.  The tip appears to be within the left ventricle. The heart is normal in size.  The aorta is ectatic.  Bones and joints show no acute abnormality.     Impella device seen in place with the  tip in the left ventricle Increased volume status Electronically signed by: Tristan Collado Date:    05/09/2025 Time:    16:18      Imaging personally reviewed by myself and SP.    Assessment / Plan:   79-year-old male previously known to Dr. Curry with PMH of sick sinus syndrome s/p bpm, CHF, PAF, HTN, CAD, PVD [facility] with syncopal episode.  Found to be in V-tach and echo showed EF of 10%.  Transferred to [facility] 5/8.  Underwent right and left heart catheterization 5/9 showing multivessel CAD and EF of 15%, severe L ventricular systolic dysfunction, severely elevated L ventricular end-diastolic pressure and an Impella was placed (removed 5/14).  He was also placed on amiodarone and lidocaine drips. He underwent numerous VT ablations, last one on 5/30. ICD placed 5/16. Hospital course complicated by Klebsiella PNA treated with Merrem and sputum culture positive for stenotrophomonas maltophilia, treated with Bactrim. Ultimately he has been downgraded to the floor and has been stable on PO Amiodarone. Awaiting SNF placement. Around 3 AM today, pt noted to have bloody BMs so Eliquis was placed on hold and STAT labs ordered. Hgb 11.8, repeat at 1102 was 12.9. GI consulted for GIB.    BRBPR  2.   Hx of diverticulosis  Noted throughout entire colon 2018    -Continue ppi  -Monitor H/H and transfuse as needed to Hgb 7  -Monitor stools for bleeding  -Endoscopy to be determine pending clinical course  -Likely ischemic colitis vs diverticular bleed. Given stable hgb, will continue to monitor for now.    Thank you for allowing us to participate in this patient's care.         [1]   Current Facility-Administered Medications   Medication Dose Route Frequency Provider Last Rate Last Admin    acetaminophen tablet 650 mg  650 mg Per NG tube Q4H PRN Bar Peck MD        acetylcysteine 100 mg/ml (10%) solution 4 mL  4 mL Nebulization TID PRN Peaec Mott MD         amiodarone tablet 200 mg  200 mg Oral Daily Jhon Ramsey AGACNP-BC   200 mg at 06/08/25 1025    bisacodyL suppository 10 mg  10 mg Rectal Daily PRN Peace Mott MD        famotidine tablet 20 mg  20 mg Oral BID LivBar MD   20 mg at 06/08/25 1026    finasteride tablet 5 mg  5 mg Oral Daily LivBar saucedo MD   5 mg at 06/08/25 1025    furosemide injection 40 mg  40 mg Intravenous BID WM Jhon Ramsey AGACNP-BC   40 mg at 06/08/25 1027    guaiFENesin 100 mg/5 ml syrup 200 mg  200 mg Per NG tube Q4H PRN Misha Johansen DO        LIDOcaine 2000 mg in D5W 250 mL infusion    Continuous PRN Lalo Dominguez MD 7.5 mL/hr at 05/10/25 1935 Rate Verify at 05/10/25 1935    methocarbamoL tablet 500 mg  500 mg Oral Once Shawn Olivas MD        metoprolol injection 5 mg  5 mg Intravenous Q6H PRN Amelia Gallardo FNPONCHO   5 mg at 05/28/25 0438    metoprolol tartrate (LOPRESSOR) tablet 50 mg  50 mg Oral TID LivBar saucedo MD   50 mg at 06/08/25 1025    mexiletine capsule 200 mg  200 mg Oral Q8H LivBar saucedo MD   200 mg at 06/08/25 0534    miconazole NITRATE 2 % top powder   Topical (Top) BID Asad Randle MD   Given at 06/08/25 1027    morphine injection 1 mg  1 mg Intravenous Once Devaughn Magdaleno MD        morphine injection 2 mg  2 mg Intravenous Q4H PRN Devaughn Magdaleno MD        neomycin-bacitracin-polymyxin ointment   Topical (Top) TID Jhon Ramsey AGACNP-BC   Given at 06/08/25 1028    ondansetron disintegrating tablet 8 mg  8 mg Per NG tube Q8H PRN Bar Peck MD        polyethylene glycol packet 17 g  17 g Oral BID LivBar MD   17 g at 06/05/25 2037    pravastatin tablet 40 mg  40 mg Oral Daily LivRajani saucedopudi, MD   40 mg at 06/08/25 1025    sacubitriL-valsartan 24-26 mg per tablet 1 tablet  1 tablet Oral BID Bar Peck MD   1 tablet at 06/08/25 1025    senna-docusate 8.6-50 mg per tablet 1 tablet  1 tablet Oral Daily Bar Peck MD   1  tablet at 06/08/25 1026    sodium chloride 0.9% flush 10 mL  10 mL Intravenous PRN Misha Johansen DO        sodium chloride 0.9% flush 10 mL  10 mL Intravenous PRN Jhon Ramsey, AGACNP-BC        sodium chloride 0.9% flush 10 mL  10 mL Intravenous Q12H PRN Isac Samayoa MD        sodium chloride 0.9% flush 10 mL  10 mL Intravenous PRN Isac Samayoa MD        sodium chloride 3% nebulizer solution 4 mL  4 mL Nebulization Q8H Oleksandr Cochran MD   4 mL at 06/08/25 0851   [2]   Medications Prior to Admission   Medication Sig Dispense Refill Last Dose/Taking    ELIQUIS 5 mg Tab Take 5 mg by mouth 2 (two) times daily.       ENTRESTO 49-51 mg per tablet Take 1 tablet by mouth 2 (two) times daily.       fenofibrate (TRICOR) 145 MG tablet TAKE 1 TABLET BY MOUTH ONCE A DAY 30 tablet 5     finasteride (PROSCAR) 5 mg tablet TAKE 1 TABLET BY MOUTH DAILY 30 tablet 11     folic acid (FOLVITE) 1 MG tablet TAKE ONE TABLET BY MOUTH ONCE DAILY 30 tablet 5     furosemide (LASIX) 40 MG tablet Take 40 mg by mouth 2 (two) times daily.       iron-vitamin C 100-250 mg, ICAR-C, (ICAR-C) 100-250 mg Tab Take 1 tablet by mouth once daily. 30 tablet 5     pravastatin (PRAVACHOL) 40 MG tablet TAKE 1 TABLET BY MOUTH DAILY 30 tablet 11
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
## 2025-06-09 VITALS
TEMPERATURE: 98 F | RESPIRATION RATE: 20 BRPM | SYSTOLIC BLOOD PRESSURE: 113 MMHG | DIASTOLIC BLOOD PRESSURE: 68 MMHG | HEIGHT: 72 IN | HEART RATE: 80 BPM | BODY MASS INDEX: 34.34 KG/M2 | WEIGHT: 253.5 LBS | OXYGEN SATURATION: 97 %

## 2025-06-09 LAB
ALBUMIN SERPL-MCNC: 2.4 G/DL (ref 3.4–4.8)
ALBUMIN/GLOB SERPL: 0.6 RATIO (ref 1.1–2)
ALP SERPL-CCNC: 107 UNIT/L (ref 40–150)
ALT SERPL-CCNC: 109 UNIT/L (ref 0–55)
ANION GAP SERPL CALC-SCNC: 10 MEQ/L
AST SERPL-CCNC: 26 UNIT/L (ref 11–45)
BASOPHILS # BLD AUTO: 0.03 X10(3)/MCL
BASOPHILS NFR BLD AUTO: 0.3 %
BILIRUB SERPL-MCNC: 1.3 MG/DL
BUN SERPL-MCNC: 22.7 MG/DL (ref 8.4–25.7)
CALCIUM SERPL-MCNC: 8.3 MG/DL (ref 8.8–10)
CHLORIDE SERPL-SCNC: 112 MMOL/L (ref 98–107)
CO2 SERPL-SCNC: 24 MMOL/L (ref 23–31)
CREAT SERPL-MCNC: 0.92 MG/DL (ref 0.72–1.25)
CREAT/UREA NIT SERPL: 25
EOSINOPHIL # BLD AUTO: 0.39 X10(3)/MCL (ref 0–0.9)
EOSINOPHIL NFR BLD AUTO: 3.8 %
ERYTHROCYTE [DISTWIDTH] IN BLOOD BY AUTOMATED COUNT: 18.5 % (ref 11.5–17)
GFR SERPLBLD CREATININE-BSD FMLA CKD-EPI: >60 ML/MIN/1.73/M2
GLOBULIN SER-MCNC: 4 GM/DL (ref 2.4–3.5)
GLUCOSE SERPL-MCNC: 92 MG/DL (ref 82–115)
HCT VFR BLD AUTO: 37.4 % (ref 42–52)
HGB BLD-MCNC: 11.2 G/DL (ref 14–18)
IMM GRANULOCYTES # BLD AUTO: 0.06 X10(3)/MCL (ref 0–0.04)
IMM GRANULOCYTES NFR BLD AUTO: 0.6 %
LYMPHOCYTES # BLD AUTO: 0.75 X10(3)/MCL (ref 0.6–4.6)
LYMPHOCYTES NFR BLD AUTO: 7.3 %
MAGNESIUM SERPL-MCNC: 1.9 MG/DL (ref 1.6–2.6)
MCH RBC QN AUTO: 29.3 PG (ref 27–31)
MCHC RBC AUTO-ENTMCNC: 29.9 G/DL (ref 33–36)
MCV RBC AUTO: 97.9 FL (ref 80–94)
MONOCYTES # BLD AUTO: 0.87 X10(3)/MCL (ref 0.1–1.3)
MONOCYTES NFR BLD AUTO: 8.5 %
NEUTROPHILS # BLD AUTO: 8.18 X10(3)/MCL (ref 2.1–9.2)
NEUTROPHILS NFR BLD AUTO: 79.5 %
NRBC BLD AUTO-RTO: 0 %
PLATELET # BLD AUTO: 172 X10(3)/MCL (ref 130–400)
PMV BLD AUTO: 12 FL (ref 7.4–10.4)
POTASSIUM SERPL-SCNC: 3.9 MMOL/L (ref 3.5–5.1)
PROT SERPL-MCNC: 6.4 GM/DL (ref 5.8–7.6)
RBC # BLD AUTO: 3.82 X10(6)/MCL (ref 4.7–6.1)
SODIUM SERPL-SCNC: 146 MMOL/L (ref 136–145)
WBC # BLD AUTO: 10.28 X10(3)/MCL (ref 4.5–11.5)

## 2025-06-09 PROCEDURE — 99900031 HC PATIENT EDUCATION (STAT)

## 2025-06-09 PROCEDURE — 63600175 PHARM REV CODE 636 W HCPCS: Performed by: NURSE PRACTITIONER

## 2025-06-09 PROCEDURE — 80053 COMPREHEN METABOLIC PANEL: CPT | Performed by: NURSE PRACTITIONER

## 2025-06-09 PROCEDURE — 99900035 HC TECH TIME PER 15 MIN (STAT)

## 2025-06-09 PROCEDURE — 94760 N-INVAS EAR/PLS OXIMETRY 1: CPT

## 2025-06-09 PROCEDURE — 25000003 PHARM REV CODE 250: Performed by: INTERNAL MEDICINE

## 2025-06-09 PROCEDURE — 36415 COLL VENOUS BLD VENIPUNCTURE: CPT | Performed by: NURSE PRACTITIONER

## 2025-06-09 PROCEDURE — 94640 AIRWAY INHALATION TREATMENT: CPT

## 2025-06-09 PROCEDURE — 85025 COMPLETE CBC W/AUTO DIFF WBC: CPT | Performed by: NURSE PRACTITIONER

## 2025-06-09 PROCEDURE — 94664 DEMO&/EVAL PT USE INHALER: CPT

## 2025-06-09 PROCEDURE — 25000003 PHARM REV CODE 250: Performed by: NURSE PRACTITIONER

## 2025-06-09 PROCEDURE — 83735 ASSAY OF MAGNESIUM: CPT | Performed by: NURSE PRACTITIONER

## 2025-06-09 PROCEDURE — 25000242 PHARM REV CODE 250 ALT 637 W/ HCPCS: Performed by: INTERNAL MEDICINE

## 2025-06-09 RX ORDER — AMIODARONE HYDROCHLORIDE 200 MG/1
200 TABLET ORAL DAILY
Qty: 30 TABLET | Refills: 11 | Status: SHIPPED | OUTPATIENT
Start: 2025-06-09 | End: 2026-06-09

## 2025-06-09 RX ORDER — FAMOTIDINE 20 MG/1
20 TABLET, FILM COATED ORAL 2 TIMES DAILY
Qty: 60 TABLET | Refills: 0 | Status: SHIPPED | OUTPATIENT
Start: 2025-06-09 | End: 2026-06-09

## 2025-06-09 RX ORDER — POLYETHYLENE GLYCOL 3350 17 G/17G
17 POWDER, FOR SOLUTION ORAL 2 TIMES DAILY
Qty: 60 EACH | Refills: 0 | Status: SHIPPED | OUTPATIENT
Start: 2025-06-09

## 2025-06-09 RX ORDER — MEXILETINE HYDROCHLORIDE 200 MG/1
200 CAPSULE ORAL EVERY 8 HOURS
Qty: 90 CAPSULE | Refills: 11 | Status: SHIPPED | OUTPATIENT
Start: 2025-06-09 | End: 2026-06-09

## 2025-06-09 RX ORDER — METOPROLOL TARTRATE 50 MG/1
50 TABLET ORAL 3 TIMES DAILY
Qty: 90 TABLET | Refills: 11 | Status: SHIPPED | OUTPATIENT
Start: 2025-06-09 | End: 2026-06-09

## 2025-06-09 RX ADMIN — MEXILETINE HYDROCHLORIDE 200 MG: 200 CAPSULE ORAL at 06:06

## 2025-06-09 RX ADMIN — METOPROLOL TARTRATE 50 MG: 50 TABLET, FILM COATED ORAL at 09:06

## 2025-06-09 RX ADMIN — Medication 4 ML: at 12:06

## 2025-06-09 RX ADMIN — SENNOSIDES AND DOCUSATE SODIUM 1 TABLET: 50; 8.6 TABLET ORAL at 09:06

## 2025-06-09 RX ADMIN — FINASTERIDE 5 MG: 5 TABLET, FILM COATED ORAL at 09:06

## 2025-06-09 RX ADMIN — SACUBITRIL AND VALSARTAN 1 TABLET: 24; 26 TABLET, FILM COATED ORAL at 09:06

## 2025-06-09 RX ADMIN — FUROSEMIDE 40 MG: 10 INJECTION, SOLUTION INTRAVENOUS at 09:06

## 2025-06-09 RX ADMIN — PRAVASTATIN SODIUM 40 MG: 40 TABLET ORAL at 09:06

## 2025-06-09 RX ADMIN — AMIODARONE HYDROCHLORIDE 200 MG: 200 TABLET ORAL at 09:06

## 2025-06-09 RX ADMIN — FAMOTIDINE 20 MG: 20 TABLET, FILM COATED ORAL at 09:06

## 2025-06-09 RX ADMIN — Medication 4 ML: at 07:06

## 2025-06-09 NOTE — DISCHARGE SUMMARY
[facility]   Cardiology  Discharge Summary    Patient Name: Alcides Rosario  MRN: [id number]  Admission Date: 5/8/2025  Hospital Length of Stay: 32 days  Code Status: Full Code   Attending Physician: Bar Peck MD   Primary Care Physician: Maycol Mcleod II, MD  Expected Discharge Date: 6/9/2025  Principal Problem:<principal problem not specified>    Subjective:     Brief HPI: This is an 79-year-old male, who is known to Dr. Bravo, with a history of sick sinus syndrome/ppm, chronic systolic heart failure, PAF, HTN, HLD, CAD, PVD.  [place] [facility] [place] vehicle [place] after syncopal episode.  Patient reported the has been having epigastric discomfort/pressure before leaving a restaurant.  His heart rate on seen was 190 beats per minute.  He was given 300 mg of amiodarone by EMS followed by synchronized cardioversion in the emergency room which only briefly improved his rate.  He was found to be in VT.  Echo at outside facility revealed an ejection fraction of 10%.  He was transferred to [facility] for higher level of care.  Plan was noted to have patient undergo left heart catheterization with Impella placement and EP study with VT ablation.  [facility] has been consulted for this reason.     Hospital Course:   5.10.25: NAD noted. Slow VT still on monitor. Impella in place. Bilateral groin benign. Denies CP/SOB/Palps.  5.11.25: NAD noted. Wide complex tachycardia on tele. Attempted Esmolol yesterday but had to be DCd  secondary to hypotension. Remains with Impella  5.12.25: NAD noted. WCT on tele. Remains on Amio and Lidocaine. NPO for VT ablation today. Denies CP/SOB/Palps.  5.13.25: NAD noted. WCT on tele. ON Amio and Lidocaine. Denies CP/SOB/palps. Impella in place.  5.14.25: NAD noted. WCT on tele. Remains on Lidocaine. Denies CP/sOB/palps. Impella removed.  5.15.25: NAD noted. ST with trigeminal. Denies CP/SOB/PALPS.    5.16.25: NAD noted. ST on  "tele. Denies CP/SOB/palps. NPO for ICD today  5.17.25: NAD. Vented/Sedated. Intermittent VT.  ICD Upgrade/VT Ablation on 5.16.25 5.18.25: NAD. Vented/Sedated. Continues to have Intermittent VT/ST. Amiodarone 1mg/min, Lidocaine 1mg/min, Levophed 0.05mcg/kg/min   5.19.25: Vented and sedated. Still with intermittent VT on tele. Remains on IV amio, Levophed, and Lidocaine.   5.20.25: Vented/sedated. WCT with rates in the low 100s -110s. Remains on IV Amio, Levo, and Lidocaine  5.21.25: Vented/sedated. WCT on tele with rates in the 100s. Remains on IV Amio, Levo and Lidocaine  5.22.25: Extubated and on NC. WCT in the low 100s today. Denies CP/sOB/Palps. Right groin benign.  5.23.25: ST on tele. Denies CP/SOB/Palps. Awaiting ST eval.  5.24.25: NAD. Remains in ST. Denies CP, SOB and Palps. "I am ok." NC O2  5.25.25: NAD. ST. Denies CP, SOB and Palps. "I am fine." NC O2  5.26.25: NAD. Feeling OK; frustrated with being in the hospital. SOB improving.   5.27.25: Feeling OK. NAD. Breathing better.    5.28.25: Restarted an amiodarone gtt on 5.27.25 and BB  5.30.25: Vented/sedated. Underwent epicardial VT ablation today. Pericardial drain in place  5.31.25: NAD. Vented/Sedated. S/P VT Ablation. Pericardial Drain. AST//732  6.1.25: NAD. "I am feeling better." Denies CP, SOB and Palps. AST//755  6.3.25: NAD noted. Paced on tele. Denies CP/SOB/palps.   6.4.25: NAD noted. Paced on tele. Denies CP/SOB/palps. Cleared for Diet now.  6.5.25: NAD noted. Paced on tele. Denies CP/SOB/Palps. He is upset about diet restrictions. SNF Placement pending.  6.6.25: NAD noted. Paced on tele. Denies CP/SOB/Palps. SNF pending.  6.7.25: NAD. "I am good." Denies, CP, SOB and Palps. Pending SNF Placement. No VT Noted on Telemetry.  6.8.25: Nursing reported blood BMs overnight. Stat labs obtained revealing stable H/H (11.8/39.5). VSS. Accurate I&)'s/daily weights not collected- remains on Lasix IVP BID. And is hypernatremic/hypochloremic "   6.9.25: NAD noted. VSS. Paced on tele. Cleared for DC to SNF.      PMH: SSS/PPM, Chronic Systolic HF, PAF, HTN, HLD, CAD, PVD  PSH: PPM (SJM), Tonsillectomy, Embolectomy, LHC  Social History:  Former Tobacco Use, Denies ETOH and Illicit Drug Use  Family History:  Mother-MI; brother-CAD     Previous Cardiac Diagnostics:   EP Study/VT 5.21.25:  3D mapping performed with Ensite.  Intracardiac ECHO.  Transeptal puncture.  VT ablation.    EP Study/VT 5.16.25:  3D mapping performed with Ensite.  Intracardiac echo.  Transeptal puncture.  VT ablation  Successful Implantation of BiV ICD ([employer])    ECHO Limited 5.9.25  Limited echo to check impella placement.  Impella is seated 3.9 cm from aortic valve annulus.    L/RHC 5.9.25  The Prox Cx to Dist Cx lesion was 50% stenosed.  However, the IFR was 0.96, indicating the absence of any obstructive coronary artery disease at this level.  The Prox LAD to Dist LAD lesion was 60% stenosed.  However, the IFR was 0.96, indicating the absence of any obstructive coronary artery disease at this level.  The ejection fraction was calculated to be 15%.  There was severe left ventricular systolic dysfunction.  The left ventricular end diastolic pressure was severely elevated.  The pre-procedure left ventricular end diastolic pressure was 23.  The estimated blood loss was none.  There was single vessel coronary artery disease.  There was trivial (1+) mitral regurgitation.  There was no aortic valve stenosis.  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  Strong distal collaterals from the left coronary system.    ECHO 7.25.24  The study quality is average.   Global left ventricular systolic function is mildly decreased. The left ventricular ejection fraction is 50%. Left ventricular diastolic function is indeterminate. Noted left ventricular hypertrophy. It is severe.  Moderate (2+) mitral regurgitation.  ERO-A0.21cm^2  Mild (1+) pulmonic regurgitation. Mild (1+) tricuspid regurgitation.   The left atrial diameter is moderately increased 5.2 cms.  The estimated right atrial pressure is 15 mmHg.      PET 12.29.22  This is an abnormal perfusion study. Study is consistent with ischemia.   This scan is suggestive of moderate risk for future cardiovascular events.   Small partially reversible perfusion abnormality of severe intensity in the inferior lateral region.   The left ventricular cavity is noted to be moderately enlarged on the stress studies. The stress left ventricular ejection fraction was calculated to be 40% and left ventricular global function is mildly reduced. The rest left ventricular cavity is noted to be moderately enlarged. The rest left ventricular ejection fraction was calculated to be 32% and rest left ventricular global function is moderately reduced.   When compared to the resting ejection fraction (32%), the stress ejection fraction (40%) has increased.   The study quality is good.   There was a rise in myocardial blood flow between rest and stress.  Global myocardial blood flow reserve was 1.97.  Myocardial blood flow reserve is globally abnormal, placing the patient at a higher coronary event risk.    Review of Systems   Constitutional: Positive for malaise/fatigue. Negative for fever and night sweats.   Cardiovascular:  Negative for chest pain, leg swelling and palpitations.   Respiratory:  Negative for shortness of breath.    Gastrointestinal:         Bloody BM   All other systems reviewed and are negative.    Objective:     Vital Signs (Most Recent):  Temp: 97.9 °F (36.6 °C) (06/09/25 0751)  Pulse: 81 (06/09/25 0751)  Resp: 20 (06/09/25 0751)  BP: 126/76 (06/09/25 0751)  SpO2: 100 % (06/09/25 0751) Vital Signs (24h Range):  Temp:  [97.4 °F (36.3 °C)-98.2 °F (36.8 °C)] 97.9 °F (36.6 °C)  Pulse:  [78-86] 81  Resp:  [17-20] 20  SpO2:  [96 %-100 %] 100 %  BP: (104-142)/(71-84) 126/76     Weight:  115 kg (253 lb 8.5 oz)  Body mass index is 34.38 kg/m².    SpO2: 100 %         Intake/Output Summary (Last 24 hours) at 6/9/2025 0846  Last data filed at 6/9/2025 0558  Gross per 24 hour   Intake --   Output 2510 ml   Net -2510 ml     Lines/Drains/Airways       Drain  Duration                  Urethral Catheter 06/08/25 1345 Silicone 16 Fr. <1 day              Peripheral Intravenous Line  Duration                  Peripheral IV - Single Lumen 06/08/25 1300 22 G Anterior;Right Upper Arm <1 day                  Significant Labs: CMP   Recent Labs   Lab 06/08/25  0448 06/09/25  0418   * 146*   K 4.0 3.9   * 112*   CO2 24 24   GLU 97 92   BUN 24.5 22.7   CREATININE 1.00 0.92   CALCIUM 8.4* 8.3*   PROT 6.4 6.4   ALBUMIN 2.3* 2.4*   BILITOT 1.2 1.3   ALKPHOS 111 107   AST 26 26   * 109*      and CBC   Recent Labs   Lab 06/08/25  0448 06/08/25  1102 06/09/25  0418   WBC 9.66  --  10.28   HGB 11.8* 12.9* 11.2*   HCT 39.5* 41.0* 37.4*     --  172       Telemetry: SR    Physical Exam  Constitutional:       General: He is not in acute distress.     Appearance: Normal appearance. He is obese. He is ill-appearing.   HENT:      Head: Normocephalic.      Mouth/Throat:      Mouth: Mucous membranes are dry.   Eyes:      Extraocular Movements: Extraocular movements intact.   Cardiovascular:      Rate and Rhythm: Normal rate and regular rhythm.      Pulses: Normal pulses.      Heart sounds: Normal heart sounds. No murmur heard.     Comments: Paced  Pulmonary:      Effort: Pulmonary effort is normal. No respiratory distress.      Breath sounds: Examination of the right-lower field reveals decreased breath sounds. Examination of the left-lower field reveals decreased breath sounds. Decreased breath sounds present.      Comments: NC O2  Abdominal:      Palpations: Abdomen is soft.   Musculoskeletal:      Right lower leg: No edema.      Left lower leg: No edema.   Skin:     General: Skin is warm.      Comments: L  CW Pacer Site Dressing C/D/I. Bilateral Groins Soft/Flat, Non-Tender, No Sign of Bleed/Infection. +2 BLE Palpable Pedal Pulses    Neurological:      General: No focal deficit present.      Mental Status: He is alert and oriented to person, place, and time.   Psychiatric:         Behavior: Behavior normal.         Judgment: Judgment normal.       Current Inpatient Medications:  Current Medications[1]    Assessment:   VT requiring Multiple Antiarrhythmics - Stable     - s/p (5.21.25) - 3D mapping performed with Ensite, Intracardiac ECHO, Transeptal puncture, VT Ablation    - s/p (5.16.25) - EP Study and Successful VT Ablation and Device Upgrade to BiV ICD (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%    - ECHO (5.31.25) - LVEF 25-35%  Syncope  PAF - Now WCT - Now SR with Episdoes of NSVT - Improved     - CHADsVASc - 5 Points - 7.2% Stroke Risk per Year   SSS/PPM (SJM) - Upgraded - See Above  HTN  HLD  Leukocytosis - Resolved    Chronic Systolic HF/EF 25-30% - Compensated     - ECHO (5.31.25) - LVEF 25-30%  Transaminitis - Improving   Electrolyte Derangements - Hypokalemia, Hypomagnesemia - Resolved   Hypernatremia/Hypochloremia  Reported GIB    Plan:   DC to SNF today  Continue Amiodarone, BB, Mexiletine, Entresto   Accurate I&Os and Daily Weight   Mobilize as Able with PT/OT   Keep K > 4.0 and Mg > 2.0   F/U with Dr. Samayoa in 1-2 weeks.     DISCHARGE PLAN:  Discharge: Home  Discharge Diet: Cardiac, Low Sodium  Discharge Condition: Stable  Discharge Activity:  As Tolerated, No Heavy Lifting > 5 lbs., Notify MD with Symptoms of Bleed/Infection  Discharge Time: 36 minutes    Discharge Medications:     Medication List        START taking these  medications      amiodarone 200 MG Tab  Commonly known as: PACERONE  Take 1 tablet (200 mg total) by mouth once daily.     famotidine 20 MG tablet  Commonly known as: PEPCID  Take 1 tablet (20 mg total) by mouth 2 (two) times daily.     metoprolol tartrate 50 MG tablet  Commonly known as: LOPRESSOR  Take 1 tablet (50 mg total) by mouth 3 (three) times daily.     mexiletine 200 MG Cap  Commonly known as: MEXITIL  Take 1 capsule (200 mg total) by mouth every 8 (eight) hours.     polyethylene glycol 17 gram Pwpk  Commonly known as: GLYCOLAX  Take 17 g by mouth 2 (two) times daily.     sacubitriL-valsartan 24-26 mg per tablet  Commonly known as: ENTRESTO  Take 1 tablet by mouth 2 (two) times daily.  Replaces: ENTRESTO 49-51 mg per tablet            CONTINUE taking these medications      ELIQUIS 5 mg Tab  Generic drug: apixaban     fenofibrate 145 MG tablet  Commonly known as: TRICOR  TAKE 1 TABLET BY MOUTH ONCE A DAY     finasteride 5 mg tablet  Commonly known as: PROSCAR  TAKE 1 TABLET BY MOUTH DAILY     folic acid 1 MG tablet  Commonly known as: FOLVITE  TAKE ONE TABLET BY MOUTH ONCE DAILY     furosemide 40 MG tablet  Commonly known as: LASIX     iron-vitamin C 100-250 mg (ICAR-C) 100-250 mg Tab  Commonly known as: ICAR-C  Take 1 tablet by mouth once daily.     pravastatin 40 MG tablet  Commonly known as: PRAVACHOL  TAKE 1 TABLET BY MOUTH DAILY            STOP taking these medications      ENTRESTO 49-51 mg per tablet  Generic drug: sacubitriL-valsartan  Replaced by: sacubitriL-valsartan 24-26 mg per tablet               Where to Get Your Medications        These medications were sent to [facility]  [facility] [address] [address]      Phone: [phone]   amiodarone 200 MG Tab  famotidine 20 MG tablet  metoprolol tartrate 50 MG tablet  mexiletine 200 MG Cap  polyethylene glycol 17 gram Pwpk  sacubitriL-valsartan 24-26 mg per tablet                    [1]    Current Facility-Administered Medications:     acetaminophen tablet 650 mg, 650 mg, Per NG tube, Q4H PRN, Bar Peck MD    acetylcysteine 100 mg/ml (10%) solution 4 mL, 4 mL, Nebulization, TID PRN, Peace Mott MD    amiodarone tablet 200 mg, 200 mg, Oral, Daily, Jhon Ramsey AGACNP-BC, 200 mg at 06/08/25 1025    bisacodyL suppository 10 mg, 10 mg, Rectal, Daily PRN, Peace Mott MD    famotidine tablet 20 mg, 20 mg, Oral, BID, Bar Peck MD, 20 mg at 06/08/25 2040    finasteride tablet 5 mg, 5 mg, Oral, Daily, Bar Peck MD, 5 mg at 06/08/25 1025    furosemide injection 40 mg, 40 mg, Intravenous, BID WM, Jhon Ramsey AGACNP-BC, 40 mg at 06/08/25 2040    guaiFENesin 100 mg/5 ml syrup 200 mg, 200 mg, Per NG tube, Q4H PRN, Misha Johansen DO    LIDOcaine 2000 mg in D5W 250 mL infusion, , , Continuous PRN, Lalo Dominguez MD, Last Rate: 7.5 mL/hr at 05/10/25 1935, Rate Verify at 05/10/25 1935    methocarbamoL tablet 500 mg, 500 mg, Oral, Once, Shawn Olivas MD    metoprolol injection 5 mg, 5 mg, Intravenous, Q6H PRN, Amelia Gallardo, FNP, 5 mg at 05/28/25 0438    metoprolol tartrate (LOPRESSOR) tablet 50 mg, 50 mg, Oral, TID, Bar Peck MD, 50 mg at 06/08/25 2040    mexiletine capsule 200 mg, 200 mg, Oral, Q8H, Bar Peck MD, 200 mg at 06/09/25 0618    miconazole NITRATE 2 % top powder, , Topical (Top), BID, Asad Randle MD, Given at 06/08/25 2040    morphine injection 1 mg, 1 mg, Intravenous, Once, Devaughn Magdaleno MD    morphine injection 2 mg, 2 mg, Intravenous, Q4H PRN, Devaughn Magdaleno MD    neomycin-bacitracin-polymyxin ointment, , Topical (Top), TID, Jhon Ramsye, [facility], Given at 06/08/25 2041    ondansetron disintegrating tablet 8 mg, 8 mg, Per NG tube, Q8H PRN, Bar Peck MD    polyethylene glycol packet 17 g, 17 g, Oral, BID, Bar Peck MD, 17 g at 06/05/25 2037    pravastatin tablet 40 mg, 40 mg, Oral, Daily,  Bar Peck MD, 40 mg at 06/08/25 1025    sacubitriL-valsartan 24-26 mg per tablet 1 tablet, 1 tablet, Oral, BID, Bar Peck MD, 1 tablet at 06/08/25 2040    senna-docusate 8.6-50 mg per tablet 1 tablet, 1 tablet, Oral, Daily, Bar Peck MD, 1 tablet at 06/08/25 1026    sodium chloride 0.9% flush 10 mL, 10 mL, Intravenous, PRN, Misha Johansen DO    sodium chloride 0.9% flush 10 mL, 10 mL, Intravenous, PRN, Jhon Ramsey, [facility]    Flushing PICC/Midline Protocol, , , Until Discontinued **AND** sodium chloride 0.9% flush 10 mL, 10 mL, Intravenous, Q12H PRN, Isac Samayoa MD    sodium chloride 0.9% flush 10 mL, 10 mL, Intravenous, PRN, Isac Samayoa MD    sodium chloride 3% nebulizer solution 4 mL, 4 mL, Nebulization, Q8H, Oleksandr Cochran MD, 4 mL at 06/09/25 0740

## 2025-06-09 NOTE — PLAN OF CARE
Received a call from Nivia with [facility], they have received Insurance Auth and can take pt today. Advised [facility].  Waiting on Discharge.

## 2025-06-09 NOTE — PLAN OF CARE
06/09/25 0914   Final Note   Assessment Type Discharge Planning Assessment   Anticipated Discharge Disposition SNF   Hospital Resources/Appts/Education Provided Appointments scheduled and added to AVS   Post-Acute Status   Post-Acute Authorization Placement   Post-Acute Placement Status Set-up Complete/Auth obtained   Patient choice form signed by patient/caregiver List with quality metrics by geographic area provided   Discharge Delays None known at this time     Pt will dc to [facility] today

## (undated) DEVICE — DEVICE PERCLOSE SUT CLSR 6FR

## (undated) DEVICE — KIT ENSITE ELECTRODE SURFACE

## (undated) DEVICE — BOWL UTILITY BLUE 32OZ

## (undated) DEVICE — KIT MANIFOLD LOW PRESS TUBING

## (undated) DEVICE — CATH IMPELLA CP 14F 4.7X65MM

## (undated) DEVICE — SUT SILK 0 BLK BR CT-1 30IN

## (undated) DEVICE — CATH VIEWFLEX XTRA ICE 9F 90CM

## (undated) DEVICE — SHEATH PINNACLE 8FR

## (undated) DEVICE — SHEATH INTRODUCER 5FR 10CM

## (undated) DEVICE — PACK OR CLEAN UP COMBO SIZE 2

## (undated) DEVICE — Device

## (undated) DEVICE — CANNULA DUAL CO2/O2 NASAL 7FT

## (undated) DEVICE — ILLUMINATOR PHOTONBLADE 8/11IN

## (undated) DEVICE — SUT VICRYL 3-0 27 SH

## (undated) DEVICE — SYR 50CC LL

## (undated) DEVICE — SUT SILK 0 SH 30IN BLK BR

## (undated) DEVICE — CATH IMPULSE 5F 100CM FR4

## (undated) DEVICE — COVER LIGHT HANDLE FLEX GRN

## (undated) DEVICE — MANIFOLD 4 PORT

## (undated) DEVICE — INTRODUCER OPTISEAL 6F 13CM

## (undated) DEVICE — GLIDE CATH ANGLED 4FR 65CM

## (undated) DEVICE — SHEATH CHECK FLO 14FR/30CM

## (undated) DEVICE — PENCIL SMOKE EVAC TELSCP 15FT

## (undated) DEVICE — KIT MINI STK MAX COAX 5FR 10CM

## (undated) DEVICE — SUT STRATAFIX 3-0 SH 8IN

## (undated) DEVICE — SHEATH INTRODUCER 6FR 11CM

## (undated) DEVICE — GUIDEWIRE VASC 4X180CM .014IN

## (undated) DEVICE — GUIDEWIRE STF .035X180CM ANG

## (undated) DEVICE — CATH SUPREME EP JSN 5MM 120CM

## (undated) DEVICE — INTRODUCER SHEATH 7F 11CM 35MM

## (undated) DEVICE — ELECTRODE REM PLYHSV RETURN 9

## (undated) DEVICE — CATH IMPULSE 5FR PIGTAIL 125CM

## (undated) DEVICE — CANNULA CAPNOFLEX ORAL/NSL CO2

## (undated) DEVICE — GLOVE 7.0 PROTEXIS PI MICRO

## (undated) DEVICE — GUIDEWIRE AMPLATZ .035X260

## (undated) DEVICE — KIT VERSACROSS TRANSSEPTAL

## (undated) DEVICE — SUT VICRYL 2-0 27 CT-1

## (undated) DEVICE — GUIDE LAUNCHER 6FR EBU 3.5

## (undated) DEVICE — FORCEP HEMOSTAT MOSQUITO STR

## (undated) DEVICE — PAD HEARTSTART DEFIB ADULT

## (undated) DEVICE — TRAY CENT PREM SUT REM PK SGL

## (undated) DEVICE — SET TUBING COOL POINT IRR

## (undated) DEVICE — SHEATH 10FR 23CM BRITE TIP

## (undated) DEVICE — KIT VERSACROSS PGTL D1 230X63

## (undated) DEVICE — PAD DEFIB CADENCE ADULT R2

## (undated) DEVICE — SUT ETHBND XTRA 1 OS-8 30IN

## (undated) DEVICE — SYS WASTE FLD DISPOSAL 1400ML

## (undated) DEVICE — DRESSING TELFA ADH ISL 4X8IN

## (undated) DEVICE — KIT HAND CONTROL HIGH PRESSUR

## (undated) DEVICE — DEVICE BASIXCOMPAK INFL 20ML

## (undated) DEVICE — CONTRAST ISOVUE 370 500ML MULT

## (undated) DEVICE — CATH ADVISOR HD GRID X SENSOR

## (undated) DEVICE — SLING ARM LARGE FOAM STRAP

## (undated) DEVICE — GUIDEWIRE OMNI STR TIP 185CM

## (undated) DEVICE — COVER PROBE US 5.5X58L NON LTX

## (undated) DEVICE — VALVE CONTROL COPILOT

## (undated) DEVICE — CATH BLLN COR SINUS VENOGRAPHY

## (undated) DEVICE — STAPLER SKIN 35 WIDE

## (undated) DEVICE — CLOSURE SKIN STERI STRIP 1/2X4

## (undated) DEVICE — GUIDEWIRE INQWIRE SE 3MM JTIP

## (undated) DEVICE — ELECTRODE PATIENT RETURN DISP

## (undated) DEVICE — KIT INTRO RADPQ BT 8FRX11CM

## (undated) DEVICE — GOWN X-LG STERILE BACK

## (undated) DEVICE — CATH IMPULSE FL5 5FR

## (undated) DEVICE — CONTRAST ISOVUE 370 100ML

## (undated) DEVICE — PACK PACER PERMANENT

## (undated) DEVICE — ADHESIVE DERMABOND ADVANCED

## (undated) DEVICE — CATH SWAN GANZ 7FR 110CM

## (undated) DEVICE — GUIDEWIRE SAFE-T-J CRV 180CM

## (undated) DEVICE — STRATAFIX 2-0

## (undated) DEVICE — DRAPE INCISE IOBAN 2 13X13IN

## (undated) DEVICE — DRAPE INCISE IOBAN 2 23X17IN

## (undated) DEVICE — SUT PERCLOSE PROSTYLE MEDIATE

## (undated) DEVICE — SET ANGIO ACIST CVI ANGIOTOUCH

## (undated) DEVICE — SHEATH INTRODUCER 7FR 11CM

## (undated) DEVICE — SUT 2-0 VICRYL / CT-1

## (undated) DEVICE — KIT SYR REUSABLE

## (undated) DEVICE — CATH AL 1.0 5/BX

## (undated) DEVICE — GLOVE 7.5 PROTEXIS PI MICRO

## (undated) DEVICE — INTRODUCER AGILIS LG CURL

## (undated) DEVICE — FLUID DELIVERY SET WITH FILTER

## (undated) DEVICE — CATH TACTFLEX SE BID CURVE D-F

## (undated) DEVICE — SET MICROPUNCTUREECHO STIFF

## (undated) DEVICE — SHEATH CPS DIRECT 115D 47CM

## (undated) DEVICE — PAD DEFIB RADIOLUCENT ADULT

## (undated) DEVICE — GUIDE WIRE WHOLLY FLOPPY

## (undated) DEVICE — TUBING HP AIRLSS ROT ADPT 30IN